# Patient Record
Sex: FEMALE | Race: WHITE | NOT HISPANIC OR LATINO | Employment: UNEMPLOYED | URBAN - METROPOLITAN AREA
[De-identification: names, ages, dates, MRNs, and addresses within clinical notes are randomized per-mention and may not be internally consistent; named-entity substitution may affect disease eponyms.]

---

## 2022-02-08 ENCOUNTER — HISTORICAL (OUTPATIENT)
Dept: ADMINISTRATIVE | Facility: HOSPITAL | Age: 62
End: 2022-02-08

## 2022-12-12 ENCOUNTER — HOSPITAL ENCOUNTER (EMERGENCY)
Facility: HOSPITAL | Age: 62
Discharge: HOME | End: 2022-12-12
Attending: STUDENT IN AN ORGANIZED HEALTH CARE EDUCATION/TRAINING PROGRAM
Payer: MEDICAID

## 2022-12-12 VITALS
SYSTOLIC BLOOD PRESSURE: 182 MMHG | WEIGHT: 145 LBS | DIASTOLIC BLOOD PRESSURE: 111 MMHG | HEART RATE: 91 BPM | TEMPERATURE: 98 F | OXYGEN SATURATION: 98 % | RESPIRATION RATE: 17 BRPM

## 2022-12-12 DIAGNOSIS — T40.711A UNINTENTIONAL POISONING BY CANNABIS, INITIAL ENCOUNTER: Primary | ICD-10-CM

## 2022-12-12 DIAGNOSIS — R51.9 NONINTRACTABLE HEADACHE, UNSPECIFIED CHRONICITY PATTERN, UNSPECIFIED HEADACHE TYPE: ICD-10-CM

## 2022-12-12 DIAGNOSIS — R11.2 NAUSEA AND VOMITING, UNSPECIFIED VOMITING TYPE: ICD-10-CM

## 2022-12-12 DIAGNOSIS — J18.9 PNEUMONIA OF LOWER LOBE DUE TO INFECTIOUS ORGANISM, UNSPECIFIED LATERALITY: ICD-10-CM

## 2022-12-12 LAB
ALBUMIN SERPL-MCNC: 4.2 GM/DL (ref 3.4–4.8)
ALBUMIN/GLOB SERPL: 1 RATIO (ref 1.1–2)
ALP SERPL-CCNC: 89 UNIT/L (ref 40–150)
ALT SERPL-CCNC: 44 UNIT/L (ref 0–55)
AST SERPL-CCNC: 72 UNIT/L (ref 5–34)
BASOPHILS # BLD AUTO: 0.05 X10(3)/MCL (ref 0–0.2)
BASOPHILS NFR BLD AUTO: 0.7 %
BILIRUBIN DIRECT+TOT PNL SERPL-MCNC: 0.7 MG/DL
BNP BLD-MCNC: 28.2 PG/ML
BUN SERPL-MCNC: 9 MG/DL (ref 9.8–20.1)
CALCIUM SERPL-MCNC: 9 MG/DL (ref 8.4–10.2)
CHLORIDE SERPL-SCNC: 108 MMOL/L (ref 98–107)
CO2 SERPL-SCNC: 21 MMOL/L (ref 23–31)
CREAT SERPL-MCNC: 0.67 MG/DL (ref 0.55–1.02)
EOSINOPHIL # BLD AUTO: 0.01 X10(3)/MCL (ref 0–0.9)
EOSINOPHIL NFR BLD AUTO: 0.1 %
ERYTHROCYTE [DISTWIDTH] IN BLOOD BY AUTOMATED COUNT: 16.2 % (ref 11.5–17)
FLUAV AG UPPER RESP QL IA.RAPID: NOT DETECTED
FLUBV AG UPPER RESP QL IA.RAPID: NOT DETECTED
GFR SERPLBLD CREATININE-BSD FMLA CKD-EPI: >60 MLS/MIN/1.73/M2
GLOBULIN SER-MCNC: 4.1 GM/DL (ref 2.4–3.5)
GLUCOSE SERPL-MCNC: 114 MG/DL (ref 82–115)
HCT VFR BLD AUTO: 41.6 % (ref 37–47)
HGB BLD-MCNC: 13.3 GM/DL (ref 12–16)
IMM GRANULOCYTES # BLD AUTO: 0.02 X10(3)/MCL (ref 0–0.04)
IMM GRANULOCYTES NFR BLD AUTO: 0.3 %
LYMPHOCYTES # BLD AUTO: 0.69 X10(3)/MCL (ref 0.6–4.6)
LYMPHOCYTES NFR BLD AUTO: 9.3 %
MCH RBC QN AUTO: 27.9 PG (ref 27–31)
MCHC RBC AUTO-ENTMCNC: 32 MG/DL (ref 33–36)
MCV RBC AUTO: 87.2 FL (ref 80–94)
MONOCYTES # BLD AUTO: 0.58 X10(3)/MCL (ref 0.1–1.3)
MONOCYTES NFR BLD AUTO: 7.8 %
NEUTROPHILS # BLD AUTO: 6 X10(3)/MCL (ref 2.1–9.2)
NEUTROPHILS NFR BLD AUTO: 81.8 %
NRBC BLD AUTO-RTO: 0 %
PLATELET # BLD AUTO: 197 X10(3)/MCL (ref 130–400)
PMV BLD AUTO: 8.8 FL (ref 7.4–10.4)
POTASSIUM SERPL-SCNC: 3.8 MMOL/L (ref 3.5–5.1)
PROT SERPL-MCNC: 8.3 GM/DL (ref 5.8–7.6)
RBC # BLD AUTO: 4.77 X10(6)/MCL (ref 4.2–5.4)
RSV A 5' UTR RNA NPH QL NAA+PROBE: NOT DETECTED
SARS-COV-2 RNA RESP QL NAA+PROBE: NOT DETECTED
SODIUM SERPL-SCNC: 145 MMOL/L (ref 136–145)
TROPONIN I SERPL-MCNC: <0.01 NG/ML (ref 0–0.04)
WBC # SPEC AUTO: 7.4 X10(3)/MCL (ref 4.5–11.5)

## 2022-12-12 PROCEDURE — 84484 ASSAY OF TROPONIN QUANT: CPT | Performed by: STUDENT IN AN ORGANIZED HEALTH CARE EDUCATION/TRAINING PROGRAM

## 2022-12-12 PROCEDURE — 99285 EMERGENCY DEPT VISIT HI MDM: CPT | Mod: 25

## 2022-12-12 PROCEDURE — 93005 ELECTROCARDIOGRAM TRACING: CPT

## 2022-12-12 PROCEDURE — 63600175 PHARM REV CODE 636 W HCPCS: Performed by: STUDENT IN AN ORGANIZED HEALTH CARE EDUCATION/TRAINING PROGRAM

## 2022-12-12 PROCEDURE — 96375 TX/PRO/DX INJ NEW DRUG ADDON: CPT

## 2022-12-12 PROCEDURE — 0241U COVID/RSV/FLU A&B PCR: CPT | Performed by: STUDENT IN AN ORGANIZED HEALTH CARE EDUCATION/TRAINING PROGRAM

## 2022-12-12 PROCEDURE — 83880 ASSAY OF NATRIURETIC PEPTIDE: CPT | Performed by: STUDENT IN AN ORGANIZED HEALTH CARE EDUCATION/TRAINING PROGRAM

## 2022-12-12 PROCEDURE — 25000003 PHARM REV CODE 250: Performed by: STUDENT IN AN ORGANIZED HEALTH CARE EDUCATION/TRAINING PROGRAM

## 2022-12-12 PROCEDURE — 85025 COMPLETE CBC W/AUTO DIFF WBC: CPT | Performed by: STUDENT IN AN ORGANIZED HEALTH CARE EDUCATION/TRAINING PROGRAM

## 2022-12-12 PROCEDURE — 96361 HYDRATE IV INFUSION ADD-ON: CPT

## 2022-12-12 PROCEDURE — 96365 THER/PROPH/DIAG IV INF INIT: CPT

## 2022-12-12 PROCEDURE — 80053 COMPREHEN METABOLIC PANEL: CPT | Performed by: STUDENT IN AN ORGANIZED HEALTH CARE EDUCATION/TRAINING PROGRAM

## 2022-12-12 RX ORDER — DOXYCYCLINE 100 MG/1
100 CAPSULE ORAL 2 TIMES DAILY
Qty: 14 CAPSULE | Refills: 0 | Status: SHIPPED | OUTPATIENT
Start: 2022-12-12 | End: 2022-12-19

## 2022-12-12 RX ORDER — KETOROLAC TROMETHAMINE 30 MG/ML
15 INJECTION, SOLUTION INTRAMUSCULAR; INTRAVENOUS
Status: COMPLETED | OUTPATIENT
Start: 2022-12-12 | End: 2022-12-12

## 2022-12-12 RX ORDER — PROMETHAZINE HYDROCHLORIDE 25 MG/1
25 TABLET ORAL EVERY 6 HOURS PRN
Qty: 10 TABLET | Refills: 0 | Status: SHIPPED | OUTPATIENT
Start: 2022-12-12 | End: 2022-12-12 | Stop reason: SDUPTHER

## 2022-12-12 RX ORDER — PROMETHAZINE HYDROCHLORIDE 25 MG/1
25 SUPPOSITORY RECTAL EVERY 6 HOURS PRN
Qty: 10 SUPPOSITORY | Refills: 0 | Status: ON HOLD | OUTPATIENT
Start: 2022-12-12 | End: 2023-10-28 | Stop reason: HOSPADM

## 2022-12-12 RX ORDER — PROMETHAZINE HYDROCHLORIDE 25 MG/1
25 TABLET ORAL EVERY 6 HOURS PRN
Qty: 10 TABLET | Refills: 0 | Status: ON HOLD | OUTPATIENT
Start: 2022-12-12 | End: 2023-10-28 | Stop reason: HOSPADM

## 2022-12-12 RX ORDER — ONDANSETRON 2 MG/ML
4 INJECTION INTRAMUSCULAR; INTRAVENOUS
Status: COMPLETED | OUTPATIENT
Start: 2022-12-12 | End: 2022-12-12

## 2022-12-12 RX ORDER — ACETAMINOPHEN 500 MG
1000 TABLET ORAL
Status: DISCONTINUED | OUTPATIENT
Start: 2022-12-12 | End: 2022-12-13 | Stop reason: HOSPADM

## 2022-12-12 RX ORDER — DOXYCYCLINE 100 MG/1
100 CAPSULE ORAL 2 TIMES DAILY
Qty: 14 CAPSULE | Refills: 0 | Status: SHIPPED | OUTPATIENT
Start: 2022-12-12 | End: 2022-12-12 | Stop reason: SDUPTHER

## 2022-12-12 RX ORDER — PROMETHAZINE HYDROCHLORIDE 25 MG/1
25 SUPPOSITORY RECTAL EVERY 6 HOURS PRN
Qty: 10 SUPPOSITORY | Refills: 0 | Status: SHIPPED | OUTPATIENT
Start: 2022-12-12 | End: 2022-12-12 | Stop reason: SDUPTHER

## 2022-12-12 RX ADMIN — ONDANSETRON 4 MG: 2 INJECTION INTRAMUSCULAR; INTRAVENOUS at 09:12

## 2022-12-12 RX ADMIN — SODIUM CHLORIDE 1000 ML: 9 INJECTION, SOLUTION INTRAVENOUS at 09:12

## 2022-12-12 RX ADMIN — ONDANSETRON 4 MG: 2 INJECTION INTRAMUSCULAR; INTRAVENOUS at 07:12

## 2022-12-12 RX ADMIN — PROMETHAZINE HYDROCHLORIDE 25 MG: 25 INJECTION INTRAMUSCULAR; INTRAVENOUS at 10:12

## 2022-12-12 RX ADMIN — KETOROLAC TROMETHAMINE 15 MG: 30 INJECTION, SOLUTION INTRAMUSCULAR; INTRAVENOUS at 07:12

## 2022-12-12 NOTE — Clinical Note
Aayush Duffy accompanied their spouse to the emergency department on 12/12/2022. They may return to work on 12/13/2022.      If you have any questions or concerns, please don't hesitate to call.      VALERI Mcguire RN RN

## 2022-12-13 NOTE — ED PROVIDER NOTES
Encounter Date: 12/12/2022       History     Chief Complaint   Patient presents with    Shortness of Breath     Pt reports migraine headache, n/v, SOB onset today. +tachycardia, EKG ordered     62-year-old female presents to ED for migraines, nausea and vomiting.  Patient states she consumed a marijuana laced gummy earlier today.  States since this time she is had recurrent nausea and vomiting.  States she has some mild abdominal discomfort from the repetitive vomiting.  Only hurts when actively throwing up.  Denies any diarrhea or stool changes.  No urinary or pelvic complaints.  Does report chronic history of migraines and states this feels similar.  No neck pain or stiffness.  No fevers or chills.   Does report history of prior head trauma requiring neurosurgery.  Has been years since procedure. Has home ODT Zofran that was not effective in controlling her nausea.  Denies any acute vision changes or neuro deficits.  Concerned since she was unable to keep any food or liquids down.  No other complaints or concerns at this time.    Review of patient's allergies indicates:   Allergen Reactions    Hydrocodone      No past medical history on file.  No past surgical history on file.  No family history on file.     Review of Systems   Constitutional:  Negative for chills, diaphoresis and fever.   HENT:  Negative for congestion, rhinorrhea, sinus pain and sore throat.    Eyes:  Negative for pain, discharge and itching.   Respiratory:  Negative for cough, chest tightness and shortness of breath.    Cardiovascular:  Negative for chest pain and palpitations.   Gastrointestinal:  Positive for abdominal pain, nausea and vomiting. Negative for abdominal distention, anal bleeding, blood in stool, constipation, diarrhea and rectal pain.   Genitourinary:  Negative for dysuria, flank pain and hematuria.   Musculoskeletal:  Negative for back pain, myalgias, neck pain and neck stiffness.   Skin:  Negative for color change and rash.    Neurological:  Positive for headaches. Negative for dizziness, tremors, seizures, syncope, facial asymmetry, speech difficulty, weakness, light-headedness and numbness.   Psychiatric/Behavioral:  Negative for confusion. The patient is not hyperactive.      Physical Exam     Initial Vitals [12/12/22 1826]   BP Pulse Resp Temp SpO2   (!) 197/96 (!) 137 (!) 24 97.5 °F (36.4 °C) 98 %      MAP       --         Physical Exam    Vitals reviewed.  Constitutional: She is not diaphoretic. No distress.   Actively vomiting.   HENT:   Head: Normocephalic and atraumatic.   Eyes: Conjunctivae and EOM are normal. Pupils are equal, round, and reactive to light.   Neck: Neck supple. No tracheal deviation present.   Normal range of motion.  Cardiovascular:  Normal rate, regular rhythm, normal heart sounds and intact distal pulses.           Pulmonary/Chest: Breath sounds normal. No respiratory distress.   Abdominal: Abdomen is soft and flat. There is no abdominal tenderness.   No right CVA tenderness.  No left CVA tenderness. There is no rebound, no guarding, no tenderness at McBurney's point and negative Castrejon's sign. negative Rovsing's sign  Musculoskeletal:         General: Normal range of motion.      Cervical back: Normal range of motion and neck supple.     Neurological: She is alert and oriented to person, place, and time. She has normal strength. She displays no atrophy and no tremor. No cranial nerve deficit or sensory deficit. She exhibits normal muscle tone. She displays a negative Romberg sign. She displays no seizure activity. Coordination normal. GCS score is 15. GCS eye subscore is 4. GCS verbal subscore is 5. GCS motor subscore is 6.   +photophobia.   Skin: Skin is warm and dry. Capillary refill takes less than 2 seconds. No rash noted.   Psychiatric: She has a normal mood and affect. Her behavior is normal. Judgment and thought content normal.       ED Course   Procedures  Labs Reviewed   COMPREHENSIVE METABOLIC  PANEL - Abnormal; Notable for the following components:       Result Value    Chloride 108 (*)     Carbon Dioxide 21 (*)     Blood Urea Nitrogen 9.0 (*)     Protein Total 8.3 (*)     Globulin 4.1 (*)     Albumin/Globulin Ratio 1.0 (*)     Aspartate Aminotransferase 72 (*)     All other components within normal limits   CBC WITH DIFFERENTIAL - Abnormal; Notable for the following components:    MCHC 32.0 (*)     All other components within normal limits   COVID/RSV/FLU A&B PCR - Normal    Narrative:     The [employer] Xpress SARS-CoV-2/FLU/RSV plus is a rapid, multiplexed real-time PCR test intended for the simultaneous qualitative detection and differentiation of SARS-CoV-2, Influenza A, Influenza B, and respiratory syncytial virus (RSV) viral RNA in either nasopharyngeal swab or nasal swab specimens.         TROPONIN I - Normal   B-TYPE NATRIURETIC PEPTIDE - Normal   CBC W/ AUTO DIFFERENTIAL    Narrative:     The following orders were created for panel order CBC auto differential.  Procedure                               Abnormality         Status                     ---------                               -----------         ------                     CBC with Differential[[id number]]        Abnormal            Final result                 Please view results for these tests on the individual orders.   EXTRA TUBES    Narrative:     The following orders were created for panel order EXTRA TUBES.  Procedure                               Abnormality         Status                     ---------                               -----------         ------                     Light Blue Top Hold[[id number]]                              In process                 Red Top Hold[[id number]]                                     In process                   Please view results for these tests on the individual orders.   LIGHT BLUE TOP HOLD   RED TOP HOLD     EKG Readings: (Independently Interpreted)   Initial Reading: No STEMI. Rhythm:  Normal Sinus Rhythm. Ectopy: No Ectopy. Conduction: Normal. Axis: Normal. Other Findings: Prolonged QT Interval. Clinical Impression: Normal Sinus Rhythm   ECG Results              EKG 12-lead (Final result)  Result time 12/16/22 11:14:47      Final result by Interface, [facility] (12/16/22 11:14:47)                   Narrative:    Test Reason : R06.02,    Vent. Rate : 099 BPM     Atrial Rate : 099 BPM     P-R Int : 112 ms          QRS Dur : 090 ms      QT Int : 410 ms       P-R-T Axes : 068 036 069 degrees     QTc Int : 526 ms    Normal sinus rhythm  Prolonged QT  Abnormal ECG  No previous ECGs available  Confirmed by Fatou De La Torre MD ([id number]) on 12/16/2022 11:14:36 AM    Referred By: MARITZA   SELF           Confirmed By:Fatou De La Torre MD                                  Imaging Results              CT Head Without Contrast (Final result)  Result time 12/13/22 08:50:37      Final result by Kevyn Abebe MD (12/13/22 08:50:37)                   Impression:    Impression:    1. There is left parietal craniotomy with stable underlying dural thickening.    2. No acute intracranial process identified. Details and findings as noted above.    No significant discrepancy with overnight report.      Electronically signed by: Kevyn Abebe  Date:    12/13/2022  Time:    08:50               Narrative:      Technique:CT of the head was performed without intravenous contrast with axial as well as coronal and sagittal images.    Comparison:Comparison is with study dated2022-02-08 17:11:03.    DLP 9 3    Dosage Information:Automated exposure control was utilized.    Clinical history:Pt reports migraine headache, n/v, SOB onset today. +tachycardia.    Findings:    Hemorrhage:No acute intracranial hemorrhage is seen.    CSF spaces:The ventricles, sulci and basal cisterns all appear mildly prominent consistent with global cerebral atrophy.    Brain parenchyma:There is preservation of the grey white junction throughout. Mild  microvascular change is seen in portions of the periventricular and deep white matter tracts.    Cerebellum:Unremarkable.    Calvarium:There is left parietal craniotomy with stable underlying dural thickening.    Maxillofacial Structures:    Paranasal sinuses:There is opacification of the right maxillary sinus with sclerosis of the sinus wall. A soft tissue density is seen into the right maxillary sinus that extends to the nasal cavity through a large defect in the medial wall of the sinus. This may reflect a polyp. There is opacification of right anterior ethmoid air cell..                        Preliminary result by Kevyn Abebe MD (12/12/22 21:38:27)                   Narrative:    START OF REPORT:  Technique: CT of the head was performed without intravenous contrast with axial as well as coronal and sagittal images.    Comparison: Comparison is with study dated2022-02-08 17:11:03.    Dosage Information: Automated exposure control was utilized.    Clinical history: Pt reports migraine headache, n/v, SOB onset today. +tachycardia.    Findings:  Hemorrhage: No acute intracranial hemorrhage is seen.  CSF spaces: The ventricles, sulci and basal cisterns all appear mildly prominent consistent with global cerebral atrophy.  Brain parenchyma: There is preservation of the grey white junction throughout. Mild microvascular change is seen in portions of the periventricular and deep white matter tracts.  Cerebellum: Unremarkable.  Sella and skull base: The sella appears to be within normal limits for age.  Herniation: None.  Intracranial calcifications: Incidental note is made of bilateral choroid plexus calcification. Incidental note is made of some pineal region calcification.  Calvarium: There is left parietal craniotomy with stable underlying dural thickening.    Maxillofacial Structures:  Paranasal sinuses: There is opacification of the right maxillary sinus with sclerosis of the sinus wall. A soft tissue density  is seen into the right maxillary sinus that extends to the nasal cavity through a large defect in the medial wall of the sinus. This may reflect a polyp. There is opacification of right anterior ethmoid air cell.  Orbits: The orbits appear unremarkable.  Zygomatic arches: The zygomatic arches are intact and unremarkable.  Temporal bones and mastoids: The temporal bones and mastoids appear unremarkable.  TMJ: The mandibular condyles appear normally placed with respect to the mandibular fossa.      Impression:  1. There is left parietal craniotomy with stable underlying dural thickening.  2. No acute intracranial process identified. Details and findings as noted above.                          Preliminary result by Mahesh Posada Jr., MD (12/12/22 21:38:27)                   Narrative:    START OF REPORT:  Technique: CT of the head was performed without intravenous contrast with axial as well as coronal and sagittal images.    Comparison: Comparison is with study [id number] 17:11:03.    Dosage Information: Automated exposure control was utilized.    Clinical history: Pt reports migraine headache, n/v, SOB onset today. +tachycardia.    Findings:  Hemorrhage: No acute intracranial hemorrhage is seen.  CSF spaces: The ventricles, sulci and basal cisterns all appear mildly prominent consistent with global cerebral atrophy.  Brain parenchyma: There is preservation of the grey white junction throughout. Mild microvascular change is seen in portions of the periventricular and deep white matter tracts.  Cerebellum: Unremarkable.  Sella and skull base: The sella appears to be within normal limits for age.  Herniation: None.  Intracranial calcifications: Incidental note is made of bilateral choroid plexus calcification. Incidental note is made of some pineal region calcification.  Calvarium: There is left parietal craniotomy with stable underlying dural thickening.    Maxillofacial Structures:  Paranasal sinuses:  There is opacification of the right maxillary sinus with sclerosis of the sinus wall. A soft tissue density is seen into the right maxillary sinus that extends to the nasal cavity through a large defect in the medial wall of the sinus. This may reflect a polyp. There is opacification of right anterior ethmoid air cell.  Orbits: The orbits appear unremarkable.  Zygomatic arches: The zygomatic arches are intact and unremarkable.  Temporal bones and mastoids: The temporal bones and mastoids appear unremarkable.  TMJ: The mandibular condyles appear normally placed with respect to the mandibular fossa.      Impression:  1. There is left parietal craniotomy with stable underlying dural thickening.  2. No acute intracranial process identified. Details and findings as noted above.                                         X-Ray Chest AP Portable (Final result)  Result time 12/12/22 19:08:29      Final result by Gildardo Tracy MD (12/12/22 19:08:29)                   Impression:      Left basilar opacities, question infection.      Electronically signed by: Gildardo Tracy  Date:    12/12/2022  Time:    19:08               Narrative:    EXAMINATION:  XR CHEST AP PORTABLE    CLINICAL HISTORY:  Chest Pain;    COMPARISON:  19 February 2021    FINDINGS:  Portable frontal view of the chest was obtained. Heart is not enlarged.  Mild left basilar opacities.  No pneumothorax.                                       Medications   ketorolac injection 15 mg (15 mg Intravenous Given 12/12/22 1958)   ondansetron injection 4 mg (4 mg Intravenous Given 12/12/22 1958)   ondansetron injection 4 mg (4 mg Intravenous Given 12/12/22 2113)   sodium chloride 0.9% bolus 1,000 mL (0 mLs Intravenous Stopped 12/12/22 2213)   promethazine (PHENERGAN) 25 mg in dextrose 5 % 50 mL IVPB (0 mg Intravenous Stopped 12/12/22 2227)     Medical Decision Making:   Clinical Tests:   Lab Tests: Reviewed and Ordered  Radiological Study: Ordered and Reviewed  Medical  Tests: Reviewed and Ordered  ED Management:  62-year-old female presents to ED for nausea vomiting and headache after consuming a cannabis edible.  Patient reports history of headaches and prior Neurosurgery but states her head discomfort feels baseline.  Denies any neuro deficits. reporting a mild abdominal discomfort secondary to repetitive vomiting.  Did try Zofran without relief.  On arrival was hypertensive otherwise vitals stable.  Afebrile.  Her comprehensive neuro assessment was grossly unremarkable. no focal deficits, no nuchal rigidity, did have some mild photophobia initially.  Her abdomen for me was soft without rebound guarding rigidity. did have recurrent nausea and vomiting in department. laboratory analysis including labs, cardiac markers, etc were grossly unremarkable.  EKG prolonged QT but no acute ischemic changes. secondary to the patient's surgical history and presenting signs and symptoms I did perform head CT that was read negative acute per Radiology.  Provided multiple antiemetics to the patient including with near-complete resolution of her symptoms prior to discharge. much more comfortable and very grateful for care.  She is to follow up closely in the outpatient setting and avoid cannabis products in the future.  Voiced understanding and ultimately discharged stable.  Strict return precautions provided. (Zmora)                         Clinical Impression:   Final diagnoses:  [T40.711A] Unintentional poisoning by cannabis, initial encounter (Primary)  [R11.2] Nausea and vomiting, unspecified vomiting type  [R51.9] Nonintractable headache, unspecified chronicity pattern, unspecified headache type  [J18.9] Pneumonia of lower lobe due to infectious organism, unspecified laterality        ED Disposition Condition    Discharge Stable          ED Prescriptions       Medication Sig Dispense Start Date End Date Auth. Provider    promethazine (PHENERGAN) 25 MG suppository  (Status: Discontinued)  Place 1 suppository (25 mg total) rectally every 6 (six) hours as needed for Nausea. 10 suppository 12/12/2022 12/12/2022 Chapin Courtney MD    promethazine (PHENERGAN) 25 MG tablet  (Status: Discontinued) Take 1 tablet (25 mg total) by mouth every 6 (six) hours as needed for Nausea. 10 tablet 12/12/2022 12/12/2022 Chapin Courtney MD    doxycycline (VIBRAMYCIN) 100 MG Cap  (Status: Discontinued) Take 1 capsule (100 mg total) by mouth 2 (two) times daily. for 7 days 14 capsule 12/12/2022 12/12/2022 Chapin Courtney MD    doxycycline (VIBRAMYCIN) 100 MG Cap Take 1 capsule (100 mg total) by mouth 2 (two) times daily. for 7 days 14 capsule 12/12/2022 12/19/2022 Chapin Courtney MD    promethazine (PHENERGAN) 25 MG suppository Place 1 suppository (25 mg total) rectally every 6 (six) hours as needed for Nausea. 10 suppository 12/12/2022 -- Chapin Courtney MD    promethazine (PHENERGAN) 25 MG tablet Take 1 tablet (25 mg total) by mouth every 6 (six) hours as needed for Nausea. 10 tablet 12/12/2022 -- Chapin Courtney MD          Follow-up Information       Follow up With Specialties Details Why Contact Info    [facility] Dept Emergency Medicine  As needed, If symptoms worsen [address] [facility] [address] [phone]    Primary  Schedule an appointment as soon as possible for a visit in 3 days               Chapin Courtney MD  12/17/22 3266

## 2023-10-12 ENCOUNTER — HOSPITAL ENCOUNTER (INPATIENT)
Facility: HOSPITAL | Age: 63
LOS: 13 days | Discharge: ANOTHER HOSPITAL | DRG: 871 | End: 2023-10-25
Attending: EMERGENCY MEDICINE | Admitting: INTERNAL MEDICINE
Payer: MEDICAID

## 2023-10-12 DIAGNOSIS — R91.8 PULMONARY INFILTRATE: ICD-10-CM

## 2023-10-12 DIAGNOSIS — E87.6 ACUTE HYPOKALEMIA: ICD-10-CM

## 2023-10-12 DIAGNOSIS — K76.9 PLEURAL EFFUSION ASSOCIATED WITH HEPATIC DISORDER: ICD-10-CM

## 2023-10-12 DIAGNOSIS — E87.1 HYPONATREMIA: Primary | ICD-10-CM

## 2023-10-12 DIAGNOSIS — R17 JAUNDICE: ICD-10-CM

## 2023-10-12 DIAGNOSIS — J90 PLEURAL EFFUSION ON RIGHT: ICD-10-CM

## 2023-10-12 DIAGNOSIS — F10.11 HISTORY OF ALCOHOL ABUSE: ICD-10-CM

## 2023-10-12 DIAGNOSIS — J91.8 PLEURAL EFFUSION ASSOCIATED WITH HEPATIC DISORDER: ICD-10-CM

## 2023-10-12 DIAGNOSIS — K70.31 ASCITES DUE TO ALCOHOLIC CIRRHOSIS: ICD-10-CM

## 2023-10-12 LAB
ALBUMIN SERPL-MCNC: 1.7 G/DL (ref 3.4–4.8)
ALBUMIN/GLOB SERPL: 0.4 RATIO (ref 1.1–2)
ALP SERPL-CCNC: 180 UNIT/L (ref 40–150)
ALT SERPL-CCNC: 36 UNIT/L (ref 0–55)
AMMONIA PLAS-MSCNC: 54.7 UMOL/L (ref 18–72)
APPEARANCE UR: ABNORMAL
APTT PPP: 42 SECONDS (ref 23.2–33.7)
AST SERPL-CCNC: 120 UNIT/L (ref 5–34)
BACTERIA #/AREA URNS AUTO: ABNORMAL /HPF
BASOPHILS # BLD AUTO: 0.08 X10(3)/MCL
BASOPHILS NFR BLD AUTO: 0.4 %
BILIRUB CRY URNS QL MICRO: ABNORMAL /HPF
BILIRUB SERPL-MCNC: 11.6 MG/DL
BILIRUB UR QL STRIP.AUTO: ABNORMAL
BNP BLD-MCNC: 36.3 PG/ML
BUN SERPL-MCNC: 9.6 MG/DL (ref 9.8–20.1)
CALCIUM SERPL-MCNC: 7.4 MG/DL (ref 8.4–10.2)
CHLORIDE SERPL-SCNC: 88 MMOL/L (ref 98–107)
CO2 SERPL-SCNC: 23 MMOL/L (ref 23–31)
COLOR UR AUTO: ABNORMAL
CREAT SERPL-MCNC: 0.79 MG/DL (ref 0.55–1.02)
EOSINOPHIL # BLD AUTO: 0.33 X10(3)/MCL (ref 0–0.9)
EOSINOPHIL NFR BLD AUTO: 1.6 %
ERYTHROCYTE [DISTWIDTH] IN BLOOD BY AUTOMATED COUNT: 19.5 % (ref 11.5–17)
ETHANOL SERPL-MCNC: <10 MG/DL
GFR SERPLBLD CREATININE-BSD FMLA CKD-EPI: >60 MLS/MIN/1.73/M2
GLOBULIN SER-MCNC: 3.9 GM/DL (ref 2.4–3.5)
GLUCOSE SERPL-MCNC: 101 MG/DL (ref 82–115)
GLUCOSE UR QL STRIP.AUTO: NEGATIVE
HCT VFR BLD AUTO: 31.8 % (ref 37–47)
HGB BLD-MCNC: 11.4 G/DL (ref 12–16)
IMM GRANULOCYTES # BLD AUTO: 0.3 X10(3)/MCL (ref 0–0.04)
IMM GRANULOCYTES NFR BLD AUTO: 1.5 %
INR PPP: 2.2
KETONES UR QL STRIP.AUTO: NEGATIVE
LACTATE SERPL-SCNC: 3.4 MMOL/L (ref 0.5–2.2)
LACTATE SERPL-SCNC: 3.4 MMOL/L (ref 0.5–2.2)
LEUKOCYTE ESTERASE UR QL STRIP.AUTO: ABNORMAL
LIPASE SERPL-CCNC: 25 U/L
LYMPHOCYTES # BLD AUTO: 1.71 X10(3)/MCL (ref 0.6–4.6)
LYMPHOCYTES NFR BLD AUTO: 8.5 %
MAGNESIUM SERPL-MCNC: 1.8 MG/DL (ref 1.6–2.6)
MCH RBC QN AUTO: 29.4 PG (ref 27–31)
MCHC RBC AUTO-ENTMCNC: 35.8 G/DL (ref 33–36)
MCV RBC AUTO: 82 FL (ref 80–94)
MONOCYTES # BLD AUTO: 1.87 X10(3)/MCL (ref 0.1–1.3)
MONOCYTES NFR BLD AUTO: 9.3 %
NEUTROPHILS # BLD AUTO: 15.88 X10(3)/MCL (ref 2.1–9.2)
NEUTROPHILS NFR BLD AUTO: 78.7 %
NITRITE UR QL STRIP.AUTO: POSITIVE
NRBC BLD AUTO-RTO: 0 %
OSMOLALITY SERPL: 261 MOSM/KG (ref 280–300)
PH UR STRIP.AUTO: 6 [PH]
PLATELET # BLD AUTO: 168 X10(3)/MCL (ref 130–400)
PMV BLD AUTO: 8.1 FL (ref 7.4–10.4)
POTASSIUM SERPL-SCNC: 3 MMOL/L (ref 3.5–5.1)
PROT SERPL-MCNC: 5.6 GM/DL (ref 5.8–7.6)
PROT UR QL STRIP.AUTO: NEGATIVE
PROTHROMBIN TIME: 24.3 SECONDS (ref 12.5–14.5)
RBC # BLD AUTO: 3.88 X10(6)/MCL (ref 4.2–5.4)
RBC #/AREA URNS AUTO: <5 /HPF
RBC UR QL AUTO: NEGATIVE
SODIUM SERPL-SCNC: 122 MMOL/L (ref 136–145)
SP GR UR STRIP.AUTO: 1.02 (ref 1–1.03)
SQUAMOUS #/AREA URNS AUTO: <5 /HPF
TROPONIN I SERPL-MCNC: <0.01 NG/ML (ref 0–0.04)
UROBILINOGEN UR STRIP-ACNC: 2
WBC # SPEC AUTO: 20.17 X10(3)/MCL (ref 4.5–11.5)
WBC #/AREA URNS AUTO: <5 /HPF

## 2023-10-12 PROCEDURE — 25500020 PHARM REV CODE 255: Performed by: EMERGENCY MEDICINE

## 2023-10-12 PROCEDURE — 63600175 PHARM REV CODE 636 W HCPCS: Performed by: INTERNAL MEDICINE

## 2023-10-12 PROCEDURE — 96375 TX/PRO/DX INJ NEW DRUG ADDON: CPT

## 2023-10-12 PROCEDURE — 25000003 PHARM REV CODE 250: Performed by: INTERNAL MEDICINE

## 2023-10-12 PROCEDURE — 80053 COMPREHEN METABOLIC PANEL: CPT | Performed by: EMERGENCY MEDICINE

## 2023-10-12 PROCEDURE — 99291 CRITICAL CARE FIRST HOUR: CPT

## 2023-10-12 PROCEDURE — 96361 HYDRATE IV INFUSION ADD-ON: CPT

## 2023-10-12 PROCEDURE — 82140 ASSAY OF AMMONIA: CPT | Performed by: EMERGENCY MEDICINE

## 2023-10-12 PROCEDURE — 63600175 PHARM REV CODE 636 W HCPCS: Performed by: EMERGENCY MEDICINE

## 2023-10-12 PROCEDURE — 93010 ELECTROCARDIOGRAM REPORT: CPT | Mod: ,,, | Performed by: INTERNAL MEDICINE

## 2023-10-12 PROCEDURE — 96365 THER/PROPH/DIAG IV INF INIT: CPT

## 2023-10-12 PROCEDURE — 93010 EKG 12-LEAD: ICD-10-PCS | Mod: ,,, | Performed by: INTERNAL MEDICINE

## 2023-10-12 PROCEDURE — 82077 ASSAY SPEC XCP UR&BREATH IA: CPT | Performed by: INTERNAL MEDICINE

## 2023-10-12 PROCEDURE — 93005 ELECTROCARDIOGRAM TRACING: CPT

## 2023-10-12 PROCEDURE — 25000003 PHARM REV CODE 250: Performed by: EMERGENCY MEDICINE

## 2023-10-12 PROCEDURE — 83735 ASSAY OF MAGNESIUM: CPT | Performed by: EMERGENCY MEDICINE

## 2023-10-12 PROCEDURE — 80074 ACUTE HEPATITIS PANEL: CPT | Performed by: EMERGENCY MEDICINE

## 2023-10-12 PROCEDURE — 83605 ASSAY OF LACTIC ACID: CPT | Performed by: EMERGENCY MEDICINE

## 2023-10-12 PROCEDURE — 84484 ASSAY OF TROPONIN QUANT: CPT | Performed by: EMERGENCY MEDICINE

## 2023-10-12 PROCEDURE — 85025 COMPLETE CBC W/AUTO DIFF WBC: CPT | Performed by: EMERGENCY MEDICINE

## 2023-10-12 PROCEDURE — 85730 THROMBOPLASTIN TIME PARTIAL: CPT | Performed by: INTERNAL MEDICINE

## 2023-10-12 PROCEDURE — 85610 PROTHROMBIN TIME: CPT | Performed by: INTERNAL MEDICINE

## 2023-10-12 PROCEDURE — 83880 ASSAY OF NATRIURETIC PEPTIDE: CPT | Performed by: EMERGENCY MEDICINE

## 2023-10-12 PROCEDURE — 81001 URINALYSIS AUTO W/SCOPE: CPT | Performed by: EMERGENCY MEDICINE

## 2023-10-12 PROCEDURE — 21400001 HC TELEMETRY ROOM

## 2023-10-12 PROCEDURE — 83930 ASSAY OF BLOOD OSMOLALITY: CPT | Performed by: INTERNAL MEDICINE

## 2023-10-12 PROCEDURE — 11000001 HC ACUTE MED/SURG PRIVATE ROOM

## 2023-10-12 PROCEDURE — 87040 BLOOD CULTURE FOR BACTERIA: CPT | Performed by: EMERGENCY MEDICINE

## 2023-10-12 PROCEDURE — 83690 ASSAY OF LIPASE: CPT | Performed by: EMERGENCY MEDICINE

## 2023-10-12 RX ORDER — SODIUM CHLORIDE 9 MG/ML
1000 INJECTION, SOLUTION INTRAVENOUS
Status: COMPLETED | OUTPATIENT
Start: 2023-10-12 | End: 2023-10-12

## 2023-10-12 RX ORDER — POTASSIUM CHLORIDE 14.9 MG/ML
20 INJECTION INTRAVENOUS
Status: DISCONTINUED | OUTPATIENT
Start: 2023-10-12 | End: 2023-10-12

## 2023-10-12 RX ORDER — POTASSIUM CHLORIDE 14.9 MG/ML
20 INJECTION INTRAVENOUS ONCE
Status: COMPLETED | OUTPATIENT
Start: 2023-10-12 | End: 2023-10-13

## 2023-10-12 RX ADMIN — CEFTRIAXONE SODIUM 1 G: 1 INJECTION, POWDER, FOR SOLUTION INTRAMUSCULAR; INTRAVENOUS at 09:10

## 2023-10-12 RX ADMIN — SODIUM CHLORIDE 1000 ML: 9 INJECTION, SOLUTION INTRAVENOUS at 07:10

## 2023-10-12 RX ADMIN — DOXYCYCLINE 100 MG: 100 INJECTION, POWDER, LYOPHILIZED, FOR SOLUTION INTRAVENOUS at 11:10

## 2023-10-12 RX ADMIN — SODIUM CHLORIDE 1000 ML: 9 INJECTION, SOLUTION INTRAVENOUS at 05:10

## 2023-10-12 RX ADMIN — PIPERACILLIN AND TAZOBACTAM 4.5 G: 4; .5 INJECTION, POWDER, FOR SOLUTION INTRAVENOUS at 07:10

## 2023-10-12 RX ADMIN — POTASSIUM CHLORIDE 20 MEQ: 14.9 INJECTION, SOLUTION INTRAVENOUS at 08:10

## 2023-10-12 RX ADMIN — IOPAMIDOL 100 ML: 755 INJECTION, SOLUTION INTRAVENOUS at 07:10

## 2023-10-12 NOTE — ED PROVIDER NOTES
Encounter Date: 10/12/2023    SCRIBE #1 NOTE: I, Ceci Olguin, am scribing for, and in the presence of,  Faizan Jurado MD. I have scribed the following portions of the note - Other sections scribed: HPI, ROS, PE.       History     Chief Complaint   Patient presents with    Abdominal Pain     AASi with generalized weakness, dec appetite, jaundice x2-3 days. Denies N/V/D.     63 year old female presents to the ED via EMS for generalized weakness onset 1 week ago. EMS noted to be jaundiced upon arrival. Patient states all of her limbs are week and she cannot remember the last time she walked. Patient reports nausea, abdominal pain, and decreased PO intake.  Patient was asked if she consumes alcohol and she says that she does not but she does state she has a history of alcohol abuse.    The history is provided by the patient. No  was used.     Review of patient's allergies indicates:   Allergen Reactions    Hydrocodone      No past medical history on file.  No past surgical history on file.  No family history on file.     Review of Systems   Constitutional:  Positive for appetite change.   Gastrointestinal:  Positive for abdominal pain and nausea.   Neurological:  Positive for weakness.       Physical Exam     Initial Vitals [10/12/23 1654]   BP Pulse Resp Temp SpO2   (!) 94/56 104 18 97.7 °F (36.5 °C) 99 %      MAP       --         Physical Exam    Nursing note and vitals reviewed.  Constitutional: No distress.   Jaundice  Very weak  Somewhat confused   HENT:   Head: Normocephalic and atraumatic.   Slightly dry mucous membranes   Eyes: EOM are normal.   Scleral icterus   Neck: Trachea normal. Neck supple.   Normal range of motion.  Cardiovascular:            No murmur  Tachycardic   Irregular rhythm   Pulmonary/Chest: Breath sounds normal. No respiratory distress.   Lungs clear   Abdominal: Abdomen is soft. Bowel sounds are normal. She exhibits no distension.   General abdominal tenderness  to palpation There is no rebound and no guarding.   Musculoskeletal:         General: Normal range of motion.      Cervical back: Normal range of motion and neck supple.      Lumbar back: Normal range of motion.      Comments: Lifts all extremities against gravity     Neurological: She is alert and oriented to person, place, and time. She has normal strength.   Answers questions appropriately  No aphasia  No facial droop   Skin: Skin is warm and dry. No rash noted.   Psychiatric: She has a normal mood and affect.         ED Course   Critical Care    Date/Time: 10/12/2023 8:42 PM    Performed by: Faizan Jurado MD  Authorized by: Faizan Jurado MD  Direct patient critical care time: 30 minutes  Additional history critical care time: 5 minutes  Ordering / reviewing critical care time: 10 minutes  Documentation critical care time: 15 minutes  Consulting other physicians critical care time: 10 minutes  Total critical care time (exclusive of procedural time) : 70 minutes  Critical care time was exclusive of separately billable procedures and treating other patients and teaching time.  Critical care was necessary to treat or prevent imminent or life-threatening deterioration of the following conditions: dehydration, metabolic crisis, sepsis, CNS failure or compromise and circulatory failure.  Critical care was time spent personally by me on the following activities: discussions with primary provider, development of treatment plan with patient or surrogate, interpretation of cardiac output measurements, evaluation of patient's response to treatment, examination of patient, obtaining history from patient or surrogate, ordering and performing treatments and interventions, ordering and review of laboratory studies, ordering and review of radiographic studies, pulse oximetry, re-evaluation of patient's condition and review of old charts.        Labs Reviewed   COMPREHENSIVE METABOLIC PANEL - Abnormal; Notable for the  following components:       Result Value    Sodium Level 122 (*)     Potassium Level 3.0 (*)     Chloride 88 (*)     Blood Urea Nitrogen 9.6 (*)     Calcium Level Total 7.4 (*)     Protein Total 5.6 (*)     Albumin Level 1.7 (*)     Globulin 3.9 (*)     Albumin/Globulin Ratio 0.4 (*)     Bilirubin Total 11.6 (*)     Alkaline Phosphatase 180 (*)     Aspartate Aminotransferase 120 (*)     All other components within normal limits   CBC WITH DIFFERENTIAL - Abnormal; Notable for the following components:    WBC 20.17 (*)     RBC 3.88 (*)     Hgb 11.4 (*)     Hct 31.8 (*)     RDW 19.5 (*)     Neut # 15.88 (*)     Mono # 1.87 (*)     IG# 0.30 (*)     All other components within normal limits   LACTIC ACID, PLASMA - Abnormal; Notable for the following components:    Lactic Acid Level 3.4 (*)     All other components within normal limits   LACTIC ACID, PLASMA - Abnormal; Notable for the following components:    Lactic Acid Level 3.4 (*)     All other components within normal limits   LIPASE - Normal   TROPONIN I - Normal   AMMONIA - Normal   MAGNESIUM - Normal   B-TYPE NATRIURETIC PEPTIDE - Normal   BLOOD CULTURE [facility]   BLOOD CULTURE [facility]   CBC W/ AUTO DIFFERENTIAL    Narrative:     The following orders were created for panel order CBC auto differential.  Procedure                               Abnormality         Status                     ---------                               -----------         ------                     CBC with Differential[[id number]]        Abnormal            Final result                 Please view results for these tests on the individual orders.   URINALYSIS, REFLEX TO URINE CULTURE   HEPATITIS PANEL, ACUTE   ALCOHOL,MEDICAL (ETHANOL)   PROTIME-INR   APTT   OSMOLALITY, SERUM     EKG Readings: (Independently Interpreted)   Initial Reading: No STEMI. Rhythm: Normal Sinus Rhythm. Heart Rate: 100. Ectopy: No Ectopy. Conduction: Normal. ST Segments: Normal ST Segments. T Waves: Normal. Axis: Normal.  Other Findings: Prolonged QT Interval.       Imaging Results              CT Abdomen Pelvis With Contrast (Final result)  Result time 10/12/23 19:33:53      Final result by Kevyn Abebe MD (10/12/23 19:33:53)                   Impression:      1. Right pleural effusion and right lower lung lobe consolidation.    2. Hepatic cirrhotic morphology and ascites.    3. Mural thickening of small bowel loops suggest nonspecific enteritis.      Electronically signed by: Kevyn Abebe  Date:    10/12/2023  Time:    19:33               Narrative:    EXAMINATION:  CT ABDOMEN PELVIS WITH CONTRAST    CLINICAL HISTORY:  Sepsis;Jaundiced;    TECHNIQUE:  Multidetector axial images were obtained of the abdomen and pelvis following the administration of IV contrast. Oral contrast was not administered.    Dose length product of 322 mGycm. Automated exposure control was utilized to minimize radiation dose.    COMPARISON:  None available    FINDINGS:  There is moderate volume right pleural effusion and right lower lung lobe dense consolidation.  There is also small left pleural and left basilar atelectasis.  Bilateral mammary implants.    There is hepatic cirrhotic morphology with low attenuation and surface nodularity.  No focal hepatic space-occupying lesion.  There is intra-abdominopelvic small to moderate free fluid consistent with ascites.  Gallbladder is distended without intraluminal calcified calculus.  No significant dilatation of the intrahepatic biliary radicles and the extrahepatic duct is also not distended without calcified choledocholithiasis.  No no acute findings of the pancreas or the spleen.    The adrenal glands appear within normal limits. The kidneys are unremarkable in size and contour. No solid or cystic renal lesion identified. There is no hydronephrosis. No perinephric fluid strandings or collections identified.    Stomach is mostly decompressed and difficult to assess.  There is suspected mild mural  thickening of the loops of small bowel suspect for enteritis.  No transition or bowel obstruction.  Colon is nondistended.  Noninflamed diverticulosis coli.  No pneumoperitoneum.    Urinary bladder appears within normal limits.  Small volume uterus.  Endometrium is not well delineated.  There is no pelvic free fluid.    Lower lumbar degenerative changes.  No acute or aggressive skeletal abnormality no acute or otherwise osseous abnormality identified.                                       CT Head Without Contrast (Final result)  Result time 10/12/23 19:26:14      Final result by Kevyn Abebe MD (10/12/23 19:26:14)                   Impression:      No acute intracranial findings identified.      Electronically signed by: Kevyn Abebe  Date:    10/12/2023  Time:    19:26               Narrative:    EXAMINATION:  CT HEAD WITHOUT CONTRAST    CLINICAL HISTORY:  Infusion;    TECHNIQUE:  Sequential axial images were performed of the brain without contrast.    Dose product length of 884 mGycm. Automated exposure control was utilized to minimize radiation dose.    COMPARISON:  February 8, 2022.    FINDINGS:  There is no intracranial mass effect, midline shift, hydrocephalus or hemorrhage. There is no sulcal effacement or low attenuation changes to suggest recent large vessel territory infarction. Chronic appearing periventricular and subcortical white matter low attenuation changes are present and are consistent with chronic microangiopathic ischemia.  Beneath left craniotomy is similar appearing dural thickening.  No acute extra-axial fluid collection identified.  The ventricular system and sulcal markings prominence is consistent with atrophy more advanced for the age.  There is no acute extra axial fluid collection.  Similar mucoperiosteal thickening of the right maxillary sinus.  Paranasal sinuses are clear without mucosal thickening, polypoidal abnormality or air-fluid levels. Mastoid air cells aeration is  optimal.                                       X-Ray Chest AP Portable (Final result)  Result time 10/12/23 17:53:34      Final result by Dariana Linares MD (10/12/23 17:53:34)                   Impression:      Infiltrate developing in the right lower lobe with a small right-sided pleural effusion    Patchy appearing infiltrate developing in the left lower lobe      Electronically signed by: Dariana Linares  Date:    10/12/2023  Time:    17:53               Narrative:    EXAMINATION:  XR CHEST AP PORTABLE    CLINICAL HISTORY:  Hypotension;    TECHNIQUE:  Single frontal view of the chest was performed.    COMPARISON:  12/12/2022    FINDINGS:  There is a infiltrate developing in the right lower lobe.  There is a small right-sided pleural effusion.  There is a patchy infiltrate in the left lower lobe.  The heart is normal in appearance.  Bones and joints show no acute abnormality.                                       Medications   potassium chloride 20 mEq in 100 mL IVPB (FOR CENTRAL LINE ADMINISTRATION ONLY) (has no administration in time range)   cefTRIAXone (ROCEPHIN) 1 g in dextrose 5 % in water (D5W) 100 mL IVPB (MB+) (has no administration in time range)   doxycycline (VIBRAMYCIN) 100 mg in dextrose 5 % in water (D5W) 100 mL IVPB (MB+) (has no administration in time range)   sodium chloride 0.9% bolus 1,000 mL 1,000 mL (0 mLs Intravenous Stopped 10/12/23 1816)   piperacillin-tazobactam (ZOSYN) 4.5 g in dextrose 5 % in water (D5W) 100 mL IVPB (MB+) (0 g Intravenous Stopped 10/12/23 1949)   0.9%  NaCl infusion (1,000 mLs Intravenous New Bag 10/12/23 1918)   iopamidoL (ISOVUE-370) injection 100 mL (100 mLs Intravenous Given 10/12/23 1917)     Medical Decision Making  As per HPI.  Differential diagnosis:  Painless jaundice, cirrhosis, alcohol abuse, electrolyte abnormality, dehydration, altered mental status, intracranial bleed    Amount and/or Complexity of Data Reviewed  Labs: ordered.     Details:  Patient has multiple lab abnormalities including sodium of 122, potassium of 3.0, chloride of 88, albumin of 1.7, [place] blood [place] [address], lactic acid of 3.4, H&H of 11.4 and 31.8.  Radiology: ordered and independent interpretation performed.  ECG/medicine tests: ordered. Decision-making details documented in ED Course.  Discussion of management or test interpretation with external provider(s): Shortly after arrival, patient was given a 1 L normal saline bolus then started on normal saline at 200 cc/hour.  Patient was also given Zosyn 4.5 g IV piggyback.  Patient was also started on supplemental potassium, 40 mEq over 4 hour infusion.  After fluids, patient states she is feeling better patient appears more alert.  Patient with multiple electrolyte abnormalities on labs.  Will continue normal saline.  CT of the head shows no acute changes.  CT of the abdomen pelvis shows ascites and right pleural effusion and right lower lobe infiltrate.  Patient will be admitted to the hospitalist team.  Vital signs remain stable.    Risk  Prescription drug management.  Decision regarding hospitalization.            Scribe Attestation:   Scribe #1: I performed the above scribed service and the documentation accurately describes the services I performed. I attest to the accuracy of the note.    Attending Attestation:           Physician Attestation for Scribe:  Physician Attestation Statement for Scribe #1: I, Faizan Jurado MD, reviewed documentation, as scribed by Ceci Olguin in my presence, and it is both accurate and complete.             ED Course as of 10/12/23 2055   Thu Oct 12, 2023   2026 Paged hospitalist [DP]   2044 After fluids, patient appears more alert, less confused.  Vital signs are stable. [KG]   2044 Anette NP, OK to admit [KG]      ED Course User Index  [DP] Chanell Brothers  [KG] Faizan Jurado MD                    Clinical Impression:   Final diagnoses:  [E87.1] Hyponatremia (Primary)  [E87.6]  Acute hypokalemia  [R17] Jaundice  [F10.11] History of alcohol abuse  [K70.31] Ascites due to alcoholic cirrhosis  [R91.8] Pulmonary infiltrate  [J90] Pleural effusion on right        ED Disposition Condition    Admit Stable                Faizan Jurado MD  10/12/23 2044       Faizan Jurado MD  10/12/23 2114

## 2023-10-13 PROBLEM — E87.1 HYPONATREMIA: Status: ACTIVE | Noted: 2023-10-13

## 2023-10-13 LAB
ALBUMIN FLD-MCNC: <0.4 GM/DL
ALBUMIN SERPL-MCNC: 1.5 G/DL (ref 3.4–4.8)
ALBUMIN/GLOB SERPL: 0.4 RATIO (ref 1.1–2)
ALP SERPL-CCNC: 170 UNIT/L (ref 40–150)
ALT SERPL-CCNC: 32 UNIT/L (ref 0–55)
AMYLASE FLD-CCNC: 5 U/L
AST SERPL-CCNC: 114 UNIT/L (ref 5–34)
B PERT.PT PRMT NPH QL NAA+NON-PROBE: NOT DETECTED
BASOPHILS # BLD AUTO: 0.07 X10(3)/MCL
BASOPHILS NFR BLD AUTO: 0.4 %
BILIRUB SERPL-MCNC: 10.8 MG/DL
BUN SERPL-MCNC: 8.5 MG/DL (ref 9.8–20.1)
C PNEUM DNA NPH QL NAA+NON-PROBE: NOT DETECTED
CALCIUM SERPL-MCNC: 7.1 MG/DL (ref 8.4–10.2)
CHLORIDE SERPL-SCNC: 92 MMOL/L (ref 98–107)
CLARITY BODY FLUID (OHS): CLEAR
CO2 SERPL-SCNC: 20 MMOL/L (ref 23–31)
COLOR BODY FLUID (OHS): YELLOW
CREAT SERPL-MCNC: 0.73 MG/DL (ref 0.55–1.02)
EOSINOPHIL # BLD AUTO: 0.35 X10(3)/MCL (ref 0–0.9)
EOSINOPHIL MANUAL BF (OHS): 6 %
EOSINOPHIL NFR BLD AUTO: 1.9 %
ERYTHROCYTE [DISTWIDTH] IN BLOOD BY AUTOMATED COUNT: 19.9 % (ref 11.5–17)
GFR SERPLBLD CREATININE-BSD FMLA CKD-EPI: >60 MLS/MIN/1.73/M2
GLOBULIN SER-MCNC: 3.8 GM/DL (ref 2.4–3.5)
GLUCOSE FLD-MCNC: 139 MG/DL
GLUCOSE SERPL-MCNC: 125 MG/DL (ref 82–115)
GRAM STN SPEC: NORMAL
HADV DNA NPH QL NAA+NON-PROBE: NOT DETECTED
HAV IGM SERPL QL IA: NONREACTIVE
HBV CORE IGM SERPL QL IA: NONREACTIVE
HBV SURFACE AG SERPL QL IA: NONREACTIVE
HCOV 229E RNA NPH QL NAA+NON-PROBE: NOT DETECTED
HCOV HKU1 RNA NPH QL NAA+NON-PROBE: NOT DETECTED
HCOV NL63 RNA NPH QL NAA+NON-PROBE: NOT DETECTED
HCOV OC43 RNA NPH QL NAA+NON-PROBE: NOT DETECTED
HCT VFR BLD AUTO: 31.6 % (ref 37–47)
HCV AB SERPL QL IA: NONREACTIVE
HGB BLD-MCNC: 11.1 G/DL (ref 12–16)
HMPV RNA NPH QL NAA+NON-PROBE: NOT DETECTED
HPIV1 RNA NPH QL NAA+NON-PROBE: NOT DETECTED
HPIV2 RNA NPH QL NAA+NON-PROBE: NOT DETECTED
HPIV3 RNA NPH QL NAA+NON-PROBE: NOT DETECTED
HPIV4 RNA NPH QL NAA+NON-PROBE: NOT DETECTED
IMM GRANULOCYTES # BLD AUTO: 0.33 X10(3)/MCL (ref 0–0.04)
IMM GRANULOCYTES NFR BLD AUTO: 1.8 %
LACTATE SERPL-SCNC: 1.8 MMOL/L (ref 0.5–2.2)
LACTATE SERPL-SCNC: 2.9 MMOL/L (ref 0.5–2.2)
LDH FLD-CCNC: <30 U/L
LYMPHOCYTE MANUAL BF (OHS): 39 %
LYMPHOCYTES # BLD AUTO: 1.91 X10(3)/MCL (ref 0.6–4.6)
LYMPHOCYTES NFR BLD AUTO: 10.1 %
M PNEUMO DNA NPH QL NAA+NON-PROBE: NOT DETECTED
MACROPHAGES, FLUID MAN COUNT (OHS): 27
MCH RBC QN AUTO: 29.5 PG (ref 27–31)
MCHC RBC AUTO-ENTMCNC: 35.1 G/DL (ref 33–36)
MCV RBC AUTO: 84 FL (ref 80–94)
MONOCYTE MANUAL BF (OHS): 49 %
MONOCYTES # BLD AUTO: 2.28 X10(3)/MCL (ref 0.1–1.3)
MONOCYTES NFR BLD AUTO: 12.1 %
NEUTROPHILS # BLD AUTO: 13.89 X10(3)/MCL (ref 2.1–9.2)
NEUTROPHILS MAN BF (OHS): 2 %
NEUTROPHILS NFR BLD AUTO: 73.7 %
NRBC BLD AUTO-RTO: 0.1 %
PLATELET # BLD AUTO: 141 X10(3)/MCL (ref 130–400)
PMV BLD AUTO: 8.3 FL (ref 7.4–10.4)
POTASSIUM SERPL-SCNC: 3.5 MMOL/L (ref 3.5–5.1)
PROT FLD-MCNC: <0.8 GM/DL
PROT SERPL-MCNC: 5.3 GM/DL (ref 5.8–7.6)
RBC # BLD AUTO: 3.76 X10(6)/MCL (ref 4.2–5.4)
RSV RNA NPH QL NAA+NON-PROBE: NOT DETECTED
RV+EV RNA NPH QL NAA+NON-PROBE: NOT DETECTED
SODIUM SERPL-SCNC: 123 MMOL/L (ref 136–145)
WBC # FLD AUTO: 56 /UL
WBC # SPEC AUTO: 18.83 X10(3)/MCL (ref 4.5–11.5)
WBC OTHER NFR FLD MANUAL: 4 %

## 2023-10-13 PROCEDURE — 25000003 PHARM REV CODE 250: Performed by: INTERNAL MEDICINE

## 2023-10-13 PROCEDURE — 51700 IRRIGATION OF BLADDER: CPT

## 2023-10-13 PROCEDURE — 87798 DETECT AGENT NOS DNA AMP: CPT | Performed by: INTERNAL MEDICINE

## 2023-10-13 PROCEDURE — 87205 SMEAR GRAM STAIN: CPT | Performed by: INTERNAL MEDICINE

## 2023-10-13 PROCEDURE — 80053 COMPREHEN METABOLIC PANEL: CPT | Performed by: INTERNAL MEDICINE

## 2023-10-13 PROCEDURE — 85025 COMPLETE CBC W/AUTO DIFF WBC: CPT | Performed by: INTERNAL MEDICINE

## 2023-10-13 PROCEDURE — 82042 OTHER SOURCE ALBUMIN QUAN EA: CPT | Performed by: INTERNAL MEDICINE

## 2023-10-13 PROCEDURE — 83605 ASSAY OF LACTIC ACID: CPT | Performed by: INTERNAL MEDICINE

## 2023-10-13 PROCEDURE — 51798 US URINE CAPACITY MEASURE: CPT

## 2023-10-13 PROCEDURE — 21400001 HC TELEMETRY ROOM

## 2023-10-13 PROCEDURE — 83615 LACTATE (LD) (LDH) ENZYME: CPT | Performed by: INTERNAL MEDICINE

## 2023-10-13 PROCEDURE — 82150 ASSAY OF AMYLASE: CPT | Performed by: INTERNAL MEDICINE

## 2023-10-13 PROCEDURE — 25000003 PHARM REV CODE 250: Performed by: EMERGENCY MEDICINE

## 2023-10-13 PROCEDURE — 94761 N-INVAS EAR/PLS OXIMETRY MLT: CPT

## 2023-10-13 PROCEDURE — 51701 INSERT BLADDER CATHETER: CPT

## 2023-10-13 PROCEDURE — 63600175 PHARM REV CODE 636 W HCPCS: Performed by: EMERGENCY MEDICINE

## 2023-10-13 PROCEDURE — 82945 GLUCOSE OTHER FLUID: CPT | Performed by: INTERNAL MEDICINE

## 2023-10-13 PROCEDURE — 87070 CULTURE OTHR SPECIMN AEROBIC: CPT | Performed by: INTERNAL MEDICINE

## 2023-10-13 PROCEDURE — 84157 ASSAY OF PROTEIN OTHER: CPT | Performed by: INTERNAL MEDICINE

## 2023-10-13 PROCEDURE — 89051 BODY FLUID CELL COUNT: CPT | Performed by: INTERNAL MEDICINE

## 2023-10-13 PROCEDURE — 63600175 PHARM REV CODE 636 W HCPCS: Performed by: INTERNAL MEDICINE

## 2023-10-13 RX ORDER — SODIUM CHLORIDE 1 G/1
1000 TABLET ORAL 2 TIMES DAILY
Status: DISCONTINUED | OUTPATIENT
Start: 2023-10-13 | End: 2023-10-15

## 2023-10-13 RX ORDER — ESCITALOPRAM OXALATE 5 MG/1
5 TABLET ORAL DAILY
Status: DISCONTINUED | OUTPATIENT
Start: 2023-10-13 | End: 2023-10-17

## 2023-10-13 RX ORDER — ONDANSETRON 2 MG/ML
4 INJECTION INTRAMUSCULAR; INTRAVENOUS EVERY 4 HOURS PRN
Status: DISCONTINUED | OUTPATIENT
Start: 2023-10-14 | End: 2023-10-26 | Stop reason: HOSPADM

## 2023-10-13 RX ORDER — TALC
6 POWDER (GRAM) TOPICAL NIGHTLY PRN
Status: DISCONTINUED | OUTPATIENT
Start: 2023-10-13 | End: 2023-10-26 | Stop reason: HOSPADM

## 2023-10-13 RX ORDER — SODIUM CHLORIDE 0.9 % (FLUSH) 0.9 %
10 SYRINGE (ML) INJECTION
Status: DISCONTINUED | OUTPATIENT
Start: 2023-10-13 | End: 2023-10-26 | Stop reason: HOSPADM

## 2023-10-13 RX ORDER — METHOCARBAMOL 750 MG/1
750 TABLET, FILM COATED ORAL EVERY 8 HOURS PRN
Status: DISCONTINUED | OUTPATIENT
Start: 2023-10-13 | End: 2023-10-26 | Stop reason: HOSPADM

## 2023-10-13 RX ORDER — DOXYCYCLINE HYCLATE 100 MG
100 TABLET ORAL EVERY 12 HOURS
Status: COMPLETED | OUTPATIENT
Start: 2023-10-13 | End: 2023-10-19

## 2023-10-13 RX ORDER — FUROSEMIDE 20 MG/1
20 TABLET ORAL DAILY
Status: DISCONTINUED | OUTPATIENT
Start: 2023-10-13 | End: 2023-10-15

## 2023-10-13 RX ORDER — ENOXAPARIN SODIUM 100 MG/ML
40 INJECTION SUBCUTANEOUS EVERY 24 HOURS
Status: DISCONTINUED | OUTPATIENT
Start: 2023-10-13 | End: 2023-10-25

## 2023-10-13 RX ADMIN — FUROSEMIDE 20 MG: 20 TABLET ORAL at 02:10

## 2023-10-13 RX ADMIN — ONDANSETRON 4 MG: 2 INJECTION INTRAMUSCULAR; INTRAVENOUS at 11:10

## 2023-10-13 RX ADMIN — Medication 6 MG: at 11:10

## 2023-10-13 RX ADMIN — ESCITALOPRAM OXALATE 5 MG: 5 TABLET, FILM COATED ORAL at 05:10

## 2023-10-13 RX ADMIN — DOXYCYCLINE HYCLATE 100 MG: 100 TABLET, COATED ORAL at 09:10

## 2023-10-13 RX ADMIN — METHOCARBAMOL 750 MG: 750 TABLET ORAL at 11:10

## 2023-10-13 RX ADMIN — SODIUM CHLORIDE 1000 MG: 1 TABLET ORAL at 05:10

## 2023-10-13 RX ADMIN — POTASSIUM CHLORIDE 20 MEQ: 14.9 INJECTION, SOLUTION INTRAVENOUS at 01:10

## 2023-10-13 RX ADMIN — FOLIC ACID: 5 INJECTION, SOLUTION INTRAMUSCULAR; INTRAVENOUS; SUBCUTANEOUS at 12:10

## 2023-10-13 RX ADMIN — CEFTRIAXONE SODIUM 1 G: 1 INJECTION, POWDER, FOR SOLUTION INTRAMUSCULAR; INTRAVENOUS at 11:10

## 2023-10-13 RX ADMIN — METHOCARBAMOL 750 MG: 750 TABLET ORAL at 03:10

## 2023-10-13 RX ADMIN — DOXYCYCLINE HYCLATE 100 MG: 100 TABLET, COATED ORAL at 10:10

## 2023-10-13 RX ADMIN — SODIUM CHLORIDE 1000 MG: 1 TABLET ORAL at 10:10

## 2023-10-13 NOTE — NURSING
Nurses Note -- 4 Eyes      10/13/2023   12:00 AM      Skin assessed during: Admit      [x] No Altered Skin Integrity Present    []Prevention Measures Documented      [] Yes- Altered Skin Integrity Present or Discovered   [] LDA Added if Not in Epic (Describe Wound)   [] New Altered Skin Integrity was Present on Admit and Documented in LDA   [] Wound Image Taken    Wound Care Consulted? Yes    Attending Nurse:  Harsha Toro RN/Staff Member:  Camila Schmidt

## 2023-10-13 NOTE — CARE UPDATE
Patient admitted by overnight hospitalist.  Documentation reviewed.    continue the current treatment plan.    Patient had successful ultrasound-guided paracentesis with Interventional Radiology today reported 0.75 L clear low ascitic fluid removed fluid studies currently pending    Pulmonology input noted-No indication for thoracentesis.  Suspecting cirrhosis with a resulting ascites and small hepato thorax.    Started on low-dose p.o. Lasix

## 2023-10-13 NOTE — CONSULTS
[facility] - 4th Floor Medical Telemetry  Pulmonary Critical Care Note    Patient Name: Lauren Ewing  MRN: [id number]  Admission Date: 10/12/2023  Hospital Length of Stay: 1 days  Code Status: Full Code  Attending Provider: Daniel Kapadia MD  Primary Care Provider: Pennie, Primary Doctor     Subjective:     HPI:   This is a 63-year-old female with a past medical history of depression/anxiety, and alcohol abuse who presented to the emergency department on 10/12/2023 with progressive weakness and yellowing of her skin.  She also endorsed poor appetite over the past week.  On arrival to the emergency department she was found to have leukocytosis of 20,000, a significantly elevated bilirubin at 11.6, hyponatremia, and mildly elevated lactic acid level.  Alcohol level was undetectable.  She reported a heavy history of alcohol abuse since about age 38.  She has been in rehab for alcohol abuse before.  Her last drink was approximately 4 months ago she tells me.  CT of the abdomen and pelvis with contrast revealed  a right-sided pleural effusion, right lower lobe consolidation, hepatic cirrhotic morphology and ascites with mural thickening of the small bowel loops suggesting nonspecific enteritis.  Pulmonary is being consulted for evaluation of right-sided pleural effusion.    Hospital Course/Significant events:  As above    24 Hour Interval History:  She is lying in bed. Still feels pretty weak but breathing is easier. Nurse tells me they are planning paracentesis. She does endorse some coughing but denies sputum production or fever.         PMH:depression/anxiety, ETOH abuse  PSH: denies  SOCIAL HX:  history of alcohol abuse since age 38. Has been clean for approximately 4 months. Life long non smoker.        Current Outpatient Medications   Medication Instructions    promethazine (PHENERGAN) 25 mg, Rectal, Every 6 hours PRN    promethazine (PHENERGAN) 25 mg, Oral, Every 6 hours PRN       Current Inpatient  Medications   cefTRIAXone (ROCEPHIN) IVPB  1 g Intravenous Q24H    doxycycline  100 mg Oral Q12H    enoxparin  40 mg Subcutaneous Daily    sodium chloride  1,000 mg Oral BID       Current Intravenous Infusions        Review of Systems   Constitutional:  Positive for malaise/fatigue.   Respiratory:  Positive for cough.    Neurological:  Positive for weakness.          Objective:       Intake/Output Summary (Last 24 hours) at 10/13/2023 0849  Last data filed at 10/13/2023 0730  Gross per 24 hour   Intake 242.71 ml   Output 500 ml   Net -257.29 ml         Vital Signs (Most Recent):  Temp: 97.3 °F (36.3 °C) (10/13/23 0751)  Pulse: 110 (10/13/23 0751)  Resp: 18 (10/13/23 0751)  BP: 114/69 (10/13/23 0751)  SpO2: (!) 94 % (10/13/23 0751)  Body mass index is 24.98 kg/m².  Weight: 66 kg (145 lb 8.1 oz) Vital Signs (24h Range):  Temp:  [97.3 °F (36.3 °C)-97.9 °F (36.6 °C)] 97.3 °F (36.3 °C)  Pulse:  [] 110  Resp:  [17-20] 18  SpO2:  [92 %-99 %] 94 %  BP: ()/(54-73) 114/69     Physical Exam  Vitals reviewed.   Constitutional:       Appearance: Normal appearance.   HENT:      Head: Normocephalic and atraumatic.   Cardiovascular:      Rate and Rhythm: Normal rate.      Heart sounds: Normal heart sounds.   Pulmonary:      Effort: Pulmonary effort is normal.      Comments: Diminished in bases  Abdominal:      Palpations: Abdomen is soft.   Musculoskeletal:      Cervical back: Neck supple.   Skin:     Coloration: Skin is jaundiced.   Neurological:      General: No focal deficit present.      Mental Status: She is alert and oriented to person, place, and time.           Lines/Drains/Airways       Peripheral Intravenous Line  Duration                  Peripheral IV - Single Lumen 10/12/23 2129 20 G Right Antecubital <1 day                    Significant Labs:    Lab Results   Component Value Date    WBC 18.83 (H) 10/13/2023    HGB 11.1 (L) 10/13/2023    HCT 31.6 (L) 10/13/2023    MCV 84.0 10/13/2023     10/13/2023  "          BMP  Lab Results   Component Value Date     (L) 10/13/2023    K 3.5 10/13/2023    CHLORIDE 92 (L) 10/13/2023    CO2 20 (L) 10/13/2023    BUN 8.5 (L) 10/13/2023    CREATININE 0.73 10/13/2023    CALCIUM 7.1 (L) 10/13/2023    EGFRNONAA >60 02/08/2022         ABG  No results for input(s): "PH", "PO2", "PCO2", "HCO3", "POCBASEDEF" in the last 168 hours.      Significant Imaging:  CT Abdomen Pelvis With Contrast  Narrative: EXAMINATION:  CT ABDOMEN PELVIS WITH CONTRAST    CLINICAL HISTORY:  Sepsis;Jaundiced;    TECHNIQUE:  Multidetector axial images were obtained of the abdomen and pelvis following the administration of IV contrast. Oral contrast was not administered.    Dose length product of 322 mGycm. Automated exposure control was utilized to minimize radiation dose.    COMPARISON:  None available    FINDINGS:  There is moderate volume right pleural effusion and right lower lung lobe dense consolidation.  There is also small left pleural and left basilar atelectasis.  Bilateral mammary implants.    There is hepatic cirrhotic morphology with low attenuation and surface nodularity.  No focal hepatic space-occupying lesion.  There is intra-abdominopelvic small to moderate free fluid consistent with ascites.  Gallbladder is distended without intraluminal calcified calculus.  No significant dilatation of the intrahepatic biliary radicles and the extrahepatic duct is also not distended without calcified choledocholithiasis.  No no acute findings of the pancreas or the spleen.    The adrenal glands appear within normal limits. The kidneys are unremarkable in size and contour. No solid or cystic renal lesion identified. There is no hydronephrosis. No perinephric fluid strandings or collections identified.    Stomach is mostly decompressed and difficult to assess.  There is suspected mild mural thickening of the loops of small bowel suspect for enteritis.  No transition or bowel obstruction.  Colon is " nondistended.  Noninflamed diverticulosis coli.  No pneumoperitoneum.    Urinary bladder appears within normal limits.  Small volume uterus.  Endometrium is not well delineated.  There is no pelvic free fluid.    Lower lumbar degenerative changes.  No acute or aggressive skeletal abnormality no acute or otherwise osseous abnormality identified.  Impression: 1. Right pleural effusion and right lower lung lobe consolidation.    2. Hepatic cirrhotic morphology and ascites.    3. Mural thickening of small bowel loops suggest nonspecific enteritis.    Electronically signed by: Kevyn Abebe  Date:    10/12/2023  Time:    19:33  CT Head Without Contrast  Narrative: EXAMINATION:  CT HEAD WITHOUT CONTRAST    CLINICAL HISTORY:  Infusion;    TECHNIQUE:  Sequential axial images were performed of the brain without contrast.    Dose product length of 884 mGycm. Automated exposure control was utilized to minimize radiation dose.    COMPARISON:  February 8, 2022.    FINDINGS:  There is no intracranial mass effect, midline shift, hydrocephalus or hemorrhage. There is no sulcal effacement or low attenuation changes to suggest recent large vessel territory infarction. Chronic appearing periventricular and subcortical white matter low attenuation changes are present and are consistent with chronic microangiopathic ischemia.  Beneath left craniotomy is similar appearing dural thickening.  No acute extra-axial fluid collection identified.  The ventricular system and sulcal markings prominence is consistent with atrophy more advanced for the age.  There is no acute extra axial fluid collection.  Similar mucoperiosteal thickening of the right maxillary sinus.  Paranasal sinuses are clear without mucosal thickening, polypoidal abnormality or air-fluid levels. Mastoid air cells aeration is optimal.  Impression: No acute intracranial findings identified.    Electronically signed by: Kevyn Abebe  Date:    10/12/2023  Time:    19:26  X-Ray  Chest AP Portable  Narrative: EXAMINATION:  XR CHEST AP PORTABLE    CLINICAL HISTORY:  Hypotension;    TECHNIQUE:  Single frontal view of the chest was performed.    COMPARISON:  12/12/2022    FINDINGS:  There is a infiltrate developing in the right lower lobe.  There is a small right-sided pleural effusion.  There is a patchy infiltrate in the left lower lobe.  The heart is normal in appearance.  Bones and joints show no acute abnormality.  Impression: Infiltrate developing in the right lower lobe with a small right-sided pleural effusion    Patchy appearing infiltrate developing in the left lower lobe    Electronically signed by: Dariana Linares  Date:    10/12/2023  Time:    17:53            Assessment/Plan:     Assessment  Right sided pneumonia with associated small pleural effusion  Hyperbilirubinemia and cirrhotic appearing liver with ascites  Hyponatremia       Plan  Continue rocephin and doxycycline (day 2) for CAP  Effusion appears small on imaging but will have MD review for further recommendations. May be hepatic hydrothorax vs parapneumonic  GI has been consulted for cirrhosis  Reportedly planning paracentesis   MD to follow for further recs          PAVAN Shahid  Pulmonary Critical Care Medicine  [facility] - 4th Floor Medical Telemetry  DOS: 10/13/2023

## 2023-10-13 NOTE — PLAN OF CARE
10/13/23 1455   Discharge Assessment   Assessment Type Discharge Planning Assessment   Confirmed/corrected address, phone number and insurance Yes   Confirmed Demographics Correct on Facesheet   Source of Information patient   Communicated LISA with patient/caregiver Date not available/Unable to determine   Reason For Admission Weakness, nausea, pneumonia, effusion, and cirrosis   People in Home alone   Do you expect to return to your current living situation? Yes   Do you have help at home or someone to help you manage your care at home? No   Current cognitive status: Alert/Oriented   Equipment Currently Used at Home none   Do you currently have service(s) that help you manage your care at home? No   Do you take prescription medications? No   Who is going to help you get home at discharge? Will need assist with transportation home at discharge   How do you get to doctors appointments? car, drives self   Are you on dialysis? No   Do you take coumadin? No   Discharge Plan discussed with: Patient   Discharge Plan A Home   Discharge Plan B Home

## 2023-10-13 NOTE — PROGRESS NOTES
Inpatient Nutrition Assessment    Admit Date: 10/12/2023   Total duration of encounter: 1 day     Nutrition Recommendation/Prescription     Continue oral diet as tolerated.  Add Boost Very High Calorie (provides 530 kcal, 22 g protein per serving).    Communication of Recommendations: reviewed with patient    Nutrition Assessment     Chart Review    Reason Seen: continuous nutrition monitoring and malnutrition screening tool (MST)    Malnutrition Screening Tool Results   Have you recently lost weight without trying?: Yes: 14-23 lbs  Have you been eating poorly because of a decreased appetite?: Yes   MST Score: 3   Diagnosis:  Right sided pneumonia with associated small pleural effusion  Hyperbilirubinemia and cirrhotic appearing liver with ascites  Hyponatremia     Relevant Medical History: depression/anxiety, and alcohol abuse     Nutrition-Related Medications: furosemide  Calorie Containing IV Medications: no significant kcals from medications at this time    Recent Labs   Lab 10/12/23  1735 10/13/23  0359   * 123*   K 3.0* 3.5   CALCIUM 7.4* 7.1*   MG 1.80  --    CHLORIDE 88* 92*   CO2 23 20*   BUN 9.6* 8.5*   CREATININE 0.79 0.73   EGFRNORACEVR >60 >60   GLUCOSE 101 125*   BILITOT 11.6* 10.8*   ALKPHOS 180* 170*   ALT 36 32   * 114*   ALBUMIN 1.7* 1.5*   HGB 11.4* 11.1*   HCT 31.8* 31.6*     Dietary Orders (From admission, onward)       Start     Ordered    10/13/23 0037  Diet Adult Regular  Diet effective now         10/13/23 0036                Appetite/Oral Intake: good/% of meals  Factors Affecting Nutritional Intake: none identified  Food/Congregation/Cultural Preferences: none reported  Food Allergies: none reported  Wound(s): no pressure injuries documented at this time  Last Bowel Movement: 10/12/23    Comments    10/13/23 Patient reports good appetite, current weight above reported usual weight, likely due to fluid accumulation, agreeable to strawberry oral  "supplement.    Anthropometrics    Height: 5' 4" (162.6 cm), Height Method: Stated  Last Weight: 66 kg (145 lb 8.1 oz) (10/13/23 0026), Weight Method: Bed Scale  BMI (Calculated): 25  BMI Classification: overweight (BMI 25-29.9)     Ideal Body Weight (IBW), Female: 120 lb     % Ideal Body Weight, Female (lb): 121.26 %                    Usual Body Weight (UBW), k.97 kg-63.5 kg  % Usual Body Weight: 112.16     Usual Weight Provided By: patient    Wt Readings from Last 5 Encounters:   10/13/23 66 kg (145 lb 8.1 oz)   22 65.8 kg (145 lb)     Weight Change(s) Since Admission: admission weight stated, +5 kg increase, ascites noted  Wt Readings from Last 1 Encounters:   10/13/23 0026 66 kg (145 lb 8.1 oz)   10/12/23 1815 61.2 kg (135 lb)   Admit Weight: 61.2 kg (135 lb) (10/12/23 1815), Weight Method: Stated    Monitoring & Evaluation     Dietitian will monitor food and beverage intake.    Nutrition Risk/Follow-Up: low (follow-up in 5-7 days)   Please consult if re-assessment needed sooner.    "

## 2023-10-13 NOTE — H&P
[facility] Hospital Medicine History & Physical Examination       Patient Name: Lauren Ewing  MRN: [id number]  Patient Class: IP- Inpatient   Admission Date: 10/12/2023  5:07 PM  Length of Stay: 1  Admitting Service: Hospital Medicine   Attending Physician: Bairon Knox MD   Primary Care Provider: No, Primary Doctor  History source: EMR, patient and/or patient's family    CHIEF COMPLAINT   Weakness, nausea     HISTORY OF PRESENT ILLNESS:   Patient is a 63-year-old female presented to the ER for further evaluation of severe weakness that started within the last week.  Patient reports she felt as though she was so weak she can not get out of bed, had a very poor appetite.  She denied fever chills, chest pain, diarrhea or vomiting.  She reports she was a prior heavy alcohol user since age 38 to age 63 and reports she is been in rehab for alcohol before, and her last drink was about 4 weeks ago.  She states a 5th would last her 2-3 days but she would drink daily.  Over the last couple of days she noticed her skin was yellow as well.      On arrival to the ER she was afebrile, hemodynamically stable though borderline hypotensive, mildly tachycardic.  Laboratory work showed leukocytosis of 20 K, a bilirubin of 11 0.6, mild AST elevation and alk-phos elevation, albumin of 1.7, a sodium level of 122 and a potassium of 3.0.  INR was 2.2.  Lactic acid was 3.4.  Alcohol level undetectable.  CT of the abdomen and pelvis with contrast showed a right pleural effusion and right lower lung consolidation, cirrhotic appearing liver with ascites, and nonspecific enteritis.  She was given 2 L of IV fluid, empiric antibiotics admitted to the hospitalist service for further management.    PAST MEDICAL HISTORY:   Depression/anxiety    PAST SURGICAL HISTORY:   Denies prior surgical intervention    ALLERGIES:   Hydrocodone    FAMILY HISTORY:   Reviewed and non-contributory     SOCIAL HISTORY:   Former  "alcohol use used to drink about a 5th every 2-3 days  Quit drinking 4 weeks ago     HOME MEDICATIONS:     Prior to Admission medications    Medication Sig Start Date End Date Taking? Authorizing Provider   promethazine (PHENERGAN) 25 MG suppository Place 1 suppository (25 mg total) rectally every 6 (six) hours as needed for Nausea. 12/12/22   Chapin Courtney MD   promethazine (PHENERGAN) 25 MG tablet Take 1 tablet (25 mg total) by mouth every 6 (six) hours as needed for Nausea. 12/12/22   Chapin Courtney MD       REVIEW OF SYSTEMS:   Except as documented, all other systems reviewed and negative     PHYSICAL EXAM:   T 97.7 °F (36.5 °C)   /73   P 97   RR 18   O2 97 %  GENERAL: awake, alert, oriented and in no acute distress, non-toxic appearing   HEENT: normocephalic atraumatic   NECK: supple   LUNGS: Clear bilaterally, no wheezing or rales, no accessory muscle use   CVS: Regular rate and rhythm, normal peripheral perfusion  ABD: Soft, non-tender, non-distended, bowel sounds present  EXTREMITIES: no clubbing or cyanosis  SKIN: Warm, dry.   NEURO: alert and oriented, grossly without focal deficits   PSYCHIATRIC: Cooperative    LABS AND IMAGING:     Recent Labs     10/12/23  1735   WBC 20.17*   RBC 3.88*   HGB 11.4*   HCT 31.8*   MCV 82.0   MCH 29.4   MCHC 35.8   RDW 19.5*        Recent Labs     10/12/23  1735 10/12/23  1954   LACTIC 3.4* 3.4*     Recent Labs     10/12/23  2133   INR 2.2*     No results for input(s): "HGBA1C", "CHOL", "TRIG", "LDL", "VLDL", "HDL" in the last 72 hours.   Recent Labs     10/12/23  1735   *   K 3.0*   CHLORIDE 88*   CO2 23   BUN 9.6*   CREATININE 0.79   GLUCOSE 101   CALCIUM 7.4*   MG 1.80   ALBUMIN 1.7*   GLOBULIN 3.9*   ALKPHOS 180*   ALT 36   *   BILITOT 11.6*   LIPASE 25     Recent Labs     10/12/23  1735   BNP 36.3   TROPONINI <0.010          CT Abdomen Pelvis With Contrast  Narrative: EXAMINATION:  CT ABDOMEN PELVIS WITH CONTRAST    CLINICAL " HISTORY:  Sepsis;Jaundiced;    TECHNIQUE:  Multidetector axial images were obtained of the abdomen and pelvis following the administration of IV contrast. Oral contrast was not administered.    Dose length product of 322 mGycm. Automated exposure control was utilized to minimize radiation dose.    COMPARISON:  None available    FINDINGS:  There is moderate volume right pleural effusion and right lower lung lobe dense consolidation.  There is also small left pleural and left basilar atelectasis.  Bilateral mammary implants.    There is hepatic cirrhotic morphology with low attenuation and surface nodularity.  No focal hepatic space-occupying lesion.  There is intra-abdominopelvic small to moderate free fluid consistent with ascites.  Gallbladder is distended without intraluminal calcified calculus.  No significant dilatation of the intrahepatic biliary radicles and the extrahepatic duct is also not distended without calcified choledocholithiasis.  No no acute findings of the pancreas or the spleen.    The adrenal glands appear within normal limits. The kidneys are unremarkable in size and contour. No solid or cystic renal lesion identified. There is no hydronephrosis. No perinephric fluid strandings or collections identified.    Stomach is mostly decompressed and difficult to assess.  There is suspected mild mural thickening of the loops of small bowel suspect for enteritis.  No transition or bowel obstruction.  Colon is nondistended.  Noninflamed diverticulosis coli.  No pneumoperitoneum.    Urinary bladder appears within normal limits.  Small volume uterus.  Endometrium is not well delineated.  There is no pelvic free fluid.    Lower lumbar degenerative changes.  No acute or aggressive skeletal abnormality no acute or otherwise osseous abnormality identified.  Impression: 1. Right pleural effusion and right lower lung lobe consolidation.    2. Hepatic cirrhotic morphology and ascites.    3. Mural thickening of  small bowel loops suggest nonspecific enteritis.    Electronically signed by: Kevyn Abebe  Date:    10/12/2023  Time:    19:33  CT Head Without Contrast  Narrative: EXAMINATION:  CT HEAD WITHOUT CONTRAST    CLINICAL HISTORY:  Infusion;    TECHNIQUE:  Sequential axial images were performed of the brain without contrast.    Dose product length of 884 mGycm. Automated exposure control was utilized to minimize radiation dose.    COMPARISON:  February 8, 2022.    FINDINGS:  There is no intracranial mass effect, midline shift, hydrocephalus or hemorrhage. There is no sulcal effacement or low attenuation changes to suggest recent large vessel territory infarction. Chronic appearing periventricular and subcortical white matter low attenuation changes are present and are consistent with chronic microangiopathic ischemia.  Beneath left craniotomy is similar appearing dural thickening.  No acute extra-axial fluid collection identified.  The ventricular system and sulcal markings prominence is consistent with atrophy more advanced for the age.  There is no acute extra axial fluid collection.  Similar mucoperiosteal thickening of the right maxillary sinus.  Paranasal sinuses are clear without mucosal thickening, polypoidal abnormality or air-fluid levels. Mastoid air cells aeration is optimal.  Impression: No acute intracranial findings identified.    Electronically signed by: Kevyn Abebe  Date:    10/12/2023  Time:    19:26  X-Ray Chest AP Portable  Narrative: EXAMINATION:  XR CHEST AP PORTABLE    CLINICAL HISTORY:  Hypotension;    TECHNIQUE:  Single frontal view of the chest was performed.    COMPARISON:  12/12/2022    FINDINGS:  There is a infiltrate developing in the right lower lobe.  There is a small right-sided pleural effusion.  There is a patchy infiltrate in the left lower lobe.  The heart is normal in appearance.  Bones and joints show no acute abnormality.  Impression: Infiltrate developing in the right lower lobe  with a small right-sided pleural effusion    Patchy appearing infiltrate developing in the left lower lobe    Electronically signed by: Dariana Linares  Date:    10/12/2023  Time:    17:53      ASSESSMENT & PLAN:   Community-acquired pneumonia  Right pleural effusion  Newly found cirrhosis/ascites, likely alcohol related   Mildly symptomatic hypervolemic hyponatremia  Prior heavy/daily alcohol use, quit 4 weeks ago    - blood cultures, respiratory PCR, continue empiric antibiotics  - obtain liver ultrasound/Doppler  - diagnostic paracentesis in AM, labs ordered  - fluid restriction 800 cc/day, oral salt tabs  - will avoid stopping SSRI which could be contributing to hyponatremia however suspect underlying cirrhosis and hypervolemia main cause.  She is tearful in the room and suspect her depression is not in remission  - encouraged continued cessation from alcohol  - consultation to GI for newly found cirrhosis  - consultation to pulmonology given right parapneumonic effusion versus hepatic hydrothorax    DVT prophylaxis: lovenox  Code status: full    If patient was admitted under observational status it is with my approval/permission.     At least 55 min was spent on this history and physical.  Time seen: 1AM 10/13/23  Bairon Knox MD

## 2023-10-13 NOTE — CONSULTS
Lauren Ewing   MRN: [id number]   ADMISSION DATE: 10/12/2023  : 1960  AGE: 63 y.o.    DATE :  10/13/2023     PROVIDER: LOPEZ JENKINS    REASON FOR REFERRAL     SUBJECTIVE:  Patient is a 63-year-old female presented to the ER for further evaluation of severe weakness that started within the last week.  Patient reports she felt as though she was so weak she can not get out of bed, had a very poor appetite.  She denied fever chills, chest pain, diarrhea or vomiting.  She reports she was a prior heavy alcohol user since age 38 to age 63 and reports she is been in rehab for alcohol before, and her last drink was about 4 weeks ago.  She states a 5th would last her 2-3 days but she would drink daily.  Over the last couple of days she noticed her skin was yellow as well.       On arrival to the ER she was afebrile, hemodynamically stable though borderline hypotensive, mildly tachycardic.  Laboratory work showed leukocytosis of 20 K, a bilirubin of 11 0.6, mild AST elevation and alk-phos elevation, albumin of 1.7, a sodium level of 122 and a potassium of 3.0.  INR was 2.2.  Lactic acid was 3.4.  Alcohol level undetectable.  CT of the abdomen and pelvis with contrast showed a right pleural effusion and right lower lung consolidation, cirrhotic appearing liver with ascites, and nonspecific enteritis.  She was given 2 L of IV fluid, empiric antibiotics admitted to the hospitalist service for further management.    Patient is noted to have markedly hyponatremic with decompensated liver disease with a bilirubin of 11.  Her MELD score is up to 30.  She is not sure if she has seen hepatology in the past.  History of heavy alcohol abuse.  She has had alcohol rehab done in the past.  According to patient last drink was 4 months ago.    Review of Systems   History of generalized weakness.  No nausea vomiting.  No abdominal pain.  No overt GI bleed.  She denies any significant issues with the bowels.  She did notice yellowish  discoloration of the sclera and skin in the recent past.  No previous history of jaundice reported.    Review of patient's allergies indicates:   Allergen Reactions    Hydrocodone          cefTRIAXone (ROCEPHIN) IVPB  1 g Intravenous Q24H    doxycycline  100 mg Oral Q12H    enoxparin  40 mg Subcutaneous Daily    furosemide  20 mg Oral Daily    sodium chloride  1,000 mg Oral BID       Medications Discontinued During This Encounter   Medication Reason    potassium chloride 20 mEq in 100 mL IVPB (FOR CENTRAL LINE ADMINISTRATION ONLY)     potassium chloride 20 mEq in 100 mL IVPB (FOR CENTRAL LINE ADMINISTRATION ONLY)     potassium chloride 20 mEq in 100 mL IVPB (FOR CENTRAL LINE ADMINISTRATION ONLY)     azithromycin 500 mg in dextrose 5 % 250 mL IVPB (ready to mix)     doxycycline (VIBRAMYCIN) 100 mg in dextrose 5 % in water (D5W) 100 mL IVPB (MB+)              No past medical history on file.   Social History     Socioeconomic History    Marital status: Significant Other    No past surgical history on file.     No family history on file.     OBJECTIVE:     Vitals:    10/13/23 0751 10/13/23 0800 10/13/23 0900 10/13/23 1152   BP: 114/69   119/82   BP Location: Right arm      Patient Position: Lying      Pulse: 110   (!) 117   Resp: 18   18   Temp: 97.3 °F (36.3 °C)   97.4 °F (36.3 °C)   TempSrc: Oral  Oral Oral   SpO2: (!) 94% (!) 94%  (!) 92%   Weight:       Height:         Physical Exam   HEENT examination deeply icteric sclera.  Neck:  Supple   Chest:  Scattered spider nevi upper chest.  Decreased breath sounds bilaterally.  Heart examination:  S1, S2 audible   Abdomen:  Bowel sounds positive.  Mild epigastric and right upper quadrant tenderness.  Also some lower abdominal tenderness.  No guarding or rebound.    Extremities:  With some edema bilaterally.      LABS    Recent Labs   Lab 10/12/23  1735 10/13/23  0359   WBC 20.17* 18.83*   HGB 11.4* 11.1*   HCT 31.8* 31.6*    141      Recent Labs   Lab  "10/12/23  1735 10/13/23  0359   * 123*   K 3.0* 3.5   CO2 23 20*   BUN 9.6* 8.5*   CREATININE 0.79 0.73   CALCIUM 7.4* 7.1*   BILITOT 11.6* 10.8*   ALKPHOS 180* 170*   ALT 36 32   * 114*   GLUCOSE 101 125*      Recent Labs   Lab 10/12/23  2133   INR 2.2*    No results for input(s): "AMYLASE" in the last 168 hours.   Recent Labs   Lab 10/12/23  2133   INR 2.2*   PTT 42.0*           RESULTS: US Paracentesis inc Imaging    Result Date: 10/13/2023  EXAMINATION: US GUIDED PARACENTESIS INC IMAGING CLINICAL HISTORY: new ascites; Attending: Carlos Enrique Irwin M.D. Anesthesia: Lidocaine utilized for local anesthesia. TECHNIQUE: After the risks, benefits and alternatives were discussed, consent was obtained. A time out was performed to verify the patient's identity and procedure. The patient was placed supine. Limited ultrasound the abdomen demonstrated ascitic fluid in the right lower quadrant. Static image was obtained. The lower quadrant was cleaned prepped in normal sterile fashion. Subcutaneous tissues were anesthetized with lidocaine solution. The Yueh needle was advanced into the abdominal cavity. Placement was confirmed by return of ascitic fluid. The catheter was advanced and a total of 0.75 liters of clear yellow fluid was aspirated. The patient tolerated the procedure well. There were no immediate complications. Estimated blood loss: None     Successful paracentesis. Electronically signed by: Carlos Enrique Irwin Date:    10/13/2023 Time:    12:19    US Liver with Doppler (xpd)    Result Date: 10/13/2023  EXAMINATION: US LIVER WITH DOPPLER CLINICAL HISTORY: Cirrhosis evaluation, Evaluation for hepatic, portal and splenic veins patency; TECHNIQUE: Right upper quadrant abdominal ultrasound (including pancreas, aorta, liver, gallbladder, common bile duct, IVC, right kidney, and spleen) was performed.  Spectral Doppler evaluation performed. COMPARISON: CT abdomen pelvis 10/12/2023 FINDINGS: LIMITATIONS: Exam limited " due to poor acoustic window related to shadowing bowel gas and/or body habitus. LIVER: Liver measures 15 cm cranial caudal at the midclavicular line. The liver is echogenic.  Nodular surface contour.  No discrete mass appreciable by sonographic evaluation. PANCREAS: Obscured by overlying bowel gas. GALLBLADDER: Not imaged BILE DUCTS: Common bile duct not imaged. RIGHT KIDNEY: Not imaged SPLEEN: 8 cm.  Normal in size with homogeneous echotexture. OTHER: There is ascites and bilateral pleural effusions. DOPPLER EVALUATION: AORTA: Not imaged HEPATIC ARTERY: Patent. Peak systolic velocity 173 cm/s.  Normal waveform. INFERIOR VENA CAVA: Not imaged.  IVC is patent on preceding CT exam. PORTAL VEINS: Portal vein is patent.  Hepatofugal flow.  Note on CT exam there were recanalized periumbilical veins, gastric varices and a large spleno renal shunt. SPLENIC VEIN: Patent with appropriate directional flow. HEPATIC VEINS: Unable to visualize.  Note on CT exam the right and left hepatic veins appeared compressed but patent.  Left hepatic vein was not well visualized.  This can be seen related to fibrotic changes of cirrhosis.     Cirrhotic morphology of the liver Changes of portal hypertension with reversed flow at the portal vein.  There were portosystemic collaterals on preceding CT exam Small pleural effusions and abdominal ascites Electronically signed by: Lorna Maxwell Date:    10/13/2023 Time:    10:26    CT Abdomen Pelvis With Contrast    Result Date: 10/12/2023  EXAMINATION: CT ABDOMEN PELVIS WITH CONTRAST CLINICAL HISTORY: Sepsis;Jaundiced; TECHNIQUE: Multidetector axial images were obtained of the abdomen and pelvis following the administration of IV contrast. Oral contrast was not administered. Dose length product of 322 mGycm. Automated exposure control was utilized to minimize radiation dose. COMPARISON: None available FINDINGS: There is moderate volume right pleural effusion and right lower lung lobe dense  consolidation.  There is also small left pleural and left basilar atelectasis.  Bilateral mammary implants. There is hepatic cirrhotic morphology with low attenuation and surface nodularity.  No focal hepatic space-occupying lesion.  There is intra-abdominopelvic small to moderate free fluid consistent with ascites.  Gallbladder is distended without intraluminal calcified calculus.  No significant dilatation of the intrahepatic biliary radicles and the extrahepatic duct is also not distended without calcified choledocholithiasis.  No no acute findings of the pancreas or the spleen. The adrenal glands appear within normal limits. The kidneys are unremarkable in size and contour. No solid or cystic renal lesion identified. There is no hydronephrosis. No perinephric fluid strandings or collections identified. Stomach is mostly decompressed and difficult to assess.  There is suspected mild mural thickening of the loops of small bowel suspect for enteritis.  No transition or bowel obstruction.  Colon is nondistended.  Noninflamed diverticulosis coli.  No pneumoperitoneum. Urinary bladder appears within normal limits.  Small volume uterus.  Endometrium is not well delineated.  There is no pelvic free fluid. Lower lumbar degenerative changes.  No acute or aggressive skeletal abnormality no acute or otherwise osseous abnormality identified.     1. Right pleural effusion and right lower lung lobe consolidation. 2. Hepatic cirrhotic morphology and ascites. 3. Mural thickening of small bowel loops suggest nonspecific enteritis. Electronically signed by: Kevyn Abebe Date:    10/12/2023 Time:    19:33    CT Head Without Contrast    Result Date: 10/12/2023  EXAMINATION: CT HEAD WITHOUT CONTRAST CLINICAL HISTORY: Infusion; TECHNIQUE: Sequential axial images were performed of the brain without contrast. Dose product length of 884 mGycm. Automated exposure control was utilized to minimize radiation dose. COMPARISON: February 8,  2022. FINDINGS: There is no intracranial mass effect, midline shift, hydrocephalus or hemorrhage. There is no sulcal effacement or low attenuation changes to suggest recent large vessel territory infarction. Chronic appearing periventricular and subcortical white matter low attenuation changes are present and are consistent with chronic microangiopathic ischemia.  Beneath left craniotomy is similar appearing dural thickening.  No acute extra-axial fluid collection identified.  The ventricular system and sulcal markings prominence is consistent with atrophy more advanced for the age.  There is no acute extra axial fluid collection.  Similar mucoperiosteal thickening of the right maxillary sinus.  Paranasal sinuses are clear without mucosal thickening, polypoidal abnormality or air-fluid levels. Mastoid air cells aeration is optimal.     No acute intracranial findings identified. Electronically signed by: Kevyn Abebe Date:    10/12/2023 Time:    19:26    X-Ray Chest AP Portable    Result Date: 10/12/2023  EXAMINATION: XR CHEST AP PORTABLE CLINICAL HISTORY: Hypotension; TECHNIQUE: Single frontal view of the chest was performed. COMPARISON: 12/12/2022 FINDINGS: There is a infiltrate developing in the right lower lobe.  There is a small right-sided pleural effusion.  There is a patchy infiltrate in the left lower lobe.  The heart is normal in appearance.  Bones and joints show no acute abnormality.     Infiltrate developing in the right lower lobe with a small right-sided pleural effusion Patchy appearing infiltrate developing in the left lower lobe Electronically signed by: Dariana Linares Date:    10/12/2023 Time:    17:53       ICD-10-CM ICD-9-CM   1. Hyponatremia  E87.1 276.1   2. Acute hypokalemia  E87.6 276.8   3. Jaundice  R17 782.4   4. History of alcohol abuse  F10.11 305.03   5. Ascites due to alcoholic cirrhosis  K70.31 571.2   6. Pulmonary infiltrate  R91.8 793.19   7. Pleural effusion on right  J90 511.9       ASSESSMENT & PLAN:   Patient is a 63-year-old female presented to the ER for further evaluation of severe weakness that started within the last week.  Patient reports she felt as though she was so weak she can not get out of bed, had a very poor appetite.  She denied fever chills, chest pain, diarrhea or vomiting.  She reports she was a prior heavy alcohol user since age 38 to age 63 and reports she is been in rehab for alcohol before, and her last drink was about 4 weeks ago.  She states a 5th would last her 2-3 days but she would drink daily.  Over the last couple of days she noticed her skin was yellow as well.       On arrival to the ER she was afebrile, hemodynamically stable though borderline hypotensive, mildly tachycardic.  Laboratory work showed leukocytosis of 20 K, a bilirubin of 11 0.6, mild AST elevation and alk-phos elevation, albumin of 1.7, a sodium level of 122 and a potassium of 3.0.  INR was 2.2.  Lactic acid was 3.4.  Alcohol level undetectable.  CT of the abdomen and pelvis with contrast showed a right pleural effusion and right lower lung consolidation, cirrhotic appearing liver with ascites, and nonspecific enteritis.  She was given 2 L of IV fluid, empiric antibiotics admitted to the hospitalist service for further management.    Patient is noted to have markedly hyponatremic with decompensated liver disease with a bilirubin of 11.  Her MELD score is up to 30.  She is not sure if she has seen hepatology in the past.  History of heavy alcohol abuse.  She has had alcohol rehab done in the past.  According to patient last drink was 4 months ago.    Recommendations:    1.  Monitor electrolytes periodically.  Marked hyponatremia.  Decompensated liver disease.  Periodic monitoring of liver enzymes.  Meld score around 30  2. Alpha-fetoprotein if not done so far.  Doppler ultrasound results noted.  Reversal of the flow along with the following Impression:  Cirrhotic morphology of the  liver  Changes of portal hypertension with reversed flow at the portal vein.  There were portosystemic collaterals on preceding CT exam  Small pleural effusions and abdominal ascites  3. Pulmonary consultation noted.  4. Lasix and Aldactone as tolerated.  5. Recommend lactulose along with rifaximin as necessary.  6. Strongly advised to follow up with hepatology on discharge.  Also recommend case management follow up to establish service at [facility].    GI consult service to follow.  Thank you for the consult    Addendum: Ascitic fluid analysis in progress.  0.75 L was removed.  So far, studies were negative for SBP.  Additional chemistries pending.  Cytology requested as well

## 2023-10-14 LAB
ALBUMIN SERPL-MCNC: 1.6 G/DL (ref 3.4–4.8)
ALBUMIN/GLOB SERPL: 0.5 RATIO (ref 1.1–2)
ALP SERPL-CCNC: 181 UNIT/L (ref 40–150)
ALT SERPL-CCNC: 34 UNIT/L (ref 0–55)
AST SERPL-CCNC: 110 UNIT/L (ref 5–34)
BASOPHILS # BLD AUTO: 0.08 X10(3)/MCL
BASOPHILS NFR BLD AUTO: 0.5 %
BILIRUB SERPL-MCNC: 9.7 MG/DL
BUN SERPL-MCNC: 9.1 MG/DL (ref 9.8–20.1)
CALCIUM SERPL-MCNC: 7.3 MG/DL (ref 8.4–10.2)
CHLORIDE SERPL-SCNC: 91 MMOL/L (ref 98–107)
CO2 SERPL-SCNC: 25 MMOL/L (ref 23–31)
CREAT SERPL-MCNC: 0.79 MG/DL (ref 0.55–1.02)
EOSINOPHIL # BLD AUTO: 0.49 X10(3)/MCL (ref 0–0.9)
EOSINOPHIL NFR BLD AUTO: 2.8 %
ERYTHROCYTE [DISTWIDTH] IN BLOOD BY AUTOMATED COUNT: 19.9 % (ref 11.5–17)
GFR SERPLBLD CREATININE-BSD FMLA CKD-EPI: >60 MLS/MIN/1.73/M2
GLOBULIN SER-MCNC: 3.5 GM/DL (ref 2.4–3.5)
GLUCOSE SERPL-MCNC: 132 MG/DL (ref 82–115)
HCT VFR BLD AUTO: 29.7 % (ref 37–47)
HGB BLD-MCNC: 10.5 G/DL (ref 12–16)
IMM GRANULOCYTES # BLD AUTO: 0.39 X10(3)/MCL (ref 0–0.04)
IMM GRANULOCYTES NFR BLD AUTO: 2.2 %
LYMPHOCYTES # BLD AUTO: 2.49 X10(3)/MCL (ref 0.6–4.6)
LYMPHOCYTES NFR BLD AUTO: 14.3 %
MCH RBC QN AUTO: 29.7 PG (ref 27–31)
MCHC RBC AUTO-ENTMCNC: 35.4 G/DL (ref 33–36)
MCV RBC AUTO: 84.1 FL (ref 80–94)
MONOCYTES # BLD AUTO: 1.45 X10(3)/MCL (ref 0.1–1.3)
MONOCYTES NFR BLD AUTO: 8.3 %
NEUTROPHILS # BLD AUTO: 12.55 X10(3)/MCL (ref 2.1–9.2)
NEUTROPHILS NFR BLD AUTO: 71.9 %
NRBC BLD AUTO-RTO: 0.2 %
PATH REV: NORMAL
PLATELET # BLD AUTO: 165 X10(3)/MCL (ref 130–400)
PMV BLD AUTO: 8.1 FL (ref 7.4–10.4)
POTASSIUM SERPL-SCNC: 3.3 MMOL/L (ref 3.5–5.1)
PROT SERPL-MCNC: 5.1 GM/DL (ref 5.8–7.6)
RBC # BLD AUTO: 3.53 X10(6)/MCL (ref 4.2–5.4)
SODIUM SERPL-SCNC: 125 MMOL/L (ref 136–145)
WBC # SPEC AUTO: 17.45 X10(3)/MCL (ref 4.5–11.5)

## 2023-10-14 PROCEDURE — 21400001 HC TELEMETRY ROOM

## 2023-10-14 PROCEDURE — 63600175 PHARM REV CODE 636 W HCPCS: Performed by: INTERNAL MEDICINE

## 2023-10-14 PROCEDURE — 25000003 PHARM REV CODE 250: Performed by: INTERNAL MEDICINE

## 2023-10-14 PROCEDURE — 63600175 PHARM REV CODE 636 W HCPCS: Performed by: NURSE PRACTITIONER

## 2023-10-14 PROCEDURE — 85610 PROTHROMBIN TIME: CPT | Performed by: NURSE PRACTITIONER

## 2023-10-14 PROCEDURE — 85025 COMPLETE CBC W/AUTO DIFF WBC: CPT | Performed by: INTERNAL MEDICINE

## 2023-10-14 PROCEDURE — 97162 PT EVAL MOD COMPLEX 30 MIN: CPT

## 2023-10-14 PROCEDURE — 80053 COMPREHEN METABOLIC PANEL: CPT | Performed by: INTERNAL MEDICINE

## 2023-10-14 PROCEDURE — 25000003 PHARM REV CODE 250: Performed by: NURSE PRACTITIONER

## 2023-10-14 RX ORDER — THIAMINE HCL 100 MG
100 TABLET ORAL DAILY
Status: DISCONTINUED | OUTPATIENT
Start: 2023-10-14 | End: 2023-10-26 | Stop reason: HOSPADM

## 2023-10-14 RX ORDER — FOLIC ACID 1 MG/1
1 TABLET ORAL DAILY
Status: DISCONTINUED | OUTPATIENT
Start: 2023-10-14 | End: 2023-10-26 | Stop reason: HOSPADM

## 2023-10-14 RX ORDER — MIDODRINE HYDROCHLORIDE 2.5 MG/1
2.5 TABLET ORAL
Status: DISCONTINUED | OUTPATIENT
Start: 2023-10-14 | End: 2023-10-23

## 2023-10-14 RX ORDER — SPIRONOLACTONE 25 MG/1
50 TABLET ORAL DAILY
Status: DISCONTINUED | OUTPATIENT
Start: 2023-10-14 | End: 2023-10-16

## 2023-10-14 RX ORDER — BUSPIRONE HYDROCHLORIDE 5 MG/1
5 TABLET ORAL 2 TIMES DAILY
Status: DISCONTINUED | OUTPATIENT
Start: 2023-10-14 | End: 2023-10-26 | Stop reason: HOSPADM

## 2023-10-14 RX ADMIN — FUROSEMIDE 20 MG: 20 TABLET ORAL at 09:10

## 2023-10-14 RX ADMIN — SPIRONOLACTONE 50 MG: 25 TABLET ORAL at 10:10

## 2023-10-14 RX ADMIN — ENOXAPARIN SODIUM 40 MG: 40 INJECTION SUBCUTANEOUS at 05:10

## 2023-10-14 RX ADMIN — METHOCARBAMOL 750 MG: 750 TABLET ORAL at 09:10

## 2023-10-14 RX ADMIN — BUSPIRONE HYDROCHLORIDE 5 MG: 5 TABLET ORAL at 09:10

## 2023-10-14 RX ADMIN — THIAMINE HCL TAB 100 MG 100 MG: 100 TAB at 01:10

## 2023-10-14 RX ADMIN — SODIUM CHLORIDE 1000 MG: 1 TABLET ORAL at 09:10

## 2023-10-14 RX ADMIN — RIFAXIMIN 550 MG: 550 TABLET ORAL at 09:10

## 2023-10-14 RX ADMIN — FOLIC ACID 1 MG: 1 TABLET ORAL at 01:10

## 2023-10-14 RX ADMIN — CEFTRIAXONE SODIUM 1 G: 1 INJECTION, POWDER, FOR SOLUTION INTRAMUSCULAR; INTRAVENOUS at 09:10

## 2023-10-14 RX ADMIN — ONDANSETRON 4 MG: 2 INJECTION INTRAMUSCULAR; INTRAVENOUS at 05:10

## 2023-10-14 RX ADMIN — Medication 6 MG: at 09:10

## 2023-10-14 RX ADMIN — DOXYCYCLINE HYCLATE 100 MG: 100 TABLET, COATED ORAL at 09:10

## 2023-10-14 RX ADMIN — MIDODRINE HYDROCHLORIDE 2.5 MG: 2.5 TABLET ORAL at 05:10

## 2023-10-14 RX ADMIN — THERA TABS 1 TABLET: TAB at 01:10

## 2023-10-14 RX ADMIN — ESCITALOPRAM OXALATE 5 MG: 5 TABLET, FILM COATED ORAL at 09:10

## 2023-10-14 RX ADMIN — RIFAXIMIN 550 MG: 550 TABLET ORAL at 01:10

## 2023-10-14 NOTE — PLAN OF CARE
Problem: Physical Therapy  Goal: Physical Therapy Goal  Description: Goals to be met by: 23     Patient will increase functional independence with mobility by performin. Supine to sit with [place]  2. Sit to supine with [place]  3. Sit to stand transfer with Modified [place]  4. Gait  x 300 feet with Modified [place] using Rolling Walker (if needed).     Outcome: Ongoing, Progressing

## 2023-10-14 NOTE — PT/OT/SLP EVAL
"Physical Therapy Evaluation    Patient Name:  Lauren Ewing   MRN:  [id number]    Recommendations:     Discharge Recommendations:  (TBD pending progress)   Discharge Equipment Recommendations: walker, rolling (Will continue to assess)   Barriers to discharge: Impaired mobility and Ongoing medical needs    Assessment:     Lauren Ewing is a 63 y.o. female admitted with a medical diagnosis of Hyponatremia. Patient with history of alcohol abuse. Prior to admit, patient was living alone in 1st-floor apartment. At baseline, she is independent.  She presents with the following impairments/functional limitations: weakness, impaired endurance, impaired self care skills, impaired functional mobility, gait instability, impaired balance, decreased safety awareness, decreased lower extremity function, decreased upper extremity function, pain. She required MIN A for bed mobility. MOD A for sit<>stand. Ambulated 4 ft with RW & MOD A. She fatigues extremely fast. Limited by pain. Discharge recommendations  will depend on patient progress.     Rehab Prognosis: Good; patient would benefit from acute skilled PT services to address these deficits and reach maximum level of function.    Recent Surgery: * No surgery found *      Plan:     During this hospitalization, patient to be seen 5 x/week to address the identified rehab impairments via gait training, therapeutic activities, therapeutic exercises, neuromuscular re-education and progress toward the following goals:    Plan of Care Expires:  11/14/23    Subjective     Chief Complaint: pain  Patient/Family Comments/goals: none  Pain/Comfort:  Pain Rating 1: 8/10  Location 1:  ("whole body")  Pain Addressed 1: Distraction, Reposition  Pain Rating Post-Intervention 1: 10/10    Patients cultural, spiritual, Mosque conflicts given the current situation: no    Living Environment:  Lives alone in 1st-floor apartment.   Prior to admission, patients level of function was independent.  " Equipment used at home: none.  DME owned (not currently used): none.  Upon discharge, patient will have assistance from TBD.    Objective:     Communicated with nurse prior to session.  Patient found supine with telemetry  upon PT entry to room.    General Precautions: Standard, fall  Orthopedic Precautions:N/A   Braces: N/A  Respiratory Status: Room air    Exams:  Cognitive Exam:  Patient is oriented to Person, Place, Time, and Situation  Sensation: -       Intact  RLE ROM: WFL  RLE Strength: -4/5 grossly  LLE ROM: WFL  LLE Strength: -4/5 grossly  Skin integrity: Visible skin intact      Functional Mobility:  Bed Mobility:     Supine to Sit: minimum assistance  Sit to Supine: minimum assistance  Transfers:  Sit to Stand:  moderate assistance with hand-held assist  Gait: 3 ft with MOD A. Limited by pain  Balance: poor      AM-PAC 6 CLICK MOBILITY  Total Score:13     Education Provided:  Role and goals of PT, transfer training, bed mobility, gait training, balance training, safety awareness, assistive device, strengthening exercises, and importance of participating in PT to return to PLOF.    Patient left supine with all lines intact and call button in reach.    GOALS:   Multidisciplinary Problems       Physical Therapy Goals          Problem: Physical Therapy    Goal Priority Disciplines Outcome Goal Variances Interventions   Physical Therapy Goal     PT, PT/OT Ongoing, Progressing     Description: Goals to be met by: 23     Patient will increase functional independence with mobility by performin. Supine to sit with [place]  2. Sit to supine with [place]  3. Sit to stand transfer with Modified [place]  4. Gait  x 300 feet with Modified [place] using Rolling Walker (if needed).                          History:     No past medical history on file.    No past surgical history on file.    Time Tracking:     PT Received On: 10/14/23  PT Start Time: 852     PT Stop Time: 905  PT Total  Time (min): 13 min     Billable Minutes: Evaluation 13 minutes      10/14/2023
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
## 2023-10-14 NOTE — PROGRESS NOTES
[facility] Hospital Medicine Progress Note        Chief Complaint: Inpatient Follow-up for weakness, nausea     HPI:   Patient is a 63-year-old female presented to the ER for further evaluation of severe weakness that started within the last week. Patient reports she felt as though she was so weak she can not get out of bed, had a very poor appetite.  She denied fever chills, chest pain, diarrhea or vomiting.  She reports she was a prior heavy alcohol user since age 38 to age 63 and reports she is been in rehab for alcohol before, and her last drink was about 4 weeks ago.  She states a 5th would last her 2-3 days but she would drink daily. Over the last couple of days she noticed her skin was yellow as well.       On arrival to the ER she was afebrile, hemodynamically stable though borderline hypotensive, mildly tachycardic.  Laboratory work showed leukocytosis of 20 K, a bilirubin of 11 0.6, mild AST elevation and alk-phos elevation, albumin of 1.7, a sodium level of 122 and a potassium of 3.0.  INR was 2.2. Lactic acid was 3.4.  Alcohol level undetectable.  CT of the abdomen and pelvis with contrast showed a right pleural effusion and right lower lung consolidation, cirrhotic appearing liver with ascites, and nonspecific enteritis.  She was given 2 L of IV fluid in the ED, started  empiric antibiotics and admitted to the hospitalist service for further management.       Interval Hx:   Awake , alert, oriented x3  Continues to c/o extreme fatigue, malaise and generalized aches.   Nausea is better. Appetite is slightly better   Afebrile since admission.   BP is soft but MAP is favorable   On RA. SpO2 92 to 97%  S/P Paracentesis yesterday with removal of 750 mL of clear yellow fluid.   GI and Pulmonology are on the case     Labs today showed  WBC down to 17.4K>20.0K, Hgb 10.5, Plt normal, Na improved to 125>122, K 3.3, Cr 0.7, AST down to 110>120, T.bili 9.7>11.6, NH3 54 on admit      Ascitic fluid is transudate with SAAG>1.1 suggestive of portal HTN is likely the cause of ascites.      Maddrey's Discriminant function 65.9 on admit        Case was discussed with patient's nurse and  on the floor.    Objective/physical exam:  General: In no acute distress, afebrile  Chest: Decreased breath sounds at abses   Heart: RRR, +S1, S2, no appreciable murmur  Abdomen: Soft, generalized tenderness to palpation,  BS +, no fluid shift   MSK: Warm, trace or no lower extremity edema, no clubbing or cyanosis  Neurologic: Alert and oriented x4, Cranial nerve II-XII intact, Moves all ext in the bed spontaneously       VITAL SIGNS: 24 HRS MIN & MAX LAST   Temp  Min: 97.4 °F (36.3 °C)  Max: 98.4 °F (36.9 °C) 98 °F (36.7 °C)   BP  Min: 102/65  Max: 119/82 102/65   Pulse  Min: 97  Max: 113  97   Resp  Min: 18  Max: 20 20   SpO2  Min: 92 %  Max: 97 % (!) 94 %     I have reviewed the following labs:  Recent Labs   Lab 10/12/23  1735 10/13/23  0359 10/14/23  0602   WBC 20.17* 18.83* 17.45*   RBC 3.88* 3.76* 3.53*   HGB 11.4* 11.1* 10.5*   HCT 31.8* 31.6* 29.7*   MCV 82.0 84.0 84.1   MCH 29.4 29.5 29.7   MCHC 35.8 35.1 35.4   RDW 19.5* 19.9* 19.9*    141 165   MPV 8.1 8.3 8.1     Recent Labs   Lab 10/12/23  1735 10/13/23  0359 10/14/23  0602   * 123* 125*   K 3.0* 3.5 3.3*   CO2 23 20* 25   BUN 9.6* 8.5* 9.1*   CREATININE 0.79 0.73 0.79   CALCIUM 7.4* 7.1* 7.3*   MG 1.80  --   --    ALBUMIN 1.7* 1.5* 1.6*   ALKPHOS 180* 170* 181*   ALT 36 32 34   * 114* 110*   BILITOT 11.6* 10.8* 9.7*     Microbiology Results (last 7 days)       Procedure Component Value Units Date/Time    Body Fluid Culture [[id number]] Collected: 10/13/23 0955    Order Status: Completed Specimen: Peritoneal Fluid from Ascitic Fluid Updated: 10/14/23 0808     Body Fluid Culture No Growth At 24 Hours    Blood culture #1 **CANNOT BE ORDERED STAT** [[id number]]  (Normal) Collected: 10/12/23 1646    Order Status: Completed  Specimen: Blood Updated: 10/13/23 2001     CULTURE, BLOOD ([facility]) No Growth At 24 Hours    Blood culture #2 **CANNOT BE ORDERED STAT** [[id number]]  (Normal) Collected: 10/12/23 1735    Order Status: Completed Specimen: Blood Updated: 10/13/23 2001     CULTURE, BLOOD ([facility]) No Growth At 24 Hours    Gram Stain [[id number]] Collected: 10/13/23 0955    Order Status: Completed Specimen: Peritoneal Fluid Updated: 10/13/23 1523     GRAM STAIN No WBCs, No bacteria seen             See below for Radiology    Scheduled Med:   cefTRIAXone (ROCEPHIN) IVPB  1 g Intravenous Q24H    doxycycline  100 mg Oral Q12H    enoxparin  40 mg Subcutaneous Daily    EScitalopram oxalate  5 mg Oral Daily    folic acid  1 mg Oral Daily    furosemide  20 mg Oral Daily    multivitamin  1 tablet Oral Daily    rifAXIMin  550 mg Oral BID    sodium chloride  1,000 mg Oral BID    spironolactone  50 mg Oral Daily    thiamine  100 mg Oral Daily      Continuous Infusions:     PRN Meds:  melatonin, methocarbamoL, ondansetron, sodium chloride 0.9%     Assessment/Plan:  Sepsis due to RLL community Acquired pneumonia , POA ( WBC 20.1K, HR>90)   Severe leukocytosis due to infection - improving   Alcoholic hepatitis and cirrhosis with ascites and decompensation  Hypervolemic hypo osmolar hyponatremia - improving   Hypokalemia, POA  Metabolic acidosis/ Lactic acidosis  , POA- improved   Normocytic anemia   Coagulopathy due to cirrhosis      H/O Alcohol abuse and dependency, Depression/ Anxiety     Plan-   Continue antibiotic targeting CAP  Ascitic fluid study neg for spontaneous bacterial peritonitis    Ascitic fluid is transudate with SAAG>1.1 suggestive of portal HTN is likely the cause of ascites.    Maddrey's Discriminant function 65.9 on admit  No steroid treatment given presence of pneumonia or infection   LFT's are improving   Serum sodium is improving as expected. Continue Lasix . Aldactone added today . Continue fluid restriction 800 to 1000 ml.  Correct  and replete electrolyte as indicated   Ok to continue SSRI with hyponatremia as pt has obvious reason for hyponatremia   Start Thiamine , Folic acid , MVI   CIWA protocol   Pt initially reported last drink 4 week ago per HPI, today she tells me last drink was 4 mos ago   GI is following   Pulmonology was consulted on admission for Rt pleural effusion. No need for thoracentesis.     PT/OT consult  Home meds reviewed.       MELD 3.0: 32 at 10/14/2023  6:02 AM  MELD-Na: 30 at 10/14/2023  6:02 AM  Calculated from:  Serum Creatinine: 0.79 mg/dL (Using min of 1 mg/dL) at 10/14/2023  6:02 AM  Serum Sodium: 125 mmol/L at 10/14/2023  6:02 AM  Total Bilirubin: 9.7 mg/dL at 10/14/2023  6:02 AM  Serum Albumin: 1.6 g/dL at 10/14/2023  6:02 AM  INR(ratio): 2.2 at 10/12/2023  9:33 PM  Age at listing (hypothetical): 63 years  Sex: Female at 10/14/2023  6:02 AM           VTE prophylaxis: Lovenox     Patient condition:  Fair     Anticipated discharge and Disposition:     TBD    All diagnosis and differential diagnosis have been reviewed; assessment and plan has been documented; I have personally reviewed the labs and test results that are presently available; I have reviewed the patients medication list; I have reviewed the consulting providers response and recommendations. I have reviewed or attempted to review medical records based upon their availability    All of the patient's questions have been  addressed and answered. Patient's is agreeable to the above stated plan. I will continue to monitor closely and make adjustments to medical management as needed.      Aleksey Roa MD   10/14/2023

## 2023-10-14 NOTE — PROGRESS NOTES
"Gastroenterology Progress Note    Subjective/Interval History:  10-14-23  Pt ambulating in room with PT this am  Tolerating diet  Hgb stable  Bili trending down slowly      ROS:12 point system reviewed and is negative except as noted in HPI       Vital Signs:  /69   Pulse 99   Temp 97.4 °F (36.3 °C) (Oral)   Resp 20   Ht 5' 4" (1.626 m)   Wt 66 kg (145 lb 8.1 oz)   SpO2 95%   BMI 24.98 kg/m²   Body mass index is 24.98 kg/m².    Physical Exam:  Vitals reviewed.   Constitutional:       Appearance: Normal appearance.   HENT:      Head: Normocephalic and atraumatic.   Cardiovascular:      Rate and Rhythm: Normal rate.      Heart sounds: Normal heart sounds.   Pulmonary:      Effort: Pulmonary effort is normal.      Comments: Diminished in bases  Abdominal:      Palpations: Abdomen is soft.   Musculoskeletal:      Cervical back: Neck supple.   Skin:     Coloration: Skin is jaundiced.   Neurological:      General: No focal deficit present.      Mental Status: She is alert and oriented to person, place, and time.     Labs:  Recent Results (from the past 48 hour(s))   Comprehensive metabolic panel    Collection Time: 10/12/23  5:35 PM   Result Value Ref Range    Sodium Level 122 (L) 136 - 145 mmol/L    Potassium Level 3.0 (L) 3.5 - 5.1 mmol/L    Chloride 88 (L) 98 - 107 mmol/L    Carbon Dioxide 23 23 - 31 mmol/L    Glucose Level 101 82 - 115 mg/dL    Blood Urea Nitrogen 9.6 (L) 9.8 - 20.1 mg/dL    Creatinine 0.79 0.55 - 1.02 mg/dL    Calcium Level Total 7.4 (L) 8.4 - 10.2 mg/dL    Protein Total 5.6 (L) 5.8 - 7.6 gm/dL    Albumin Level 1.7 (L) 3.4 - 4.8 g/dL    Globulin 3.9 (H) 2.4 - 3.5 gm/dL    Albumin/Globulin Ratio 0.4 (L) 1.1 - 2.0 ratio    Bilirubin Total 11.6 (H) <=1.5 mg/dL    Alkaline Phosphatase 180 (H) 40 - 150 unit/L    Alanine Aminotransferase 36 0 - 55 unit/L    Aspartate Aminotransferase 120 (H) 5 - 34 unit/L    eGFR >60 mls/min/1.73/m2   Lipase    Collection Time: 10/12/23  5:35 PM   Result Value " Ref Range    Lipase Level 25 <=60 U/L   Troponin I    Collection Time: 10/12/23  5:35 PM   Result Value Ref Range    Troponin-I <0.010 0.000 - 0.045 ng/mL   CBC with Differential    Collection Time: 10/12/23  5:35 PM   Result Value Ref Range    WBC 20.17 (H) 4.50 - 11.50 x10(3)/mcL    RBC 3.88 (L) 4.20 - 5.40 x10(6)/mcL    Hgb 11.4 (L) 12.0 - 16.0 g/dL    Hct 31.8 (L) 37.0 - 47.0 %    MCV 82.0 80.0 - 94.0 fL    MCH 29.4 27.0 - 31.0 pg    MCHC 35.8 33.0 - 36.0 g/dL    RDW 19.5 (H) 11.5 - 17.0 %    Platelet 168 130 - 400 x10(3)/mcL    MPV 8.1 7.4 - 10.4 fL    Neut % 78.7 %    Lymph % 8.5 %    Mono % 9.3 %    Eos % 1.6 %    Basophil % 0.4 %    Lymph # 1.71 0.6 - 4.6 x10(3)/mcL    Neut # 15.88 (H) 2.1 - 9.2 x10(3)/mcL    Mono # 1.87 (H) 0.1 - 1.3 x10(3)/mcL    Eos # 0.33 0 - 0.9 x10(3)/mcL    Baso # 0.08 <=0.2 x10(3)/mcL    IG# 0.30 (H) 0 - 0.04 x10(3)/mcL    IG% 1.5 %    NRBC% 0.0 %   Ammonia    Collection Time: 10/12/23  5:35 PM   Result Value Ref Range    Ammonia Level 54.7 18.0 - 72.0 umol/L   Blood culture #1 **CANNOT BE ORDERED STAT**    Collection Time: 10/12/23  5:35 PM    Specimen: Blood   Result Value Ref Range    CULTURE, BLOOD ([facility]) No Growth At 24 Hours    Blood culture #2 **CANNOT BE ORDERED STAT**    Collection Time: 10/12/23  5:35 PM    Specimen: Blood   Result Value Ref Range    CULTURE, BLOOD ([facility]) No Growth At 24 Hours    Lactic acid, plasma    Collection Time: 10/12/23  5:35 PM   Result Value Ref Range    Lactic Acid Level 3.4 (H) 0.5 - 2.2 mmol/L   Magnesium    Collection Time: 10/12/23  5:35 PM   Result Value Ref Range    Magnesium Level 1.80 1.60 - 2.60 mg/dL   Brain natriuretic peptide    Collection Time: 10/12/23  5:35 PM   Result Value Ref Range    Natriuretic Peptide 36.3 <=100.0 pg/mL   Hepatitis Panel, Acute    Collection Time: 10/12/23  5:35 PM   Result Value Ref Range    Hepatitis A IgM Nonreactive Nonreactive    Hepatitis B Core IgM Nonreactive Nonreactive    Hepatitis B Surface Antigen  Nonreactive Nonreactive    Hep C Ab Interp Nonreactive Nonreactive   Ethanol    Collection Time: 10/12/23  5:35 PM   Result Value Ref Range    Ethanol Level <10.0 <=10.0 mg/dL   Lactic Acid, Plasma    Collection Time: 10/12/23  7:54 PM   Result Value Ref Range    Lactic Acid Level 3.4 (H) 0.5 - 2.2 mmol/L   Urinalysis, Reflex to Urine Culture    Collection Time: 10/12/23  8:04 PM    Specimen: Urine   Result Value Ref Range    Color, UA Dark Yellow Yellow, Light-Yellow, Dark Yellow, Keisha, Straw    Appearance, UA Cloudy (A) Clear    Specific Gravity, UA 1.019 1.005 - 1.030    pH, UA 6.0 5.0 - 8.5    Protein, UA Negative Negative    Glucose, UA Negative Negative, Normal    Ketones, UA Negative Negative    Blood, UA Negative Negative    Bilirubin, UA 3+ (A) Negative    Urobilinogen, UA 2.0 (A) 0.2, 1.0, Normal    Nitrites, UA Positive (A) Negative    Leukocyte Esterase, UA 1+ (A) Negative   Urinalysis, Microscopic    Collection Time: 10/12/23  8:04 PM   Result Value Ref Range    RBC, UA <5 <=5 /HPF    WBC, UA <5 <=5 /HPF    Squamous Epithelial Cells, UA <5 <=5 /HPF    Bacteria, UA None Seen None Seen, Rare, Occasional /HPF    Bilirubin Crystals, UA Occasional (A) None Seen /HPF   Osmolality, Serum    Collection Time: 10/12/23  9:04 PM   Result Value Ref Range    Osmolality 261 (L) 280 - 300 mOsm/kg   Protime-INR    Collection Time: 10/12/23  9:33 PM   Result Value Ref Range    PT 24.3 (H) 12.5 - 14.5 seconds    INR 2.2 (H) <=1.3   APTT    Collection Time: 10/12/23  9:33 PM   Result Value Ref Range    PTT 42.0 (H) 23.2 - 33.7 seconds   Comprehensive Metabolic Panel    Collection Time: 10/13/23  3:59 AM   Result Value Ref Range    Sodium Level 123 (L) 136 - 145 mmol/L    Potassium Level 3.5 3.5 - 5.1 mmol/L    Chloride 92 (L) 98 - 107 mmol/L    Carbon Dioxide 20 (L) 23 - 31 mmol/L    Glucose Level 125 (H) 82 - 115 mg/dL    Blood Urea Nitrogen 8.5 (L) 9.8 - 20.1 mg/dL    Creatinine 0.73 0.55 - 1.02 mg/dL    Calcium Level  Total 7.1 (L) 8.4 - 10.2 mg/dL    Protein Total 5.3 (L) 5.8 - 7.6 gm/dL    Albumin Level 1.5 (L) 3.4 - 4.8 g/dL    Globulin 3.8 (H) 2.4 - 3.5 gm/dL    Albumin/Globulin Ratio 0.4 (L) 1.1 - 2.0 ratio    Bilirubin Total 10.8 (H) <=1.5 mg/dL    Alkaline Phosphatase 170 (H) 40 - 150 unit/L    Alanine Aminotransferase 32 0 - 55 unit/L    Aspartate Aminotransferase 114 (H) 5 - 34 unit/L    eGFR >60 mls/min/1.73/m2   Lactic Acid, Plasma    Collection Time: 10/13/23  3:59 AM   Result Value Ref Range    Lactic Acid Level 2.9 (H) 0.5 - 2.2 mmol/L   CBC with Differential    Collection Time: 10/13/23  3:59 AM   Result Value Ref Range    WBC 18.83 (H) 4.50 - 11.50 x10(3)/mcL    RBC 3.76 (L) 4.20 - 5.40 x10(6)/mcL    Hgb 11.1 (L) 12.0 - 16.0 g/dL    Hct 31.6 (L) 37.0 - 47.0 %    MCV 84.0 80.0 - 94.0 fL    MCH 29.5 27.0 - 31.0 pg    MCHC 35.1 33.0 - 36.0 g/dL    RDW 19.9 (H) 11.5 - 17.0 %    Platelet 141 130 - 400 x10(3)/mcL    MPV 8.3 7.4 - 10.4 fL    Neut % 73.7 %    Lymph % 10.1 %    Mono % 12.1 %    Eos % 1.9 %    Basophil % 0.4 %    Lymph # 1.91 0.6 - 4.6 x10(3)/mcL    Neut # 13.89 (H) 2.1 - 9.2 x10(3)/mcL    Mono # 2.28 (H) 0.1 - 1.3 x10(3)/mcL    Eos # 0.35 0 - 0.9 x10(3)/mcL    Baso # 0.07 <=0.2 x10(3)/mcL    IG# 0.33 (H) 0 - 0.04 x10(3)/mcL    IG% 1.8 %    NRBC% 0.1 %   LD, Body Fluid    Collection Time: 10/13/23  9:55 AM   Result Value Ref Range    Lactate Dehydrogenase Body Fluid <30 U/L   Amylase, Body Fluid    Collection Time: 10/13/23  9:55 AM   Result Value Ref Range    Amylase Body Fluid 5 U/L   Glucose, Body Fluid    Collection Time: 10/13/23  9:55 AM   Result Value Ref Range    Glucose Body Fluid 139 mg/dL   Albumin, Body Fluid    Collection Time: 10/13/23  9:55 AM   Result Value Ref Range    Albumin Level Body Fluid <0.4 gm/dL   Protein, Body Fluid    Collection Time: 10/13/23  9:55 AM   Result Value Ref Range    Protein Body Fluid <0.8 gm/dL   Body Fluid Culture    Collection Time: 10/13/23  9:55 AM    Specimen:  Ascitic Fluid; Peritoneal Fluid   Result Value Ref Range    Body Fluid Culture No Growth At 24 Hours    Cell Count, Body Fluid    Collection Time: 10/13/23  9:55 AM   Result Value Ref Range    WBC BF 56 /uL    Color BF Yellow     Clarity BF Clear    Gram Stain    Collection Time: 10/13/23  9:55 AM    Specimen: Peritoneal Fluid   Result Value Ref Range    GRAM STAIN No WBCs, No bacteria seen    Differential, Body Fluid    Collection Time: 10/13/23  9:55 AM   Result Value Ref Range    Neutrophils % 2 %    Lymphocyte % 39 %    Monocyte % 49 %    Eosinophil 6 %    Other Cells BF 4 %    Macrophage count 27    Respiratory Panel    Collection Time: 10/13/23 12:33 PM   Result Value Ref Range    Adenovirus Not Detected Not Detected    Coronavirus 229E Not Detected Not Detected    Coronavirus HKU1 Not Detected Not Detected    Coronavirus NL63 Not Detected Not Detected    Coronavirus OC43 PCR, Common Cold Virus Not Detected Not Detected    Human Metapneumovirus Not Detected Not Detected    Parainfluenza Virus 1 Not Detected Not Detected    Parainfluenza Virus 2 Not Detected Not Detected    Parainfluenza Virus 3 Not Detected Not Detected    Parainfluenza Virus 4 Not Detected Not Detected    Bordetella pertussis (ptxP) Not Detected Not Detected    Chlamydia pneumoniae Not Detected Not Detected    Mycoplasma pneumoniae Not Detected Not Detected    Human Rhinovirus/Enterovirus Not Detected Not Detected    Bordetella parapertussis (UO0056) Not Detected Not Detected   Lactic Acid, Plasma    Collection Time: 10/13/23  2:34 PM   Result Value Ref Range    Lactic Acid Level 1.8 0.5 - 2.2 mmol/L   Comprehensive Metabolic Panel    Collection Time: 10/14/23  6:02 AM   Result Value Ref Range    Sodium Level 125 (L) 136 - 145 mmol/L    Potassium Level 3.3 (L) 3.5 - 5.1 mmol/L    Chloride 91 (L) 98 - 107 mmol/L    Carbon Dioxide 25 23 - 31 mmol/L    Glucose Level 132 (H) 82 - 115 mg/dL    Blood Urea Nitrogen 9.1 (L) 9.8 - 20.1 mg/dL     Creatinine 0.79 0.55 - 1.02 mg/dL    Calcium Level Total 7.3 (L) 8.4 - 10.2 mg/dL    Protein Total 5.1 (L) 5.8 - 7.6 gm/dL    Albumin Level 1.6 (L) 3.4 - 4.8 g/dL    Globulin 3.5 2.4 - 3.5 gm/dL    Albumin/Globulin Ratio 0.5 (L) 1.1 - 2.0 ratio    Bilirubin Total 9.7 (H) <=1.5 mg/dL    Alkaline Phosphatase 181 (H) 40 - 150 unit/L    Alanine Aminotransferase 34 0 - 55 unit/L    Aspartate Aminotransferase 110 (H) 5 - 34 unit/L    eGFR >60 mls/min/1.73/m2   CBC with Differential    Collection Time: 10/14/23  6:02 AM   Result Value Ref Range    WBC 17.45 (H) 4.50 - 11.50 x10(3)/mcL    RBC 3.53 (L) 4.20 - 5.40 x10(6)/mcL    Hgb 10.5 (L) 12.0 - 16.0 g/dL    Hct 29.7 (L) 37.0 - 47.0 %    MCV 84.1 80.0 - 94.0 fL    MCH 29.7 27.0 - 31.0 pg    MCHC 35.4 33.0 - 36.0 g/dL    RDW 19.9 (H) 11.5 - 17.0 %    Platelet 165 130 - 400 x10(3)/mcL    MPV 8.1 7.4 - 10.4 fL    Neut % 71.9 %    Lymph % 14.3 %    Mono % 8.3 %    Eos % 2.8 %    Basophil % 0.5 %    Lymph # 2.49 0.6 - 4.6 x10(3)/mcL    Neut # 12.55 (H) 2.1 - 9.2 x10(3)/mcL    Mono # 1.45 (H) 0.1 - 1.3 x10(3)/mcL    Eos # 0.49 0 - 0.9 x10(3)/mcL    Baso # 0.08 <=0.2 x10(3)/mcL    IG# 0.39 (H) 0 - 0.04 x10(3)/mcL    IG% 2.2 %    NRBC% 0.2 %       Imaging:  US Paracentesis inc Imaging    Result Date: 10/13/2023  EXAMINATION: US GUIDED PARACENTESIS INC IMAGING CLINICAL HISTORY: new ascites; Attending: Carlos Enrique Irwin M.D. Anesthesia: Lidocaine utilized for local anesthesia. TECHNIQUE: After the risks, benefits and alternatives were discussed, consent was obtained. A time out was performed to verify the patient's identity and procedure. The patient was placed supine. Limited ultrasound the abdomen demonstrated ascitic fluid in the right lower quadrant. Static image was obtained. The lower quadrant was cleaned prepped in normal sterile fashion. Subcutaneous tissues were anesthetized with lidocaine solution. The [employer] needle was advanced into the abdominal cavity. Placement was  confirmed by return of ascitic fluid. The catheter was advanced and a total of 0.75 liters of clear yellow fluid was aspirated. The patient tolerated the procedure well. There were no immediate complications. Estimated blood loss: None     Successful paracentesis. Electronically signed by: Carlos Enrique Irwin Date:    10/13/2023 Time:    12:19    US Liver with Doppler (xpd)    Result Date: 10/13/2023  EXAMINATION: US LIVER WITH DOPPLER CLINICAL HISTORY: Cirrhosis evaluation, Evaluation for hepatic, portal and splenic veins patency; TECHNIQUE: Right upper quadrant abdominal ultrasound (including pancreas, aorta, liver, gallbladder, common bile duct, IVC, right kidney, and spleen) was performed.  Spectral Doppler evaluation performed. COMPARISON: CT abdomen pelvis 10/12/2023 FINDINGS: LIMITATIONS: Exam limited due to poor acoustic window related to shadowing bowel gas and/or body habitus. LIVER: Liver measures 15 cm cranial caudal at the midclavicular line. The liver is echogenic.  Nodular surface contour.  No discrete mass appreciable by sonographic evaluation. PANCREAS: Obscured by overlying bowel gas. GALLBLADDER: Not imaged BILE DUCTS: Common bile duct not imaged. RIGHT KIDNEY: Not imaged SPLEEN: 8 cm.  Normal in size with homogeneous echotexture. OTHER: There is ascites and bilateral pleural effusions. DOPPLER EVALUATION: AORTA: Not imaged HEPATIC ARTERY: Patent. Peak systolic velocity 173 cm/s.  Normal waveform. INFERIOR VENA CAVA: Not imaged.  IVC is patent on preceding CT exam. PORTAL VEINS: Portal vein is patent.  Hepatofugal flow.  Note on CT exam there were recanalized periumbilical veins, gastric varices and a large spleno renal shunt. SPLENIC VEIN: Patent with appropriate directional flow. HEPATIC VEINS: Unable to visualize.  Note on CT exam the right and left hepatic veins appeared compressed but patent.  Left hepatic vein was not well visualized.  This can be seen related to fibrotic changes of cirrhosis.      Cirrhotic morphology of the liver Changes of portal hypertension with reversed flow at the portal vein.  There were portosystemic collaterals on preceding CT exam Small pleural effusions and abdominal ascites Electronically signed by: Lorna Maxwell Date:    10/13/2023 Time:    10:26    CT Abdomen Pelvis With Contrast    Result Date: 10/12/2023  EXAMINATION: CT ABDOMEN PELVIS WITH CONTRAST CLINICAL HISTORY: Sepsis;Jaundiced; TECHNIQUE: Multidetector axial images were obtained of the abdomen and pelvis following the administration of IV contrast. Oral contrast was not administered. Dose length product of 322 mGycm. Automated exposure control was utilized to minimize radiation dose. COMPARISON: None available FINDINGS: There is moderate volume right pleural effusion and right lower lung lobe dense consolidation.  There is also small left pleural and left basilar atelectasis.  Bilateral mammary implants. There is hepatic cirrhotic morphology with low attenuation and surface nodularity.  No focal hepatic space-occupying lesion.  There is intra-abdominopelvic small to moderate free fluid consistent with ascites.  Gallbladder is distended without intraluminal calcified calculus.  No significant dilatation of the intrahepatic biliary radicles and the extrahepatic duct is also not distended without calcified choledocholithiasis.  No no acute findings of the pancreas or the spleen. The adrenal glands appear within normal limits. The kidneys are unremarkable in size and contour. No solid or cystic renal lesion identified. There is no hydronephrosis. No perinephric fluid strandings or collections identified. Stomach is mostly decompressed and difficult to assess.  There is suspected mild mural thickening of the loops of small bowel suspect for enteritis.  No transition or bowel obstruction.  Colon is nondistended.  Noninflamed diverticulosis coli.  No pneumoperitoneum. Urinary bladder appears within normal limits.  Small  volume uterus.  Endometrium is not well delineated.  There is no pelvic free fluid. Lower lumbar degenerative changes.  No acute or aggressive skeletal abnormality no acute or otherwise osseous abnormality identified.     1. Right pleural effusion and right lower lung lobe consolidation. 2. Hepatic cirrhotic morphology and ascites. 3. Mural thickening of small bowel loops suggest nonspecific enteritis. Electronically signed by: Kevyn Abebe Date:    10/12/2023 Time:    19:33    CT Head Without Contrast    Result Date: 10/12/2023  EXAMINATION: CT HEAD WITHOUT CONTRAST CLINICAL HISTORY: Infusion; TECHNIQUE: Sequential axial images were performed of the brain without contrast. Dose product length of 884 mGycm. Automated exposure control was utilized to minimize radiation dose. COMPARISON: February 8, 2022. FINDINGS: There is no intracranial mass effect, midline shift, hydrocephalus or hemorrhage. There is no sulcal effacement or low attenuation changes to suggest recent large vessel territory infarction. Chronic appearing periventricular and subcortical white matter low attenuation changes are present and are consistent with chronic microangiopathic ischemia.  Beneath left craniotomy is similar appearing dural thickening.  No acute extra-axial fluid collection identified.  The ventricular system and sulcal markings prominence is consistent with atrophy more advanced for the age.  There is no acute extra axial fluid collection.  Similar mucoperiosteal thickening of the right maxillary sinus.  Paranasal sinuses are clear without mucosal thickening, polypoidal abnormality or air-fluid levels. Mastoid air cells aeration is optimal.     No acute intracranial findings identified. Electronically signed by: Kevyn Abebe Date:    10/12/2023 Time:    19:26    X-Ray Chest AP Portable    Result Date: 10/12/2023  EXAMINATION: XR CHEST AP PORTABLE CLINICAL HISTORY: Hypotension; TECHNIQUE: Single frontal view of the chest was  performed. COMPARISON: 12/12/2022 FINDINGS: There is a infiltrate developing in the right lower lobe.  There is a small right-sided pleural effusion.  There is a patchy infiltrate in the left lower lobe.  The heart is normal in appearance.  Bones and joints show no acute abnormality.     Infiltrate developing in the right lower lobe with a small right-sided pleural effusion Patchy appearing infiltrate developing in the left lower lobe Electronically signed by: Dariana Linares Date:    10/12/2023 Time:    17:53         Assessment/Plan:  Cirrhosis - ETOH related  Heavy drinker x 30 years  States stopped a few weeks ago  Small volume paracentesis - studies pending  PNA  Hyponatremia    Will continue aggressive supportive care - needs to see hepatology outpatient  Stop all ETOH       [place] NP as scribe for Dr. Jose Daniel Garrido  on call for Dr. Pennington

## 2023-10-14 NOTE — PLAN OF CARE
Problem: Adult Inpatient Plan of Care  Goal: Plan of Care Review  Outcome: Ongoing, Progressing  Goal: Patient-Specific Goal (Individualized)  Outcome: Ongoing, Progressing  Goal: Absence of Hospital-Acquired Illness or Injury  Outcome: Ongoing, Progressing  Goal: Optimal Comfort and Wellbeing  Outcome: Ongoing, Progressing  Goal: Readiness for Transition of Care  Outcome: Ongoing, Progressing     Problem: Skin Injury Risk Increased  Goal: Skin Health and Integrity  Outcome: Ongoing, Progressing     Problem: Infection  Goal: Absence of Infection Signs and Symptoms  Outcome: Ongoing, Progressing     Problem: Impaired Wound Healing  Goal: Optimal Wound Healing  Outcome: Ongoing, Progressing

## 2023-10-14 NOTE — PROGRESS NOTES
[facility] - 4th Floor Medical Telemetry  Pulmonary Critical Care Note    Patient Name: Lauren Ewing  MRN: [id number]  Admission Date: 10/12/2023  Hospital Length of Stay: 2 days  Code Status: Full Code  Attending Provider: Daniel Kapadia MD  Primary Care Provider: Pennie, Primary Doctor     Subjective:     HPI:   This is a 63-year-old female with a past medical history of depression/anxiety, and alcohol abuse who presented to the emergency department on 10/12/2023 with progressive weakness and yellowing of her skin.  She also endorsed poor appetite over the past week.  On arrival to the emergency department she was found to have leukocytosis of 20,000, a significantly elevated bilirubin at 11.6, hyponatremia, and mildly elevated lactic acid level.  Alcohol level was undetectable.  She reported a heavy history of alcohol abuse since about age 38.  She has been in rehab for alcohol abuse before.  Her last drink was approximately 4 months ago she tells me.  CT of the abdomen and pelvis with contrast revealed  a right-sided pleural effusion, right lower lobe consolidation, hepatic cirrhotic morphology and ascites with mural thickening of the small bowel loops suggesting nonspecific enteritis.  Pulmonary is being consulted for evaluation of right-sided pleural effusion.    Hospital Course/Significant events:  10/13/2023- US paracentesis with removal of 750 mL of clear yellow fluid.    24 Hour Interval History:  She is lying in bed. Still continues to experience fatigue. Oxygenating well on room air.      PMH:depression/anxiety, ETOH abuse  PSH: denies  SOCIAL HX:  history of alcohol abuse since age 38. Has been clean for approximately 4 months. Life long non smoker.        Current Outpatient Medications   Medication Instructions    promethazine (PHENERGAN) 25 mg, Rectal, Every 6 hours PRN    promethazine (PHENERGAN) 25 mg, Oral, Every 6 hours PRN       Current Inpatient Medications   cefTRIAXone  (ROCEPHIN) IVPB  1 g Intravenous Q24H    doxycycline  100 mg Oral Q12H    enoxparin  40 mg Subcutaneous Daily    EScitalopram oxalate  5 mg Oral Daily    folic acid  1 mg Oral Daily    furosemide  20 mg Oral Daily    multivitamin  1 tablet Oral Daily    rifAXIMin  550 mg Oral BID    sodium chloride  1,000 mg Oral BID    spironolactone  50 mg Oral Daily    thiamine  100 mg Oral Daily       Current Intravenous Infusions        Review of Systems   Constitutional:  Positive for malaise/fatigue.   Respiratory:  Positive for cough.    Neurological:  Positive for weakness.          Objective:       Intake/Output Summary (Last 24 hours) at 10/14/2023 1136  Last data filed at 10/13/2023 1750  Gross per 24 hour   Intake 1220 ml   Output 150 ml   Net 1070 ml           Vital Signs (Most Recent):  Temp: 98 °F (36.7 °C) (10/14/23 1112)  Pulse: 97 (10/14/23 1112)  Resp: 20 (10/14/23 0420)  BP: 102/65 (10/14/23 1112)  SpO2: (!) 94 % (10/14/23 1112)  Body mass index is 24.98 kg/m².  Weight: 66 kg (145 lb 8.1 oz) Vital Signs (24h Range):  Temp:  [97.4 °F (36.3 °C)-98.4 °F (36.9 °C)] 98 °F (36.7 °C)  Pulse:  [] 97  Resp:  [18-20] 20  SpO2:  [92 %-97 %] 94 %  BP: (102-119)/(63-82) 102/65     Physical Exam  Vitals reviewed.   Constitutional:       Appearance: Normal appearance.   HENT:      Head: Normocephalic and atraumatic.   Cardiovascular:      Rate and Rhythm: Normal rate.      Heart sounds: Normal heart sounds.   Pulmonary:      Effort: Pulmonary effort is normal.      Comments: Diminished in bases  Abdominal:      Palpations: Abdomen is soft.   Musculoskeletal:      Cervical back: Neck supple.   Skin:     Coloration: Skin is jaundiced.   Neurological:      General: No focal deficit present.      Mental Status: She is alert and oriented to person, place, and time.           Lines/Drains/Airways       Peripheral Intravenous Line  Duration                  Peripheral IV - Single Lumen 10/12/23 2129 20 G Right Antecubital 1 day  "                   Significant Labs:    Lab Results   Component Value Date    WBC 17.45 (H) 10/14/2023    HGB 10.5 (L) 10/14/2023    HCT 29.7 (L) 10/14/2023    MCV 84.1 10/14/2023     10/14/2023           BMP  Lab Results   Component Value Date     (L) 10/14/2023    K 3.3 (L) 10/14/2023    CHLORIDE 91 (L) 10/14/2023    CO2 25 10/14/2023    BUN 9.1 (L) 10/14/2023    CREATININE 0.79 10/14/2023    CALCIUM 7.3 (L) 10/14/2023    EGFRNONAA >60 02/08/2022         ABG  No results for input(s): "PH", "PO2", "PCO2", "HCO3", "POCBASEDEF" in the last 168 hours.      Significant Imaging:  US Paracentesis inc Imaging  Narrative: EXAMINATION:  US GUIDED PARACENTESIS INC IMAGING    CLINICAL HISTORY:  new ascites;    Attending: Carlos Enrique Irwin M.D.    Anesthesia: Lidocaine utilized for local anesthesia.    TECHNIQUE:  After the risks, benefits and alternatives were discussed, consent was obtained. A time out was performed to verify the patient's identity and procedure.    The patient was placed supine. Limited ultrasound the abdomen demonstrated ascitic fluid in the right lower quadrant. Static image was obtained. The lower quadrant was cleaned prepped in normal sterile fashion. Subcutaneous tissues were anesthetized with lidocaine solution. The Yueh needle was advanced into the abdominal cavity. Placement was confirmed by return of ascitic fluid. The catheter was advanced and a total of 0.75 liters of clear yellow fluid was aspirated. The patient tolerated the procedure well. There were no immediate complications.    Estimated blood loss: None  Impression: Successful paracentesis.    Electronically signed by: Carlos Enrique Irwin  Date:    10/13/2023  Time:    12:19  US Liver with Doppler (xpd)  Narrative: EXAMINATION:  US LIVER WITH DOPPLER    CLINICAL HISTORY:  Cirrhosis evaluation, Evaluation for hepatic, portal and splenic veins patency;    TECHNIQUE:  Right upper quadrant abdominal ultrasound (including pancreas, aorta, " liver, gallbladder, common bile duct, IVC, right kidney, and spleen) was performed.  Spectral Doppler evaluation performed.    COMPARISON:  CT abdomen pelvis 10/12/2023    FINDINGS:  LIMITATIONS: Exam limited due to poor acoustic window related to shadowing bowel gas and/or body habitus.    LIVER: Liver measures 15 cm cranial caudal at the midclavicular line. The liver is echogenic.  Nodular surface contour.  No discrete mass appreciable by sonographic evaluation.    PANCREAS: Obscured by overlying bowel gas.    GALLBLADDER: Not imaged    BILE DUCTS: Common bile duct not imaged.    RIGHT KIDNEY: Not imaged    SPLEEN: 8 cm.  Normal in size with homogeneous echotexture.    OTHER: There is ascites and bilateral pleural effusions.    DOPPLER EVALUATION:    AORTA: Not imaged    HEPATIC ARTERY: Patent. Peak systolic velocity 173 cm/s.  Normal waveform.    INFERIOR VENA CAVA: Not imaged.  IVC is patent on preceding CT exam.    PORTAL VEINS: Portal vein is patent.  Hepatofugal flow.  Note on CT exam there were recanalized periumbilical veins, gastric varices and a large spleno renal shunt.    SPLENIC VEIN: Patent with appropriate directional flow.    HEPATIC VEINS: Unable to visualize.  Note on CT exam the right and left hepatic veins appeared compressed but patent.  Left hepatic vein was not well visualized.  This can be seen related to fibrotic changes of cirrhosis.  Impression: Cirrhotic morphology of the liver    Changes of portal hypertension with reversed flow at the portal vein.  There were portosystemic collaterals on preceding CT exam    Small pleural effusions and abdominal ascites    Electronically signed by: Lorna Maxwell  Date:    10/13/2023  Time:    10:26            Assessment/Plan:     Assessment  Right sided pneumonia  Cirrhosis with resulting ascites and small hepatothorax  Hyponatremia       Plan  Continue rocephin and doxycycline (day 3) for possible CAP  Status post paracentesis; doing well from a  pulmonary standpoint          PAVAN De Santiago  Pulmonary Critical Care Medicine  [facility] - 4th Floor Medical Telemetry  DOS: 10/14/2023

## 2023-10-15 LAB
ALBUMIN SERPL-MCNC: 1.6 G/DL (ref 3.4–4.8)
ALBUMIN/GLOB SERPL: 0.5 RATIO (ref 1.1–2)
ALP SERPL-CCNC: 169 UNIT/L (ref 40–150)
ALT SERPL-CCNC: 35 UNIT/L (ref 0–55)
AST SERPL-CCNC: 106 UNIT/L (ref 5–34)
BASOPHILS # BLD AUTO: 0.06 X10(3)/MCL
BASOPHILS NFR BLD AUTO: 0.4 %
BILIRUB SERPL-MCNC: 8.7 MG/DL
BUN SERPL-MCNC: 10.3 MG/DL (ref 9.8–20.1)
CALCIUM SERPL-MCNC: 7.1 MG/DL (ref 8.4–10.2)
CHLORIDE SERPL-SCNC: 92 MMOL/L (ref 98–107)
CO2 SERPL-SCNC: 25 MMOL/L (ref 23–31)
CREAT SERPL-MCNC: 0.78 MG/DL (ref 0.55–1.02)
EOSINOPHIL # BLD AUTO: 0.51 X10(3)/MCL (ref 0–0.9)
EOSINOPHIL NFR BLD AUTO: 3.4 %
ERYTHROCYTE [DISTWIDTH] IN BLOOD BY AUTOMATED COUNT: 20.1 % (ref 11.5–17)
GFR SERPLBLD CREATININE-BSD FMLA CKD-EPI: >60 MLS/MIN/1.73/M2
GLOBULIN SER-MCNC: 3.4 GM/DL (ref 2.4–3.5)
GLUCOSE SERPL-MCNC: 111 MG/DL (ref 82–115)
HCT VFR BLD AUTO: 30.5 % (ref 37–47)
HGB BLD-MCNC: 10.7 G/DL (ref 12–16)
IMM GRANULOCYTES # BLD AUTO: 0.29 X10(3)/MCL (ref 0–0.04)
IMM GRANULOCYTES NFR BLD AUTO: 1.9 %
INR PPP: 2.5
INR PPP: 2.9
LYMPHOCYTES # BLD AUTO: 2.29 X10(3)/MCL (ref 0.6–4.6)
LYMPHOCYTES NFR BLD AUTO: 15.4 %
MCH RBC QN AUTO: 29.6 PG (ref 27–31)
MCHC RBC AUTO-ENTMCNC: 35.1 G/DL (ref 33–36)
MCV RBC AUTO: 84.3 FL (ref 80–94)
MONOCYTES # BLD AUTO: 1.17 X10(3)/MCL (ref 0.1–1.3)
MONOCYTES NFR BLD AUTO: 7.9 %
NEUTROPHILS # BLD AUTO: 10.58 X10(3)/MCL (ref 2.1–9.2)
NEUTROPHILS NFR BLD AUTO: 71 %
NRBC BLD AUTO-RTO: 0.3 %
OSMOLALITY SERPL: 261 MOSM/KG (ref 280–300)
OSMOLALITY UR: 467 MOSM/KG (ref 300–1300)
PLATELET # BLD AUTO: 138 X10(3)/MCL (ref 130–400)
PMV BLD AUTO: 8.1 FL (ref 7.4–10.4)
POTASSIUM SERPL-SCNC: 3.1 MMOL/L (ref 3.5–5.1)
PROT SERPL-MCNC: 5 GM/DL (ref 5.8–7.6)
PROTHROMBIN TIME: 26.9 SECONDS (ref 12.5–14.5)
PROTHROMBIN TIME: 29.5 SECONDS (ref 12.5–14.5)
RBC # BLD AUTO: 3.62 X10(6)/MCL (ref 4.2–5.4)
SODIUM SERPL-SCNC: 123 MMOL/L (ref 136–145)
SODIUM SERPL-SCNC: 125 MMOL/L (ref 136–145)
SODIUM UR-SCNC: <20 MMOL/L
WBC # SPEC AUTO: 14.9 X10(3)/MCL (ref 4.5–11.5)

## 2023-10-15 PROCEDURE — 80053 COMPREHEN METABOLIC PANEL: CPT | Performed by: INTERNAL MEDICINE

## 2023-10-15 PROCEDURE — 25000003 PHARM REV CODE 250: Performed by: INTERNAL MEDICINE

## 2023-10-15 PROCEDURE — 25000003 PHARM REV CODE 250: Performed by: NURSE PRACTITIONER

## 2023-10-15 PROCEDURE — 21400001 HC TELEMETRY ROOM

## 2023-10-15 PROCEDURE — 83935 ASSAY OF URINE OSMOLALITY: CPT | Performed by: INTERNAL MEDICINE

## 2023-10-15 PROCEDURE — 85610 PROTHROMBIN TIME: CPT | Performed by: INTERNAL MEDICINE

## 2023-10-15 PROCEDURE — 83930 ASSAY OF BLOOD OSMOLALITY: CPT | Performed by: INTERNAL MEDICINE

## 2023-10-15 PROCEDURE — 85025 COMPLETE CBC W/AUTO DIFF WBC: CPT | Performed by: INTERNAL MEDICINE

## 2023-10-15 PROCEDURE — 63600175 PHARM REV CODE 636 W HCPCS: Performed by: NURSE PRACTITIONER

## 2023-10-15 PROCEDURE — 84295 ASSAY OF SERUM SODIUM: CPT | Performed by: INTERNAL MEDICINE

## 2023-10-15 PROCEDURE — 84300 ASSAY OF URINE SODIUM: CPT | Performed by: INTERNAL MEDICINE

## 2023-10-15 PROCEDURE — 63600175 PHARM REV CODE 636 W HCPCS: Performed by: INTERNAL MEDICINE

## 2023-10-15 RX ORDER — PANTOPRAZOLE SODIUM 40 MG/1
40 TABLET, DELAYED RELEASE ORAL DAILY
Status: DISCONTINUED | OUTPATIENT
Start: 2023-10-15 | End: 2023-10-26 | Stop reason: HOSPADM

## 2023-10-15 RX ORDER — LACTULOSE 10 G/15ML
10 SOLUTION ORAL 2 TIMES DAILY
Status: DISCONTINUED | OUTPATIENT
Start: 2023-10-15 | End: 2023-10-26 | Stop reason: HOSPADM

## 2023-10-15 RX ORDER — FUROSEMIDE 20 MG/1
20 TABLET ORAL 2 TIMES DAILY
Status: DISCONTINUED | OUTPATIENT
Start: 2023-10-15 | End: 2023-10-16

## 2023-10-15 RX ADMIN — RIFAXIMIN 550 MG: 550 TABLET ORAL at 09:10

## 2023-10-15 RX ADMIN — PANTOPRAZOLE SODIUM 40 MG: 40 TABLET, DELAYED RELEASE ORAL at 11:10

## 2023-10-15 RX ADMIN — BUSPIRONE HYDROCHLORIDE 5 MG: 5 TABLET ORAL at 10:10

## 2023-10-15 RX ADMIN — FUROSEMIDE 20 MG: 20 TABLET ORAL at 06:10

## 2023-10-15 RX ADMIN — DOXYCYCLINE HYCLATE 100 MG: 100 TABLET, COATED ORAL at 09:10

## 2023-10-15 RX ADMIN — THERA TABS 1 TABLET: TAB at 09:10

## 2023-10-15 RX ADMIN — Medication 6 MG: at 10:10

## 2023-10-15 RX ADMIN — MIDODRINE HYDROCHLORIDE 2.5 MG: 2.5 TABLET ORAL at 11:10

## 2023-10-15 RX ADMIN — FOLIC ACID 1 MG: 1 TABLET ORAL at 09:10

## 2023-10-15 RX ADMIN — RIFAXIMIN 550 MG: 550 TABLET ORAL at 10:10

## 2023-10-15 RX ADMIN — POTASSIUM BICARBONATE 50 MEQ: 977.5 TABLET, EFFERVESCENT ORAL at 10:10

## 2023-10-15 RX ADMIN — THIAMINE HCL TAB 100 MG 100 MG: 100 TAB at 09:10

## 2023-10-15 RX ADMIN — ENOXAPARIN SODIUM 40 MG: 40 INJECTION SUBCUTANEOUS at 04:10

## 2023-10-15 RX ADMIN — ESCITALOPRAM OXALATE 5 MG: 5 TABLET, FILM COATED ORAL at 09:10

## 2023-10-15 RX ADMIN — SPIRONOLACTONE 50 MG: 25 TABLET ORAL at 09:10

## 2023-10-15 RX ADMIN — FUROSEMIDE 20 MG: 20 TABLET ORAL at 09:10

## 2023-10-15 RX ADMIN — ONDANSETRON 4 MG: 2 INJECTION INTRAMUSCULAR; INTRAVENOUS at 11:10

## 2023-10-15 RX ADMIN — SODIUM CHLORIDE 1000 MG: 1 TABLET ORAL at 09:10

## 2023-10-15 RX ADMIN — ONDANSETRON 4 MG: 2 INJECTION INTRAMUSCULAR; INTRAVENOUS at 10:10

## 2023-10-15 RX ADMIN — CEFTRIAXONE SODIUM 1 G: 1 INJECTION, POWDER, FOR SOLUTION INTRAMUSCULAR; INTRAVENOUS at 10:10

## 2023-10-15 RX ADMIN — DOXYCYCLINE HYCLATE 100 MG: 100 TABLET, COATED ORAL at 10:10

## 2023-10-15 RX ADMIN — LACTULOSE 10 G: 10 SOLUTION ORAL at 10:10

## 2023-10-15 RX ADMIN — BUSPIRONE HYDROCHLORIDE 5 MG: 5 TABLET ORAL at 09:10

## 2023-10-15 RX ADMIN — POTASSIUM BICARBONATE 50 MEQ: 977.5 TABLET, EFFERVESCENT ORAL at 11:10

## 2023-10-15 RX ADMIN — MIDODRINE HYDROCHLORIDE 2.5 MG: 2.5 TABLET ORAL at 09:10

## 2023-10-15 RX ADMIN — LACTULOSE 10 G: 10 SOLUTION ORAL at 11:10

## 2023-10-15 NOTE — PROGRESS NOTES
"Gastroenterology Progress Note    Subjective/Interval History:  10-14-23  Pt ambulating in room with PT this am  Tolerating diet  Hgb stable  Bili trending down slowly    10-15-23  In bathroom  Had bm  Hgb stable  Sodium remains low  No acute changes overnight        ROS:12 point system reviewed and is negative except as noted in HPI       Vital Signs:  /66   Pulse 95   Temp 97.5 °F (36.4 °C) (Oral)   Resp 20   Ht 5' 4" (1.626 m)   Wt 66 kg (145 lb 8.1 oz)   SpO2 (!) 94%   BMI 24.98 kg/m²   Body mass index is 24.98 kg/m².    Physical Exam:  Vitals reviewed.   Constitutional:       Appearance: Normal appearance.   HENT:      Head: Normocephalic and atraumatic.   Cardiovascular:      Rate and Rhythm: Normal rate.      Heart sounds: Normal heart sounds.   Pulmonary:      Effort: Pulmonary effort is normal.      Comments: Diminished in bases  Abdominal:      Palpations: Abdomen is soft.   Musculoskeletal:      Cervical back: Neck supple.   Skin:     Coloration: Skin is jaundiced.   Neurological:      General: No focal deficit present.      Mental Status: She is alert and oriented to person, place, and time.     Labs:  Recent Results (from the past 48 hour(s))   LD, Body Fluid    Collection Time: 10/13/23  9:55 AM   Result Value Ref Range    Lactate Dehydrogenase Body Fluid <30 U/L   Amylase, Body Fluid    Collection Time: 10/13/23  9:55 AM   Result Value Ref Range    Amylase Body Fluid 5 U/L   Glucose, Body Fluid    Collection Time: 10/13/23  9:55 AM   Result Value Ref Range    Glucose Body Fluid 139 mg/dL   Albumin, Body Fluid    Collection Time: 10/13/23  9:55 AM   Result Value Ref Range    Albumin Level Body Fluid <0.4 gm/dL   Protein, Body Fluid    Collection Time: 10/13/23  9:55 AM   Result Value Ref Range    Protein Body Fluid <0.8 gm/dL   Body Fluid Culture    Collection Time: 10/13/23  9:55 AM    Specimen: Ascitic Fluid; Peritoneal Fluid   Result Value Ref Range    Body Fluid Culture No Growth At 48 " Hours    Cell Count, Body Fluid    Collection Time: 10/13/23  9:55 AM   Result Value Ref Range    WBC BF 56 /uL    Color BF Yellow     Clarity BF Clear    Gram Stain    Collection Time: 10/13/23  9:55 AM    Specimen: Peritoneal Fluid   Result Value Ref Range    GRAM STAIN No WBCs, No bacteria seen    Differential, Body Fluid    Collection Time: 10/13/23  9:55 AM   Result Value Ref Range    Neutrophils % 2 %    Lymphocyte % 39 %    Monocyte % 49 %    Eosinophil 6 %    Other Cells BF 4 %    Macrophage count 27    Pathologist Interpretation    Collection Time: 10/13/23  9:55 AM   Result Value Ref Range    Pathology Review       Numerous histiocytes, neutrophils, reactive mesothelial cells, red blood cells. No malignant cells identified.  Joseph Houston M.D., Ph.D.     Respiratory Panel    Collection Time: 10/13/23 12:33 PM   Result Value Ref Range    Adenovirus Not Detected Not Detected    Coronavirus 229E Not Detected Not Detected    Coronavirus HKU1 Not Detected Not Detected    Coronavirus NL63 Not Detected Not Detected    Coronavirus OC43 PCR, Common Cold Virus Not Detected Not Detected    Human Metapneumovirus Not Detected Not Detected    Parainfluenza Virus 1 Not Detected Not Detected    Parainfluenza Virus 2 Not Detected Not Detected    Parainfluenza Virus 3 Not Detected Not Detected    Parainfluenza Virus 4 Not Detected Not Detected    Bordetella pertussis (ptxP) Not Detected Not Detected    Chlamydia pneumoniae Not Detected Not Detected    Mycoplasma pneumoniae Not Detected Not Detected    Human Rhinovirus/Enterovirus Not Detected Not Detected    Bordetella parapertussis (MK3849) Not Detected Not Detected   Lactic Acid, Plasma    Collection Time: 10/13/23  2:34 PM   Result Value Ref Range    Lactic Acid Level 1.8 0.5 - 2.2 mmol/L   Comprehensive Metabolic Panel    Collection Time: 10/14/23  6:02 AM   Result Value Ref Range    Sodium Level 125 (L) 136 - 145 mmol/L    Potassium Level 3.3 (L) 3.5 - 5.1 mmol/L     Chloride 91 (L) 98 - 107 mmol/L    Carbon Dioxide 25 23 - 31 mmol/L    Glucose Level 132 (H) 82 - 115 mg/dL    Blood Urea Nitrogen 9.1 (L) 9.8 - 20.1 mg/dL    Creatinine 0.79 0.55 - 1.02 mg/dL    Calcium Level Total 7.3 (L) 8.4 - 10.2 mg/dL    Protein Total 5.1 (L) 5.8 - 7.6 gm/dL    Albumin Level 1.6 (L) 3.4 - 4.8 g/dL    Globulin 3.5 2.4 - 3.5 gm/dL    Albumin/Globulin Ratio 0.5 (L) 1.1 - 2.0 ratio    Bilirubin Total 9.7 (H) <=1.5 mg/dL    Alkaline Phosphatase 181 (H) 40 - 150 unit/L    Alanine Aminotransferase 34 0 - 55 unit/L    Aspartate Aminotransferase 110 (H) 5 - 34 unit/L    eGFR >60 mls/min/1.73/m2   CBC with Differential    Collection Time: 10/14/23  6:02 AM   Result Value Ref Range    WBC 17.45 (H) 4.50 - 11.50 x10(3)/mcL    RBC 3.53 (L) 4.20 - 5.40 x10(6)/mcL    Hgb 10.5 (L) 12.0 - 16.0 g/dL    Hct 29.7 (L) 37.0 - 47.0 %    MCV 84.1 80.0 - 94.0 fL    MCH 29.7 27.0 - 31.0 pg    MCHC 35.4 33.0 - 36.0 g/dL    RDW 19.9 (H) 11.5 - 17.0 %    Platelet 165 130 - 400 x10(3)/mcL    MPV 8.1 7.4 - 10.4 fL    Neut % 71.9 %    Lymph % 14.3 %    Mono % 8.3 %    Eos % 2.8 %    Basophil % 0.5 %    Lymph # 2.49 0.6 - 4.6 x10(3)/mcL    Neut # 12.55 (H) 2.1 - 9.2 x10(3)/mcL    Mono # 1.45 (H) 0.1 - 1.3 x10(3)/mcL    Eos # 0.49 0 - 0.9 x10(3)/mcL    Baso # 0.08 <=0.2 x10(3)/mcL    IG# 0.39 (H) 0 - 0.04 x10(3)/mcL    IG% 2.2 %    NRBC% 0.2 %   Protime-INR    Collection Time: 10/14/23 11:29 PM   Result Value Ref Range    PT 29.5 (H) 12.5 - 14.5 seconds    INR 2.9 (H) <=1.3   Comprehensive Metabolic Panel    Collection Time: 10/15/23  3:39 AM   Result Value Ref Range    Sodium Level 123 (L) 136 - 145 mmol/L    Potassium Level 3.1 (L) 3.5 - 5.1 mmol/L    Chloride 92 (L) 98 - 107 mmol/L    Carbon Dioxide 25 23 - 31 mmol/L    Glucose Level 111 82 - 115 mg/dL    Blood Urea Nitrogen 10.3 9.8 - 20.1 mg/dL    Creatinine 0.78 0.55 - 1.02 mg/dL    Calcium Level Total 7.1 (L) 8.4 - 10.2 mg/dL    Protein Total 5.0 (L) 5.8 - 7.6 gm/dL     Albumin Level 1.6 (L) 3.4 - 4.8 g/dL    Globulin 3.4 2.4 - 3.5 gm/dL    Albumin/Globulin Ratio 0.5 (L) 1.1 - 2.0 ratio    Bilirubin Total 8.7 (H) <=1.5 mg/dL    Alkaline Phosphatase 169 (H) 40 - 150 unit/L    Alanine Aminotransferase 35 0 - 55 unit/L    Aspartate Aminotransferase 106 (H) 5 - 34 unit/L    eGFR >60 mls/min/1.73/m2   Protime-INR    Collection Time: 10/15/23  3:39 AM   Result Value Ref Range    PT 26.9 (H) 12.5 - 14.5 seconds    INR 2.5 (H) <=1.3   CBC with Differential    Collection Time: 10/15/23  3:39 AM   Result Value Ref Range    WBC 14.90 (H) 4.50 - 11.50 x10(3)/mcL    RBC 3.62 (L) 4.20 - 5.40 x10(6)/mcL    Hgb 10.7 (L) 12.0 - 16.0 g/dL    Hct 30.5 (L) 37.0 - 47.0 %    MCV 84.3 80.0 - 94.0 fL    MCH 29.6 27.0 - 31.0 pg    MCHC 35.1 33.0 - 36.0 g/dL    RDW 20.1 (H) 11.5 - 17.0 %    Platelet 138 130 - 400 x10(3)/mcL    MPV 8.1 7.4 - 10.4 fL    Neut % 71.0 %    Lymph % 15.4 %    Mono % 7.9 %    Eos % 3.4 %    Basophil % 0.4 %    Lymph # 2.29 0.6 - 4.6 x10(3)/mcL    Neut # 10.58 (H) 2.1 - 9.2 x10(3)/mcL    Mono # 1.17 0.1 - 1.3 x10(3)/mcL    Eos # 0.51 0 - 0.9 x10(3)/mcL    Baso # 0.06 <=0.2 x10(3)/mcL    IG# 0.29 (H) 0 - 0.04 x10(3)/mcL    IG% 1.9 %    NRBC% 0.3 %       Imaging:  US Paracentesis inc Imaging    Result Date: 10/13/2023  EXAMINATION: US GUIDED PARACENTESIS INC IMAGING CLINICAL HISTORY: new ascites; Attending: Carlos Enrique Irwin M.D. Anesthesia: Lidocaine utilized for local anesthesia. TECHNIQUE: After the risks, benefits and alternatives were discussed, consent was obtained. A time out was performed to verify the patient's identity and procedure. The patient was placed supine. Limited ultrasound the abdomen demonstrated ascitic fluid in the right lower quadrant. Static image was obtained. The lower quadrant was cleaned prepped in normal sterile fashion. Subcutaneous tissues were anesthetized with lidocaine solution. The Verslyeh needle was advanced into the abdominal cavity. Placement was  confirmed by return of ascitic fluid. The catheter was advanced and a total of 0.75 liters of clear yellow fluid was aspirated. The patient tolerated the procedure well. There were no immediate complications. Estimated blood loss: None     Successful paracentesis. Electronically signed by: Carlos Enrique Irwin Date:    10/13/2023 Time:    12:19    US Liver with Doppler (xpd)    Result Date: 10/13/2023  EXAMINATION: US LIVER WITH DOPPLER CLINICAL HISTORY: Cirrhosis evaluation, Evaluation for hepatic, portal and splenic veins patency; TECHNIQUE: Right upper quadrant abdominal ultrasound (including pancreas, aorta, liver, gallbladder, common bile duct, IVC, right kidney, and spleen) was performed.  Spectral Doppler evaluation performed. COMPARISON: CT abdomen pelvis 10/12/2023 FINDINGS: LIMITATIONS: Exam limited due to poor acoustic window related to shadowing bowel gas and/or body habitus. LIVER: Liver measures 15 cm cranial caudal at the midclavicular line. The liver is echogenic.  Nodular surface contour.  No discrete mass appreciable by sonographic evaluation. PANCREAS: Obscured by overlying bowel gas. GALLBLADDER: Not imaged BILE DUCTS: Common bile duct not imaged. RIGHT KIDNEY: Not imaged SPLEEN: 8 cm.  Normal in size with homogeneous echotexture. OTHER: There is ascites and bilateral pleural effusions. DOPPLER EVALUATION: AORTA: Not imaged HEPATIC ARTERY: Patent. Peak systolic velocity 173 cm/s.  Normal waveform. INFERIOR VENA CAVA: Not imaged.  IVC is patent on preceding CT exam. PORTAL VEINS: Portal vein is patent.  Hepatofugal flow.  Note on CT exam there were recanalized periumbilical veins, gastric varices and a large spleno renal shunt. SPLENIC VEIN: Patent with appropriate directional flow. HEPATIC VEINS: Unable to visualize.  Note on CT exam the right and left hepatic veins appeared compressed but patent.  Left hepatic vein was not well visualized.  This can be seen related to fibrotic changes of cirrhosis.      Cirrhotic morphology of the liver Changes of portal hypertension with reversed flow at the portal vein.  There were portosystemic collaterals on preceding CT exam Small pleural effusions and abdominal ascites Electronically signed by: Lorna Maxwell Date:    10/13/2023 Time:    10:26    CT Abdomen Pelvis With Contrast    Result Date: 10/12/2023  EXAMINATION: CT ABDOMEN PELVIS WITH CONTRAST CLINICAL HISTORY: Sepsis;Jaundiced; TECHNIQUE: Multidetector axial images were obtained of the abdomen and pelvis following the administration of IV contrast. Oral contrast was not administered. Dose length product of 322 mGycm. Automated exposure control was utilized to minimize radiation dose. COMPARISON: None available FINDINGS: There is moderate volume right pleural effusion and right lower lung lobe dense consolidation.  There is also small left pleural and left basilar atelectasis.  Bilateral mammary implants. There is hepatic cirrhotic morphology with low attenuation and surface nodularity.  No focal hepatic space-occupying lesion.  There is intra-abdominopelvic small to moderate free fluid consistent with ascites.  Gallbladder is distended without intraluminal calcified calculus.  No significant dilatation of the intrahepatic biliary radicles and the extrahepatic duct is also not distended without calcified choledocholithiasis.  No no acute findings of the pancreas or the spleen. The adrenal glands appear within normal limits. The kidneys are unremarkable in size and contour. No solid or cystic renal lesion identified. There is no hydronephrosis. No perinephric fluid strandings or collections identified. Stomach is mostly decompressed and difficult to assess.  There is suspected mild mural thickening of the loops of small bowel suspect for enteritis.  No transition or bowel obstruction.  Colon is nondistended.  Noninflamed diverticulosis coli.  No pneumoperitoneum. Urinary bladder appears within normal limits.  Small  volume uterus.  Endometrium is not well delineated.  There is no pelvic free fluid. Lower lumbar degenerative changes.  No acute or aggressive skeletal abnormality no acute or otherwise osseous abnormality identified.     1. Right pleural effusion and right lower lung lobe consolidation. 2. Hepatic cirrhotic morphology and ascites. 3. Mural thickening of small bowel loops suggest nonspecific enteritis. Electronically signed by: Kevyn Abebe Date:    10/12/2023 Time:    19:33    CT Head Without Contrast    Result Date: 10/12/2023  EXAMINATION: CT HEAD WITHOUT CONTRAST CLINICAL HISTORY: Infusion; TECHNIQUE: Sequential axial images were performed of the brain without contrast. Dose product length of 884 mGycm. Automated exposure control was utilized to minimize radiation dose. COMPARISON: February 8, 2022. FINDINGS: There is no intracranial mass effect, midline shift, hydrocephalus or hemorrhage. There is no sulcal effacement or low attenuation changes to suggest recent large vessel territory infarction. Chronic appearing periventricular and subcortical white matter low attenuation changes are present and are consistent with chronic microangiopathic ischemia.  Beneath left craniotomy is similar appearing dural thickening.  No acute extra-axial fluid collection identified.  The ventricular system and sulcal markings prominence is consistent with atrophy more advanced for the age.  There is no acute extra axial fluid collection.  Similar mucoperiosteal thickening of the right maxillary sinus.  Paranasal sinuses are clear without mucosal thickening, polypoidal abnormality or air-fluid levels. Mastoid air cells aeration is optimal.     No acute intracranial findings identified. Electronically signed by: Kevyn Abebe Date:    10/12/2023 Time:    19:26    X-Ray Chest AP Portable    Result Date: 10/12/2023  EXAMINATION: XR CHEST AP PORTABLE CLINICAL HISTORY: Hypotension; TECHNIQUE: Single frontal view of the chest was  performed. COMPARISON: 12/12/2022 FINDINGS: There is a infiltrate developing in the right lower lobe.  There is a small right-sided pleural effusion.  There is a patchy infiltrate in the left lower lobe.  The heart is normal in appearance.  Bones and joints show no acute abnormality.     Infiltrate developing in the right lower lobe with a small right-sided pleural effusion Patchy appearing infiltrate developing in the left lower lobe Electronically signed by: Dariana Linares Date:    10/12/2023 Time:    17:53         Assessment/Plan:  Cirrhosis - ETOH related  Heavy drinker x 30 years  States stopped a few weeks ago  Small volume paracentesis - studies pending  PNA  Hyponatremia    Will continue aggressive supportive care - needs to see hepatology outpatient  Stop all ETOH    Dr. Pennington to resume care of his pt in the am       [place] NP as scribe for Dr. Jose Daniel Garrido  on call for Dr. Pennington

## 2023-10-15 NOTE — PROGRESS NOTES
[facility] - 4th Floor Medical Telemetry  Pulmonary Critical Care Note    Patient Name: Lauren Ewing  MRN: [id number]  Admission Date: 10/12/2023  Hospital Length of Stay: 3 days  Code Status: Full Code  Attending Provider: Aleksey Roa MD  Primary Care Provider: Pennie, Primary Doctor     Subjective:     HPI:   This is a 63-year-old female with a past medical history of depression/anxiety, and alcohol abuse who presented to the emergency department on 10/12/2023 with progressive weakness and yellowing of her skin.  She also endorsed poor appetite over the past week.  On arrival to the emergency department she was found to have leukocytosis of 20,000, a significantly elevated bilirubin at 11.6, hyponatremia, and mildly elevated lactic acid level.  Alcohol level was undetectable.  She reported a heavy history of alcohol abuse since about age 38.  She has been in rehab for alcohol abuse before.  Her last drink was approximately 4 months ago she tells me.  CT of the abdomen and pelvis with contrast revealed  a right-sided pleural effusion, right lower lobe consolidation, hepatic cirrhotic morphology and ascites with mural thickening of the small bowel loops suggesting nonspecific enteritis.  Pulmonary is being consulted for evaluation of right-sided pleural effusion.    Hospital Course/Significant events:  10/13/2023- US paracentesis with removal of 750 mL of clear yellow fluid.    24 Hour Interval History:  She is lying in bed. Still continues to experience fatigue. Oxygenating well on room air.      PMH:depression/anxiety, ETOH abuse  PSH: denies  SOCIAL HX:  history of alcohol abuse since age 38. Has been clean for approximately 4 months. Life long non smoker.        Current Outpatient Medications   Medication Instructions    promethazine (PHENERGAN) 25 mg, Rectal, Every 6 hours PRN    promethazine (PHENERGAN) 25 mg, Oral, Every 6 hours PRN       Current Inpatient Medications   busPIRone  5 mg Oral BID     cefTRIAXone (ROCEPHIN) IVPB  1 g Intravenous Q24H    doxycycline  100 mg Oral Q12H    enoxparin  40 mg Subcutaneous Daily    EScitalopram oxalate  5 mg Oral Daily    folic acid  1 mg Oral Daily    furosemide  20 mg Oral BID    lactulose 10 gram/15 ml  10 g Oral BID    midodrine  2.5 mg Oral TID WM    multivitamin  1 tablet Oral Daily    pantoprazole  40 mg Oral Daily    potassium bicarbonate  50 mEq Oral BID    rifAXIMin  550 mg Oral BID    spironolactone  50 mg Oral Daily    thiamine  100 mg Oral Daily       Current Intravenous Infusions        Review of Systems   Constitutional:  Positive for malaise/fatigue.   Respiratory:  Positive for cough.    Neurological:  Positive for weakness.          Objective:       Intake/Output Summary (Last 24 hours) at 10/15/2023 1132  Last data filed at 10/15/2023 0754  Gross per 24 hour   Intake --   Output 240 ml   Net -240 ml           Vital Signs (Most Recent):  Temp: 98.5 °F (36.9 °C) (10/15/23 1121)  Pulse: 98 (10/15/23 1121)  Resp: 20 (10/14/23 0420)  BP: 112/60 (10/15/23 1121)  SpO2: (!) 94 % (10/15/23 0740)  Body mass index is 24.98 kg/m².  Weight: 66 kg (145 lb 8.1 oz) Vital Signs (24h Range):  Temp:  [97.5 °F (36.4 °C)-98.5 °F (36.9 °C)] 98.5 °F (36.9 °C)  Pulse:  [92-99] 98  SpO2:  [92 %-94 %] 94 %  BP: ()/(58-67) 112/60     Physical Exam  Vitals reviewed.   Constitutional:       Appearance: Normal appearance.   HENT:      Head: Normocephalic and atraumatic.   Cardiovascular:      Rate and Rhythm: Normal rate.      Heart sounds: Normal heart sounds.   Pulmonary:      Effort: Pulmonary effort is normal.      Comments: Diminished in bases  Abdominal:      Palpations: Abdomen is soft.   Musculoskeletal:      Cervical back: Neck supple.   Skin:     Coloration: Skin is jaundiced.   Neurological:      General: No focal deficit present.      Mental Status: She is alert and oriented to person, place, and time.           Lines/Drains/Airways       Peripheral Intravenous  "Line  Duration                  Peripheral IV - Single Lumen 10/12/23 2129 20 G Right Antecubital 2 days                    Significant Labs:    Lab Results   Component Value Date    WBC 14.90 (H) 10/15/2023    HGB 10.7 (L) 10/15/2023    HCT 30.5 (L) 10/15/2023    MCV 84.3 10/15/2023     10/15/2023           BMP  Lab Results   Component Value Date     (L) 10/15/2023    K 3.1 (L) 10/15/2023    CHLORIDE 92 (L) 10/15/2023    CO2 25 10/15/2023    BUN 10.3 10/15/2023    CREATININE 0.78 10/15/2023    CALCIUM 7.1 (L) 10/15/2023    EGFRNONAA >60 02/08/2022         ABG  No results for input(s): "PH", "PO2", "PCO2", "HCO3", "POCBASEDEF" in the last 168 hours.      Significant Imaging:  US Paracentesis inc Imaging  Narrative: EXAMINATION:  US GUIDED PARACENTESIS INC IMAGING    CLINICAL HISTORY:  new ascites;    Attending: Carlos Enrique Irwin M.D.    Anesthesia: Lidocaine utilized for local anesthesia.    TECHNIQUE:  After the risks, benefits and alternatives were discussed, consent was obtained. A time out was performed to verify the patient's identity and procedure.    The patient was placed supine. Limited ultrasound the abdomen demonstrated ascitic fluid in the right lower quadrant. Static image was obtained. The lower quadrant was cleaned prepped in normal sterile fashion. Subcutaneous tissues were anesthetized with lidocaine solution. The Yueh needle was advanced into the abdominal cavity. Placement was confirmed by return of ascitic fluid. The catheter was advanced and a total of 0.75 liters of clear yellow fluid was aspirated. The patient tolerated the procedure well. There were no immediate complications.    Estimated blood loss: None  Impression: Successful paracentesis.    Electronically signed by: Carlos Enrique Irwin  Date:    10/13/2023  Time:    12:19  US Liver with Doppler (xpd)  Narrative: EXAMINATION:  US LIVER WITH DOPPLER    CLINICAL HISTORY:  Cirrhosis evaluation, Evaluation for hepatic, portal and " splenic veins patency;    TECHNIQUE:  Right upper quadrant abdominal ultrasound (including pancreas, aorta, liver, gallbladder, common bile duct, IVC, right kidney, and spleen) was performed.  Spectral Doppler evaluation performed.    COMPARISON:  CT abdomen pelvis 10/12/2023    FINDINGS:  LIMITATIONS: Exam limited due to poor acoustic window related to shadowing bowel gas and/or body habitus.    LIVER: Liver measures 15 cm cranial caudal at the midclavicular line. The liver is echogenic.  Nodular surface contour.  No discrete mass appreciable by sonographic evaluation.    PANCREAS: Obscured by overlying bowel gas.    GALLBLADDER: Not imaged    BILE DUCTS: Common bile duct not imaged.    RIGHT KIDNEY: Not imaged    SPLEEN: 8 cm.  Normal in size with homogeneous echotexture.    OTHER: There is ascites and bilateral pleural effusions.    DOPPLER EVALUATION:    AORTA: Not imaged    HEPATIC ARTERY: Patent. Peak systolic velocity 173 cm/s.  Normal waveform.    INFERIOR VENA CAVA: Not imaged.  IVC is patent on preceding CT exam.    PORTAL VEINS: Portal vein is patent.  Hepatofugal flow.  Note on CT exam there were recanalized periumbilical veins, gastric varices and a large spleno renal shunt.    SPLENIC VEIN: Patent with appropriate directional flow.    HEPATIC VEINS: Unable to visualize.  Note on CT exam the right and left hepatic veins appeared compressed but patent.  Left hepatic vein was not well visualized.  This can be seen related to fibrotic changes of cirrhosis.  Impression: Cirrhotic morphology of the liver    Changes of portal hypertension with reversed flow at the portal vein.  There were portosystemic collaterals on preceding CT exam    Small pleural effusions and abdominal ascites    Electronically signed by: Lorna Maxwell  Date:    10/13/2023  Time:    10:26            Assessment/Plan:     Assessment  Right sided pneumonia  Cirrhosis with resulting ascites and small hepatothorax  Hyponatremia        Plan  Continue rocephin and doxycycline (day 4) for possible CAP  Status post paracentesis; doing well from a pulmonary standpoint   Psych has been consulted         PAVAN De Santiago  Pulmonary Critical Care Medicine  [facility] - 4th Floor Medical Telemetry  DOS: 10/15/2023

## 2023-10-15 NOTE — PROGRESS NOTES
[facility]  Hospital Medicine Progress Note        Chief Complaint: Inpatient Follow-up    HPI:     63-year-old female with significant history of depression/anxiety presented to the ED with complaints of severe generalized weakness x1 week with associated poor appetite.  History of heavy alcohol abuse since age 38.  Patient has been in rehab for alcohol use before.  Last drink 4 weeks back.  Patient was hemodynamically stable except for borderline hypotension mild tachycardia in the ED.  Lab significant for leukocytosis, hyperbilirubinemia, low albumin and severe hyponatremia/hypokalemia.  Lactic acid was elevated.  Imaging revealed right pleural effusion, right lower lobe consolidation, cirrhotic liver with ascites and enteritis.  Patient received IV fluids in the ED.  Initiated on empiric antibiotics paid pulmonology, Gastroenterology consulted.  Admitted to hospitalist medicine service.  Patient underwent paracentesis.  Evaluated by pulmonology, effusion is small, recommended to continue antibiotics for pneumonia.  GI also following, ascitic fluid studies negative for SBP.  Initiated Lasix, Aldactone.  Monitoring sodium closely.    Interval Hx:   Patient seen at bedside.  Comfortably laying in bed.  Complaining of mild hand tremors . otherwise no specific complaints.  Awake, alert and fully oriented.  Hemodynamics are stable and afebrile.  No abdominal pain.  Patient reports she quit alcohol use 5 months back    Objective/physical exam:  General: In no acute distress, afebrile  Chest: Clear to auscultation bilaterally  Heart: S1, S2, no appreciable murmur  Abdomen: Soft, nontender, BS +  MSK: Warm, no lower extremity edema, no clubbing or cyanosis  Neurologic: Alert and oriented x4, moving all extremities with good strength     VITAL SIGNS: 24 HRS MIN & MAX LAST   Temp  Min: 97.5 °F (36.4 °C)  Max: 98 °F (36.7 °C) 97.5 °F (36.4 °C)   BP  Min: 97/58  Max: 108/69 103/66   Pulse  Min:  92  Max: 99  95   No data recorded 20   SpO2  Min: 92 %  Max: 94 % (!) 94 %       Recent Labs   Lab 10/15/23  0339   WBC 14.90*   RBC 3.62*   HGB 10.7*   HCT 30.5*   MCV 84.3   MCH 29.6   MCHC 35.1   RDW 20.1*      MPV 8.1         Recent Labs   Lab 10/12/23  1735 10/13/23  0359 10/15/23  0339   *   < > 123*   K 3.0*   < > 3.1*   CO2 23   < > 25   BUN 9.6*   < > 10.3   CREATININE 0.79   < > 0.78   CALCIUM 7.4*   < > 7.1*   MG 1.80  --   --    ALBUMIN 1.7*   < > 1.6*   ALKPHOS 180*   < > 169*   ALT 36   < > 35   *   < > 106*   BILITOT 11.6*   < > 8.7*    < > = values in this interval not displayed.          Microbiology Results (last 7 days)       Procedure Component Value Units Date/Time    Body Fluid Culture [[id number]] Collected: 10/13/23 0955    Order Status: Completed Specimen: Peritoneal Fluid from Ascitic Fluid Updated: 10/15/23 0844     Body Fluid Culture No Growth At 48 Hours    Blood culture #1 **CANNOT BE ORDERED STAT** [[id number]]  (Normal) Collected: 10/12/23 1735    Order Status: Completed Specimen: Blood Updated: 10/14/23 2000     CULTURE, BLOOD ([facility]) No Growth At 48 Hours    Blood culture #2 **CANNOT BE ORDERED STAT** [[id number]]  (Normal) Collected: 10/12/23 1735    Order Status: Completed Specimen: Blood Updated: 10/14/23 2000     CULTURE, BLOOD ([facility]) No Growth At 48 Hours    Gram Stain [[id number]] Collected: 10/13/23 0955    Order Status: Completed Specimen: Peritoneal Fluid Updated: 10/13/23 1523     GRAM STAIN No WBCs, No bacteria seen             Scheduled Med:   busPIRone  5 mg Oral BID    cefTRIAXone (ROCEPHIN) IVPB  1 g Intravenous Q24H    doxycycline  100 mg Oral Q12H    enoxparin  40 mg Subcutaneous Daily    EScitalopram oxalate  5 mg Oral Daily    folic acid  1 mg Oral Daily    furosemide  20 mg Oral Daily    midodrine  2.5 mg Oral TID WM    multivitamin  1 tablet Oral Daily    rifAXIMin  550 mg Oral BID    sodium chloride  1,000 mg Oral BID    spironolactone  50 mg  Oral Daily    thiamine  100 mg Oral Daily          Assessment/Plan:    Right lower lobe pneumonia with right-sided parapneumonic effusion  Sepsis secondary to above  New diagnosis alcoholic cirrhosis-decompensated with ascites status post paracentesis  Acute with possible underlying chronic hyponatremia-hypervolemic  Chronic anemia  Hypokalemia  Chronic heavy alcohol abuse, quit 5 months back  Depression/anxiety  Prophylaxis    Continue ceftriaxone, doxycycline for pneumonia, will complete a 7 day course  Evaluated by pulmonology for pleural effusion which is small, no interventions needed  GI on board for new diagnosis cirrhosis   Patient underwent paracentesis, no concern for SBP  On rifaximin to prevent hepatic encephalopathy, add lactulose   On Lasix, Aldactone to prevent reaccumulating ascites  I increase Lasix to 20 mg b.i.d.  Sodium worse today-123   Check osmolality studies  Monitor sodium every 8 hours   DC salt tablet  Potassium replaced-50 mEq x 2 doses  Will need screening EGD for varices, timing to be decided by GI   Add Protonix   Continue BuSpar, citalopram  BP pretty stable on midodrine  Continue multivitamin, thiamine, folic acid  Patient having some mild hand tremors, no concerns for withdrawal, quit alcohol use 5 months back, tremors could be secondary to electrolyte derangement  DVT prophylaxis-Lovenox      Amanda Trejo MD   10/15/2023

## 2023-10-16 LAB
ALBUMIN SERPL-MCNC: 1.5 G/DL (ref 3.4–4.8)
ALBUMIN/GLOB SERPL: 0.5 RATIO (ref 1.1–2)
ALP SERPL-CCNC: 161 UNIT/L (ref 40–150)
ALT SERPL-CCNC: 33 UNIT/L (ref 0–55)
AST SERPL-CCNC: 102 UNIT/L (ref 5–34)
BASOPHILS # BLD AUTO: 0.04 X10(3)/MCL
BASOPHILS NFR BLD AUTO: 0.3 %
BILIRUB SERPL-MCNC: 7.8 MG/DL
BUN SERPL-MCNC: 10.1 MG/DL (ref 9.8–20.1)
CALCIUM SERPL-MCNC: 6.9 MG/DL (ref 8.4–10.2)
CHLORIDE SERPL-SCNC: 94 MMOL/L (ref 98–107)
CO2 SERPL-SCNC: 22 MMOL/L (ref 23–31)
CREAT SERPL-MCNC: 0.77 MG/DL (ref 0.55–1.02)
EOSINOPHIL # BLD AUTO: 0.46 X10(3)/MCL (ref 0–0.9)
EOSINOPHIL NFR BLD AUTO: 3.2 %
ERYTHROCYTE [DISTWIDTH] IN BLOOD BY AUTOMATED COUNT: 19.6 % (ref 11.5–17)
GFR SERPLBLD CREATININE-BSD FMLA CKD-EPI: >60 MLS/MIN/1.73/M2
GLOBULIN SER-MCNC: 3.1 GM/DL (ref 2.4–3.5)
GLUCOSE SERPL-MCNC: 97 MG/DL (ref 82–115)
HCT VFR BLD AUTO: 26.7 % (ref 37–47)
HGB BLD-MCNC: 9.5 G/DL (ref 12–16)
IMM GRANULOCYTES # BLD AUTO: 0.3 X10(3)/MCL (ref 0–0.04)
IMM GRANULOCYTES NFR BLD AUTO: 2.1 %
INR PPP: 2.5
LYMPHOCYTES # BLD AUTO: 1.81 X10(3)/MCL (ref 0.6–4.6)
LYMPHOCYTES NFR BLD AUTO: 12.4 %
MCH RBC QN AUTO: 29.6 PG (ref 27–31)
MCHC RBC AUTO-ENTMCNC: 35.6 G/DL (ref 33–36)
MCV RBC AUTO: 83.2 FL (ref 80–94)
MONOCYTES # BLD AUTO: 1.56 X10(3)/MCL (ref 0.1–1.3)
MONOCYTES NFR BLD AUTO: 10.7 %
NEUTROPHILS # BLD AUTO: 10.37 X10(3)/MCL (ref 2.1–9.2)
NEUTROPHILS NFR BLD AUTO: 71.3 %
NRBC BLD AUTO-RTO: 0.3 %
PLATELET # BLD AUTO: 154 X10(3)/MCL (ref 130–400)
PMV BLD AUTO: 8.3 FL (ref 7.4–10.4)
POTASSIUM SERPL-SCNC: 3.8 MMOL/L (ref 3.5–5.1)
PROT SERPL-MCNC: 4.6 GM/DL (ref 5.8–7.6)
PROTHROMBIN TIME: 26.7 SECONDS (ref 12.5–14.5)
RBC # BLD AUTO: 3.21 X10(6)/MCL (ref 4.2–5.4)
SODIUM SERPL-SCNC: 123 MMOL/L (ref 136–145)
SODIUM SERPL-SCNC: 124 MMOL/L (ref 136–145)
WBC # SPEC AUTO: 14.54 X10(3)/MCL (ref 4.5–11.5)

## 2023-10-16 PROCEDURE — 63600175 PHARM REV CODE 636 W HCPCS: Performed by: INTERNAL MEDICINE

## 2023-10-16 PROCEDURE — 63600175 PHARM REV CODE 636 W HCPCS: Performed by: NURSE PRACTITIONER

## 2023-10-16 PROCEDURE — 94761 N-INVAS EAR/PLS OXIMETRY MLT: CPT

## 2023-10-16 PROCEDURE — 85610 PROTHROMBIN TIME: CPT | Performed by: INTERNAL MEDICINE

## 2023-10-16 PROCEDURE — 25000003 PHARM REV CODE 250: Performed by: NURSE PRACTITIONER

## 2023-10-16 PROCEDURE — 97166 OT EVAL MOD COMPLEX 45 MIN: CPT

## 2023-10-16 PROCEDURE — 85025 COMPLETE CBC W/AUTO DIFF WBC: CPT | Performed by: INTERNAL MEDICINE

## 2023-10-16 PROCEDURE — 25000003 PHARM REV CODE 250: Performed by: INTERNAL MEDICINE

## 2023-10-16 PROCEDURE — 21400001 HC TELEMETRY ROOM

## 2023-10-16 PROCEDURE — 80053 COMPREHEN METABOLIC PANEL: CPT | Performed by: INTERNAL MEDICINE

## 2023-10-16 PROCEDURE — 84295 ASSAY OF SERUM SODIUM: CPT | Performed by: INTERNAL MEDICINE

## 2023-10-16 PROCEDURE — 97116 GAIT TRAINING THERAPY: CPT | Mod: CQ

## 2023-10-16 RX ORDER — SODIUM CHLORIDE 9 MG/ML
INJECTION, SOLUTION INTRAVENOUS CONTINUOUS
Status: ACTIVE | OUTPATIENT
Start: 2023-10-16 | End: 2023-10-16

## 2023-10-16 RX ADMIN — DOXYCYCLINE HYCLATE 100 MG: 100 TABLET, COATED ORAL at 09:10

## 2023-10-16 RX ADMIN — FOLIC ACID 1 MG: 1 TABLET ORAL at 08:10

## 2023-10-16 RX ADMIN — RIFAXIMIN 550 MG: 550 TABLET ORAL at 08:10

## 2023-10-16 RX ADMIN — RIFAXIMIN 550 MG: 550 TABLET ORAL at 09:10

## 2023-10-16 RX ADMIN — BUSPIRONE HYDROCHLORIDE 5 MG: 5 TABLET ORAL at 09:10

## 2023-10-16 RX ADMIN — THIAMINE HCL TAB 100 MG 100 MG: 100 TAB at 08:10

## 2023-10-16 RX ADMIN — SODIUM CHLORIDE: 9 INJECTION, SOLUTION INTRAVENOUS at 10:10

## 2023-10-16 RX ADMIN — DOXYCYCLINE HYCLATE 100 MG: 100 TABLET, COATED ORAL at 08:10

## 2023-10-16 RX ADMIN — THERA TABS 1 TABLET: TAB at 08:10

## 2023-10-16 RX ADMIN — ENOXAPARIN SODIUM 40 MG: 40 INJECTION SUBCUTANEOUS at 04:10

## 2023-10-16 RX ADMIN — PANTOPRAZOLE SODIUM 40 MG: 40 TABLET, DELAYED RELEASE ORAL at 08:10

## 2023-10-16 RX ADMIN — ONDANSETRON 4 MG: 2 INJECTION INTRAMUSCULAR; INTRAVENOUS at 10:10

## 2023-10-16 RX ADMIN — CEFTRIAXONE SODIUM 1 G: 1 INJECTION, POWDER, FOR SOLUTION INTRAMUSCULAR; INTRAVENOUS at 09:10

## 2023-10-16 RX ADMIN — ESCITALOPRAM OXALATE 5 MG: 5 TABLET, FILM COATED ORAL at 08:10

## 2023-10-16 RX ADMIN — Medication 6 MG: at 09:10

## 2023-10-16 RX ADMIN — MIDODRINE HYDROCHLORIDE 2.5 MG: 2.5 TABLET ORAL at 04:10

## 2023-10-16 RX ADMIN — LACTULOSE 10 G: 10 SOLUTION ORAL at 09:10

## 2023-10-16 RX ADMIN — LACTULOSE 10 G: 10 SOLUTION ORAL at 08:10

## 2023-10-16 RX ADMIN — MIDODRINE HYDROCHLORIDE 2.5 MG: 2.5 TABLET ORAL at 01:10

## 2023-10-16 RX ADMIN — MIDODRINE HYDROCHLORIDE 2.5 MG: 2.5 TABLET ORAL at 08:10

## 2023-10-16 NOTE — PT/OT/SLP EVAL
Occupational Therapy  Evaluation    Name: Lauren Ewing  MRN: [id number]  Admitting Diagnosis: hyponatremia   Recent Surgery: * No surgery found *      Recommendations:     Discharge Recommendations:  high intensity (pending progress)  Discharge Equipment Recommendations:   (TBD pending progress)  Barriers to discharge:  Decreased caregiver support    Assessment:     Lauren Ewing is a 63 y.o. female with a medical diagnosis of hyponatremia, PNA, alcohol related cirrhosis. Pt was overall min A for mobility and ADLs, mostly limited by pain and decrease endurance/strength in LE's. She lives alone and states family/friends live in nearby area. Tremors noted in BUE's and pt reports noticing onset of tremors since hospitalization. Prior to hospitalization pt was independent with daily activities/routines. She presents with the following performance deficits affecting function: weakness, gait instability, impaired endurance, impaired self care skills, impaired functional mobility, pain, decreased coordination, impaired coordination, decreased lower extremity function, decreased upper extremity function, impaired fine motor.  Pending pt progress, pt may benefit from further skilled therapy to safely return to ADL/IADLs independently.    Rehab Prognosis: Good; patient would benefit from acute skilled OT services to address these deficits and reach maximum level of function.       Plan:     Patient to be seen 4 x/week to address the above listed problems via self-care/home management, therapeutic activities, therapeutic exercises, neuromuscular re-education  Plan of Care Expires: 11/13/23  Plan of Care Reviewed with: patient    Subjective     Chief Complaint: pain in BLE  Patient/Family Comments/goals: go home    Occupational Profile:  Living Environment: lives alone, apartment, 1st story   Previous level of function: independent  Roles and Routines: none stated  Equipment Used at Home: none  Assistance upon Discharge:  family/friends    Pain/Comfort:  Pain Rating 1: 0/10    Patients cultural, spiritual, Baptism conflicts given the current situation:      Objective:     OT communicated with Nsg prior to session.      Patient was found  sitting up in bed  with peripheral IV, telemetry upon OT entry to room.    General Precautions: Standard, fall  Orthopedic Precautions: N/A  Braces: N/A      Bed Mobility:    Patient completed Supine <> Sit with modified independence    Functional Mobility/Transfers:  Patient completed Sit <> Stand Transfer with minimum assistance  with  rolling walker   Functional Mobility: ambulated to bed <>toilet with RW and CGA for stability; t/f to toilet with min A for stability, no LOB but pain limiting    Activities of Daily Living:  Lower Body Dressing: SBA don/doff socks EOB  Toileting: SBA for pericare       Functional Cognition:  Orientation: oriented to Person, Place, and Time  Command Following: Intact grossly, increased cues intermittently to follow prompts for WC management and safety w/ transfers    Upper Extremity Function:  BUE:   WFL except tremors causing decrease coordination; able to oppose slowly      Therapeutic Positioning  Risk for acquired pressure injuries is decreased due to ability to get to BSC/toilet with assist.      Patient Education:  Patient provided with verbal education education regarding OT role/goals/POC.  Understanding was verbalized.     Patient left HOB elevated with all lines intact and call button in reach    GOALS:   Multidisciplinary Problems       Occupational Therapy Goals          Problem: Occupational Therapy    Goal Priority Disciplines Outcome Interventions   Occupational Therapy Goal     OT, PT/OT Ongoing, Progressing    Description: Goals to be met by: 11/13/23     Patient will increase functional independence with ADLs by performing:    UE Dressing with Modified [place].  LE Dressing with Modified [place].  Grooming while standing at sink with  Modified [place].  Toileting from toilet with Modified [place] for hygiene and clothing management.   Toilet transfer to toilet with Modified [place].                         History:     No past medical history on file.    No past surgical history on file.    Time Tracking:     OT Date of Treatment: 10/16/23  OT Start Time: 1014  OT Stop Time: 1034  OT Total Time (min): 20 min    Billable Minutes:Evaluation Mod Complexity    10/16/2023

## 2023-10-16 NOTE — PLAN OF CARE
Problem: Occupational Therapy  Goal: Occupational Therapy Goal  Description: Goals to be met by: 11/13/23     Patient will increase functional independence with ADLs by performing:    UE Dressing with Modified [place].  LE Dressing with Modified [place].  Grooming while standing at sink with Modified [place].  Toileting from toilet with Modified [place] for hygiene and clothing management.   Toilet transfer to toilet with Modified [place].    Outcome: Ongoing, Progressing

## 2023-10-16 NOTE — PROGRESS NOTES
"Gastroenterology Progress Note    Subjective/Interval History:  Awake.  No significant GI issues.  Patient reported to have 2-3 loose bowel movements a day.  On lactulose and rifaximin.    ROS:12 point system reviewed and is negative except as noted in HPI       Vital Signs:  /68   Pulse 97   Temp 98.1 °F (36.7 °C) (Oral)   Resp 18   Ht 5' 4" (1.626 m)   Wt 66 kg (145 lb 8.1 oz)   SpO2 (!) 94%   BMI 24.98 kg/m²   Body mass index is 24.98 kg/m².    Physical Exam:  Vitals reviewed.   Constitutional:       Appearance: Normal appearance.   HENT:      Head: Normocephalic and atraumatic.   Cardiovascular:      Rate and Rhythm: Normal rate.      Heart sounds: Normal heart sounds.   Pulmonary:      Effort: Pulmonary effort is normal.      Comments: Diminished in bases  Abdominal:      Palpations: Abdomen is soft.   Musculoskeletal:      Cervical back: Neck supple.   Skin:     Coloration: Skin is jaundiced.   Neurological:      General: No focal deficit present.      Mental Status: She is alert and oriented to person, place, and time.     Labs:  Recent Results (from the past 48 hour(s))   Protime-INR    Collection Time: 10/14/23 11:29 PM   Result Value Ref Range    PT 29.5 (H) 12.5 - 14.5 seconds    INR 2.9 (H) <=1.3   Comprehensive Metabolic Panel    Collection Time: 10/15/23  3:39 AM   Result Value Ref Range    Sodium Level 123 (L) 136 - 145 mmol/L    Potassium Level 3.1 (L) 3.5 - 5.1 mmol/L    Chloride 92 (L) 98 - 107 mmol/L    Carbon Dioxide 25 23 - 31 mmol/L    Glucose Level 111 82 - 115 mg/dL    Blood Urea Nitrogen 10.3 9.8 - 20.1 mg/dL    Creatinine 0.78 0.55 - 1.02 mg/dL    Calcium Level Total 7.1 (L) 8.4 - 10.2 mg/dL    Protein Total 5.0 (L) 5.8 - 7.6 gm/dL    Albumin Level 1.6 (L) 3.4 - 4.8 g/dL    Globulin 3.4 2.4 - 3.5 gm/dL    Albumin/Globulin Ratio 0.5 (L) 1.1 - 2.0 ratio    Bilirubin Total 8.7 (H) <=1.5 mg/dL    Alkaline Phosphatase 169 (H) 40 - 150 unit/L    Alanine Aminotransferase 35 0 - 55 " unit/L    Aspartate Aminotransferase 106 (H) 5 - 34 unit/L    eGFR >60 mls/min/1.73/m2   Protime-INR    Collection Time: 10/15/23  3:39 AM   Result Value Ref Range    PT 26.9 (H) 12.5 - 14.5 seconds    INR 2.5 (H) <=1.3   CBC with Differential    Collection Time: 10/15/23  3:39 AM   Result Value Ref Range    WBC 14.90 (H) 4.50 - 11.50 x10(3)/mcL    RBC 3.62 (L) 4.20 - 5.40 x10(6)/mcL    Hgb 10.7 (L) 12.0 - 16.0 g/dL    Hct 30.5 (L) 37.0 - 47.0 %    MCV 84.3 80.0 - 94.0 fL    MCH 29.6 27.0 - 31.0 pg    MCHC 35.1 33.0 - 36.0 g/dL    RDW 20.1 (H) 11.5 - 17.0 %    Platelet 138 130 - 400 x10(3)/mcL    MPV 8.1 7.4 - 10.4 fL    Neut % 71.0 %    Lymph % 15.4 %    Mono % 7.9 %    Eos % 3.4 %    Basophil % 0.4 %    Lymph # 2.29 0.6 - 4.6 x10(3)/mcL    Neut # 10.58 (H) 2.1 - 9.2 x10(3)/mcL    Mono # 1.17 0.1 - 1.3 x10(3)/mcL    Eos # 0.51 0 - 0.9 x10(3)/mcL    Baso # 0.06 <=0.2 x10(3)/mcL    IG# 0.29 (H) 0 - 0.04 x10(3)/mcL    IG% 1.9 %    NRBC% 0.3 %   Osmolality, Serum    Collection Time: 10/15/23  3:39 AM   Result Value Ref Range    Osmolality 261 (L) 280 - 300 mOsm/kg   Sodium    Collection Time: 10/15/23  5:09 PM   Result Value Ref Range    Sodium Level 125 (L) 136 - 145 mmol/L   Osmolality, Urine    Collection Time: 10/15/23 10:48 PM   Result Value Ref Range    Urine Osmolality 467 300 - 1,300 mOsm/kg   Sodium, Random Urine    Collection Time: 10/15/23 10:49 PM   Result Value Ref Range    Urine Sodium <20.0 mmol/L   Protime-INR    Collection Time: 10/16/23  2:13 AM   Result Value Ref Range    PT 26.7 (H) 12.5 - 14.5 seconds    INR 2.5 (H) <=1.3   Comprehensive Metabolic Panel    Collection Time: 10/16/23  2:13 AM   Result Value Ref Range    Sodium Level 123 (L) 136 - 145 mmol/L    Potassium Level 3.8 3.5 - 5.1 mmol/L    Chloride 94 (L) 98 - 107 mmol/L    Carbon Dioxide 22 (L) 23 - 31 mmol/L    Glucose Level 97 82 - 115 mg/dL    Blood Urea Nitrogen 10.1 9.8 - 20.1 mg/dL    Creatinine 0.77 0.55 - 1.02 mg/dL    Calcium  Level Total 6.9 (LL) 8.4 - 10.2 mg/dL    Protein Total 4.6 (L) 5.8 - 7.6 gm/dL    Albumin Level 1.5 (L) 3.4 - 4.8 g/dL    Globulin 3.1 2.4 - 3.5 gm/dL    Albumin/Globulin Ratio 0.5 (L) 1.1 - 2.0 ratio    Bilirubin Total 7.8 (H) <=1.5 mg/dL    Alkaline Phosphatase 161 (H) 40 - 150 unit/L    Alanine Aminotransferase 33 0 - 55 unit/L    Aspartate Aminotransferase 102 (H) 5 - 34 unit/L    eGFR >60 mls/min/1.73/m2   CBC with Differential    Collection Time: 10/16/23  2:13 AM   Result Value Ref Range    WBC 14.54 (H) 4.50 - 11.50 x10(3)/mcL    RBC 3.21 (L) 4.20 - 5.40 x10(6)/mcL    Hgb 9.5 (L) 12.0 - 16.0 g/dL    Hct 26.7 (L) 37.0 - 47.0 %    MCV 83.2 80.0 - 94.0 fL    MCH 29.6 27.0 - 31.0 pg    MCHC 35.6 33.0 - 36.0 g/dL    RDW 19.6 (H) 11.5 - 17.0 %    Platelet 154 130 - 400 x10(3)/mcL    MPV 8.3 7.4 - 10.4 fL    Neut % 71.3 %    Lymph % 12.4 %    Mono % 10.7 %    Eos % 3.2 %    Basophil % 0.3 %    Lymph # 1.81 0.6 - 4.6 x10(3)/mcL    Neut # 10.37 (H) 2.1 - 9.2 x10(3)/mcL    Mono # 1.56 (H) 0.1 - 1.3 x10(3)/mcL    Eos # 0.46 0 - 0.9 x10(3)/mcL    Baso # 0.04 <=0.2 x10(3)/mcL    IG# 0.30 (H) 0 - 0.04 x10(3)/mcL    IG% 2.1 %    NRBC% 0.3 %   Sodium    Collection Time: 10/16/23  8:45 AM   Result Value Ref Range    Sodium Level 123 (L) 136 - 145 mmol/L       Imaging:  US Paracentesis inc Imaging    Result Date: 10/13/2023  EXAMINATION: US GUIDED PARACENTESIS INC IMAGING CLINICAL HISTORY: new ascites; Attending: Carlos Enrique Irwin M.D. Anesthesia: Lidocaine utilized for local anesthesia. TECHNIQUE: After the risks, benefits and alternatives were discussed, consent was obtained. A time out was performed to verify the patient's identity and procedure. The patient was placed supine. Limited ultrasound the abdomen demonstrated ascitic fluid in the right lower quadrant. Static image was obtained. The lower quadrant was cleaned prepped in normal sterile fashion. Subcutaneous tissues were anesthetized with lidocaine solution. The Jacobo  needle was advanced into the abdominal cavity. Placement was confirmed by return of ascitic fluid. The catheter was advanced and a total of 0.75 liters of clear yellow fluid was aspirated. The patient tolerated the procedure well. There were no immediate complications. Estimated blood loss: None     Successful paracentesis. Electronically signed by: Carlos Enrique Irwin Date:    10/13/2023 Time:    12:19    US Liver with Doppler (xpd)    Result Date: 10/13/2023  EXAMINATION: US LIVER WITH DOPPLER CLINICAL HISTORY: Cirrhosis evaluation, Evaluation for hepatic, portal and splenic veins patency; TECHNIQUE: Right upper quadrant abdominal ultrasound (including pancreas, aorta, liver, gallbladder, common bile duct, IVC, right kidney, and spleen) was performed.  Spectral Doppler evaluation performed. COMPARISON: CT abdomen pelvis 10/12/2023 FINDINGS: LIMITATIONS: Exam limited due to poor acoustic window related to shadowing bowel gas and/or body habitus. LIVER: Liver measures 15 cm cranial caudal at the midclavicular line. The liver is echogenic.  Nodular surface contour.  No discrete mass appreciable by sonographic evaluation. PANCREAS: Obscured by overlying bowel gas. GALLBLADDER: Not imaged BILE DUCTS: Common bile duct not imaged. RIGHT KIDNEY: Not imaged SPLEEN: 8 cm.  Normal in size with homogeneous echotexture. OTHER: There is ascites and bilateral pleural effusions. DOPPLER EVALUATION: AORTA: Not imaged HEPATIC ARTERY: Patent. Peak systolic velocity 173 cm/s.  Normal waveform. INFERIOR VENA CAVA: Not imaged.  IVC is patent on preceding CT exam. PORTAL VEINS: Portal vein is patent.  Hepatofugal flow.  Note on CT exam there were recanalized periumbilical veins, gastric varices and a large spleno renal shunt. SPLENIC VEIN: Patent with appropriate directional flow. HEPATIC VEINS: Unable to visualize.  Note on CT exam the right and left hepatic veins appeared compressed but patent.  Left hepatic vein was not well visualized.   This can be seen related to fibrotic changes of cirrhosis.     Cirrhotic morphology of the liver Changes of portal hypertension with reversed flow at the portal vein.  There were portosystemic collaterals on preceding CT exam Small pleural effusions and abdominal ascites Electronically signed by: Lorna Maxwell Date:    10/13/2023 Time:    10:26    CT Abdomen Pelvis With Contrast    Result Date: 10/12/2023  EXAMINATION: CT ABDOMEN PELVIS WITH CONTRAST CLINICAL HISTORY: Sepsis;Jaundiced; TECHNIQUE: Multidetector axial images were obtained of the abdomen and pelvis following the administration of IV contrast. Oral contrast was not administered. Dose length product of 322 mGycm. Automated exposure control was utilized to minimize radiation dose. COMPARISON: None available FINDINGS: There is moderate volume right pleural effusion and right lower lung lobe dense consolidation.  There is also small left pleural and left basilar atelectasis.  Bilateral mammary implants. There is hepatic cirrhotic morphology with low attenuation and surface nodularity.  No focal hepatic space-occupying lesion.  There is intra-abdominopelvic small to moderate free fluid consistent with ascites.  Gallbladder is distended without intraluminal calcified calculus.  No significant dilatation of the intrahepatic biliary radicles and the extrahepatic duct is also not distended without calcified choledocholithiasis.  No no acute findings of the pancreas or the spleen. The adrenal glands appear within normal limits. The kidneys are unremarkable in size and contour. No solid or cystic renal lesion identified. There is no hydronephrosis. No perinephric fluid strandings or collections identified. Stomach is mostly decompressed and difficult to assess.  There is suspected mild mural thickening of the loops of small bowel suspect for enteritis.  No transition or bowel obstruction.  Colon is nondistended.  Noninflamed diverticulosis coli.  No  pneumoperitoneum. Urinary bladder appears within normal limits.  Small volume uterus.  Endometrium is not well delineated.  There is no pelvic free fluid. Lower lumbar degenerative changes.  No acute or aggressive skeletal abnormality no acute or otherwise osseous abnormality identified.     1. Right pleural effusion and right lower lung lobe consolidation. 2. Hepatic cirrhotic morphology and ascites. 3. Mural thickening of small bowel loops suggest nonspecific enteritis. Electronically signed by: Kevyn Abebe Date:    10/12/2023 Time:    19:33    CT Head Without Contrast    Result Date: 10/12/2023  EXAMINATION: CT HEAD WITHOUT CONTRAST CLINICAL HISTORY: Infusion; TECHNIQUE: Sequential axial images were performed of the brain without contrast. Dose product length of 884 mGycm. Automated exposure control was utilized to minimize radiation dose. COMPARISON: February 8, 2022. FINDINGS: There is no intracranial mass effect, midline shift, hydrocephalus or hemorrhage. There is no sulcal effacement or low attenuation changes to suggest recent large vessel territory infarction. Chronic appearing periventricular and subcortical white matter low attenuation changes are present and are consistent with chronic microangiopathic ischemia.  Beneath left craniotomy is similar appearing dural thickening.  No acute extra-axial fluid collection identified.  The ventricular system and sulcal markings prominence is consistent with atrophy more advanced for the age.  There is no acute extra axial fluid collection.  Similar mucoperiosteal thickening of the right maxillary sinus.  Paranasal sinuses are clear without mucosal thickening, polypoidal abnormality or air-fluid levels. Mastoid air cells aeration is optimal.     No acute intracranial findings identified. Electronically signed by: Kevyn Abebe Date:    10/12/2023 Time:    19:26    X-Ray Chest AP Portable    Result Date: 10/12/2023  EXAMINATION: XR CHEST AP PORTABLE CLINICAL  HISTORY: Hypotension; TECHNIQUE: Single frontal view of the chest was performed. COMPARISON: 12/12/2022 FINDINGS: There is a infiltrate developing in the right lower lobe.  There is a small right-sided pleural effusion.  There is a patchy infiltrate in the left lower lobe.  The heart is normal in appearance.  Bones and joints show no acute abnormality.     Infiltrate developing in the right lower lobe with a small right-sided pleural effusion Patchy appearing infiltrate developing in the left lower lobe Electronically signed by: Dariana Linares Date:    10/12/2023 Time:    17:53         Assessment/Plan:  Cirrhosis - ETOH related  Heavy drinker x 30 years  States stopped a few weeks ago  Small volume paracentesis - studies pending  PNA  Hyponatremia    Will continue aggressive supportive care - needs to see hepatology outpatient  Stop all ETOH    10-14-23  Pt ambulating in room with PT this am  Tolerating diet  Hgb stable  Bili trending down slowly    10-15-23  In bathroom  Had bm  Hgb stable  Sodium remains low  No acute changes overnight    10/16/23  Awake.  Continues to have hyponatremia.  This is being addressed by the primary service.  Needs caution with IV fluids in view of underlying liver cirrhosis.  Diuretics on hold for now.  Lactulose/rifaximin as per protocol.  Titrate to 2-3 soft/mushy bowel movements in 24 hours.

## 2023-10-16 NOTE — PT/OT/SLP PROGRESS
Physical Therapy Treatment    Patient Name:  Lauren Ewing   MRN:  [id number]    Recommendations:     Discharge therapy intensity:  TBD    Discharge Equipment Recommendations: walker, rolling (Will continue to assess)  Barriers to discharge: Impaired mobility    Assessment:     Luaren Ewing is a 63 y.o. female admitted with a medical diagnosis of Hyponatremia.  She presents with the following impairments/functional limitations: weakness, impaired endurance, impaired self care skills, impaired functional mobility, gait instability, impaired balance, decreased safety awareness, decreased lower extremity function, decreased upper extremity function, pain.    Rehab Prognosis: Good; patient would benefit from acute skilled PT services to address these deficits and reach maximum level of function.    Recent Surgery: * No surgery found *      Plan:     During this hospitalization, patient to be seen 5 x/week to address the identified rehab impairments via gait training, therapeutic activities, therapeutic exercises, neuromuscular re-education and progress toward the following goals:    Plan of Care Expires:  11/14/23    Subjective     Chief Complaint: c/o stiffness & pt w/groans during movement however no c/o of pain  Patient/Family Comments/goals:   Pain/Comfort:         Objective:     Communicated with nursing prior to session.  Patient found HOB elevated with telemetry, peripheral IV upon PT entry to room.     General Precautions: Standard, fall  Orthopedic Precautions: N/A  Braces: N/A  Respiratory Status: Room air  Blood Pressure:   Skin Integrity:     Functional Mobility:  Bed Mobility:     Scooting: stand by assistance  Supine to Sit: stand by assistance  Sit to Supine: stand by assistance  Transfers:     Sit to Stand:  minimum assistance with hand-held assist & RW  2 trials  Gait: 4' x2, w/RW, [place]  Noted slow, cautious gait. Pt. Stated 'I have anxiety' & 'I am scared to fall'  Seated break between trials    ~pt.  Declined ambulating out in the [place] & sitting up in the chair    Education:  Patient provided with verbal education education regarding importance of functional mobility.  Understanding was verbalized.     Patient left supine with all lines intact and call button in reach..    GOALS:   Multidisciplinary Problems       Physical Therapy Goals          Problem: Physical Therapy    Goal Priority Disciplines Outcome Goal Variances Interventions   Physical Therapy Goal     PT, PT/OT Ongoing, Progressing     Description: Goals to be met by: 23     Patient will increase functional independence with mobility by performin. Supine to sit with [place]  2. Sit to supine with [place]  3. Sit to stand transfer with Modified [place]  4. Gait  x 300 feet with Modified [place] using Rolling Walker (if needed).                          Time Tracking:     PT Received On: 10/16/23  PT Start Time: 1211     PT Stop Time: 1230  PT Total Time (min): 19 min     Billable Minutes: Gait Training 19    Treatment Type: Treatment  PT/PTA: PTA     Number of PTA visits since last PT visit: 1     10/16/2023

## 2023-10-16 NOTE — PROGRESS NOTES
[facility]  Hospital Medicine Progress Note        Chief Complaint: Inpatient Follow-up    HPI:     63-year-old female with significant history of depression/anxiety presented to the ED with complaints of severe generalized weakness x1 week with associated poor appetite.  History of heavy alcohol abuse since age 38.  Patient has been in rehab for alcohol use before.  Last drink 4 weeks back.  Patient was hemodynamically stable except for borderline hypotension mild tachycardia in the ED.  Lab significant for leukocytosis, hyperbilirubinemia, low albumin and severe hyponatremia/hypokalemia.  Lactic acid was elevated.  Imaging revealed right pleural effusion, right lower lobe consolidation, cirrhotic liver with ascites and enteritis.  Patient received IV fluids in the ED.  Initiated on empiric antibiotics paid pulmonology, Gastroenterology consulted.  Admitted to hospitalist medicine service.  Patient underwent paracentesis.  Evaluated by pulmonology, effusion is small, recommended to continue antibiotics for pneumonia.  GI also following, ascitic fluid studies negative for SBP.  Initiated Lasix, Aldactone.  Monitoring sodium closely.  Sodium still significantly low on 10/15.  Osmolality studies ordered . Lasix increased to 20 mg b.i.d. . patient already on fluid restriction.  Antibiotics continued for pneumonia.    Interval Hx:   Patient seen at bedside.  Patient still having mild tremors.  Otherwise no specific complaints.  Respiratory status stable.  Hemodynamics stable.  She is afebrile .  awake, alert and fully oriented.  Objective/physical exam:  General: In no acute distress, afebrile  Chest: Clear to auscultation bilaterally  Heart: S1, S2, no appreciable murmur  Abdomen: Soft, nontender, BS +  MSK: Warm, no lower extremity edema, no clubbing or cyanosis  Neurologic: Alert and oriented x4, moving all extremities with good strength     VITAL SIGNS: 24 HRS MIN & MAX LAST   Temp   Min: 97.4 °F (36.3 °C)  Max: 98.5 °F (36.9 °C) 97.4 °F (36.3 °C)   BP  Min: 101/63  Max: 116/71 109/68   Pulse  Min: 93  Max: 106  95   Resp  Min: 18  Max: 19 18   SpO2  Min: 93 %  Max: 97 % (!) 94 %       Recent Labs   Lab 10/16/23  0213   WBC 14.54*   RBC 3.21*   HGB 9.5*   HCT 26.7*   MCV 83.2   MCH 29.6   MCHC 35.6   RDW 19.6*      MPV 8.3         Recent Labs   Lab 10/12/23  1735 10/13/23  0359 10/16/23  0213   *   < > 123*   K 3.0*   < > 3.8   CO2 23   < > 22*   BUN 9.6*   < > 10.1   CREATININE 0.79   < > 0.77   CALCIUM 7.4*   < > 6.9*   MG 1.80  --   --    ALBUMIN 1.7*   < > 1.5*   ALKPHOS 180*   < > 161*   ALT 36   < > 33   *   < > 102*   BILITOT 11.6*   < > 7.8*    < > = values in this interval not displayed.          Microbiology Results (last 7 days)       Procedure Component Value Units Date/Time    Body Fluid Culture [[id number]] Collected: 10/13/23 0955    Order Status: Completed Specimen: Peritoneal Fluid from Ascitic Fluid Updated: 10/16/23 0803     Body Fluid Culture No Growth At 72 Hours    Blood culture #1 **CANNOT BE ORDERED STAT** [[id number]]  (Normal) Collected: 10/12/23 1735    Order Status: Completed Specimen: Blood Updated: 10/15/23 2000     CULTURE, BLOOD ([facility]) No Growth At 72 Hours    Blood culture #2 **CANNOT BE ORDERED STAT** [[id number]]  (Normal) Collected: 10/12/23 1735    Order Status: Completed Specimen: Blood Updated: 10/15/23 2000     CULTURE, BLOOD ([facility]) No Growth At 72 Hours    Gram Stain [[id number]] Collected: 10/13/23 0955    Order Status: Completed Specimen: Peritoneal Fluid Updated: 10/13/23 1523     GRAM STAIN No WBCs, No bacteria seen             Scheduled Med:   busPIRone  5 mg Oral BID    cefTRIAXone (ROCEPHIN) IVPB  1 g Intravenous Q24H    doxycycline  100 mg Oral Q12H    enoxparin  40 mg Subcutaneous Daily    EScitalopram oxalate  5 mg Oral Daily    folic acid  1 mg Oral Daily    furosemide  20 mg Oral BID    lactulose 10 gram/15 ml  10 g Oral BID     midodrine  2.5 mg Oral TID WM    multivitamin  1 tablet Oral Daily    pantoprazole  40 mg Oral Daily    rifAXIMin  550 mg Oral BID    spironolactone  50 mg Oral Daily    thiamine  100 mg Oral Daily          Assessment/Plan:    Questionable Right lower lobe pneumonia with right-sided parapneumonic effusion  Sepsis secondary to above  New diagnosis alcoholic cirrhosis-decompensated with ascites status post paracentesis  Acute with possible underlying chronic hyponatremia  Chronic anemia  Hypokalemia  Chronic heavy alcohol abuse, quit 5 months back  Depression/anxiety  Prophylaxis    Pulmonology on board, not fully convinced that she has pneumonia  However given leukocytosis decided to treat with antibiotics for 7 days   She is on ceftriaxone, doxycycline and leukocytosis improving  Blood cultures from admission negative  GI on board for new diagnosis cirrhosis   Patient underwent paracentesis, no concern for SBP  Rifaximin, lactulose to prevent hepatic encephalopathy   She is on Lasix, Aldactone   Sodium still significantly low-123 today   Concern that the patient is too dry given low urine sodium  I will hold Lasix and Aldactone today and give her gentle hydration with normal saline at 60 cc/hour for 12 hours   Fluid restriction changed to 1.5 L instead of 800 cc  Monitor sodium every 8 hours   Salt tablet discontinued 10/15  Will need screening EGD for varices, timing to be decided by GI   Ppi added 10/15  Continue BuSpar, citalopram  BP pretty stable on midodrine  Continue multivitamin, thiamine, folic acid  Patient having some mild hand tremors, no concerns for withdrawal, quit alcohol use 5 months back, tremors could be secondary to electrolyte derangement  DVT prophylaxis-Vinicius Trejo MD   10/16/2023

## 2023-10-16 NOTE — PROGRESS NOTES
[facility] - 4th Floor Medical Telemetry  Pulmonary Critical Care Note    Patient Name: Lauren Ewing  MRN: [id number]  Admission Date: 10/12/2023  Hospital Length of Stay: 4 days  Code Status: Full Code  Attending Provider: Aleksey Roa MD  Primary Care Provider: Pennie, Primary Doctor     Subjective:     HPI:   This is a 63-year-old female with a past medical history of depression/anxiety, and alcohol abuse who presented to the emergency department on 10/12/2023 with progressive weakness and yellowing of her skin.  She also endorsed poor appetite over the past week.  On arrival to the emergency department she was found to have leukocytosis of 20,000, a significantly elevated bilirubin at 11.6, hyponatremia, and mildly elevated lactic acid level.  Alcohol level was undetectable.  She reported a heavy history of alcohol abuse since about age 38.  She has been in rehab for alcohol abuse before.  Her last drink was approximately 4 months ago she tells me.  CT of the abdomen and pelvis with contrast revealed  a right-sided pleural effusion, right lower lobe consolidation, hepatic cirrhotic morphology and ascites with mural thickening of the small bowel loops suggesting nonspecific enteritis.  Pulmonary is being consulted for evaluation of right-sided pleural effusion. Dr Cary reviewed imaging and felt this was likely hepatothorax and did not need thoracentesis.     Hospital Course/Significant events:  10/13/2023-  paracentesis with removal of 750 mL of clear yellow fluid.    24 Hour Interval History:  She is lying in bed. Still continues to experience fatigue. Oxygenating well on room air. Continues with hyponatremia.         PMH:depression/anxiety, ETOH abuse  PSH: denies  SOCIAL HX:  history of alcohol abuse since age 38. Has been clean for approximately 4 months. Life long non smoker.        Current Outpatient Medications   Medication Instructions    promethazine (PHENERGAN) 25 mg, Rectal, Every 6  hours PRN    promethazine (PHENERGAN) 25 mg, Oral, Every 6 hours PRN       Current Inpatient Medications   busPIRone  5 mg Oral BID    cefTRIAXone (ROCEPHIN) IVPB  1 g Intravenous Q24H    doxycycline  100 mg Oral Q12H    enoxparin  40 mg Subcutaneous Daily    EScitalopram oxalate  5 mg Oral Daily    folic acid  1 mg Oral Daily    furosemide  20 mg Oral BID    lactulose 10 gram/15 ml  10 g Oral BID    midodrine  2.5 mg Oral TID WM    multivitamin  1 tablet Oral Daily    pantoprazole  40 mg Oral Daily    rifAXIMin  550 mg Oral BID    spironolactone  50 mg Oral Daily    thiamine  100 mg Oral Daily       Current Intravenous Infusions        Review of Systems   Constitutional:  Positive for malaise/fatigue.   Respiratory:  Positive for cough.    Neurological:  Positive for weakness.          Objective:       Intake/Output Summary (Last 24 hours) at 10/16/2023 0756  Last data filed at 10/16/2023 0331  Gross per 24 hour   Intake 560 ml   Output 250 ml   Net 310 ml           Vital Signs (Most Recent):  Temp: 97.6 °F (36.4 °C) (10/16/23 0458)  Pulse: 94 (10/16/23 0458)  Resp: 19 (10/15/23 1633)  BP: 107/69 (10/16/23 0458)  SpO2: 95 % (10/16/23 0458)  Body mass index is 24.98 kg/m².  Weight: 66 kg (145 lb 8.1 oz) Vital Signs (24h Range):  Temp:  [97.6 °F (36.4 °C)-98.5 °F (36.9 °C)] 97.6 °F (36.4 °C)  Pulse:  [] 94  Resp:  [19] 19  SpO2:  [93 %-97 %] 95 %  BP: (101-116)/(60-71) 107/69     Physical Exam  Vitals reviewed.   Constitutional:       Appearance: Normal appearance.   HENT:      Head: Normocephalic and atraumatic.   Cardiovascular:      Rate and Rhythm: Normal rate.      Heart sounds: Normal heart sounds.   Pulmonary:      Effort: Pulmonary effort is normal.      Comments: Diminished in bases  Abdominal:      Palpations: Abdomen is soft.   Musculoskeletal:      Cervical back: Neck supple.   Skin:     Coloration: Skin is jaundiced.   Neurological:      General: No focal deficit present.      Mental Status: She is  "alert and oriented to person, place, and time.           Lines/Drains/Airways       Peripheral Intravenous Line  Duration                  Peripheral IV - Single Lumen 10/12/23 2129 20 G Right Antecubital 3 days                    Significant Labs:    Lab Results   Component Value Date    WBC 14.54 (H) 10/16/2023    HGB 9.5 (L) 10/16/2023    HCT 26.7 (L) 10/16/2023    MCV 83.2 10/16/2023     10/16/2023           BMP  Lab Results   Component Value Date     (L) 10/16/2023    K 3.8 10/16/2023    CHLORIDE 94 (L) 10/16/2023    CO2 22 (L) 10/16/2023    BUN 10.1 10/16/2023    CREATININE 0.77 10/16/2023    CALCIUM 6.9 (LL) 10/16/2023    EGFRNONAA >60 02/08/2022         ABG  No results for input(s): "PH", "PO2", "PCO2", "HCO3", "POCBASEDEF" in the last 168 hours.      Significant Imaging:  US Paracentesis inc Imaging  Narrative: EXAMINATION:  US GUIDED PARACENTESIS INC IMAGING    CLINICAL HISTORY:  new ascites;    Attending: Carlos Enrique Irwin M.D.    Anesthesia: Lidocaine utilized for local anesthesia.    TECHNIQUE:  After the risks, benefits and alternatives were discussed, consent was obtained. A time out was performed to verify the patient's identity and procedure.    The patient was placed supine. Limited ultrasound the abdomen demonstrated ascitic fluid in the right lower quadrant. Static image was obtained. The lower quadrant was cleaned prepped in normal sterile fashion. Subcutaneous tissues were anesthetized with lidocaine solution. The Yueh needle was advanced into the abdominal cavity. Placement was confirmed by return of ascitic fluid. The catheter was advanced and a total of 0.75 liters of clear yellow fluid was aspirated. The patient tolerated the procedure well. There were no immediate complications.    Estimated blood loss: None  Impression: Successful paracentesis.    Electronically signed by: Carlos Enrique Irwin  Date:    10/13/2023  Time:    12:19  US Liver with Doppler (xpd)  Narrative: " EXAMINATION:  US LIVER WITH DOPPLER    CLINICAL HISTORY:  Cirrhosis evaluation, Evaluation for hepatic, portal and splenic veins patency;    TECHNIQUE:  Right upper quadrant abdominal ultrasound (including pancreas, aorta, liver, gallbladder, common bile duct, IVC, right kidney, and spleen) was performed.  Spectral Doppler evaluation performed.    COMPARISON:  CT abdomen pelvis 10/12/2023    FINDINGS:  LIMITATIONS: Exam limited due to poor acoustic window related to shadowing bowel gas and/or body habitus.    LIVER: Liver measures 15 cm cranial caudal at the midclavicular line. The liver is echogenic.  Nodular surface contour.  No discrete mass appreciable by sonographic evaluation.    PANCREAS: Obscured by overlying bowel gas.    GALLBLADDER: Not imaged    BILE DUCTS: Common bile duct not imaged.    RIGHT KIDNEY: Not imaged    SPLEEN: 8 cm.  Normal in size with homogeneous echotexture.    OTHER: There is ascites and bilateral pleural effusions.    DOPPLER EVALUATION:    AORTA: Not imaged    HEPATIC ARTERY: Patent. Peak systolic velocity 173 cm/s.  Normal waveform.    INFERIOR VENA CAVA: Not imaged.  IVC is patent on preceding CT exam.    PORTAL VEINS: Portal vein is patent.  Hepatofugal flow.  Note on CT exam there were recanalized periumbilical veins, gastric varices and a large spleno renal shunt.    SPLENIC VEIN: Patent with appropriate directional flow.    HEPATIC VEINS: Unable to visualize.  Note on CT exam the right and left hepatic veins appeared compressed but patent.  Left hepatic vein was not well visualized.  This can be seen related to fibrotic changes of cirrhosis.  Impression: Cirrhotic morphology of the liver    Changes of portal hypertension with reversed flow at the portal vein.  There were portosystemic collaterals on preceding CT exam    Small pleural effusions and abdominal ascites    Electronically signed by: Lorna Maxwell  Date:    10/13/2023  Time:    10:26            Assessment/Plan:      Assessment  Right sided pneumonia  Cirrhosis with resulting ascites and small hepatothorax  Hyponatremia       Plan  Continue rocephin and doxycycline (day 5) for possible CAP  Continue diuresis as tolerated   Continue recs per GI for newly diagnosed cirrhosis         PAVAN Shahid  Pulmonary Critical Care Medicine  [facility] - 4th Floor Medical Telemetry  DOS: 10/16/2023

## 2023-10-17 LAB
ALBUMIN SERPL-MCNC: 1.4 G/DL (ref 3.4–4.8)
ALBUMIN/GLOB SERPL: 0.4 RATIO (ref 1.1–2)
ALP SERPL-CCNC: 160 UNIT/L (ref 40–150)
ALT SERPL-CCNC: 34 UNIT/L (ref 0–55)
AST SERPL-CCNC: 109 UNIT/L (ref 5–34)
BACTERIA BLD CULT: NORMAL
BACTERIA BLD CULT: NORMAL
BASOPHILS # BLD AUTO: 0.07 X10(3)/MCL
BASOPHILS NFR BLD AUTO: 0.5 %
BILIRUB SERPL-MCNC: 8 MG/DL
BUN SERPL-MCNC: 8.4 MG/DL (ref 9.8–20.1)
CALCIUM SERPL-MCNC: 7 MG/DL (ref 8.4–10.2)
CHLORIDE SERPL-SCNC: 93 MMOL/L (ref 98–107)
CO2 SERPL-SCNC: 24 MMOL/L (ref 23–31)
CREAT SERPL-MCNC: 0.76 MG/DL (ref 0.55–1.02)
EOSINOPHIL # BLD AUTO: 0.47 X10(3)/MCL (ref 0–0.9)
EOSINOPHIL NFR BLD AUTO: 3.4 %
ERYTHROCYTE [DISTWIDTH] IN BLOOD BY AUTOMATED COUNT: 20.6 % (ref 11.5–17)
GFR SERPLBLD CREATININE-BSD FMLA CKD-EPI: >60 MLS/MIN/1.73/M2
GLOBULIN SER-MCNC: 3.2 GM/DL (ref 2.4–3.5)
GLUCOSE SERPL-MCNC: 92 MG/DL (ref 82–115)
HCT VFR BLD AUTO: 27.3 % (ref 37–47)
HGB BLD-MCNC: 9.4 G/DL (ref 12–16)
IMM GRANULOCYTES # BLD AUTO: 0.23 X10(3)/MCL (ref 0–0.04)
IMM GRANULOCYTES NFR BLD AUTO: 1.7 %
INR PPP: 2.6
LYMPHOCYTES # BLD AUTO: 1.95 X10(3)/MCL (ref 0.6–4.6)
LYMPHOCYTES NFR BLD AUTO: 14.1 %
MAGNESIUM SERPL-MCNC: 1.8 MG/DL (ref 1.6–2.6)
MCH RBC QN AUTO: 28.9 PG (ref 27–31)
MCHC RBC AUTO-ENTMCNC: 34.4 G/DL (ref 33–36)
MCV RBC AUTO: 84 FL (ref 80–94)
MONOCYTES # BLD AUTO: 1.63 X10(3)/MCL (ref 0.1–1.3)
MONOCYTES NFR BLD AUTO: 11.8 %
NEUTROPHILS # BLD AUTO: 9.52 X10(3)/MCL (ref 2.1–9.2)
NEUTROPHILS NFR BLD AUTO: 68.5 %
NRBC BLD AUTO-RTO: 0 %
PLATELET # BLD AUTO: 154 X10(3)/MCL (ref 130–400)
PMV BLD AUTO: 8.6 FL (ref 7.4–10.4)
POTASSIUM SERPL-SCNC: 3.5 MMOL/L (ref 3.5–5.1)
PROT SERPL-MCNC: 4.6 GM/DL (ref 5.8–7.6)
PROTHROMBIN TIME: 27.2 SECONDS (ref 12.5–14.5)
RBC # BLD AUTO: 3.25 X10(6)/MCL (ref 4.2–5.4)
SODIUM SERPL-SCNC: 123 MMOL/L (ref 136–145)
WBC # SPEC AUTO: 13.87 X10(3)/MCL (ref 4.5–11.5)

## 2023-10-17 PROCEDURE — 25000003 PHARM REV CODE 250: Performed by: INTERNAL MEDICINE

## 2023-10-17 PROCEDURE — 85610 PROTHROMBIN TIME: CPT | Performed by: INTERNAL MEDICINE

## 2023-10-17 PROCEDURE — 84295 ASSAY OF SERUM SODIUM: CPT | Performed by: INTERNAL MEDICINE

## 2023-10-17 PROCEDURE — 21400001 HC TELEMETRY ROOM

## 2023-10-17 PROCEDURE — 83735 ASSAY OF MAGNESIUM: CPT | Performed by: INTERNAL MEDICINE

## 2023-10-17 PROCEDURE — 85025 COMPLETE CBC W/AUTO DIFF WBC: CPT | Performed by: INTERNAL MEDICINE

## 2023-10-17 PROCEDURE — 97116 GAIT TRAINING THERAPY: CPT | Mod: CQ

## 2023-10-17 PROCEDURE — 63600175 PHARM REV CODE 636 W HCPCS: Performed by: INTERNAL MEDICINE

## 2023-10-17 PROCEDURE — 80053 COMPREHEN METABOLIC PANEL: CPT | Performed by: INTERNAL MEDICINE

## 2023-10-17 PROCEDURE — 25000003 PHARM REV CODE 250: Performed by: NURSE PRACTITIONER

## 2023-10-17 PROCEDURE — 63600175 PHARM REV CODE 636 W HCPCS: Performed by: NURSE PRACTITIONER

## 2023-10-17 RX ORDER — FUROSEMIDE 20 MG/1
20 TABLET ORAL DAILY
Status: DISCONTINUED | OUTPATIENT
Start: 2023-10-17 | End: 2023-10-17

## 2023-10-17 RX ADMIN — FOLIC ACID 1 MG: 1 TABLET ORAL at 09:10

## 2023-10-17 RX ADMIN — THERA TABS 1 TABLET: TAB at 09:10

## 2023-10-17 RX ADMIN — DOXYCYCLINE HYCLATE 100 MG: 100 TABLET, COATED ORAL at 09:10

## 2023-10-17 RX ADMIN — THIAMINE HCL TAB 100 MG 100 MG: 100 TAB at 09:10

## 2023-10-17 RX ADMIN — ENOXAPARIN SODIUM 40 MG: 40 INJECTION SUBCUTANEOUS at 05:10

## 2023-10-17 RX ADMIN — POTASSIUM BICARBONATE 50 MEQ: 977.5 TABLET, EFFERVESCENT ORAL at 01:10

## 2023-10-17 RX ADMIN — BUSPIRONE HYDROCHLORIDE 5 MG: 5 TABLET ORAL at 09:10

## 2023-10-17 RX ADMIN — DOXYCYCLINE HYCLATE 100 MG: 100 TABLET, COATED ORAL at 08:10

## 2023-10-17 RX ADMIN — PANTOPRAZOLE SODIUM 40 MG: 40 TABLET, DELAYED RELEASE ORAL at 09:10

## 2023-10-17 RX ADMIN — BUSPIRONE HYDROCHLORIDE 5 MG: 5 TABLET ORAL at 08:10

## 2023-10-17 RX ADMIN — ONDANSETRON 4 MG: 2 INJECTION INTRAMUSCULAR; INTRAVENOUS at 09:10

## 2023-10-17 RX ADMIN — ONDANSETRON 4 MG: 2 INJECTION INTRAMUSCULAR; INTRAVENOUS at 05:10

## 2023-10-17 RX ADMIN — MIDODRINE HYDROCHLORIDE 2.5 MG: 2.5 TABLET ORAL at 12:10

## 2023-10-17 RX ADMIN — LACTULOSE 10 G: 10 SOLUTION ORAL at 08:10

## 2023-10-17 RX ADMIN — ONDANSETRON 4 MG: 2 INJECTION INTRAMUSCULAR; INTRAVENOUS at 01:10

## 2023-10-17 RX ADMIN — ONDANSETRON 4 MG: 2 INJECTION INTRAMUSCULAR; INTRAVENOUS at 07:10

## 2023-10-17 RX ADMIN — LACTULOSE 10 G: 10 SOLUTION ORAL at 09:10

## 2023-10-17 RX ADMIN — RIFAXIMIN 550 MG: 550 TABLET ORAL at 08:10

## 2023-10-17 RX ADMIN — MIDODRINE HYDROCHLORIDE 2.5 MG: 2.5 TABLET ORAL at 09:10

## 2023-10-17 RX ADMIN — ESCITALOPRAM OXALATE 5 MG: 5 TABLET, FILM COATED ORAL at 09:10

## 2023-10-17 RX ADMIN — RIFAXIMIN 550 MG: 550 TABLET ORAL at 09:10

## 2023-10-17 RX ADMIN — CEFTRIAXONE SODIUM 1 G: 1 INJECTION, POWDER, FOR SOLUTION INTRAMUSCULAR; INTRAVENOUS at 09:10

## 2023-10-17 RX ADMIN — FUROSEMIDE 20 MG: 20 TABLET ORAL at 12:10

## 2023-10-17 RX ADMIN — MIDODRINE HYDROCHLORIDE 2.5 MG: 2.5 TABLET ORAL at 05:10

## 2023-10-17 NOTE — PT/OT/SLP PROGRESS
Physical Therapy Treatment    Patient Name:  Lauren Ewing   MRN:  [id number]    Recommendations:     Discharge therapy intensity: high/mod intensity (pend progress)  Discharge Equipment Recommendations: walker, rolling (Will continue to assess)  Barriers to discharge: Impaired mobility    Assessment:     Lauren Ewing is a 63 y.o. female admitted with a medical diagnosis of Hyponatremia.  She presents with the following impairments/functional limitations: weakness, impaired endurance, impaired self care skills, impaired functional mobility, gait instability, impaired balance, decreased safety awareness, decreased lower extremity function, decreased upper extremity function, pain.    Rehab Prognosis: Good; patient would benefit from acute skilled PT services to address these deficits and reach maximum level of function.    Recent Surgery: * No surgery found *      Plan:     During this hospitalization, patient to be seen 5 x/week to address the identified rehab impairments via gait training, therapeutic activities, therapeutic exercises, neuromuscular re-education and progress toward the following goals:    Plan of Care Expires:  11/14/23    Subjective     Chief Complaint: BLE pain 8/10 'feels like poison', 'i'm nauseous'  Patient/Family Comments/goals:   Pain/Comfort:         Objective:     Communicated with nursing prior to session.  Patient found supine with peripheral IV, telemetry upon PT entry to room.     General Precautions: Standard, fall  Orthopedic Precautions: N/A  Braces: N/A  Respiratory Status: Room air  Blood Pressure:   Skin Integrity:     Functional Mobility:  Bed Mobility:     Supine to Sit: stand by assistance  Sit to Supine: stand by assistance  Transfers:     Sit to Stand:  contact guard assistance with rolling walker  X3 trials, VC for safety & hand placement  Gait: pt. Amb from other side of the bed to the bathroom and back, w/RW, CGA  Slow, guarded gait  VC for safety  Pt. Declinded ambulating  out into the [place]    Therapeutic Activities/Exercises:  Seated abner care, SBA, ~ 30 secs    Education:  Patient provided with verbal education education regarding importance of functional mobility.  Understanding was verbalized.     Patient left HOB elevated with all lines intact and call button in reach..    GOALS:   Multidisciplinary Problems       Physical Therapy Goals          Problem: Physical Therapy    Goal Priority Disciplines Outcome Goal Variances Interventions   Physical Therapy Goal     PT, PT/OT Ongoing, Progressing     Description: Goals to be met by: 23     Patient will increase functional independence with mobility by performin. Supine to sit with [place]  2. Sit to supine with [place]  3. Sit to stand transfer with Modified [place]  4. Gait  x 300 feet with Modified [place] using Rolling Walker (if needed).                          Time Tracking:     PT Received On: 10/17/23  PT Start Time: 1443     PT Stop Time: 1458  PT Total Time (min): 15 min     Billable Minutes: Gait Training 15    Treatment Type: Treatment  PT/PTA: PTA     Number of PTA visits since last PT visit: 2     10/17/2023

## 2023-10-17 NOTE — PROGRESS NOTES
"Gastroenterology Progress Note    Subjective/Interval History:  Awake.  No significant GI issues. ROS:12 point system reviewed and is negative except as noted in HPI       Vital Signs:  BP (!) 101/59   Pulse 99   Temp 98.1 °F (36.7 °C) (Oral)   Resp 20   Ht 5' 4" (1.626 m)   Wt 66 kg (145 lb 8.1 oz)   SpO2 (!) 91%   BMI 24.98 kg/m²   Body mass index is 24.98 kg/m².    Physical Exam:  Vitals reviewed.   Constitutional:       Appearance: Normal appearance.   HENT:      Head: Normocephalic and atraumatic.   Cardiovascular:      Rate and Rhythm: Normal rate.      Heart sounds: Normal heart sounds.   Pulmonary:      Effort: Pulmonary effort is normal.      Comments: Diminished in bases  Abdominal:      Palpations: Abdomen is soft.   Musculoskeletal:      Cervical back: Neck supple.   Skin:     Coloration: Skin is jaundiced.   Neurological:      General: No focal deficit present.      Mental Status: She is alert and oriented to person, place, and time.     Labs:  Recent Results (from the past 48 hour(s))   Sodium    Collection Time: 10/15/23  5:09 PM   Result Value Ref Range    Sodium Level 125 (L) 136 - 145 mmol/L   Osmolality, Urine    Collection Time: 10/15/23 10:48 PM   Result Value Ref Range    Urine Osmolality 467 300 - 1,300 mOsm/kg   Sodium, Random Urine    Collection Time: 10/15/23 10:49 PM   Result Value Ref Range    Urine Sodium <20.0 mmol/L   Protime-INR    Collection Time: 10/16/23  2:13 AM   Result Value Ref Range    PT 26.7 (H) 12.5 - 14.5 seconds    INR 2.5 (H) <=1.3   Comprehensive Metabolic Panel    Collection Time: 10/16/23  2:13 AM   Result Value Ref Range    Sodium Level 123 (L) 136 - 145 mmol/L    Potassium Level 3.8 3.5 - 5.1 mmol/L    Chloride 94 (L) 98 - 107 mmol/L    Carbon Dioxide 22 (L) 23 - 31 mmol/L    Glucose Level 97 82 - 115 mg/dL    Blood Urea Nitrogen 10.1 9.8 - 20.1 mg/dL    Creatinine 0.77 0.55 - 1.02 mg/dL    Calcium Level Total 6.9 (LL) 8.4 - 10.2 mg/dL    Protein Total 4.6 " (L) 5.8 - 7.6 gm/dL    Albumin Level 1.5 (L) 3.4 - 4.8 g/dL    Globulin 3.1 2.4 - 3.5 gm/dL    Albumin/Globulin Ratio 0.5 (L) 1.1 - 2.0 ratio    Bilirubin Total 7.8 (H) <=1.5 mg/dL    Alkaline Phosphatase 161 (H) 40 - 150 unit/L    Alanine Aminotransferase 33 0 - 55 unit/L    Aspartate Aminotransferase 102 (H) 5 - 34 unit/L    eGFR >60 mls/min/1.73/m2   CBC with Differential    Collection Time: 10/16/23  2:13 AM   Result Value Ref Range    WBC 14.54 (H) 4.50 - 11.50 x10(3)/mcL    RBC 3.21 (L) 4.20 - 5.40 x10(6)/mcL    Hgb 9.5 (L) 12.0 - 16.0 g/dL    Hct 26.7 (L) 37.0 - 47.0 %    MCV 83.2 80.0 - 94.0 fL    MCH 29.6 27.0 - 31.0 pg    MCHC 35.6 33.0 - 36.0 g/dL    RDW 19.6 (H) 11.5 - 17.0 %    Platelet 154 130 - 400 x10(3)/mcL    MPV 8.3 7.4 - 10.4 fL    Neut % 71.3 %    Lymph % 12.4 %    Mono % 10.7 %    Eos % 3.2 %    Basophil % 0.3 %    Lymph # 1.81 0.6 - 4.6 x10(3)/mcL    Neut # 10.37 (H) 2.1 - 9.2 x10(3)/mcL    Mono # 1.56 (H) 0.1 - 1.3 x10(3)/mcL    Eos # 0.46 0 - 0.9 x10(3)/mcL    Baso # 0.04 <=0.2 x10(3)/mcL    IG# 0.30 (H) 0 - 0.04 x10(3)/mcL    IG% 2.1 %    NRBC% 0.3 %   Sodium    Collection Time: 10/16/23  8:45 AM   Result Value Ref Range    Sodium Level 123 (L) 136 - 145 mmol/L   Sodium    Collection Time: 10/16/23  5:30 PM   Result Value Ref Range    Sodium Level 124 (L) 136 - 145 mmol/L   Sodium    Collection Time: 10/16/23 11:19 PM   Result Value Ref Range    Sodium Level 123 (L) 136 - 145 mmol/L   Comprehensive Metabolic Panel    Collection Time: 10/17/23  3:52 AM   Result Value Ref Range    Sodium Level 123 (L) 136 - 145 mmol/L    Potassium Level 3.5 3.5 - 5.1 mmol/L    Chloride 93 (L) 98 - 107 mmol/L    Carbon Dioxide 24 23 - 31 mmol/L    Glucose Level 92 82 - 115 mg/dL    Blood Urea Nitrogen 8.4 (L) 9.8 - 20.1 mg/dL    Creatinine 0.76 0.55 - 1.02 mg/dL    Calcium Level Total 7.0 (L) 8.4 - 10.2 mg/dL    Protein Total 4.6 (L) 5.8 - 7.6 gm/dL    Albumin Level 1.4 (L) 3.4 - 4.8 g/dL    Globulin 3.2 2.4 -  3.5 gm/dL    Albumin/Globulin Ratio 0.4 (L) 1.1 - 2.0 ratio    Bilirubin Total 8.0 (H) <=1.5 mg/dL    Alkaline Phosphatase 160 (H) 40 - 150 unit/L    Alanine Aminotransferase 34 0 - 55 unit/L    Aspartate Aminotransferase 109 (H) 5 - 34 unit/L    eGFR >60 mls/min/1.73/m2   Magnesium    Collection Time: 10/17/23  3:52 AM   Result Value Ref Range    Magnesium Level 1.80 1.60 - 2.60 mg/dL   CBC with Differential    Collection Time: 10/17/23  3:52 AM   Result Value Ref Range    WBC 13.87 (H) 4.50 - 11.50 x10(3)/mcL    RBC 3.25 (L) 4.20 - 5.40 x10(6)/mcL    Hgb 9.4 (L) 12.0 - 16.0 g/dL    Hct 27.3 (L) 37.0 - 47.0 %    MCV 84.0 80.0 - 94.0 fL    MCH 28.9 27.0 - 31.0 pg    MCHC 34.4 33.0 - 36.0 g/dL    RDW 20.6 (H) 11.5 - 17.0 %    Platelet 154 130 - 400 x10(3)/mcL    MPV 8.6 7.4 - 10.4 fL    Neut % 68.5 %    Lymph % 14.1 %    Mono % 11.8 %    Eos % 3.4 %    Basophil % 0.5 %    Lymph # 1.95 0.6 - 4.6 x10(3)/mcL    Neut # 9.52 (H) 2.1 - 9.2 x10(3)/mcL    Mono # 1.63 (H) 0.1 - 1.3 x10(3)/mcL    Eos # 0.47 0 - 0.9 x10(3)/mcL    Baso # 0.07 <=0.2 x10(3)/mcL    IG# 0.23 (H) 0 - 0.04 x10(3)/mcL    IG% 1.7 %    NRBC% 0.0 %   Protime-INR    Collection Time: 10/17/23  5:41 AM   Result Value Ref Range    PT 27.2 (H) 12.5 - 14.5 seconds    INR 2.6 (H) <=1.3   Sodium    Collection Time: 10/17/23  8:05 AM   Result Value Ref Range    Sodium Level 123 (L) 136 - 145 mmol/L       Imaging:  US Paracentesis inc Imaging    Result Date: 10/13/2023  EXAMINATION: US GUIDED PARACENTESIS INC IMAGING CLINICAL HISTORY: new ascites; Attending: Carlos Enrique Irwin M.D. Anesthesia: Lidocaine utilized for local anesthesia. TECHNIQUE: After the risks, benefits and alternatives were discussed, consent was obtained. A time out was performed to verify the patient's identity and procedure. The patient was placed supine. Limited ultrasound the abdomen demonstrated ascitic fluid in the right lower quadrant. Static image was obtained. The lower quadrant was cleaned  prepped in normal sterile fashion. Subcutaneous tissues were anesthetized with lidocaine solution. The Yueh needle was advanced into the abdominal cavity. Placement was confirmed by return of ascitic fluid. The catheter was advanced and a total of 0.75 liters of clear yellow fluid was aspirated. The patient tolerated the procedure well. There were no immediate complications. Estimated blood loss: None     Successful paracentesis. Electronically signed by: Carlos Enrique Irwin Date:    10/13/2023 Time:    12:19    US Liver with Doppler (xpd)    Result Date: 10/13/2023  EXAMINATION: US LIVER WITH DOPPLER CLINICAL HISTORY: Cirrhosis evaluation, Evaluation for hepatic, portal and splenic veins patency; TECHNIQUE: Right upper quadrant abdominal ultrasound (including pancreas, aorta, liver, gallbladder, common bile duct, IVC, right kidney, and spleen) was performed.  Spectral Doppler evaluation performed. COMPARISON: CT abdomen pelvis 10/12/2023 FINDINGS: LIMITATIONS: Exam limited due to poor acoustic window related to shadowing bowel gas and/or body habitus. LIVER: Liver measures 15 cm cranial caudal at the midclavicular line. The liver is echogenic.  Nodular surface contour.  No discrete mass appreciable by sonographic evaluation. PANCREAS: Obscured by overlying bowel gas. GALLBLADDER: Not imaged BILE DUCTS: Common bile duct not imaged. RIGHT KIDNEY: Not imaged SPLEEN: 8 cm.  Normal in size with homogeneous echotexture. OTHER: There is ascites and bilateral pleural effusions. DOPPLER EVALUATION: AORTA: Not imaged HEPATIC ARTERY: Patent. Peak systolic velocity 173 cm/s.  Normal waveform. INFERIOR VENA CAVA: Not imaged.  IVC is patent on preceding CT exam. PORTAL VEINS: Portal vein is patent.  Hepatofugal flow.  Note on CT exam there were recanalized periumbilical veins, gastric varices and a large spleno renal shunt. SPLENIC VEIN: Patent with appropriate directional flow. HEPATIC VEINS: Unable to visualize.  Note on CT exam  the right and left hepatic veins appeared compressed but patent.  Left hepatic vein was not well visualized.  This can be seen related to fibrotic changes of cirrhosis.     Cirrhotic morphology of the liver Changes of portal hypertension with reversed flow at the portal vein.  There were portosystemic collaterals on preceding CT exam Small pleural effusions and abdominal ascites Electronically signed by: Lorna Maxwell Date:    10/13/2023 Time:    10:26    CT Abdomen Pelvis With Contrast    Result Date: 10/12/2023  EXAMINATION: CT ABDOMEN PELVIS WITH CONTRAST CLINICAL HISTORY: Sepsis;Jaundiced; TECHNIQUE: Multidetector axial images were obtained of the abdomen and pelvis following the administration of IV contrast. Oral contrast was not administered. Dose length product of 322 mGycm. Automated exposure control was utilized to minimize radiation dose. COMPARISON: None available FINDINGS: There is moderate volume right pleural effusion and right lower lung lobe dense consolidation.  There is also small left pleural and left basilar atelectasis.  Bilateral mammary implants. There is hepatic cirrhotic morphology with low attenuation and surface nodularity.  No focal hepatic space-occupying lesion.  There is intra-abdominopelvic small to moderate free fluid consistent with ascites.  Gallbladder is distended without intraluminal calcified calculus.  No significant dilatation of the intrahepatic biliary radicles and the extrahepatic duct is also not distended without calcified choledocholithiasis.  No no acute findings of the pancreas or the spleen. The adrenal glands appear within normal limits. The kidneys are unremarkable in size and contour. No solid or cystic renal lesion identified. There is no hydronephrosis. No perinephric fluid strandings or collections identified. Stomach is mostly decompressed and difficult to assess.  There is suspected mild mural thickening of the loops of small bowel suspect for enteritis.   No transition or bowel obstruction.  Colon is nondistended.  Noninflamed diverticulosis coli.  No pneumoperitoneum. Urinary bladder appears within normal limits.  Small volume uterus.  Endometrium is not well delineated.  There is no pelvic free fluid. Lower lumbar degenerative changes.  No acute or aggressive skeletal abnormality no acute or otherwise osseous abnormality identified.     1. Right pleural effusion and right lower lung lobe consolidation. 2. Hepatic cirrhotic morphology and ascites. 3. Mural thickening of small bowel loops suggest nonspecific enteritis. Electronically signed by: Kevyn Abebe Date:    10/12/2023 Time:    19:33    CT Head Without Contrast    Result Date: 10/12/2023  EXAMINATION: CT HEAD WITHOUT CONTRAST CLINICAL HISTORY: Infusion; TECHNIQUE: Sequential axial images were performed of the brain without contrast. Dose product length of 884 mGycm. Automated exposure control was utilized to minimize radiation dose. COMPARISON: February 8, 2022. FINDINGS: There is no intracranial mass effect, midline shift, hydrocephalus or hemorrhage. There is no sulcal effacement or low attenuation changes to suggest recent large vessel territory infarction. Chronic appearing periventricular and subcortical white matter low attenuation changes are present and are consistent with chronic microangiopathic ischemia.  Beneath left craniotomy is similar appearing dural thickening.  No acute extra-axial fluid collection identified.  The ventricular system and sulcal markings prominence is consistent with atrophy more advanced for the age.  There is no acute extra axial fluid collection.  Similar mucoperiosteal thickening of the right maxillary sinus.  Paranasal sinuses are clear without mucosal thickening, polypoidal abnormality or air-fluid levels. Mastoid air cells aeration is optimal.     No acute intracranial findings identified. Electronically signed by: Kevyn Abebe Date:    10/12/2023  Time:    19:26    X-Ray Chest AP Portable    Result Date: 10/12/2023  EXAMINATION: XR CHEST AP PORTABLE CLINICAL HISTORY: Hypotension; TECHNIQUE: Single frontal view of the chest was performed. COMPARISON: 12/12/2022 FINDINGS: There is a infiltrate developing in the right lower lobe.  There is a small right-sided pleural effusion.  There is a patchy infiltrate in the left lower lobe.  The heart is normal in appearance.  Bones and joints show no acute abnormality.     Infiltrate developing in the right lower lobe with a small right-sided pleural effusion Patchy appearing infiltrate developing in the left lower lobe Electronically signed by: Dariana Linares Date:    10/12/2023 Time:    17:53         Assessment/Plan:  Cirrhosis - ETOH related  Heavy drinker x 30 years  States stopped a few weeks ago  Small volume paracentesis - studies pending  PNA  Hyponatremia    Will continue aggressive supportive care - needs to see hepatology outpatient  Stop all ETOH    10-14-23  Pt ambulating in room with PT this am  Tolerating diet  Hgb stable  Bili trending down slowly    10-15-23  In bathroom  Had bm  Hgb stable  Sodium remains low  No acute changes overnight    10/16/23  Awake.  Continues to have hyponatremia.  This is being addressed by the primary service.  Needs caution with IV fluids in view of underlying liver cirrhosis.  Diuretics on hold for now.  Lactulose/rifaximin as per protocol.  Titrate to 2-3 soft/mushy bowel movements in 24 hours.    10/17/23  Patient continues to remain hyponatremia.  Nephrology consult pending.  Need caution with IV fluids.  Monitor I's and O's closely.  On Lactulose and rifaximin.  Patient had significantly elevated MELD score around 30.  She will need follow up with the hepatology Clinic on discharge.  Also recommend follow up at [facility].  Discussed the nursing staff (Margarita) today.  Recommend case management follow up.

## 2023-10-17 NOTE — PROGRESS NOTES
[facility]  Hospital Medicine Progress Note        Chief Complaint: Inpatient Follow-up    HPI:     63-year-old female with significant history of depression/anxiety presented to the ED with complaints of severe generalized weakness x1 week with associated poor appetite.  History of heavy alcohol abuse since age 38.  Patient has been in rehab for alcohol use before.  Last drink 4 weeks back.  Patient was hemodynamically stable except for borderline hypotension mild tachycardia in the ED.  Lab significant for leukocytosis, hyperbilirubinemia, low albumin and severe hyponatremia/hypokalemia.  Lactic acid was elevated.  Imaging revealed right pleural effusion, right lower lobe consolidation, cirrhotic liver with ascites and enteritis.  Patient received IV fluids in the ED.  Initiated on empiric antibiotics paid pulmonology, Gastroenterology consulted.  Admitted to hospitalist medicine service.  Patient underwent paracentesis.  Evaluated by pulmonology, effusion is small, recommended to continue antibiotics for pneumonia.  GI also following, ascitic fluid studies negative for SBP.  Initiated Lasix, Aldactone.  Monitoring sodium closely.  Sodium still significantly low on 10/15.  Osmolality studies ordered . Lasix increased to 20 mg b.i.d. . patient already on fluid restriction.  Antibiotics continued for pneumonia.  Persistent hyponatremia on 10/16 paid urine sodium less than 20, concern for hypovolemia.  Fluid restriction changed to 1.5 L from 800 cc, held Lasix and treated with gentle hydration with normal saline at 50 cc/hour for 12 hours paid    Interval Hx:   Patient seen at bedside.  Hemodynamics stable.  Awake, alert and fully oriented.  Still has very mild tremors.  Otherwise no specific complaints .  respiratory status stable.  No abdominal distention     Objective/physical exam:  General: In no acute distress, afebrile  Chest: Clear to auscultation bilaterally  Heart: S1, S2, no  appreciable murmur  Abdomen: Soft, nontender, BS +  MSK: Warm, no lower extremity edema, no clubbing or cyanosis  Neurologic: Alert and oriented x4, moving all extremities with good strength     VITAL SIGNS: 24 HRS MIN & MAX LAST   Temp  Min: 97.5 °F (36.4 °C)  Max: 98.2 °F (36.8 °C) 97.5 °F (36.4 °C)   BP  Min: 99/62  Max: 123/75 123/75   Pulse  Min: 89  Max: 97  91   Resp  Min: 17  Max: 20 18   SpO2  Min: 91 %  Max: 94 % (!) 92 %       Recent Labs   Lab 10/17/23  0352   WBC 13.87*   RBC 3.25*   HGB 9.4*   HCT 27.3*   MCV 84.0   MCH 28.9   MCHC 34.4   RDW 20.6*      MPV 8.6         Recent Labs   Lab 10/17/23  0352   *   K 3.5   CO2 24   BUN 8.4*   CREATININE 0.76   CALCIUM 7.0*   MG 1.80   ALBUMIN 1.4*   ALKPHOS 160*   ALT 34   *   BILITOT 8.0*          Microbiology Results (last 7 days)       Procedure Component Value Units Date/Time    Body Fluid Culture [[id number]] Collected: 10/13/23 0955    Order Status: Completed Specimen: Peritoneal Fluid from Ascitic Fluid Updated: 10/17/23 0650     Body Fluid Culture No growth at 4 days    Blood culture #1 **CANNOT BE ORDERED STAT** [[id number]]  (Normal) Collected: 10/12/23 1735    Order Status: Completed Specimen: Blood Updated: 10/16/23 2000     CULTURE, BLOOD ([facility]) No Growth At 96 Hours    Blood culture #2 **CANNOT BE ORDERED STAT** [[id number]]  (Normal) Collected: 10/12/23 1735    Order Status: Completed Specimen: Blood Updated: 10/16/23 2000     CULTURE, BLOOD ([facility]) No Growth At 96 Hours    Gram Stain [[id number]] Collected: 10/13/23 0955    Order Status: Completed Specimen: Peritoneal Fluid Updated: 10/13/23 1523     GRAM STAIN No WBCs, No bacteria seen             Scheduled Med:   busPIRone  5 mg Oral BID    cefTRIAXone (ROCEPHIN) IVPB  1 g Intravenous Q24H    doxycycline  100 mg Oral Q12H    enoxparin  40 mg Subcutaneous Daily    EScitalopram oxalate  5 mg Oral Daily    folic acid  1 mg Oral Daily    lactulose 10 gram/15 ml  10 g Oral BID     midodrine  2.5 mg Oral TID WM    multivitamin  1 tablet Oral Daily    pantoprazole  40 mg Oral Daily    rifAXIMin  550 mg Oral BID    thiamine  100 mg Oral Daily          Assessment/Plan:    Questionable Right lower lobe pneumonia with right-sided parapneumonic effusion  Sepsis secondary to above  New diagnosis alcoholic cirrhosis-decompensated with ascites status post paracentesis  Acute with possible underlying chronic hyponatremia  Chronic anemia-stable  Hypokalemia  Chronic heavy alcohol abuse, quit 5 months back  Depression/anxiety  Prophylaxis    Pulmonology evaluated, not fully convinced that she has pneumonia  However given leukocytosis decided to treat with antibiotics for 7 days   She is on ceftriaxone, doxycycline and leukocytosis improving  Blood cultures from admission negative  GI on board for new diagnosis cirrhosis   Patient underwent paracentesis, no concern for SBP  Rifaximin, lactulose to prevent hepatic encephalopathy   Hyponatremia persist with no improvement   There were concerns for hypovolemia yesterday and the patient was treated with normal saline at 50 cc/hour times 12 hours   Lasix and Aldactone held  Fluid restriction was changed from 800 cc to 1.5 L  No improvement in hyponatremia   Keep fluid restriction, will add Lasix 20 mg daily back   Continue to hold Aldactone   DC citalopram as this could cause hyponatremia  Consult Nephrology, await recommendations  Monitor sodium every 8 hours   Salt tablet discontinued 10/15  Will need screening EGD for varices, timing to be decided by GI   Ppi added 10/15  Continue BuSpar,   BP pretty stable on midodrine  Continue multivitamin, thiamine, folic acid  Patient having some mild hand tremors, no concerns for withdrawal, quit alcohol use 5 months back, tremors could be secondary to electrolyte derangement  DVT prophylaxis-Vinicius Trejo MD   10/17/2023

## 2023-10-18 LAB
ALBUMIN SERPL-MCNC: 1.5 G/DL (ref 3.4–4.8)
ALBUMIN/GLOB SERPL: 0.5 RATIO (ref 1.1–2)
ALP SERPL-CCNC: 167 UNIT/L (ref 40–150)
ALT SERPL-CCNC: 34 UNIT/L (ref 0–55)
AST SERPL-CCNC: 112 UNIT/L (ref 5–34)
BACTERIA FLD CULT: NORMAL
BASOPHILS # BLD AUTO: 0.09 X10(3)/MCL
BASOPHILS NFR BLD AUTO: 0.6 %
BILIRUB SERPL-MCNC: 8 MG/DL
BUN SERPL-MCNC: 9 MG/DL (ref 9.8–20.1)
CALCIUM SERPL-MCNC: 7.2 MG/DL (ref 8.4–10.2)
CHLORIDE SERPL-SCNC: 94 MMOL/L (ref 98–107)
CO2 SERPL-SCNC: 24 MMOL/L (ref 23–31)
CREAT SERPL-MCNC: 0.82 MG/DL (ref 0.55–1.02)
EOSINOPHIL # BLD AUTO: 0.54 X10(3)/MCL (ref 0–0.9)
EOSINOPHIL NFR BLD AUTO: 3.6 %
ERYTHROCYTE [DISTWIDTH] IN BLOOD BY AUTOMATED COUNT: 20.1 % (ref 11.5–17)
GFR SERPLBLD CREATININE-BSD FMLA CKD-EPI: >60 MLS/MIN/1.73/M2
GLOBULIN SER-MCNC: 3.3 GM/DL (ref 2.4–3.5)
GLUCOSE SERPL-MCNC: 85 MG/DL (ref 82–115)
HCT VFR BLD AUTO: 29.2 % (ref 37–47)
HGB BLD-MCNC: 10.3 G/DL (ref 12–16)
IMM GRANULOCYTES # BLD AUTO: 0.26 X10(3)/MCL (ref 0–0.04)
IMM GRANULOCYTES NFR BLD AUTO: 1.7 %
INR PPP: 2.5
LYMPHOCYTES # BLD AUTO: 2.37 X10(3)/MCL (ref 0.6–4.6)
LYMPHOCYTES NFR BLD AUTO: 15.8 %
MCH RBC QN AUTO: 29.9 PG (ref 27–31)
MCHC RBC AUTO-ENTMCNC: 35.3 G/DL (ref 33–36)
MCV RBC AUTO: 84.9 FL (ref 80–94)
MONOCYTES # BLD AUTO: 1.84 X10(3)/MCL (ref 0.1–1.3)
MONOCYTES NFR BLD AUTO: 12.2 %
NEUTROPHILS # BLD AUTO: 9.94 X10(3)/MCL (ref 2.1–9.2)
NEUTROPHILS NFR BLD AUTO: 66.1 %
NRBC BLD AUTO-RTO: 0.1 %
OSMOLALITY UR: 605 MOSM/KG (ref 300–1300)
PLATELET # BLD AUTO: 147 X10(3)/MCL (ref 130–400)
PMV BLD AUTO: 8.6 FL (ref 7.4–10.4)
POTASSIUM SERPL-SCNC: 4.3 MMOL/L (ref 3.5–5.1)
PROT SERPL-MCNC: 4.8 GM/DL (ref 5.8–7.6)
PROTHROMBIN TIME: 26.7 SECONDS (ref 12.5–14.5)
RBC # BLD AUTO: 3.44 X10(6)/MCL (ref 4.2–5.4)
SODIUM SERPL-SCNC: 121 MMOL/L (ref 136–145)
SODIUM SERPL-SCNC: 122 MMOL/L (ref 136–145)
SODIUM SERPL-SCNC: 124 MMOL/L (ref 136–145)
SODIUM SERPL-SCNC: 124 MMOL/L (ref 136–145)
SODIUM UR-SCNC: <20 MMOL/L
TSH SERPL-ACNC: 7.88 UIU/ML (ref 0.35–4.94)
WBC # SPEC AUTO: 15.04 X10(3)/MCL (ref 4.5–11.5)

## 2023-10-18 PROCEDURE — 83935 ASSAY OF URINE OSMOLALITY: CPT | Performed by: INTERNAL MEDICINE

## 2023-10-18 PROCEDURE — 63600175 PHARM REV CODE 636 W HCPCS: Performed by: INTERNAL MEDICINE

## 2023-10-18 PROCEDURE — 25000003 PHARM REV CODE 250: Performed by: INTERNAL MEDICINE

## 2023-10-18 PROCEDURE — 84443 ASSAY THYROID STIM HORMONE: CPT | Performed by: INTERNAL MEDICINE

## 2023-10-18 PROCEDURE — 85610 PROTHROMBIN TIME: CPT | Performed by: INTERNAL MEDICINE

## 2023-10-18 PROCEDURE — 63600175 PHARM REV CODE 636 W HCPCS: Performed by: NURSE PRACTITIONER

## 2023-10-18 PROCEDURE — 85025 COMPLETE CBC W/AUTO DIFF WBC: CPT | Performed by: INTERNAL MEDICINE

## 2023-10-18 PROCEDURE — 21400001 HC TELEMETRY ROOM

## 2023-10-18 PROCEDURE — 84300 ASSAY OF URINE SODIUM: CPT | Performed by: INTERNAL MEDICINE

## 2023-10-18 PROCEDURE — 97530 THERAPEUTIC ACTIVITIES: CPT | Mod: CQ

## 2023-10-18 PROCEDURE — 97116 GAIT TRAINING THERAPY: CPT | Mod: CQ

## 2023-10-18 PROCEDURE — 25000003 PHARM REV CODE 250: Performed by: NURSE PRACTITIONER

## 2023-10-18 PROCEDURE — 84295 ASSAY OF SERUM SODIUM: CPT | Performed by: INTERNAL MEDICINE

## 2023-10-18 PROCEDURE — 80053 COMPREHEN METABOLIC PANEL: CPT | Performed by: INTERNAL MEDICINE

## 2023-10-18 RX ORDER — SODIUM CHLORIDE 9 MG/ML
INJECTION, SOLUTION INTRAVENOUS CONTINUOUS
Status: ACTIVE | OUTPATIENT
Start: 2023-10-18 | End: 2023-10-19

## 2023-10-18 RX ADMIN — ENOXAPARIN SODIUM 40 MG: 40 INJECTION SUBCUTANEOUS at 04:10

## 2023-10-18 RX ADMIN — LACTULOSE 10 G: 10 SOLUTION ORAL at 08:10

## 2023-10-18 RX ADMIN — CEFTRIAXONE SODIUM 1 G: 1 INJECTION, POWDER, FOR SOLUTION INTRAMUSCULAR; INTRAVENOUS at 08:10

## 2023-10-18 RX ADMIN — BUSPIRONE HYDROCHLORIDE 5 MG: 5 TABLET ORAL at 08:10

## 2023-10-18 RX ADMIN — BUSPIRONE HYDROCHLORIDE 5 MG: 5 TABLET ORAL at 09:10

## 2023-10-18 RX ADMIN — RIFAXIMIN 550 MG: 550 TABLET ORAL at 08:10

## 2023-10-18 RX ADMIN — THIAMINE HCL TAB 100 MG 100 MG: 100 TAB at 09:10

## 2023-10-18 RX ADMIN — MIDODRINE HYDROCHLORIDE 2.5 MG: 2.5 TABLET ORAL at 12:10

## 2023-10-18 RX ADMIN — FOLIC ACID 1 MG: 1 TABLET ORAL at 09:10

## 2023-10-18 RX ADMIN — RIFAXIMIN 550 MG: 550 TABLET ORAL at 09:10

## 2023-10-18 RX ADMIN — SODIUM CHLORIDE: 9 INJECTION, SOLUTION INTRAVENOUS at 10:10

## 2023-10-18 RX ADMIN — THERA TABS 1 TABLET: TAB at 09:10

## 2023-10-18 RX ADMIN — MIDODRINE HYDROCHLORIDE 2.5 MG: 2.5 TABLET ORAL at 04:10

## 2023-10-18 RX ADMIN — SODIUM CHLORIDE: 9 INJECTION, SOLUTION INTRAVENOUS at 12:10

## 2023-10-18 RX ADMIN — LACTULOSE 10 G: 10 SOLUTION ORAL at 09:10

## 2023-10-18 RX ADMIN — MIDODRINE HYDROCHLORIDE 2.5 MG: 2.5 TABLET ORAL at 08:10

## 2023-10-18 RX ADMIN — DOXYCYCLINE HYCLATE 100 MG: 100 TABLET, COATED ORAL at 09:10

## 2023-10-18 RX ADMIN — DOXYCYCLINE HYCLATE 100 MG: 100 TABLET, COATED ORAL at 08:10

## 2023-10-18 RX ADMIN — ONDANSETRON 4 MG: 2 INJECTION INTRAMUSCULAR; INTRAVENOUS at 12:10

## 2023-10-18 RX ADMIN — PANTOPRAZOLE SODIUM 40 MG: 40 TABLET, DELAYED RELEASE ORAL at 09:10

## 2023-10-18 NOTE — CONSULTS
Nephrology on call  Consults  Chief Complaint   Patient presents with    Abdominal Pain     AASi with generalized weakness, dec appetite, jaundice x2-3 days. Denies N/V/D.     HPI: 63-year-old female, consulted for hyponatremia, initially presented to the ER on 10/12/23  for further evaluation of severe weakness that started within the last week.  Patient reports she felt as though she was so weak she can not get out of bed, had a very poor appetite.  She denied fever chills, chest pain, diarrhea or vomiting.  She reports she was a prior heavy alcohol user since age 38 to age 63 and reports she is been in rehab for alcohol before, and her last drink was about 4 weeks ago.  She states a 5th would last her 2-3 days but she would drink daily.  Over the last couple of days she noticed her skin was yellow as well. She was admitted, underwent paracentesis.  Evaluated by pulmonology, effusion is small, recommended to continue antibiotics for pneumonia.  GI also following, ascitic fluid studies negative for SBP.  Initiated Lasix, Aldactone. Sodium on admit was 122 and has not really changed. Today it is 123.     Review of Systems   All other systems negative except for HPI    No past medical history on file.   No past surgical history on file.   No family history on file.     Social History     Socioeconomic History    Marital status: Significant Other        Review of patient's allergies indicates:   Allergen Reactions    Hydrocodone      Current Outpatient Medications   Medication Instructions    promethazine (PHENERGAN) 25 mg, Rectal, Every 6 hours PRN    promethazine (PHENERGAN) 25 mg, Oral, Every 6 hours PRN     Objective   VITAL SIGNS: 24 HR MIN & MAX LAST    Temp  Min: 97.5 °F (36.4 °C)  Max: 98.1 °F (36.7 °C)  97.7 °F (36.5 °C)        BP  Min: 99/62  Max: 123/75  113/70     Pulse  Min: 89  Max: 99  92     Resp  Min: 18  Max: 20  18    SpO2  Min: 91 %  Max: 94 %  (!) 93 %      Wt Readings from Last 3 Encounters:    10/13/23 66 kg (145 lb 8.1 oz)   12/12/22 65.8 kg (145 lb)       Intake/Output Summary (Last 24 hours) at 10/17/2023 1956  Last data filed at 10/17/2023 1513  Gross per 24 hour   Intake 360 ml   Output 202 ml   Net 158 ml     General:  Alert and oriented  Psychiatric:  Cooperative, Appropriate mood & affect.    Eye:  Within normal limits, Normal conjunctiva.    HENT:  Normocephalic, Oral mucosa is moist.    Respiratory: Bilaterally CTA, un-labored.    Cardiovascular:  Normal rate, Regular rhythm, No murmur, No edema.    Gastrointestinal:  Soft, mildly protuberant  Musculoskeletal:  Normal strength, No deformity.    Integumentary:  Warm, No rash. Mildy jaundiced    Recent Labs     10/17/23  0352 10/17/23  0805 10/17/23  1559   * 123* 123*   K 3.5  --   --    CHLORIDE 93*  --   --    CO2 24  --   --    BUN 8.4*  --   --    CREATININE 0.76  --   --    GLUCOSE 92  --   --    CALCIUM 7.0*  --   --    MG 1.80  --   --    ALBUMIN 1.4*  --   --       Recent Labs     10/15/23  0339 10/16/23  0213 10/17/23  0352   WBC 14.90* 14.54* 13.87*   HGB 10.7* 9.5* 9.4*    154 154          ASSESSMENT PLAN    New EtOH cirrhosis    HypoNa  PNA with effusion  ESLD  Anemia    Does not appear fluid overloaded  Agree with stopping SSRI  Will stop lasix  Repeat urine studies in AM        Omer Caballero M.D.  Nephrology

## 2023-10-18 NOTE — PROGRESS NOTES
[facility]  Hospital Medicine Progress Note        Chief Complaint:  Weakness and poor appetite    HPI:   63-year-old female with significant history of depression/anxiety and history of heavy alcohol abuse since age 38.    presented to the ED with complaints of severe generalized weakness x1 week with associated poor appetite.  Patient was hemodynamically stable except for borderline hypotension mild tachycardia in the ED.  Lab significant for leukocytosis, hyperbilirubinemia, low albumin and severe hyponatremia/hypokalemia.  Lactic acid was elevated.  Imaging revealed right pleural effusion, right lower lobe consolidation, cirrhotic liver with ascites and enteritis.  Patient received IV fluids in the ED.  Initiated on empiric antibiotics paid pulmonology, Gastroenterology consulted.  Admitted to hospitalist medicine service.     Interval Hx:   Patient resting in bed comfortably.  No overnight events reported.  No new complaints reported.  No family present at bedside.    Patient tells me that her last drink was 5 months ago but according to documentation is seems it may have been 4 weeks ago.  __________________________________________________________________________________________________________________________________  Objective/physical exam:  Vital signs have been personally reviewed by me   Neuro:  Awake, alert, oriented  Sitting in chair onside of the bed.      General: Appears comfortable, no acute distress.  Integumentary: Warm, dry, intact.  Musculoskeletal: Purposeful movement noted.   Respiratory: No accessory muscle use. Breath sounds are equal.  Cardiovascular: Regular rate. No peripheral edema.    VITAL SIGNS: 24 HRS MIN & MAX LAST   Temp  Min: 97.5 °F (36.4 °C)  Max: 98.2 °F (36.8 °C) 97.9 °F (36.6 °C)   BP  Min: 104/69  Max: 117/73 115/72   Pulse  Min: 89  Max: 94  94   Resp  Min: 17  Max: 20 18   SpO2  Min: 93 %  Max: 96 % (!) 94 %     US Paracentesis inc  Imaging  Narrative: EXAMINATION:  US GUIDED PARACENTESIS INC IMAGING    CLINICAL HISTORY:  new ascites;    Attending: Carlos Enrique Irwin M.D.    Anesthesia: Lidocaine utilized for local anesthesia.    TECHNIQUE:  After the risks, benefits and alternatives were discussed, consent was obtained. A time out was performed to verify the patient's identity and procedure.    The patient was placed supine. Limited ultrasound the abdomen demonstrated ascitic fluid in the right lower quadrant. Static image was obtained. The lower quadrant was cleaned prepped in normal sterile fashion. Subcutaneous tissues were anesthetized with lidocaine solution. The Yueh needle was advanced into the abdominal cavity. Placement was confirmed by return of ascitic fluid. The catheter was advanced and a total of 0.75 liters of clear yellow fluid was aspirated. The patient tolerated the procedure well. There were no immediate complications.    Estimated blood loss: None  Impression: Successful paracentesis.    Electronically signed by: Carlos Enrique Irwin  Date:    10/13/2023  Time:    12:19  US Liver with Doppler (xpd)  Narrative: EXAMINATION:  US LIVER WITH DOPPLER    CLINICAL HISTORY:  Cirrhosis evaluation, Evaluation for hepatic, portal and splenic veins patency;    TECHNIQUE:  Right upper quadrant abdominal ultrasound (including pancreas, aorta, liver, gallbladder, common bile duct, IVC, right kidney, and spleen) was performed.  Spectral Doppler evaluation performed.    COMPARISON:  CT abdomen pelvis 10/12/2023    FINDINGS:  LIMITATIONS: Exam limited due to poor acoustic window related to shadowing bowel gas and/or body habitus.    LIVER: Liver measures 15 cm cranial caudal at the midclavicular line. The liver is echogenic.  Nodular surface contour.  No discrete mass appreciable by sonographic evaluation.    PANCREAS: Obscured by overlying bowel gas.    GALLBLADDER: Not imaged    BILE DUCTS: Common bile duct not imaged.    RIGHT KIDNEY: Not  imaged    SPLEEN: 8 cm.  Normal in size with homogeneous echotexture.    OTHER: There is ascites and bilateral pleural effusions.    DOPPLER EVALUATION:    AORTA: Not imaged    HEPATIC ARTERY: Patent. Peak systolic velocity 173 cm/s.  Normal waveform.    INFERIOR VENA CAVA: Not imaged.  IVC is patent on preceding CT exam.    PORTAL VEINS: Portal vein is patent.  Hepatofugal flow.  Note on CT exam there were recanalized periumbilical veins, gastric varices and a large spleno renal shunt.    SPLENIC VEIN: Patent with appropriate directional flow.    HEPATIC VEINS: Unable to visualize.  Note on CT exam the right and left hepatic veins appeared compressed but patent.  Left hepatic vein was not well visualized.  This can be seen related to fibrotic changes of cirrhosis.  Impression: Cirrhotic morphology of the liver    Changes of portal hypertension with reversed flow at the portal vein.  There were portosystemic collaterals on preceding CT exam    Small pleural effusions and abdominal ascites    Electronically signed by: Lorna Maxwell  Date:    10/13/2023  Time:    10:26    Recent Labs   Lab 10/16/23  0213 10/17/23  0352 10/18/23  0438   WBC 14.54* 13.87* 15.04*   RBC 3.21* 3.25* 3.44*   HGB 9.5* 9.4* 10.3*   HCT 26.7* 27.3* 29.2*   MCV 83.2 84.0 84.9   MCH 29.6 28.9 29.9   MCHC 35.6 34.4 35.3   RDW 19.6* 20.6* 20.1*    154 147   MPV 8.3 8.6 8.6       Recent Labs   Lab 10/12/23  1735 10/13/23  0359 10/16/23  0213 10/16/23  0845 10/17/23  0352 10/17/23  0805 10/17/23  2357 10/18/23  0438 10/18/23  0745   *   < > 123*   < > 123*   < > 124* 122* 121*   K 3.0*   < > 3.8  --  3.5  --   --  4.3  --    CO2 23   < > 22*  --  24  --   --  24  --    BUN 9.6*   < > 10.1  --  8.4*  --   --  9.0*  --    CREATININE 0.79   < > 0.77  --  0.76  --   --  0.82  --    CALCIUM 7.4*   < > 6.9*  --  7.0*  --   --  7.2*  --    MG 1.80  --   --   --  1.80  --   --   --   --    ALBUMIN 1.7*   < > 1.5*  --  1.4*  --   --  1.5*  --     ALKPHOS 180*   < > 161*  --  160*  --   --  167*  --    ALT 36   < > 33  --  34  --   --  34  --    *   < > 102*  --  109*  --   --  112*  --    BILITOT 11.6*   < > 7.8*  --  8.0*  --   --  8.0*  --     < > = values in this interval not displayed.          Microbiology Results (last 7 days)       Procedure Component Value Units Date/Time    Body Fluid Culture [[id number]] Collected: 10/13/23 0955    Order Status: Completed Specimen: Peritoneal Fluid from Ascitic Fluid Updated: 10/18/23 0922     Body Fluid Culture Final Report: At 5 days. No growth    Blood culture #1 **CANNOT BE ORDERED STAT** [[id number]]  (Normal) Collected: 10/12/23 1735    Order Status: Completed Specimen: Blood Updated: 10/17/23 2001     CULTURE, BLOOD ([facility]) No Growth at 5 days    Blood culture #2 **CANNOT BE ORDERED STAT** [[id number]]  (Normal) Collected: 10/12/23 1735    Order Status: Completed Specimen: Blood Updated: 10/17/23 2001     CULTURE, BLOOD ([facility]) No Growth at 5 days    Gram Stain [[id number]] Collected: 10/13/23 0955    Order Status: Completed Specimen: Peritoneal Fluid Updated: 10/13/23 1523     GRAM STAIN No WBCs, No bacteria seen             See below for Radiology    Scheduled Med:   busPIRone  5 mg Oral BID    cefTRIAXone (ROCEPHIN) IVPB  1 g Intravenous Q24H    doxycycline  100 mg Oral Q12H    enoxparin  40 mg Subcutaneous Daily    folic acid  1 mg Oral Daily    lactulose 10 gram/15 ml  10 g Oral BID    midodrine  2.5 mg Oral TID WM    multivitamin  1 tablet Oral Daily    pantoprazole  40 mg Oral Daily    rifAXIMin  550 mg Oral BID    thiamine  100 mg Oral Daily        Continuous Infusions:   sodium chloride 0.9% 100 mL/hr at 10/18/23 1228        PRN Meds:  melatonin, methocarbamoL, ondansetron, sodium chloride 0.9%     ______________________________________________________________________________________________________________________    Assessment/Plan:  Acute hyponatremia   Pneumonia with pleural effusions    End-stage liver disease-MELD score of 30  Chronic anemia  _______________________________________________________________________________________________________________________________  Presents with generalized weakness and poor appetite.  Noted to be hyponatremic appears hypovolemic.    Plan for initiation of normal saline at 100 mL/hour, will continue to monitor volume status and sodium response.    Continue PT/OT and diet as tolerated  Continue supportive care  Continue checking vital signs q4hrs.     Nurse notified to page me if any changes occur   DVT prophylaxis initiated     Consults:  Nephrologist, gastroenterologist    I have personally reviewed the specialist documentation and/or have spoken to the specialist with regard to the care of this patient; recommendations are noted above.     I have spent >30 minutes on the day of the visit; time spent includes face to face time and non-face to face time preparing to see the patient (eg, review of tests), independently reviewing and interpreting medical records, both past and current; documenting clinical information in the electronic or other health record, and communicating results to the patient/family/caregiver and care coordinator and nursing team.    Anticipated discharge and Disposition when medically stable:      All diagnosis and differential diagnosis have been reviewed,  interpreted and communicated appropriately to care team. assessment and plan has been documented; I have personally reviewed the labs and test results that are presently available and pertinent to this hospital course; I have reviewed medical records based upon their availability.    All of the patient's questions have been  addressed and answered. Patient's is agreeable to the above stated plan.   I will continue to monitor closely and make adjustments to medical management as needed.    Gaby Tabares,    10/18/2023      This note was created with the assistance of [employer]  voice recognition software. There may be transcription errors as a result of using this technology however minimal. Effort has been made to assure accuracy of transcription but any obvious errors or omissions should be clarified with the author of the document.

## 2023-10-18 NOTE — PT/OT/SLP PROGRESS
Physical Therapy Treatment    Patient Name:  Lauren Ewing   MRN:  [id number]    Recommendations:     Discharge therapy intensity: high intensity   Discharge Equipment Recommendations: walker, rolling (Will continue to assess)  Barriers to discharge: Impaired mobility    Assessment:     Lauren Ewing is a 63 y.o. female admitted with a medical diagnosis of Hyponatremia.  She presents with the following impairments/functional limitations: weakness, impaired endurance, impaired self care skills, impaired functional mobility, gait instability, impaired balance, decreased safety awareness, decreased lower extremity function, decreased upper extremity function, pain.    Rehab Prognosis: Good; patient would benefit from acute skilled PT services to address these deficits and reach maximum level of function.    Recent Surgery: * No surgery found *      Plan:     During this hospitalization, patient to be seen 5 x/week to address the identified rehab impairments via gait training, therapeutic activities, therapeutic exercises, neuromuscular re-education and progress toward the following goals:    Plan of Care Expires:  11/14/23    Subjective     Chief Complaint: 'i'm nauseous'  Patient/Family Comments/goals:   Pain/Comfort:         Objective:     Communicated with nursing prior to session.  Patient found HOB elevated with peripheral IV, telemetry upon PT entry to room.     General Precautions: Standard, fall  Orthopedic Precautions: N/A  Braces: N/A  Respiratory Status: Room air  Blood Pressure:   Skin Integrity:     Functional Mobility:  Bed Mobility:     Supine to Sit: stand by assistance  Transfers:     Sit to Stand:  contact guard assistance with rolling walker  Gait: ~90' w/RW, CGA  Pt. With groans & stating she needs to turn around but no c/o pain    Therapeutic Activities/Exercises:  Pt. Amb from hensley to bathroom, w/RW, CGA  Seated trail ~3 mins, self pericare, SBA    Education:  Patient provided with verbal education  education regarding importance of functional mobility.  Understanding was verbalized.     Patient left up in chair with all lines intact and call button in reach..    GOALS:   Multidisciplinary Problems       Physical Therapy Goals          Problem: Physical Therapy    Goal Priority Disciplines Outcome Goal Variances Interventions   Physical Therapy Goal     PT, PT/OT Ongoing, Progressing     Description: Goals to be met by: 23     Patient will increase functional independence with mobility by performin. Supine to sit with Barron  2. Sit to supine with Barron  3. Sit to stand transfer with Modified Barron  4. Gait  x 300 feet with Modified Barron using Rolling Walker (if needed).                          Time Tracking:     PT Received On: 10/18/23  PT Start Time: 1130     PT Stop Time: 1153  PT Total Time (min): 23 min     Billable Minutes: Gait Training 10 and Therapeutic Activity 13    Treatment Type: Treatment  PT/PTA: PTA     Number of PTA visits since last PT visit: 3     10/18/2023

## 2023-10-18 NOTE — PROGRESS NOTES
Renal    HPI: 63-year-old female, consulted for hyponatremia, initially presented to the ER on 10/12/23  for further evaluation of severe weakness that started within the last week.  Patient reports she felt as though she was so weak she can not get out of bed, had a very poor appetite.  She denied fever chills, chest pain, diarrhea or vomiting.  She reports she was a prior heavy alcohol user since age 38 to age 63 and reports she is been in rehab for alcohol before, and her last drink was about 4 weeks ago.  She states a 5th would last her 2-3 days but she would drink daily.  Over the last couple of days she noticed her skin was yellow as well. She was admitted, underwent paracentesis.  Evaluated by pulmonology, effusion is small, recommended to continue antibiotics for pneumonia.  GI also following, ascitic fluid studies negative for SBP.  Initiated Lasix, Aldactone. Sodium on admit was 122 and has not really changed. Today it is 123.     Chief complaint:   A little nausea    Interval History:  Stopped lasix yesterday no issues overnight    ROS:  all else Neg     busPIRone  5 mg Oral BID    cefTRIAXone (ROCEPHIN) IVPB  1 g Intravenous Q24H    doxycycline  100 mg Oral Q12H    enoxparin  40 mg Subcutaneous Daily    folic acid  1 mg Oral Daily    lactulose 10 gram/15 ml  10 g Oral BID    midodrine  2.5 mg Oral TID WM    multivitamin  1 tablet Oral Daily    pantoprazole  40 mg Oral Daily    rifAXIMin  550 mg Oral BID    thiamine  100 mg Oral Daily       VITAL SIGNS: 24 HR MIN & MAX LAST    Temp  Min: 97.5 °F (36.4 °C)  Max: 98.2 °F (36.8 °C)  98 °F (36.7 °C)        BP  Min: 101/59  Max: 117/73  109/72     Pulse  Min: 89  Max: 99  89     Resp  Min: 17  Max: 20  20    SpO2  Min: 91 %  Max: 96 %  96 %      Wt Readings from Last 3 Encounters:   10/13/23 66 kg (145 lb 8.1 oz)   12/12/22 65.8 kg (145 lb)       Intake/Output Summary (Last 24 hours) at 10/18/2023 1109  Last data filed at 10/18/2023 0554  Gross per 24 hour    Intake 360 ml   Output 100 ml   Net 260 ml     A&O x 4, ill appearing  EOMI, janudice  (B) symmetrical  Unlabored  Small ascites, TTP RLQ  Edema none    Recent Labs     10/17/23  0352 10/17/23  0805 10/17/23  2357 10/18/23  0438 10/18/23  0745   *   < > 124* 122* 121*   K 3.5  --   --  4.3  --    CHLORIDE 93*  --   --  94*  --    CO2 24  --   --  24  --    BUN 8.4*  --   --  9.0*  --    CREATININE 0.76  --   --  0.82  --    GLUCOSE 92  --   --  85  --    CALCIUM 7.0*  --   --  7.2*  --    MG 1.80  --   --   --   --    ALBUMIN 1.4*  --   --  1.5*  --     < > = values in this interval not displayed.      Recent Labs     10/16/23  0213 10/17/23  0352 10/18/23  0438   WBC 14.54* 13.87* 15.04*   HGB 9.5* 9.4* 10.3*   HCT 26.7* 27.3* 29.2*    154 147       ASSESSMENT / PLAN    New EtOH cirrhosis     HypoNa - worse, Ur c/w decr ECF, not clinically overloaded  PNA with effusion  ESLD  Anemia     Start NS @  100/hr x 20 hrs  Check TSH        Omer Caballero M.D.  Nephrology

## 2023-10-19 LAB
ALBUMIN SERPL-MCNC: 1.4 G/DL (ref 3.4–4.8)
BUN SERPL-MCNC: 8.9 MG/DL (ref 9.8–20.1)
CALCIUM SERPL-MCNC: 7.1 MG/DL (ref 8.4–10.2)
CHLORIDE SERPL-SCNC: 95 MMOL/L (ref 98–107)
CO2 SERPL-SCNC: 25 MMOL/L (ref 23–31)
CREAT SERPL-MCNC: 0.76 MG/DL (ref 0.55–1.02)
GFR SERPLBLD CREATININE-BSD FMLA CKD-EPI: >60 MLS/MIN/1.73/M2
GLUCOSE SERPL-MCNC: 86 MG/DL (ref 82–115)
INR PPP: 2.6
MAGNESIUM SERPL-MCNC: 1.7 MG/DL (ref 1.6–2.6)
PHOSPHATE SERPL-MCNC: 2.9 MG/DL (ref 2.3–4.7)
POTASSIUM SERPL-SCNC: 3.9 MMOL/L (ref 3.5–5.1)
PROTHROMBIN TIME: 27.8 SECONDS (ref 12.5–14.5)
SODIUM SERPL-SCNC: 124 MMOL/L (ref 136–145)
SODIUM SERPL-SCNC: 124 MMOL/L (ref 136–145)
SODIUM SERPL-SCNC: 125 MMOL/L (ref 136–145)
T3FREE SERPL-MCNC: <1.5 PG/ML (ref 1.58–3.91)
T4 FREE SERPL-MCNC: 0.69 NG/DL (ref 0.7–1.48)

## 2023-10-19 PROCEDURE — 25000003 PHARM REV CODE 250: Performed by: INTERNAL MEDICINE

## 2023-10-19 PROCEDURE — 25000003 PHARM REV CODE 250: Performed by: NURSE PRACTITIONER

## 2023-10-19 PROCEDURE — 63600175 PHARM REV CODE 636 W HCPCS: Performed by: INTERNAL MEDICINE

## 2023-10-19 PROCEDURE — 63600175 PHARM REV CODE 636 W HCPCS: Performed by: NURSE PRACTITIONER

## 2023-10-19 PROCEDURE — 97116 GAIT TRAINING THERAPY: CPT | Mod: CQ

## 2023-10-19 PROCEDURE — 84295 ASSAY OF SERUM SODIUM: CPT | Performed by: INTERNAL MEDICINE

## 2023-10-19 PROCEDURE — 85610 PROTHROMBIN TIME: CPT | Performed by: INTERNAL MEDICINE

## 2023-10-19 PROCEDURE — 84481 FREE ASSAY (FT-3): CPT | Performed by: STUDENT IN AN ORGANIZED HEALTH CARE EDUCATION/TRAINING PROGRAM

## 2023-10-19 PROCEDURE — 25000003 PHARM REV CODE 250: Performed by: STUDENT IN AN ORGANIZED HEALTH CARE EDUCATION/TRAINING PROGRAM

## 2023-10-19 PROCEDURE — 21400001 HC TELEMETRY ROOM

## 2023-10-19 PROCEDURE — 80069 RENAL FUNCTION PANEL: CPT | Performed by: INTERNAL MEDICINE

## 2023-10-19 PROCEDURE — 83735 ASSAY OF MAGNESIUM: CPT | Performed by: INTERNAL MEDICINE

## 2023-10-19 PROCEDURE — 84439 ASSAY OF FREE THYROXINE: CPT | Performed by: STUDENT IN AN ORGANIZED HEALTH CARE EDUCATION/TRAINING PROGRAM

## 2023-10-19 RX ORDER — LEVOTHYROXINE SODIUM 100 UG/1
100 TABLET ORAL
Status: DISCONTINUED | OUTPATIENT
Start: 2023-10-19 | End: 2023-10-26 | Stop reason: HOSPADM

## 2023-10-19 RX ORDER — LEVOTHYROXINE SODIUM 100 UG/1
100 TABLET ORAL
Status: DISCONTINUED | OUTPATIENT
Start: 2023-10-20 | End: 2023-10-19

## 2023-10-19 RX ADMIN — MIDODRINE HYDROCHLORIDE 2.5 MG: 2.5 TABLET ORAL at 10:10

## 2023-10-19 RX ADMIN — FOLIC ACID 1 MG: 1 TABLET ORAL at 10:10

## 2023-10-19 RX ADMIN — PANTOPRAZOLE SODIUM 40 MG: 40 TABLET, DELAYED RELEASE ORAL at 10:10

## 2023-10-19 RX ADMIN — MIDODRINE HYDROCHLORIDE 2.5 MG: 2.5 TABLET ORAL at 11:10

## 2023-10-19 RX ADMIN — Medication 6 MG: at 09:10

## 2023-10-19 RX ADMIN — MIDODRINE HYDROCHLORIDE 2.5 MG: 2.5 TABLET ORAL at 05:10

## 2023-10-19 RX ADMIN — DOXYCYCLINE HYCLATE 100 MG: 100 TABLET, COATED ORAL at 09:10

## 2023-10-19 RX ADMIN — ONDANSETRON 4 MG: 2 INJECTION INTRAMUSCULAR; INTRAVENOUS at 10:10

## 2023-10-19 RX ADMIN — RIFAXIMIN 550 MG: 550 TABLET ORAL at 09:10

## 2023-10-19 RX ADMIN — RIFAXIMIN 550 MG: 550 TABLET ORAL at 10:10

## 2023-10-19 RX ADMIN — DOXYCYCLINE HYCLATE 100 MG: 100 TABLET, COATED ORAL at 10:10

## 2023-10-19 RX ADMIN — THIAMINE HCL TAB 100 MG 100 MG: 100 TAB at 10:10

## 2023-10-19 RX ADMIN — LACTULOSE 10 G: 10 SOLUTION ORAL at 10:10

## 2023-10-19 RX ADMIN — BUSPIRONE HYDROCHLORIDE 5 MG: 5 TABLET ORAL at 09:10

## 2023-10-19 RX ADMIN — BUSPIRONE HYDROCHLORIDE 5 MG: 5 TABLET ORAL at 10:10

## 2023-10-19 RX ADMIN — THERA TABS 1 TABLET: TAB at 10:10

## 2023-10-19 RX ADMIN — LEVOTHYROXINE SODIUM 100 MCG: 100 TABLET ORAL at 09:10

## 2023-10-19 RX ADMIN — ENOXAPARIN SODIUM 40 MG: 40 INJECTION SUBCUTANEOUS at 05:10

## 2023-10-19 RX ADMIN — ONDANSETRON 4 MG: 2 INJECTION INTRAMUSCULAR; INTRAVENOUS at 12:10

## 2023-10-19 NOTE — PROGRESS NOTES
Inpatient Nutrition Assessment    Admit Date: 10/12/2023   Total duration of encounter: 7 days     Nutrition Recommendation/Prescription     Continue oral diet as tolerated.  Discontinue Boost Very High Calorie (provides 530 kcal, 22 g protein per serving) per patient request.    Communication of Recommendations: reviewed with patient    Nutrition Assessment     Chart Review    Reason Seen: continuous nutrition monitoring, malnutrition screening tool (MST), and follow-up    Malnutrition Screening Tool Results   Have you recently lost weight without trying?: Yes: 14-23 lbs  Have you been eating poorly because of a decreased appetite?: Yes   MST Score: 3   Diagnosis:  Acute hyponatremia   Pneumonia with pleural effusions   End-stage liver disease-MELD score of 30  Chronic anemia    Relevant Medical History: depression/anxiety, and alcohol abuse     Nutrition-Related Medications: folic acid, lactulose, multivitamin, pantoprazole, thiamin  Calorie Containing IV Medications: no significant kcals from medications at this time    Recent Labs   Lab 10/12/23  1735 10/13/23  0359 10/14/23  0602 10/15/23  0339 10/15/23  1709 10/16/23  0213 10/16/23  0845 10/17/23  0352 10/17/23  0805 10/17/23  2357 10/18/23  0438 10/18/23  0745 10/18/23  1700 10/19/23  0012 10/19/23  0449 10/19/23  0900   * 123* 125* 123*   < > 123*   < > 123*   < > 124* 122* 121* 124* 124* 125* 124*   K 3.0* 3.5 3.3* 3.1*  --  3.8  --  3.5  --   --  4.3  --   --   --  3.9  --    CALCIUM 7.4* 7.1* 7.3* 7.1*  --  6.9*  --  7.0*  --   --  7.2*  --   --   --  7.1*  --    PHOS  --   --   --   --   --   --   --   --   --   --   --   --   --   --  2.9  --    MG 1.80  --   --   --   --   --   --  1.80  --   --   --   --   --   --  1.70  --    CHLORIDE 88* 92* 91* 92*  --  94*  --  93*  --   --  94*  --   --   --  95*  --    CO2 23 20* 25 25  --  22*  --  24  --   --  24  --   --   --  25  --    BUN 9.6* 8.5* 9.1* 10.3  --  10.1  --  8.4*  --   --  9.0*  --   --    --  8.9*  --    CREATININE 0.79 0.73 0.79 0.78  --  0.77  --  0.76  --   --  0.82  --   --   --  0.76  --    EGFRNORACEVR >60 >60 >60 >60  --  >60  --  >60  --   --  >60  --   --   --  >60  --    GLUCOSE 101 125* 132* 111  --  97  --  92  --   --  85  --   --   --  86  --    BILITOT 11.6* 10.8* 9.7* 8.7*  --  7.8*  --  8.0*  --   --  8.0*  --   --   --   --   --    ALKPHOS 180* 170* 181* 169*  --  161*  --  160*  --   --  167*  --   --   --   --   --    ALT 36 32 34 35  --  33  --  34  --   --  34  --   --   --   --   --    * 114* 110* 106*  --  102*  --  109*  --   --  112*  --   --   --   --   --    ALBUMIN 1.7* 1.5* 1.6* 1.6*  --  1.5*  --  1.4*  --   --  1.5*  --   --   --  1.4*  --    HGB 11.4* 11.1* 10.5* 10.7*  --  9.5*  --  9.4*  --   --  10.3*  --   --   --   --   --    HCT 31.8* 31.6* 29.7* 30.5*  --  26.7*  --  27.3*  --   --  29.2*  --   --   --   --   --     < > = values in this interval not displayed.       Dietary Orders (From admission, onward)       Start     Ordered    10/16/23 0835  Diet Adult Regular Fluid - 1500mL  Diet effective now        Question:  Fluid restriction:  Answer:  Fluid - 1500mL    10/16/23 0835    10/13/23 1520  Dietary nutrition supplements Boost Very High Calorie Nutritional Drink - Strawberry; BID  Continuous        Question Answer Comment   Select PO Supplement: Boost Very High Calorie Nutritional Drink - Strawberry    Frequency: BID        10/13/23 151                Appetite/Oral Intake: good/50-75% of meals  Factors Affecting Nutritional Intake: none identified  Food/Adventist/Cultural Preferences: none reported  Food Allergies: none reported  Wound(s): no pressure injuries documented at this time  Last Bowel Movement: 10/18/23    Comments    10/13/23 Patient reports good appetite, current weight above reported usual weight, likely due to fluid accumulation, agreeable to strawberry oral supplement.    10/19/23 Patient reports a good appetite, fair intake which  "is normal for her, denies drinking Boost and would like it discontinued.    Anthropometrics    Height: 5' 4" (162.6 cm), Height Method: Stated  Last Weight: 66 kg (145 lb 8.1 oz) (10/13/23 0026), Weight Method: Bed Scale  BMI (Calculated): 25  BMI Classification: overweight (BMI 25-29.9)     Ideal Body Weight (IBW), Female: 120 lb     % Ideal Body Weight, Female (lb): 121.26 %                    Usual Body Weight (UBW), k.97 kg-63.5 kg  % Usual Body Weight: 112.16     Usual Weight Provided By: patient    Wt Readings from Last 5 Encounters:   10/13/23 66 kg (145 lb 8.1 oz)   22 65.8 kg (145 lb)     Weight Change(s) Since Admission: admission weight stated, +5 kg increase, ascites noted  Wt Readings from Last 1 Encounters:   10/13/23 0026 66 kg (145 lb 8.1 oz)   10/12/23 1815 61.2 kg (135 lb)   Admit Weight: 61.2 kg (135 lb) (10/12/23 1815), Weight Method: Stated    Monitoring & Evaluation     Dietitian will monitor food and beverage intake.  Nutrition Risk/Follow-Up: low (follow-up in 5-7 days)   Please consult if re-assessment needed sooner.    "

## 2023-10-19 NOTE — PROGRESS NOTES
Renal    HPI: 63-year-old female, consulted for hyponatremia, initially presented to the ER on 10/12/23  for further evaluation of severe weakness that started within the last week.  Patient reports she felt as though she was so weak she can not get out of bed, had a very poor appetite.  She denied fever chills, chest pain, diarrhea or vomiting.  She reports she was a prior heavy alcohol user since age 38 to age 63 and reports she is been in rehab for alcohol before, and her last drink was about 4 weeks ago.  She states a 5th would last her 2-3 days but she would drink daily.  Over the last couple of days she noticed her skin was yellow as well. She was admitted, underwent paracentesis.  Evaluated by pulmonology, effusion is small, recommended to continue antibiotics for pneumonia.  GI also following, ascitic fluid studies negative for SBP.  Initiated Lasix, Aldactone. Sodium on admit was 122 and has not really changed. Today it is 123.     Chief complaint:   Nauseated    Interval History:  Got 2 L NS yesterday w/o issue. Having nausea that causes SOB    ROS:  all else Neg     busPIRone  5 mg Oral BID    doxycycline  100 mg Oral Q12H    enoxparin  40 mg Subcutaneous Daily    folic acid  1 mg Oral Daily    lactulose 10 gram/15 ml  10 g Oral BID    levothyroxine  100 mcg Oral Before breakfast    midodrine  2.5 mg Oral TID WM    multivitamin  1 tablet Oral Daily    pantoprazole  40 mg Oral Daily    rifAXIMin  550 mg Oral BID    thiamine  100 mg Oral Daily       VITAL SIGNS: 24 HR MIN & MAX LAST    Temp  Min: 97.9 °F (36.6 °C)  Max: 98.5 °F (36.9 °C)  98.1 °F (36.7 °C)        BP  Min: 105/68  Max: 115/72  105/68     Pulse  Min: 91  Max: 103  103     Resp  Min: 17  Max: 18  18    SpO2  Min: 92 %  Max: 97 %  (!) 92 %      Wt Readings from Last 3 Encounters:   10/13/23 66 kg (145 lb 8.1 oz)   12/12/22 65.8 kg (145 lb)     No intake or output data in the 24 hours ending 10/19/23 1102    A&O x 4, ill appearing  EOMI,  janudice  CTAB  Tachy  Small ascites  Edema none    Recent Labs     10/19/23  0012 10/19/23  0449 10/19/23  0900   * 125* 124*   K  --  3.9  --    CHLORIDE  --  95*  --    CO2  --  25  --    BUN  --  8.9*  --    CREATININE  --  0.76  --    GLUCOSE  --  86  --    CALCIUM  --  7.1*  --    MG  --  1.70  --    PHOS  --  2.9  --    ALBUMIN  --  1.4*  --       Recent Labs     10/17/23  0352 10/18/23  0438   WBC 13.87* 15.04*   HGB 9.4* 10.3*   HCT 27.3* 29.2*    147       ASSESSMENT / PLAN    New EtOH cirrhosis     HypoNa - worse, Ur c/w decr ECF, not clinically overloaded  PNA with effusion  ESLD  Anemia  New hypothyroid      Ur Na low, got 2 L NS over 24 hrs and sodium stable/ slightly improved.  No IVF today  TSH high, T3 and T4 low  Start synthroid 100 and monitor sodium          Omer Caballero M.D.  Nephrology

## 2023-10-19 NOTE — PROGRESS NOTES
[facility]  Hospital Medicine Progress Note      Chief Complaint:  Weakness and poor appetite    HPI:   63-year-old female with significant history of depression/anxiety and history of heavy alcohol abuse since age 38.    presented to the ED with complaints of severe generalized weakness x1 week with associated poor appetite.  Patient was hemodynamically stable except for borderline hypotension mild tachycardia in the ED.  Lab significant for leukocytosis, hyperbilirubinemia, low albumin and severe hyponatremia/hypokalemia.  Lactic acid was elevated.  Imaging revealed right pleural effusion, right lower lobe consolidation, cirrhotic liver with ascites and enteritis.  Patient received IV fluids in the ED.  Initiated on empiric antibiotics paid pulmonology, Gastroenterology consulted.  Admitted to hospitalist medicine service.     Interval Hx:     Patient tells me that her last drink was 5 months ago.  No new changes overnight.  Patient tolerating her meals no nausea no vomiting reported.  __________________________________________________________________________________________________________________________________  Objective/physical exam:  Vital signs have been personally reviewed by me   Neuro:  Awake, alert, oriented  Resting comfortably in bed.  Hand tremor present with extension    General: Appears comfortable, no acute distress.  Integumentary: Warm, dry, intact.  Musculoskeletal: Purposeful movement noted.   Respiratory: No accessory muscle use. Breath sounds are equal.  Cardiovascular: Regular rate. No peripheral edema.    VITAL SIGNS: 24 HRS MIN & MAX LAST   Temp  Min: 97.9 °F (36.6 °C)  Max: 98.5 °F (36.9 °C) 98.1 °F (36.7 °C)   BP  Min: 105/68  Max: 115/72 105/68   Pulse  Min: 91  Max: 103  103   Resp  Min: 17  Max: 18 18   SpO2  Min: 92 %  Max: 97 % (!) 92 %     US Paracentesis inc Imaging  Narrative: EXAMINATION:  US GUIDED PARACENTESIS INC IMAGING    CLINICAL  HISTORY:  new ascites;    Attending: Carlos Enrique Irwin M.D.    Anesthesia: Lidocaine utilized for local anesthesia.    TECHNIQUE:  After the risks, benefits and alternatives were discussed, consent was obtained. A time out was performed to verify the patient's identity and procedure.    The patient was placed supine. Limited ultrasound the abdomen demonstrated ascitic fluid in the right lower quadrant. Static image was obtained. The lower quadrant was cleaned prepped in normal sterile fashion. Subcutaneous tissues were anesthetized with lidocaine solution. The Yueh needle was advanced into the abdominal cavity. Placement was confirmed by return of ascitic fluid. The catheter was advanced and a total of 0.75 liters of clear yellow fluid was aspirated. The patient tolerated the procedure well. There were no immediate complications.    Estimated blood loss: None  Impression: Successful paracentesis.    Electronically signed by: Carlos Enrique Irwin  Date:    10/13/2023  Time:    12:19  US Liver with Doppler (xpd)  Narrative: EXAMINATION:  US LIVER WITH DOPPLER    CLINICAL HISTORY:  Cirrhosis evaluation, Evaluation for hepatic, portal and splenic veins patency;    TECHNIQUE:  Right upper quadrant abdominal ultrasound (including pancreas, aorta, liver, gallbladder, common bile duct, IVC, right kidney, and spleen) was performed.  Spectral Doppler evaluation performed.    COMPARISON:  CT abdomen pelvis 10/12/2023    FINDINGS:  LIMITATIONS: Exam limited due to poor acoustic window related to shadowing bowel gas and/or body habitus.    LIVER: Liver measures 15 cm cranial caudal at the midclavicular line. The liver is echogenic.  Nodular surface contour.  No discrete mass appreciable by sonographic evaluation.    PANCREAS: Obscured by overlying bowel gas.    GALLBLADDER: Not imaged    BILE DUCTS: Common bile duct not imaged.    RIGHT KIDNEY: Not imaged    SPLEEN: 8 cm.  Normal in size with homogeneous echotexture.    OTHER: There is  ascites and bilateral pleural effusions.    DOPPLER EVALUATION:    AORTA: Not imaged    HEPATIC ARTERY: Patent. Peak systolic velocity 173 cm/s.  Normal waveform.    INFERIOR VENA CAVA: Not imaged.  IVC is patent on preceding CT exam.    PORTAL VEINS: Portal vein is patent.  Hepatofugal flow.  Note on CT exam there were recanalized periumbilical veins, gastric varices and a large spleno renal shunt.    SPLENIC VEIN: Patent with appropriate directional flow.    HEPATIC VEINS: Unable to visualize.  Note on CT exam the right and left hepatic veins appeared compressed but patent.  Left hepatic vein was not well visualized.  This can be seen related to fibrotic changes of cirrhosis.  Impression: Cirrhotic morphology of the liver    Changes of portal hypertension with reversed flow at the portal vein.  There were portosystemic collaterals on preceding CT exam    Small pleural effusions and abdominal ascites    Electronically signed by: Lorna Maxwell  Date:    10/13/2023  Time:    10:26    Recent Labs   Lab 10/16/23  0213 10/17/23  0352 10/18/23  0438   WBC 14.54* 13.87* 15.04*   RBC 3.21* 3.25* 3.44*   HGB 9.5* 9.4* 10.3*   HCT 26.7* 27.3* 29.2*   MCV 83.2 84.0 84.9   MCH 29.6 28.9 29.9   MCHC 35.6 34.4 35.3   RDW 19.6* 20.6* 20.1*    154 147   MPV 8.3 8.6 8.6       Recent Labs   Lab 10/12/23  1735 10/13/23  0359 10/16/23  0213 10/16/23  0845 10/17/23  0352 10/17/23  0805 10/18/23  0438 10/18/23  0745 10/19/23  0012 10/19/23  0449 10/19/23  0900   *   < > 123*   < > 123*   < > 122*   < > 124* 125* 124*   K 3.0*   < > 3.8  --  3.5  --  4.3  --   --  3.9  --    CO2 23   < > 22*  --  24  --  24  --   --  25  --    BUN 9.6*   < > 10.1  --  8.4*  --  9.0*  --   --  8.9*  --    CREATININE 0.79   < > 0.77  --  0.76  --  0.82  --   --  0.76  --    CALCIUM 7.4*   < > 6.9*  --  7.0*  --  7.2*  --   --  7.1*  --    MG 1.80  --   --   --  1.80  --   --   --   --  1.70  --    ALBUMIN 1.7*   < > 1.5*  --  1.4*  --  1.5*   --   --  1.4*  --    ALKPHOS 180*   < > 161*  --  160*  --  167*  --   --   --   --    ALT 36   < > 33  --  34  --  34  --   --   --   --    *   < > 102*  --  109*  --  112*  --   --   --   --    BILITOT 11.6*   < > 7.8*  --  8.0*  --  8.0*  --   --   --   --     < > = values in this interval not displayed.          Microbiology Results (last 7 days)       Procedure Component Value Units Date/Time    Body Fluid Culture [[id number]] Collected: 10/13/23 0955    Order Status: Completed Specimen: Peritoneal Fluid from Ascitic Fluid Updated: 10/18/23 0922     Body Fluid Culture Final Report: At 5 days. No growth    Blood culture #1 **CANNOT BE ORDERED STAT** [[id number]]  (Normal) Collected: 10/12/23 1735    Order Status: Completed Specimen: Blood Updated: 10/17/23 2001     CULTURE, BLOOD ([facility]) No Growth at 5 days    Blood culture #2 **CANNOT BE ORDERED STAT** [[id number]]  (Normal) Collected: 10/12/23 1735    Order Status: Completed Specimen: Blood Updated: 10/17/23 2001     CULTURE, BLOOD ([facility]) No Growth at 5 days    Gram Stain [[id number]] Collected: 10/13/23 0955    Order Status: Completed Specimen: Peritoneal Fluid Updated: 10/13/23 1523     GRAM STAIN No WBCs, No bacteria seen             See below for Radiology    Scheduled Med:   busPIRone  5 mg Oral BID    doxycycline  100 mg Oral Q12H    enoxparin  40 mg Subcutaneous Daily    folic acid  1 mg Oral Daily    lactulose 10 gram/15 ml  10 g Oral BID    levothyroxine  100 mcg Oral Before breakfast    midodrine  2.5 mg Oral TID WM    multivitamin  1 tablet Oral Daily    pantoprazole  40 mg Oral Daily    rifAXIMin  550 mg Oral BID    thiamine  100 mg Oral Daily          PRN Meds:  melatonin, methocarbamoL, ondansetron, sodium chloride 0.9%     ______________________________________________________________________________________________________________________    Assessment/Plan:  Acute hyponatremia   Pneumonia with pleural effusions   End-stage liver  disease-MELD score of 30  Chronic anemia  Acute hypothyroidism-diagnoses on this hospitalization  _______________________________________________________________________________________________________________________________  Presents with generalized weakness and poor appetite.  Noted to be hyponatremic appears hypovolemic.    Hyponatremia slightly improved-appropriate response to fluid therapy.  TSH/T3-T4 reviewed-indicative of hypothyroidism, initiated levothyroxine 100 mcg daily------ an empty stomach with water, ideally 30 to 60 minutes before breakfast     Continue PT/OT and diet as tolerated  Continue supportive care  Continue checking vital signs q4hrs.     Nurse notified to page me if any changes occur   DVT prophylaxis initiated     Consults:  Nephrologist, gastroenterologist    I have personally reviewed the specialist documentation and/or have spoken to the specialist with regard to the care of this patient; recommendations are noted above.     I have spent >30 minutes on the day of the visit; time spent includes face to face time and non-face to face time preparing to see the patient (eg, review of tests), independently reviewing and interpreting medical records, both past and current; documenting clinical information in the electronic or other health record, and communicating results to the patient/family/caregiver and care coordinator and nursing team.    Anticipated discharge and Disposition when medically stable:  Pending.     All diagnosis and differential diagnosis have been reviewed,  interpreted and communicated appropriately to care team. assessment and plan has been documented; I have personally reviewed the labs and test results that are presently available and pertinent to this hospital course; I have reviewed medical records based upon their availability.    All of the patient's questions have been  addressed and answered. Patient's is agreeable to the above stated plan.   I will continue  to monitor closely and make adjustments to medical management as needed.    Gaby Tabares, DO   10/19/2023      This note was created with the assistance of Dragon voice recognition software. There may be transcription errors as a result of using this technology however minimal. Effort has been made to assure accuracy of transcription but any obvious errors or omissions should be clarified with the author of the document.

## 2023-10-19 NOTE — PT/OT/SLP PROGRESS
Physical Therapy Treatment    Patient Name:  Lauren Ewing   MRN:  [id number]    Recommendations:     Discharge therapy intensity: high/mod intensity (pend progress)  Discharge Equipment Recommendations: walker, rolling (Will continue to assess)  Barriers to discharge: Impaired mobility    Assessment:     Lauren Ewing is a 63 y.o. female admitted with a medical diagnosis of Hyponatremia.  She presents with the following impairments/functional limitations: weakness, impaired endurance, impaired self care skills, impaired functional mobility, gait instability, impaired balance, decreased safety awareness, decreased lower extremity function, decreased upper extremity function, pain. Pt. With increased anxiety & 1 instance of crying, requiring moderate encouragement/comfort during tx.    Rehab Prognosis: Good; patient would benefit from acute skilled PT services to address these deficits and reach maximum level of function.    Recent Surgery: * No surgery found *      Plan:     During this hospitalization, patient to be seen 5 x/week to address the identified rehab impairments via gait training, therapeutic activities, therapeutic exercises, neuromuscular re-education and progress toward the following goals:    Plan of Care Expires:  11/14/23    Subjective     Chief Complaint: nauseous   Patient/Family Comments/goals:   Pain/Comfort:       Objective:     Communicated with nursing prior to session.  Patient found HOB elevated with peripheral IV, telemetry upon PT entry to room.     General Precautions: Standard, fall  Orthopedic Precautions: N/A  Braces: N/A  Respiratory Status: Room air  Blood Pressure:   Skin Integrity:     Functional Mobility:  Bed Mobility:     Supine to Sit: stand by assistance  Transfers:     Sit to Stand:  contact guard assistance with rolling walker  Bed to Chair: contact guard assistance with  rolling walker  using  Step Transfer  Gait: 30' x 2 w/RW, CGA  1 seated rest break  Noted slow miguel,  forward flexed posture    Patient left up in chair with all lines intact and call button in reach..    GOALS:   Multidisciplinary Problems       Physical Therapy Goals          Problem: Physical Therapy    Goal Priority Disciplines Outcome Goal Variances Interventions   Physical Therapy Goal     PT, PT/OT Ongoing, Progressing     Description: Goals to be met by: 23     Patient will increase functional independence with mobility by performin. Supine to sit with Lamar  2. Sit to supine with Lamar  3. Sit to stand transfer with Modified Lamar  4. Gait  x 300 feet with Modified Lamar using Rolling Walker (if needed).                          Time Tracking:     PT Received On: 10/19/23  PT Start Time: 1000     PT Stop Time: 1019  PT Total Time (min): 19 min     Billable Minutes: Gait Training 19    Treatment Type: Treatment  PT/PTA: PTA     Number of PTA visits since last PT visit: 4     10/19/2023

## 2023-10-19 NOTE — PT/OT/SLP PROGRESS
Attempted OT session this AM, pt declined to participate 2/2 nausea. Will f/u this PM as schedule permits.

## 2023-10-20 LAB
ANION GAP SERPL CALC-SCNC: 3 MEQ/L
BASOPHILS # BLD AUTO: 0.09 X10(3)/MCL
BASOPHILS NFR BLD AUTO: 0.7 %
BUN SERPL-MCNC: 8.7 MG/DL (ref 9.8–20.1)
CALCIUM SERPL-MCNC: 7.1 MG/DL (ref 8.4–10.2)
CHLORIDE SERPL-SCNC: 96 MMOL/L (ref 98–107)
CO2 SERPL-SCNC: 24 MMOL/L (ref 23–31)
CREAT SERPL-MCNC: 0.73 MG/DL (ref 0.55–1.02)
CREAT/UREA NIT SERPL: 12
EOSINOPHIL # BLD AUTO: 0.36 X10(3)/MCL (ref 0–0.9)
EOSINOPHIL NFR BLD AUTO: 2.7 %
ERYTHROCYTE [DISTWIDTH] IN BLOOD BY AUTOMATED COUNT: 20.6 % (ref 11.5–17)
GFR SERPLBLD CREATININE-BSD FMLA CKD-EPI: >60 MLS/MIN/1.73/M2
GLUCOSE SERPL-MCNC: 90 MG/DL (ref 82–115)
HCT VFR BLD AUTO: 29 % (ref 37–47)
HGB BLD-MCNC: 10 G/DL (ref 12–16)
IMM GRANULOCYTES # BLD AUTO: 0.23 X10(3)/MCL (ref 0–0.04)
IMM GRANULOCYTES NFR BLD AUTO: 1.7 %
INR PPP: 2.6
LIPASE SERPL-CCNC: 26 U/L
LYMPHOCYTES # BLD AUTO: 2.12 X10(3)/MCL (ref 0.6–4.6)
LYMPHOCYTES NFR BLD AUTO: 15.7 %
MAGNESIUM SERPL-MCNC: 1.7 MG/DL (ref 1.6–2.6)
MCH RBC QN AUTO: 29.8 PG (ref 27–31)
MCHC RBC AUTO-ENTMCNC: 34.5 G/DL (ref 33–36)
MCV RBC AUTO: 86.3 FL (ref 80–94)
MONOCYTES # BLD AUTO: 1.88 X10(3)/MCL (ref 0.1–1.3)
MONOCYTES NFR BLD AUTO: 13.9 %
NEUTROPHILS # BLD AUTO: 8.83 X10(3)/MCL (ref 2.1–9.2)
NEUTROPHILS NFR BLD AUTO: 65.3 %
NRBC BLD AUTO-RTO: 0.1 %
PHOSPHATE SERPL-MCNC: 3.2 MG/DL (ref 2.3–4.7)
PLATELET # BLD AUTO: 160 X10(3)/MCL (ref 130–400)
PMV BLD AUTO: 8.5 FL (ref 7.4–10.4)
POTASSIUM SERPL-SCNC: 3.8 MMOL/L (ref 3.5–5.1)
PROTHROMBIN TIME: 27.4 SECONDS (ref 12.5–14.5)
RBC # BLD AUTO: 3.36 X10(6)/MCL (ref 4.2–5.4)
SODIUM SERPL-SCNC: 123 MMOL/L (ref 136–145)
WBC # SPEC AUTO: 13.51 X10(3)/MCL (ref 4.5–11.5)

## 2023-10-20 PROCEDURE — 63600175 PHARM REV CODE 636 W HCPCS: Performed by: INTERNAL MEDICINE

## 2023-10-20 PROCEDURE — 85610 PROTHROMBIN TIME: CPT | Performed by: INTERNAL MEDICINE

## 2023-10-20 PROCEDURE — 25000003 PHARM REV CODE 250: Performed by: INTERNAL MEDICINE

## 2023-10-20 PROCEDURE — 21400001 HC TELEMETRY ROOM

## 2023-10-20 PROCEDURE — 85025 COMPLETE CBC W/AUTO DIFF WBC: CPT | Performed by: STUDENT IN AN ORGANIZED HEALTH CARE EDUCATION/TRAINING PROGRAM

## 2023-10-20 PROCEDURE — 83690 ASSAY OF LIPASE: CPT | Performed by: STUDENT IN AN ORGANIZED HEALTH CARE EDUCATION/TRAINING PROGRAM

## 2023-10-20 PROCEDURE — 83735 ASSAY OF MAGNESIUM: CPT | Performed by: STUDENT IN AN ORGANIZED HEALTH CARE EDUCATION/TRAINING PROGRAM

## 2023-10-20 PROCEDURE — 84100 ASSAY OF PHOSPHORUS: CPT | Performed by: STUDENT IN AN ORGANIZED HEALTH CARE EDUCATION/TRAINING PROGRAM

## 2023-10-20 PROCEDURE — 97530 THERAPEUTIC ACTIVITIES: CPT

## 2023-10-20 PROCEDURE — 80048 BASIC METABOLIC PNL TOTAL CA: CPT | Performed by: STUDENT IN AN ORGANIZED HEALTH CARE EDUCATION/TRAINING PROGRAM

## 2023-10-20 PROCEDURE — 25000003 PHARM REV CODE 250: Performed by: NURSE PRACTITIONER

## 2023-10-20 RX ADMIN — ENOXAPARIN SODIUM 40 MG: 40 INJECTION SUBCUTANEOUS at 04:10

## 2023-10-20 RX ADMIN — THERA TABS 1 TABLET: TAB at 09:10

## 2023-10-20 RX ADMIN — Medication 6 MG: at 08:10

## 2023-10-20 RX ADMIN — MIDODRINE HYDROCHLORIDE 2.5 MG: 2.5 TABLET ORAL at 12:10

## 2023-10-20 RX ADMIN — RIFAXIMIN 550 MG: 550 TABLET ORAL at 09:10

## 2023-10-20 RX ADMIN — BUSPIRONE HYDROCHLORIDE 5 MG: 5 TABLET ORAL at 08:10

## 2023-10-20 RX ADMIN — PANTOPRAZOLE SODIUM 40 MG: 40 TABLET, DELAYED RELEASE ORAL at 09:10

## 2023-10-20 RX ADMIN — FOLIC ACID 1 MG: 1 TABLET ORAL at 09:10

## 2023-10-20 RX ADMIN — MIDODRINE HYDROCHLORIDE 2.5 MG: 2.5 TABLET ORAL at 04:10

## 2023-10-20 RX ADMIN — RIFAXIMIN 550 MG: 550 TABLET ORAL at 08:10

## 2023-10-20 RX ADMIN — MIDODRINE HYDROCHLORIDE 2.5 MG: 2.5 TABLET ORAL at 09:10

## 2023-10-20 RX ADMIN — THIAMINE HCL TAB 100 MG 100 MG: 100 TAB at 09:10

## 2023-10-20 RX ADMIN — BUSPIRONE HYDROCHLORIDE 5 MG: 5 TABLET ORAL at 09:10

## 2023-10-20 NOTE — PROGRESS NOTES
"Gastroenterology Progress Note    Subjective/Interval History:  Awake.  No significant GI issues.     ROS:12 point system reviewed and is negative except as noted in HPI     Vital Signs:  BP (!) 100/58   Pulse 93   Temp 98.1 °F (36.7 °C) (Oral)   Resp 18   Ht 5' 4" (1.626 m)   Wt 66 kg (145 lb 8.1 oz)   SpO2 (!) 92%   BMI 24.98 kg/m²   Body mass index is 24.98 kg/m².    Physical Exam:  Vitals reviewed.   Constitutional:       Appearance: Normal appearance.   HENT:      Head: Normocephalic and atraumatic.   Cardiovascular:      Rate and Rhythm: Normal rate.      Heart sounds: Normal heart sounds.   Pulmonary:      Effort: Pulmonary effort is normal.      Comments: Diminished in bases  Abdominal:      Palpations: Abdomen is soft.   Musculoskeletal:      Cervical back: Neck supple.   Skin:     Coloration: Skin is jaundiced.   Neurological:      General: No focal deficit present.      Mental Status: She is alert and oriented to person, place, and time.     Labs:  Recent Results (from the past 48 hour(s))   Sodium    Collection Time: 10/17/23 11:57 PM   Result Value Ref Range    Sodium Level 124 (L) 136 - 145 mmol/L   Protime-INR    Collection Time: 10/18/23  4:38 AM   Result Value Ref Range    PT 26.7 (H) 12.5 - 14.5 seconds    INR 2.5 (H) <=1.3   Comprehensive Metabolic Panel    Collection Time: 10/18/23  4:38 AM   Result Value Ref Range    Sodium Level 122 (L) 136 - 145 mmol/L    Potassium Level 4.3 3.5 - 5.1 mmol/L    Chloride 94 (L) 98 - 107 mmol/L    Carbon Dioxide 24 23 - 31 mmol/L    Glucose Level 85 82 - 115 mg/dL    Blood Urea Nitrogen 9.0 (L) 9.8 - 20.1 mg/dL    Creatinine 0.82 0.55 - 1.02 mg/dL    Calcium Level Total 7.2 (L) 8.4 - 10.2 mg/dL    Protein Total 4.8 (L) 5.8 - 7.6 gm/dL    Albumin Level 1.5 (L) 3.4 - 4.8 g/dL    Globulin 3.3 2.4 - 3.5 gm/dL    Albumin/Globulin Ratio 0.5 (L) 1.1 - 2.0 ratio    Bilirubin Total 8.0 (H) <=1.5 mg/dL    Alkaline Phosphatase 167 (H) 40 - 150 unit/L    Alanine " Aminotransferase 34 0 - 55 unit/L    Aspartate Aminotransferase 112 (H) 5 - 34 unit/L    eGFR >60 mls/min/1.73/m2   CBC with Differential    Collection Time: 10/18/23  4:38 AM   Result Value Ref Range    WBC 15.04 (H) 4.50 - 11.50 x10(3)/mcL    RBC 3.44 (L) 4.20 - 5.40 x10(6)/mcL    Hgb 10.3 (L) 12.0 - 16.0 g/dL    Hct 29.2 (L) 37.0 - 47.0 %    MCV 84.9 80.0 - 94.0 fL    MCH 29.9 27.0 - 31.0 pg    MCHC 35.3 33.0 - 36.0 g/dL    RDW 20.1 (H) 11.5 - 17.0 %    Platelet 147 130 - 400 x10(3)/mcL    MPV 8.6 7.4 - 10.4 fL    Neut % 66.1 %    Lymph % 15.8 %    Mono % 12.2 %    Eos % 3.6 %    Basophil % 0.6 %    Lymph # 2.37 0.6 - 4.6 x10(3)/mcL    Neut # 9.94 (H) 2.1 - 9.2 x10(3)/mcL    Mono # 1.84 (H) 0.1 - 1.3 x10(3)/mcL    Eos # 0.54 0 - 0.9 x10(3)/mcL    Baso # 0.09 <=0.2 x10(3)/mcL    IG# 0.26 (H) 0 - 0.04 x10(3)/mcL    IG% 1.7 %    NRBC% 0.1 %   Sodium, Random Urine    Collection Time: 10/18/23  5:32 AM   Result Value Ref Range    Urine Sodium <20.0 mmol/L   Osmolality, Urine    Collection Time: 10/18/23  5:32 AM   Result Value Ref Range    Urine Osmolality 605 300 - 1,300 mOsm/kg   Sodium    Collection Time: 10/18/23  7:45 AM   Result Value Ref Range    Sodium Level 121 (L) 136 - 145 mmol/L   TSH    Collection Time: 10/18/23  7:45 AM   Result Value Ref Range    TSH 7.882 (H) 0.350 - 4.940 uIU/mL   Sodium    Collection Time: 10/18/23  5:00 PM   Result Value Ref Range    Sodium Level 124 (L) 136 - 145 mmol/L   Sodium    Collection Time: 10/19/23 12:12 AM   Result Value Ref Range    Sodium Level 124 (L) 136 - 145 mmol/L   Protime-INR    Collection Time: 10/19/23  4:49 AM   Result Value Ref Range    PT 27.8 (H) 12.5 - 14.5 seconds    INR 2.6 (H) <=1.3   Renal Function Panel    Collection Time: 10/19/23  4:49 AM   Result Value Ref Range    Sodium Level 125 (L) 136 - 145 mmol/L    Potassium Level 3.9 3.5 - 5.1 mmol/L    Chloride 95 (L) 98 - 107 mmol/L    Carbon Dioxide 25 23 - 31 mmol/L    Glucose Level 86 82 - 115 mg/dL     Blood Urea Nitrogen 8.9 (L) 9.8 - 20.1 mg/dL    Creatinine 0.76 0.55 - 1.02 mg/dL    Calcium Level Total 7.1 (L) 8.4 - 10.2 mg/dL    Albumin Level 1.4 (L) 3.4 - 4.8 g/dL    Phosphorus Level 2.9 2.3 - 4.7 mg/dL    eGFR >60 mls/min/1.73/m2   Magnesium    Collection Time: 10/19/23  4:49 AM   Result Value Ref Range    Magnesium Level 1.70 1.60 - 2.60 mg/dL   T4, Free    Collection Time: 10/19/23  4:49 AM   Result Value Ref Range    Thyroxine Free 0.69 (L) 0.70 - 1.48 ng/dL   T3, Free ([facility])    Collection Time: 10/19/23  4:49 AM   Result Value Ref Range    T3 Free <1.50 (L) 1.58 - 3.91 pg/mL   Sodium    Collection Time: 10/19/23  9:00 AM   Result Value Ref Range    Sodium Level 124 (L) 136 - 145 mmol/L       Imaging:  US Paracentesis inc Imaging    Result Date: 10/13/2023  EXAMINATION: US GUIDED PARACENTESIS INC IMAGING CLINICAL HISTORY: new ascites; Attending: Carlos Enrique Irwin M.D. Anesthesia: Lidocaine utilized for local anesthesia. TECHNIQUE: After the risks, benefits and alternatives were discussed, consent was obtained. A time out was performed to verify the patient's identity and procedure. The patient was placed supine. Limited ultrasound the abdomen demonstrated ascitic fluid in the right lower quadrant. Static image was obtained. The lower quadrant was cleaned prepped in normal sterile fashion. Subcutaneous tissues were anesthetized with lidocaine solution. The Yueh needle was advanced into the abdominal cavity. Placement was confirmed by return of ascitic fluid. The catheter was advanced and a total of 0.75 liters of clear yellow fluid was aspirated. The patient tolerated the procedure well. There were no immediate complications. Estimated blood loss: None     Successful paracentesis. Electronically signed by: Carlos Enrique Irwin Date:    10/13/2023 Time:    12:19    US Liver with Doppler (xpd)    Result Date: 10/13/2023  EXAMINATION: US LIVER WITH DOPPLER CLINICAL HISTORY: Cirrhosis evaluation, Evaluation for hepatic,  portal and splenic veins patency; TECHNIQUE: Right upper quadrant abdominal ultrasound (including pancreas, aorta, liver, gallbladder, common bile duct, IVC, right kidney, and spleen) was performed.  Spectral Doppler evaluation performed. COMPARISON: CT abdomen pelvis 10/12/2023 FINDINGS: LIMITATIONS: Exam limited due to poor acoustic window related to shadowing bowel gas and/or body habitus. LIVER: Liver measures 15 cm cranial caudal at the midclavicular line. The liver is echogenic.  Nodular surface contour.  No discrete mass appreciable by sonographic evaluation. PANCREAS: Obscured by overlying bowel gas. GALLBLADDER: Not imaged BILE DUCTS: Common bile duct not imaged. RIGHT KIDNEY: Not imaged SPLEEN: 8 cm.  Normal in size with homogeneous echotexture. OTHER: There is ascites and bilateral pleural effusions. DOPPLER EVALUATION: AORTA: Not imaged HEPATIC ARTERY: Patent. Peak systolic velocity 173 cm/s.  Normal waveform. INFERIOR VENA CAVA: Not imaged.  IVC is patent on preceding CT exam. PORTAL VEINS: Portal vein is patent.  Hepatofugal flow.  Note on CT exam there were recanalized periumbilical veins, gastric varices and a large spleno renal shunt. SPLENIC VEIN: Patent with appropriate directional flow. HEPATIC VEINS: Unable to visualize.  Note on CT exam the right and left hepatic veins appeared compressed but patent.  Left hepatic vein was not well visualized.  This can be seen related to fibrotic changes of cirrhosis.     Cirrhotic morphology of the liver Changes of portal hypertension with reversed flow at the portal vein.  There were portosystemic collaterals on preceding CT exam Small pleural effusions and abdominal ascites Electronically signed by: Lorna Maxwell Date:    10/13/2023 Time:    10:26    CT Abdomen Pelvis With Contrast    Result Date: 10/12/2023  EXAMINATION: CT ABDOMEN PELVIS WITH CONTRAST CLINICAL HISTORY: Sepsis;Jaundiced; TECHNIQUE: Multidetector axial images were obtained of the abdomen  and pelvis following the administration of IV contrast. Oral contrast was not administered. Dose length product of 322 mGycm. Automated exposure control was utilized to minimize radiation dose. COMPARISON: None available FINDINGS: There is moderate volume right pleural effusion and right lower lung lobe dense consolidation.  There is also small left pleural and left basilar atelectasis.  Bilateral mammary implants. There is hepatic cirrhotic morphology with low attenuation and surface nodularity.  No focal hepatic space-occupying lesion.  There is intra-abdominopelvic small to moderate free fluid consistent with ascites.  Gallbladder is distended without intraluminal calcified calculus.  No significant dilatation of the intrahepatic biliary radicles and the extrahepatic duct is also not distended without calcified choledocholithiasis.  No no acute findings of the pancreas or the spleen. The adrenal glands appear within normal limits. The kidneys are unremarkable in size and contour. No solid or cystic renal lesion identified. There is no hydronephrosis. No perinephric fluid strandings or collections identified. Stomach is mostly decompressed and difficult to assess.  There is suspected mild mural thickening of the loops of small bowel suspect for enteritis.  No transition or bowel obstruction.  Colon is nondistended.  Noninflamed diverticulosis coli.  No pneumoperitoneum. Urinary bladder appears within normal limits.  Small volume uterus.  Endometrium is not well delineated.  There is no pelvic free fluid. Lower lumbar degenerative changes.  No acute or aggressive skeletal abnormality no acute or otherwise osseous abnormality identified.     1. Right pleural effusion and right lower lung lobe consolidation. 2. Hepatic cirrhotic morphology and ascites. 3. Mural thickening of small bowel loops suggest nonspecific enteritis. Electronically signed by: Kevyn Abebe Date:    10/12/2023 Time:    19:33    CT Head Without  Contrast    Result Date: 10/12/2023  EXAMINATION: CT HEAD WITHOUT CONTRAST CLINICAL HISTORY: Infusion; TECHNIQUE: Sequential axial images were performed of the brain without contrast. Dose product length of 884 mGycm. Automated exposure control was utilized to minimize radiation dose. COMPARISON: February 8, 2022. FINDINGS: There is no intracranial mass effect, midline shift, hydrocephalus or hemorrhage. There is no sulcal effacement or low attenuation changes to suggest recent large vessel territory infarction. Chronic appearing periventricular and subcortical white matter low attenuation changes are present and are consistent with chronic microangiopathic ischemia.  Beneath left craniotomy is similar appearing dural thickening.  No acute extra-axial fluid collection identified.  The ventricular system and sulcal markings prominence is consistent with atrophy more advanced for the age.  There is no acute extra axial fluid collection.  Similar mucoperiosteal thickening of the right maxillary sinus.  Paranasal sinuses are clear without mucosal thickening, polypoidal abnormality or air-fluid levels. Mastoid air cells aeration is optimal.     No acute intracranial findings identified. Electronically signed by: Kevyn Abebe Date:    10/12/2023 Time:    19:26    X-Ray Chest AP Portable    Result Date: 10/12/2023  EXAMINATION: XR CHEST AP PORTABLE CLINICAL HISTORY: Hypotension; TECHNIQUE: Single frontal view of the chest was performed. COMPARISON: 12/12/2022 FINDINGS: There is a infiltrate developing in the right lower lobe.  There is a small right-sided pleural effusion.  There is a patchy infiltrate in the left lower lobe.  The heart is normal in appearance.  Bones and joints show no acute abnormality.     Infiltrate developing in the right lower lobe with a small right-sided pleural effusion Patchy appearing infiltrate developing in the left lower lobe Electronically signed by: Dariana Linares Date:    10/12/2023  Time:    17:53         Assessment/Plan:  Cirrhosis - ETOH related  Heavy drinker x 30 years  States stopped a few weeks ago  Small volume paracentesis - studies pending  PNA  Hyponatremia    Will continue aggressive supportive care - needs to see hepatology outpatient  Stop all ETOH    10-14-23  Pt ambulating in room with PT this am  Tolerating diet  Hgb stable  Bili trending down slowly    10-15-23  In bathroom  Had bm  Hgb stable  Sodium remains low  No acute changes overnight    10/16/23  Awake.  Continues to have hyponatremia.  This is being addressed by the primary service.  Needs caution with IV fluids in view of underlying liver cirrhosis.  Diuretics on hold for now.  Lactulose/rifaximin as per protocol.  Titrate to 2-3 soft/mushy bowel movements in 24 hours.    10/17/23  Patient continues to remain hyponatremia.  Nephrology consult pending.  Need caution with IV fluids.  Monitor I's and O's closely.  On Lactulose and rifaximin.  Patient had significantly elevated MELD score around 30.  She will need follow up with the hepatology Clinic on discharge.  Also recommend follow up at [facility].  Discussed the nursing staff (Margarita) today.  Recommend case management follow up.    10/19/23  Events noted.  No significant GI issues.  Sodium slightly improved.  IV fluids as per Nephrology Service.  Lactulose and rifaximin.  Titrate lactulose to 2-3 soft/mushy bowel movements a day.  Patient will need follow up with [facility]/ hepatology follow up on discharge.

## 2023-10-20 NOTE — PROGRESS NOTES
[facility]  Hospital Medicine Progress Note      Chief Complaint:  Weakness and poor appetite    HPI:   63-year-old female with significant history of depression/anxiety and history of heavy alcohol abuse since age 38. Presented to the ED with complaints of severe generalized weakness x1 week with associated poor appetite.  Patient was hemodynamically stable except for borderline hypotension mild tachycardia in the ED.  Lab significant for leukocytosis, hyperbilirubinemia, low albumin and severe hyponatremia/hypokalemia.  Lactic acid was elevated.  Imaging revealed right pleural effusion, right lower lobe consolidation, cirrhotic liver with ascites and enteritis.  Patient received IV fluids in the ED.  Initiated on empiric antibiotics paid pulmonology, Gastroenterology consulted.  Admitted to hospitalist medicine service.     Interval Hx:   Seen and examined the patient.  Afebrile vitals stable   Patient has epigastric abdominal pain and mild-to-moderate tenderness.  She stated that for 1st time today she was not feeling nauseous.  Did had 5 times bowel movement yesterday with lactulose.  Has cough with no sputum, no urinary symptoms.  __________________________________________________________________________________________________________________________________  Objective/physical exam:  Vital signs have been personally reviewed by me   Neuro:  Awake, alert, oriented  Resting comfortably in bed.  Hand tremor present with extension  General: Appears comfortable, no acute distress.  Integumentary: Warm, dry, intact.  Musculoskeletal: Purposeful movement noted.   Respiratory: No accessory muscle use. Breath sounds are equal.  Cardiovascular: Regular rate. No peripheral edema.    VITAL SIGNS: 24 HRS MIN & MAX LAST   Temp  Min: 97.4 °F (36.3 °C)  Max: 98.1 °F (36.7 °C) 97.7 °F (36.5 °C)   BP  Min: 100/61  Max: 109/62 100/61   Pulse  Min: 90  Max: 98  91   Resp  Min: 16  Max: 20 16   SpO2   Min: 92 %  Max: 97 % (!) 93 %     US Paracentesis inc Imaging  Narrative: EXAMINATION:  US GUIDED PARACENTESIS INC IMAGING    CLINICAL HISTORY:  new ascites;    Attending: Carlos Enrique Irwin M.D.    Anesthesia: Lidocaine utilized for local anesthesia.    TECHNIQUE:  After the risks, benefits and alternatives were discussed, consent was obtained. A time out was performed to verify the patient's identity and procedure.    The patient was placed supine. Limited ultrasound the abdomen demonstrated ascitic fluid in the right lower quadrant. Static image was obtained. The lower quadrant was cleaned prepped in normal sterile fashion. Subcutaneous tissues were anesthetized with lidocaine solution. The Yueh needle was advanced into the abdominal cavity. Placement was confirmed by return of ascitic fluid. The catheter was advanced and a total of 0.75 liters of clear yellow fluid was aspirated. The patient tolerated the procedure well. There were no immediate complications.    Estimated blood loss: None  Impression: Successful paracentesis.    Electronically signed by: Carlos Enrique Irwin  Date:    10/13/2023  Time:    12:19  US Liver with Doppler (xpd)  Narrative: EXAMINATION:  US LIVER WITH DOPPLER    CLINICAL HISTORY:  Cirrhosis evaluation, Evaluation for hepatic, portal and splenic veins patency;    TECHNIQUE:  Right upper quadrant abdominal ultrasound (including pancreas, aorta, liver, gallbladder, common bile duct, IVC, right kidney, and spleen) was performed.  Spectral Doppler evaluation performed.    COMPARISON:  CT abdomen pelvis 10/12/2023    FINDINGS:  LIMITATIONS: Exam limited due to poor acoustic window related to shadowing bowel gas and/or body habitus.    LIVER: Liver measures 15 cm cranial caudal at the midclavicular line. The liver is echogenic.  Nodular surface contour.  No discrete mass appreciable by sonographic evaluation.    PANCREAS: Obscured by overlying bowel gas.    GALLBLADDER: Not imaged    BILE DUCTS:  Common bile duct not imaged.    RIGHT KIDNEY: Not imaged    SPLEEN: 8 cm.  Normal in size with homogeneous echotexture.    OTHER: There is ascites and bilateral pleural effusions.    DOPPLER EVALUATION:    AORTA: Not imaged    HEPATIC ARTERY: Patent. Peak systolic velocity 173 cm/s.  Normal waveform.    INFERIOR VENA CAVA: Not imaged.  IVC is patent on preceding CT exam.    PORTAL VEINS: Portal vein is patent.  Hepatofugal flow.  Note on CT exam there were recanalized periumbilical veins, gastric varices and a large spleno renal shunt.    SPLENIC VEIN: Patent with appropriate directional flow.    HEPATIC VEINS: Unable to visualize.  Note on CT exam the right and left hepatic veins appeared compressed but patent.  Left hepatic vein was not well visualized.  This can be seen related to fibrotic changes of cirrhosis.  Impression: Cirrhotic morphology of the liver    Changes of portal hypertension with reversed flow at the portal vein.  There were portosystemic collaterals on preceding CT exam    Small pleural effusions and abdominal ascites    Electronically signed by: Lorna Maxwell  Date:    10/13/2023  Time:    10:26    Recent Labs   Lab 10/16/23  0213 10/17/23  0352 10/18/23  0438   WBC 14.54* 13.87* 15.04*   RBC 3.21* 3.25* 3.44*   HGB 9.5* 9.4* 10.3*   HCT 26.7* 27.3* 29.2*   MCV 83.2 84.0 84.9   MCH 29.6 28.9 29.9   MCHC 35.6 34.4 35.3   RDW 19.6* 20.6* 20.1*    154 147   MPV 8.3 8.6 8.6         Recent Labs   Lab 10/16/23  0213 10/16/23  0845 10/17/23  0352 10/17/23  0805 10/18/23  0438 10/18/23  0745 10/19/23  0449 10/19/23  0900 10/20/23  0445   *   < > 123*   < > 122*   < > 125* 124* 123*   K 3.8  --  3.5  --  4.3  --  3.9  --  3.8   CO2 22*  --  24  --  24  --  25  --  24   BUN 10.1  --  8.4*  --  9.0*  --  8.9*  --  8.7*   CREATININE 0.77  --  0.76  --  0.82  --  0.76  --  0.73   CALCIUM 6.9*  --  7.0*  --  7.2*  --  7.1*  --  7.1*   MG  --   --  1.80  --   --   --  1.70  --  1.70   ALBUMIN  1.5*  --  1.4*  --  1.5*  --  1.4*  --   --    ALKPHOS 161*  --  160*  --  167*  --   --   --   --    ALT 33  --  34  --  34  --   --   --   --    *  --  109*  --  112*  --   --   --   --    BILITOT 7.8*  --  8.0*  --  8.0*  --   --   --   --     < > = values in this interval not displayed.            Microbiology Results (last 7 days)       Procedure Component Value Units Date/Time    Body Fluid Culture [[id number]] Collected: 10/13/23 0955    Order Status: Completed Specimen: Peritoneal Fluid from Ascitic Fluid Updated: 10/18/23 0922     Body Fluid Culture Final Report: At 5 days. No growth    Blood culture #1 **CANNOT BE ORDERED STAT** [[id number]]  (Normal) Collected: 10/12/23 1735    Order Status: Completed Specimen: Blood Updated: 10/17/23 2001     CULTURE, BLOOD ([facility]) No Growth at 5 days    Blood culture #2 **CANNOT BE ORDERED STAT** [[id number]]  (Normal) Collected: 10/12/23 1735    Order Status: Completed Specimen: Blood Updated: 10/17/23 2001     CULTURE, BLOOD ([facility]) No Growth at 5 days    Gram Stain [[id number]] Collected: 10/13/23 0955    Order Status: Completed Specimen: Peritoneal Fluid Updated: 10/13/23 1523     GRAM STAIN No WBCs, No bacteria seen             See below for Radiology    Scheduled Med:   busPIRone  5 mg Oral BID    enoxparin  40 mg Subcutaneous Daily    folic acid  1 mg Oral Daily    lactulose 10 gram/15 ml  10 g Oral BID    levothyroxine  100 mcg Oral Before breakfast    midodrine  2.5 mg Oral TID WM    multivitamin  1 tablet Oral Daily    pantoprazole  40 mg Oral Daily    rifAXIMin  550 mg Oral BID    thiamine  100 mg Oral Daily        PRN Meds:  melatonin, methocarbamoL, ondansetron, sodium chloride 0.9%     Assessment/Plan:  Acute hyponatremia   Pneumonia with pleural effusions   End-stage liver disease-MELD score of 30  Chronic anemia  Acute hypothyroidism-diagnoses on this hospitalization      - WBC remains elevated we will check it today.    -get UA chest x-ray.     -epigastric tenderness, get lipase, abdominal ultrasound the liver and gallbladder.  -check CBC mac today and repeat labs tomorrow a.m..  Hyponatremia slightly improved-appropriate response to fluid therapy.  TSH/T3-T4 reviewed-indicative of hypothyroidism, initiated levothyroxine 100 mcg daily------ an empty stomach with water, ideally 30 to 60 minutes before breakfast     Continue PT/OT and diet as tolerated  Continue supportive care  Continue checking vital signs q4hrs.     DVT prophylaxis initiated   Consults:  Nephrologist, gastroenterologist  I have personally reviewed the specialist documentation and/or have spoken to the specialist with regard to the care of this patient; recommendations are noted above.     I have spent >30 minutes on the day of the visit; time spent includes face to face time and non-face to face time preparing to see the patient (eg, review of tests), independently reviewing and interpreting medical records, both past and current; documenting clinical information in the electronic or other health record, and communicating results to the patient/family/caregiver and care coordinator and nursing team.      All diagnosis and differential diagnosis have been reviewed,  interpreted and communicated appropriately to care team. assessment and plan has been documented; I have personally reviewed the labs and test results that are presently available and pertinent to this hospital course; I have reviewed medical records based upon their availability.  All of the patient's questions have been  addressed and answered. Patient's is agreeable to the above stated plan.   I will continue to monitor closely and make adjustments to medical management as needed.    Emre Kerr MD   10/20/2023      This note was created with the assistance of Dragon voice recognition software. There may be transcription errors as a result of using this technology however minimal. Effort has been made to assure accuracy of  transcription but any obvious errors or omissions should be clarified with the author of the document.

## 2023-10-20 NOTE — PROGRESS NOTES
"Gastroenterology Progress Note    Subjective/Interval History:  Awake.  No significant GI issues.  Patient is feeling less bloated today.  Feeling somewhat better.  Nausea improved    ROS:12 point system reviewed and is negative except as noted in HPI     Vital Signs:  /61   Pulse 91   Temp 97.7 °F (36.5 °C) (Oral)   Resp 16   Ht 5' 4" (1.626 m)   Wt 66 kg (145 lb 8.1 oz)   SpO2 (!) 93%   BMI 24.98 kg/m²   Body mass index is 24.98 kg/m².    Physical Exam:  Vitals reviewed.   Constitutional:       Appearance: Normal appearance.   HENT:      Head: Normocephalic and atraumatic.   Cardiovascular:      Rate and Rhythm: Normal rate.      Heart sounds: Normal heart sounds.   Pulmonary:      Effort: Pulmonary effort is normal.      Comments: Diminished in bases  Abdominal:      Palpations: Abdomen is soft.   Musculoskeletal:      Cervical back: Neck supple.   Skin:     Coloration: Skin is jaundiced.   Neurological:      General: No focal deficit present.      Mental Status: She is alert and oriented to person, place, and time.     Labs:  Recent Results (from the past 48 hour(s))   Sodium    Collection Time: 10/18/23  5:00 PM   Result Value Ref Range    Sodium Level 124 (L) 136 - 145 mmol/L   Sodium    Collection Time: 10/19/23 12:12 AM   Result Value Ref Range    Sodium Level 124 (L) 136 - 145 mmol/L   Protime-INR    Collection Time: 10/19/23  4:49 AM   Result Value Ref Range    PT 27.8 (H) 12.5 - 14.5 seconds    INR 2.6 (H) <=1.3   Renal Function Panel    Collection Time: 10/19/23  4:49 AM   Result Value Ref Range    Sodium Level 125 (L) 136 - 145 mmol/L    Potassium Level 3.9 3.5 - 5.1 mmol/L    Chloride 95 (L) 98 - 107 mmol/L    Carbon Dioxide 25 23 - 31 mmol/L    Glucose Level 86 82 - 115 mg/dL    Blood Urea Nitrogen 8.9 (L) 9.8 - 20.1 mg/dL    Creatinine 0.76 0.55 - 1.02 mg/dL    Calcium Level Total 7.1 (L) 8.4 - 10.2 mg/dL    Albumin Level 1.4 (L) 3.4 - 4.8 g/dL    Phosphorus Level 2.9 2.3 - 4.7 mg/dL    " eGFR >60 mls/min/1.73/m2   Magnesium    Collection Time: 10/19/23  4:49 AM   Result Value Ref Range    Magnesium Level 1.70 1.60 - 2.60 mg/dL   T4, Free    Collection Time: 10/19/23  4:49 AM   Result Value Ref Range    Thyroxine Free 0.69 (L) 0.70 - 1.48 ng/dL   T3, Free ([facility])    Collection Time: 10/19/23  4:49 AM   Result Value Ref Range    T3 Free <1.50 (L) 1.58 - 3.91 pg/mL   Sodium    Collection Time: 10/19/23  9:00 AM   Result Value Ref Range    Sodium Level 124 (L) 136 - 145 mmol/L   Protime-INR    Collection Time: 10/20/23  4:45 AM   Result Value Ref Range    PT 27.4 (H) 12.5 - 14.5 seconds    INR 2.6 (H) <=1.3   Basic Metabolic Panel    Collection Time: 10/20/23  4:45 AM   Result Value Ref Range    Sodium Level 123 (L) 136 - 145 mmol/L    Potassium Level 3.8 3.5 - 5.1 mmol/L    Chloride 96 (L) 98 - 107 mmol/L    Carbon Dioxide 24 23 - 31 mmol/L    Glucose Level 90 82 - 115 mg/dL    Blood Urea Nitrogen 8.7 (L) 9.8 - 20.1 mg/dL    Creatinine 0.73 0.55 - 1.02 mg/dL    BUN/Creatinine Ratio 12     Calcium Level Total 7.1 (L) 8.4 - 10.2 mg/dL    Anion Gap 3.0 mEq/L    eGFR >60 mls/min/1.73/m2   Magnesium    Collection Time: 10/20/23  4:45 AM   Result Value Ref Range    Magnesium Level 1.70 1.60 - 2.60 mg/dL   Phosphorus    Collection Time: 10/20/23  4:45 AM   Result Value Ref Range    Phosphorus Level 3.2 2.3 - 4.7 mg/dL       Imaging:  US Paracentesis inc Imaging    Result Date: 10/13/2023  EXAMINATION: US GUIDED PARACENTESIS INC IMAGING CLINICAL HISTORY: new ascites; Attending: Carlos Enrique Irwin M.D. Anesthesia: Lidocaine utilized for local anesthesia. TECHNIQUE: After the risks, benefits and alternatives were discussed, consent was obtained. A time out was performed to verify the patient's identity and procedure. The patient was placed supine. Limited ultrasound the abdomen demonstrated ascitic fluid in the right lower quadrant. Static image was obtained. The lower quadrant was cleaned prepped in normal sterile  fashion. Subcutaneous tissues were anesthetized with lidocaine solution. The Yueh needle was advanced into the abdominal cavity. Placement was confirmed by return of ascitic fluid. The catheter was advanced and a total of 0.75 liters of clear yellow fluid was aspirated. The patient tolerated the procedure well. There were no immediate complications. Estimated blood loss: None     Successful paracentesis. Electronically signed by: Carlos Enrique Irwin Date:    10/13/2023 Time:    12:19    US Liver with Doppler (xpd)    Result Date: 10/13/2023  EXAMINATION: US LIVER WITH DOPPLER CLINICAL HISTORY: Cirrhosis evaluation, Evaluation for hepatic, portal and splenic veins patency; TECHNIQUE: Right upper quadrant abdominal ultrasound (including pancreas, aorta, liver, gallbladder, common bile duct, IVC, right kidney, and spleen) was performed.  Spectral Doppler evaluation performed. COMPARISON: CT abdomen pelvis 10/12/2023 FINDINGS: LIMITATIONS: Exam limited due to poor acoustic window related to shadowing bowel gas and/or body habitus. LIVER: Liver measures 15 cm cranial caudal at the midclavicular line. The liver is echogenic.  Nodular surface contour.  No discrete mass appreciable by sonographic evaluation. PANCREAS: Obscured by overlying bowel gas. GALLBLADDER: Not imaged BILE DUCTS: Common bile duct not imaged. RIGHT KIDNEY: Not imaged SPLEEN: 8 cm.  Normal in size with homogeneous echotexture. OTHER: There is ascites and bilateral pleural effusions. DOPPLER EVALUATION: AORTA: Not imaged HEPATIC ARTERY: Patent. Peak systolic velocity 173 cm/s.  Normal waveform. INFERIOR VENA CAVA: Not imaged.  IVC is patent on preceding CT exam. PORTAL VEINS: Portal vein is patent.  Hepatofugal flow.  Note on CT exam there were recanalized periumbilical veins, gastric varices and a large spleno renal shunt. SPLENIC VEIN: Patent with appropriate directional flow. HEPATIC VEINS: Unable to visualize.  Note on CT exam the right and left hepatic  veins appeared compressed but patent.  Left hepatic vein was not well visualized.  This can be seen related to fibrotic changes of cirrhosis.     Cirrhotic morphology of the liver Changes of portal hypertension with reversed flow at the portal vein.  There were portosystemic collaterals on preceding CT exam Small pleural effusions and abdominal ascites Electronically signed by: Lorna Maxwell Date:    10/13/2023 Time:    10:26    CT Abdomen Pelvis With Contrast    Result Date: 10/12/2023  EXAMINATION: CT ABDOMEN PELVIS WITH CONTRAST CLINICAL HISTORY: Sepsis;Jaundiced; TECHNIQUE: Multidetector axial images were obtained of the abdomen and pelvis following the administration of IV contrast. Oral contrast was not administered. Dose length product of 322 mGycm. Automated exposure control was utilized to minimize radiation dose. COMPARISON: None available FINDINGS: There is moderate volume right pleural effusion and right lower lung lobe dense consolidation.  There is also small left pleural and left basilar atelectasis.  Bilateral mammary implants. There is hepatic cirrhotic morphology with low attenuation and surface nodularity.  No focal hepatic space-occupying lesion.  There is intra-abdominopelvic small to moderate free fluid consistent with ascites.  Gallbladder is distended without intraluminal calcified calculus.  No significant dilatation of the intrahepatic biliary radicles and the extrahepatic duct is also not distended without calcified choledocholithiasis.  No no acute findings of the pancreas or the spleen. The adrenal glands appear within normal limits. The kidneys are unremarkable in size and contour. No solid or cystic renal lesion identified. There is no hydronephrosis. No perinephric fluid strandings or collections identified. Stomach is mostly decompressed and difficult to assess.  There is suspected mild mural thickening of the loops of small bowel suspect for enteritis.  No transition or bowel  obstruction.  Colon is nondistended.  Noninflamed diverticulosis coli.  No pneumoperitoneum. Urinary bladder appears within normal limits.  Small volume uterus.  Endometrium is not well delineated.  There is no pelvic free fluid. Lower lumbar degenerative changes.  No acute or aggressive skeletal abnormality no acute or otherwise osseous abnormality identified.     1. Right pleural effusion and right lower lung lobe consolidation. 2. Hepatic cirrhotic morphology and ascites. 3. Mural thickening of small bowel loops suggest nonspecific enteritis. Electronically signed by: Kevyn Abebe Date:    10/12/2023 Time:    19:33    CT Head Without Contrast    Result Date: 10/12/2023  EXAMINATION: CT HEAD WITHOUT CONTRAST CLINICAL HISTORY: Infusion; TECHNIQUE: Sequential axial images were performed of the brain without contrast. Dose product length of 884 mGycm. Automated exposure control was utilized to minimize radiation dose. COMPARISON: February 8, 2022. FINDINGS: There is no intracranial mass effect, midline shift, hydrocephalus or hemorrhage. There is no sulcal effacement or low attenuation changes to suggest recent large vessel territory infarction. Chronic appearing periventricular and subcortical white matter low attenuation changes are present and are consistent with chronic microangiopathic ischemia.  Beneath left craniotomy is similar appearing dural thickening.  No acute extra-axial fluid collection identified.  The ventricular system and sulcal markings prominence is consistent with atrophy more advanced for the age.  There is no acute extra axial fluid collection.  Similar mucoperiosteal thickening of the right maxillary sinus.  Paranasal sinuses are clear without mucosal thickening, polypoidal abnormality or air-fluid levels. Mastoid air cells aeration is optimal.     No acute intracranial findings identified. Electronically signed by: Kevyn Abebe Date:    10/12/2023 Time:    19:26    X-Ray Chest AP  Portable    Result Date: 10/12/2023  EXAMINATION: XR CHEST AP PORTABLE CLINICAL HISTORY: Hypotension; TECHNIQUE: Single frontal view of the chest was performed. COMPARISON: 12/12/2022 FINDINGS: There is a infiltrate developing in the right lower lobe.  There is a small right-sided pleural effusion.  There is a patchy infiltrate in the left lower lobe.  The heart is normal in appearance.  Bones and joints show no acute abnormality.     Infiltrate developing in the right lower lobe with a small right-sided pleural effusion Patchy appearing infiltrate developing in the left lower lobe Electronically signed by: Dariana Linares Date:    10/12/2023 Time:    17:53         Assessment/Plan:  Cirrhosis - ETOH related  Heavy drinker x 30 years  States stopped a few weeks ago  Small volume paracentesis - studies pending  PNA  Hyponatremia    Will continue aggressive supportive care - needs to see hepatology outpatient  Stop all ETOH    10-14-23  Pt ambulating in room with PT this am  Tolerating diet  Hgb stable  Bili trending down slowly    10-15-23  In bathroom  Had bm  Hgb stable  Sodium remains low  No acute changes overnight    10/16/23  Awake.  Continues to have hyponatremia.  This is being addressed by the primary service.  Needs caution with IV fluids in view of underlying liver cirrhosis.  Diuretics on hold for now.  Lactulose/rifaximin as per protocol.  Titrate to 2-3 soft/mushy bowel movements in 24 hours.    10/17/23  Patient continues to remain hyponatremia.  Nephrology consult pending.  Need caution with IV fluids.  Monitor I's and O's closely.  On Lactulose and rifaximin.  Patient had significantly elevated MELD score around 30.  She will need follow up with the hepatology Clinic on discharge.  Also recommend follow up at [facility] [place], [place].  Discussed the nursing staff (Margarita) today.  Recommend case management follow up.    10/19/23  Events noted.  No significant GI issues.  Sodium slightly improved.  IV  fluids as per Nephrology Service.  Lactulose and rifaximin.  Titrate lactulose to 2-3 soft/mushy bowel movements a day.  Patient will need follow up with [facility]/ hepatology follow up on discharge.    10/20/23  Continues to be hyponatremic.  Clinically better.  MELD Na score around 31.  Hyponatremia being addressed by primary service as well as Nephrology.  No endoscopy procedures at this time.  [facility]/hepatology follow up on discharge.  Please call GI consult service on call for any questions over the weekend.

## 2023-10-20 NOTE — PT/OT/SLP PROGRESS
Physical Therapy Treatment    Patient Name:  Lauren Ewing   MRN:  [id number]    Recommendations:     Discharge therapy intensity: moderate intensity   Discharge Equipment Recommendations: walker, rolling  Barriers to discharge: Decreased caregiver support, Impaired mobility, and Ongoing medical needs    Assessment:     Lauren Ewing is a 63 y.o. female admitted with a medical diagnosis of Hyponatremia, acute hypokalemia, severe weakness, anxiety, jaundice, ascites secondary to alcoholic cirrhosis. .  She presents with the following impairments/functional limitations: weakness, impaired endurance, impaired self care skills, impaired functional mobility, gait instability, impaired balance, decreased safety awareness, decreased lower extremity function, decreased upper extremity function, pain. Pt tolerated PT well; however, appeared anxious throughout session and required moderate encouragement/comfort. Pt was only able to ambulate approx 60 feet total 2/2 to endurance and anxiety. Pt will continue to benefit from acute skilled PT to address functional limitations with recommendation to d/c to moderate intensity therapy.     Rehab Prognosis: Good; patient would benefit from acute skilled PT services to address these deficits and reach maximum level of function.    Recent Surgery: * No surgery found *      Plan:     During this hospitalization, patient to be seen 5 x/week to address the identified rehab impairments via gait training, therapeutic activities, therapeutic exercises, neuromuscular re-education and progress toward the following goals:    Plan of Care Expires:  11/14/23    Subjective     Chief Complaint: feels weak   Patient/Family Comments/goals: to get better   Pain/Comfort:         Objective:     Communicated with nurse prior to session.  Patient found with bed in chair position with peripheral IV, telemetry upon PT entry to room.     General Precautions: Standard, fall  Orthopedic Precautions:  N/A  Braces: N/A  Respiratory Status: Room air      Functional Mobility:  Bed Mobility:     Scooting: stand by assistance  Supine to Sit: stand by assistance  Transfers:     Sit to Stand:  contact guard assistance with rolling walker  Toilet Transfer: contact guard assistance with  rolling walker  using  Step Transfer  Gait: Pt ambulated 20 ft to bathroom, and then 40 feet more with RW and CGA at slow pace    Education:  Patient provided with verbal education education regarding sitting EOB or in chair throughout the day. Discussed seated exercises as well.   Understanding was verbalized.     Patient left with bed in chair position with all lines intact..    GOALS:   Multidisciplinary Problems       Physical Therapy Goals          Problem: Physical Therapy    Goal Priority Disciplines Outcome Goal Variances Interventions   Physical Therapy Goal     PT, PT/OT Ongoing, Progressing     Description: Goals to be met by: 23     Patient will increase functional independence with mobility by performin. Supine to sit with [place]  2. Sit to supine with [place]  3. Sit to stand transfer with Modified [place]  4. Gait  x 300 feet with Modified [place] using Rolling Walker (if needed).                          Time Tracking:     PT Received On: 10/20/23  PT Start Time: 1048     PT Stop Time: 1100  PT Total Time (min): 12 min     Billable Minutes: Therapeutic Activity 12    Treatment Type: Treatment  PT/PTA: PT     Number of PTA visits since last PT visit: 5

## 2023-10-21 LAB
ALBUMIN SERPL-MCNC: 1.4 G/DL (ref 3.4–4.8)
ALBUMIN/GLOB SERPL: 0.4 RATIO (ref 1.1–2)
ALP SERPL-CCNC: 162 UNIT/L (ref 40–150)
ALT SERPL-CCNC: 37 UNIT/L (ref 0–55)
AST SERPL-CCNC: 115 UNIT/L (ref 5–34)
BASOPHILS # BLD AUTO: 0.11 X10(3)/MCL
BASOPHILS NFR BLD AUTO: 0.8 %
BILIRUB SERPL-MCNC: 8.5 MG/DL
BILIRUBIN DIRECT+TOT PNL SERPL-MCNC: 5.2 MG/DL (ref 0–?)
BUN SERPL-MCNC: 10.6 MG/DL (ref 9.8–20.1)
CALCIUM SERPL-MCNC: 7.4 MG/DL (ref 8.4–10.2)
CHLORIDE SERPL-SCNC: 95 MMOL/L (ref 98–107)
CO2 SERPL-SCNC: 25 MMOL/L (ref 23–31)
CREAT SERPL-MCNC: 0.85 MG/DL (ref 0.55–1.02)
EOSINOPHIL # BLD AUTO: 0.39 X10(3)/MCL (ref 0–0.9)
EOSINOPHIL NFR BLD AUTO: 2.9 %
ERYTHROCYTE [DISTWIDTH] IN BLOOD BY AUTOMATED COUNT: 20.1 % (ref 11.5–17)
GFR SERPLBLD CREATININE-BSD FMLA CKD-EPI: >60 MLS/MIN/1.73/M2
GLOBULIN SER-MCNC: 3.4 GM/DL (ref 2.4–3.5)
GLUCOSE SERPL-MCNC: 82 MG/DL (ref 82–115)
HCT VFR BLD AUTO: 29.5 % (ref 37–47)
HGB BLD-MCNC: 10.3 G/DL (ref 12–16)
IMM GRANULOCYTES # BLD AUTO: 0.27 X10(3)/MCL (ref 0–0.04)
IMM GRANULOCYTES NFR BLD AUTO: 2 %
INR PPP: 2.5
LYMPHOCYTES # BLD AUTO: 1.97 X10(3)/MCL (ref 0.6–4.6)
LYMPHOCYTES NFR BLD AUTO: 14.7 %
MAGNESIUM SERPL-MCNC: 1.8 MG/DL (ref 1.6–2.6)
MCH RBC QN AUTO: 30 PG (ref 27–31)
MCHC RBC AUTO-ENTMCNC: 34.9 G/DL (ref 33–36)
MCV RBC AUTO: 86 FL (ref 80–94)
MONOCYTES # BLD AUTO: 1.86 X10(3)/MCL (ref 0.1–1.3)
MONOCYTES NFR BLD AUTO: 13.9 %
NEUTROPHILS # BLD AUTO: 8.82 X10(3)/MCL (ref 2.1–9.2)
NEUTROPHILS NFR BLD AUTO: 65.7 %
NRBC BLD AUTO-RTO: 0.2 %
PLATELET # BLD AUTO: 143 X10(3)/MCL (ref 130–400)
PMV BLD AUTO: 8.3 FL (ref 7.4–10.4)
POTASSIUM SERPL-SCNC: 4.2 MMOL/L (ref 3.5–5.1)
PROT SERPL-MCNC: 4.8 GM/DL (ref 5.8–7.6)
PROTHROMBIN TIME: 26.9 SECONDS (ref 12.5–14.5)
RBC # BLD AUTO: 3.43 X10(6)/MCL (ref 4.2–5.4)
SODIUM SERPL-SCNC: 126 MMOL/L (ref 136–145)
WBC # SPEC AUTO: 13.42 X10(3)/MCL (ref 4.5–11.5)

## 2023-10-21 PROCEDURE — 25000003 PHARM REV CODE 250: Performed by: INTERNAL MEDICINE

## 2023-10-21 PROCEDURE — 21400001 HC TELEMETRY ROOM

## 2023-10-21 PROCEDURE — 85610 PROTHROMBIN TIME: CPT | Performed by: INTERNAL MEDICINE

## 2023-10-21 PROCEDURE — 80053 COMPREHEN METABOLIC PANEL: CPT | Performed by: STUDENT IN AN ORGANIZED HEALTH CARE EDUCATION/TRAINING PROGRAM

## 2023-10-21 PROCEDURE — 63600175 PHARM REV CODE 636 W HCPCS: Performed by: INTERNAL MEDICINE

## 2023-10-21 PROCEDURE — 85025 COMPLETE CBC W/AUTO DIFF WBC: CPT | Performed by: STUDENT IN AN ORGANIZED HEALTH CARE EDUCATION/TRAINING PROGRAM

## 2023-10-21 PROCEDURE — 83735 ASSAY OF MAGNESIUM: CPT | Performed by: STUDENT IN AN ORGANIZED HEALTH CARE EDUCATION/TRAINING PROGRAM

## 2023-10-21 PROCEDURE — 82248 BILIRUBIN DIRECT: CPT | Performed by: STUDENT IN AN ORGANIZED HEALTH CARE EDUCATION/TRAINING PROGRAM

## 2023-10-21 PROCEDURE — 25000003 PHARM REV CODE 250: Performed by: STUDENT IN AN ORGANIZED HEALTH CARE EDUCATION/TRAINING PROGRAM

## 2023-10-21 PROCEDURE — 25000003 PHARM REV CODE 250: Performed by: NURSE PRACTITIONER

## 2023-10-21 RX ADMIN — LACTULOSE 10 G: 10 SOLUTION ORAL at 08:10

## 2023-10-21 RX ADMIN — PANTOPRAZOLE SODIUM 40 MG: 40 TABLET, DELAYED RELEASE ORAL at 09:10

## 2023-10-21 RX ADMIN — RIFAXIMIN 550 MG: 550 TABLET ORAL at 08:10

## 2023-10-21 RX ADMIN — BUSPIRONE HYDROCHLORIDE 5 MG: 5 TABLET ORAL at 08:10

## 2023-10-21 RX ADMIN — THIAMINE HCL TAB 100 MG 100 MG: 100 TAB at 09:10

## 2023-10-21 RX ADMIN — RIFAXIMIN 550 MG: 550 TABLET ORAL at 09:10

## 2023-10-21 RX ADMIN — MIDODRINE HYDROCHLORIDE 2.5 MG: 2.5 TABLET ORAL at 05:10

## 2023-10-21 RX ADMIN — MIDODRINE HYDROCHLORIDE 2.5 MG: 2.5 TABLET ORAL at 12:10

## 2023-10-21 RX ADMIN — ENOXAPARIN SODIUM 40 MG: 40 INJECTION SUBCUTANEOUS at 05:10

## 2023-10-21 RX ADMIN — BUSPIRONE HYDROCHLORIDE 5 MG: 5 TABLET ORAL at 09:10

## 2023-10-21 RX ADMIN — THERA TABS 1 TABLET: TAB at 09:10

## 2023-10-21 RX ADMIN — Medication 6 MG: at 08:10

## 2023-10-21 RX ADMIN — LEVOTHYROXINE SODIUM 100 MCG: 100 TABLET ORAL at 06:10

## 2023-10-21 RX ADMIN — LACTULOSE 10 G: 10 SOLUTION ORAL at 09:10

## 2023-10-21 RX ADMIN — FOLIC ACID 1 MG: 1 TABLET ORAL at 09:10

## 2023-10-21 RX ADMIN — MIDODRINE HYDROCHLORIDE 2.5 MG: 2.5 TABLET ORAL at 09:10

## 2023-10-21 NOTE — PROGRESS NOTES
[facility]  Hospital Medicine Progress Note      Chief Complaint:  Weakness and poor appetite    Subjective  A 63-year-old female with significant history of depression/anxiety and history of heavy alcohol abuse since age 38. Presented to the ED with complaints of severe generalized weakness x1 week with associated poor appetite.  Patient was hemodynamically stable except for borderline hypotension mild tachycardia in the ED.  Lab significant for leukocytosis, hyperbilirubinemia, low albumin and severe hyponatremia/hypokalemia.  Lactic acid was elevated.  Imaging revealed right pleural effusion, right lower lobe consolidation, cirrhotic liver with ascites and enteritis.  Patient received IV fluids in the ED.  Initiated on empiric antibiotics paid pulmonology, Gastroenterology consulted.  Admitted to hospitalist medicine service.     Interval Hx:   Patient denies having any abdominal pain or tenderness this morning.  She is afebrile vitals stable and hemodynamically stable.  White blood cell count is improving.  __________________________________________________________________________________________________________________________________  Objective/physical exam:  Vital signs have been personally reviewed by me   Neuro:  Awake, alert, oriented  Resting comfortably in bed.  Hand tremor present with extension  General: Appears comfortable, no acute distress.  Integumentary: Warm, dry, intact.  Musculoskeletal: Purposeful movement noted.   Respiratory: No accessory muscle use. Breath sounds are equal.  Cardiovascular: Regular rate. No peripheral edema.    VITAL SIGNS: 24 HRS MIN & MAX LAST   Temp  Min: 97.5 °F (36.4 °C)  Max: 98.8 °F (37.1 °C) 98.2 °F (36.8 °C)   BP  Min: 103/68  Max: 121/74 121/74   Pulse  Min: 95  Max: 103  103   Resp  Min: 18  Max: 24 18   SpO2  Min: 90 %  Max: 92 % (!) 92 %     CT Chest Without Contrast  Narrative: EXAMINATION:  CT CHEST WITHOUT CONTRAST    CLINICAL  HISTORY:  Pneumonia, unresolved;    TECHNIQUE:  Helically acquired axial images, sagittal and coronal reformations were obtained from the thoracic inlet to the lung bases without the IV administration of contrast.    Automated tube current modulation, weight-based exposure dosing, and/or iterative reconstruction technique utilized to reach lowest reasonably achievable exposure rate.    DLP: 322 mGy*cm    COMPARISON:  Chest radiograph 10/20/2023, CT abdomen pelvis 10/12/2023    FINDINGS:  BASE OF NECK: No significant abnormality.    AORTA: Left-sided aortic arch.  No aneurysm and no significant atherosclerosis    HEART: Normal size. No effusion.    JAZMINE/MEDIASTINUM: No enlarged lymph nodes by size criteria.  Evaluation of hilar lymphadenopathy is limited without intravenous contrast.    AIRWAYS: Patent.    LUNGS: Bibasilar opacities suggestive of atelectasis in the setting of pleural effusions but poorly characterized without contrast.    PLEURA: Moderate dependently layering right pleural effusion.  Small dependently layering left pleural effusion.    UPPER ABDOMEN: Free intraperitoneal fluid.  Venous collaterals in the left upper quadrant.    THORACIC SOFT TISSUES: Body wall edema.  Bilateral breast implants.    BONES: No acute fracture. No suspicious lytic or sclerotic lesions.  Impression: Dependently layering pleural effusions, right larger than left with adjacent airspace opacities, most commonly atelectasis but incompletely characterized without contrast.  On a prior CT exam 10/12/2023 there was enhancing atelectatic lung at both lung bases.    Electronically signed by: Lorna Maxwell  Date:    10/20/2023  Time:    18:25  X-Ray Chest 1 View  Narrative: EXAMINATION:  XR CHEST 1 VIEW    CLINICAL HISTORY:  pneumonia;    TECHNIQUE:  Frontal view(s) of the chest.    COMPARISON:  Radiography 10/12/2023    FINDINGS:  Small bilateral pleural effusions with adjacent hazy lung opacities.  Pleural fluid volume has  increased since prior study.  No pneumothorax identified.  Stable cardiac silhouette.  Impression: Small bilateral pleural effusions are larger since 10/12/2023.  Adjacent opacities may be infiltrates or atelectasis.    Electronically signed by: Naun Soler  Date:    10/20/2023  Time:    11:15  US Abdomen Limited  Narrative: EXAMINATION:  US ABDOMEN LIMITED    CLINICAL HISTORY:  cholecyctitis;    COMPARISON:  CT 12 October 2023.    FINDINGS:  Grayscale, color and spectral doppler evaluation of the right upper quadrant.    The pancreas is obscured.  Imaged portion of the IVC normal in caliber.    Liver is not significantly enlarged.  There is heterogeneous hepatic parenchymal echogenicity.  No focal liver lesion.  The portal vein is patent but there is reversal of flow.    No gallstones are seen.  Gallbladder wall is not significantly thickened.  Common bile duct is not well visualized, not grossly dilated.    Right kidney measures 10 cm in length. No hydronephrosis.    There is moderate ascites.  Impression: 1. Moderate ascites.  2. Patent portal vein but there is reversal of flow with prominent portosystemic collaterals noted on CT.  3. No gallstones.    Electronically signed by: Gildardo Tracy  Date:    10/20/2023  Time:    11:13    Recent Labs   Lab 10/18/23  0438 10/20/23  1011 10/21/23  0508   WBC 15.04* 13.51* 13.42*   RBC 3.44* 3.36* 3.43*   HGB 10.3* 10.0* 10.3*   HCT 29.2* 29.0* 29.5*   MCV 84.9 86.3 86.0   MCH 29.9 29.8 30.0   MCHC 35.3 34.5 34.9   RDW 20.1* 20.6* 20.1*    160 143   MPV 8.6 8.5 8.3         Recent Labs   Lab 10/17/23  0352 10/17/23  0805 10/18/23  0438 10/18/23  0745 10/19/23  0449 10/19/23  0900 10/20/23  0445 10/21/23  0508   *   < > 122*   < > 125* 124* 123* 126*   K 3.5  --  4.3  --  3.9  --  3.8 4.2   CO2 24  --  24  --  25  --  24 25   BUN 8.4*  --  9.0*  --  8.9*  --  8.7* 10.6   CREATININE 0.76  --  0.82  --  0.76  --  0.73 0.85   CALCIUM 7.0*  --  7.2*  --  7.1*  --   7.1* 7.4*   MG 1.80  --   --   --  1.70  --  1.70 1.80   ALBUMIN 1.4*  --  1.5*  --  1.4*  --   --  1.4*   ALKPHOS 160*  --  167*  --   --   --   --  162*   ALT 34  --  34  --   --   --   --  37   *  --  112*  --   --   --   --  115*   BILITOT 8.0*  --  8.0*  --   --   --   --  8.5*    < > = values in this interval not displayed.            Microbiology Results (last 7 days)       Procedure Component Value Units Date/Time    Body Fluid Culture [[id number]] Collected: 10/13/23 0955    Order Status: Completed Specimen: Peritoneal Fluid from Ascitic Fluid Updated: 10/18/23 0922     Body Fluid Culture Final Report: At 5 days. No growth    Blood culture #1 **CANNOT BE ORDERED STAT** [[id number]]  (Normal) Collected: 10/12/23 1735    Order Status: Completed Specimen: Blood Updated: 10/17/23 2001     CULTURE, BLOOD ([facility]) No Growth at 5 days    Blood culture #2 **CANNOT BE ORDERED STAT** [[id number]]  (Normal) Collected: 10/12/23 1735    Order Status: Completed Specimen: Blood Updated: 10/17/23 2001     CULTURE, BLOOD ([facility]) No Growth at 5 days             See below for Radiology    Scheduled Med:   busPIRone  5 mg Oral BID    enoxparin  40 mg Subcutaneous Daily    folic acid  1 mg Oral Daily    lactulose 10 gram/15 ml  10 g Oral BID    levothyroxine  100 mcg Oral Before breakfast    midodrine  2.5 mg Oral TID WM    multivitamin  1 tablet Oral Daily    pantoprazole  40 mg Oral Daily    rifAXIMin  550 mg Oral BID    thiamine  100 mg Oral Daily        PRN Meds:  melatonin, methocarbamoL, ondansetron, sodium chloride 0.9%     Assessment/Plan:  Acute hyponatremia   Pneumonia with pleural effusions   End-stage liver disease-MELD score of 30  Chronic anemia  Acute hypothyroidism-diagnoses on this hospitalization        - UA is negative.    -CT of the chest shows pleural effusion with dependent atelectasis.  Is less likely that has pneumonia.  She previously received antibiotics for pneumonia.    -ultrasound of the  gallbladder showed no gallstones, gallbladder wall is not thickened.  -his large pleural effusion right greater than left.  Will monitor her respiratory symptoms.  She also has significant ascites.    -hyponatremia is improving we will hold off diuresing now.  We might attempt tomorrow if hyponatremia continues to improve, if she can not tolerate she will need thoracentesis and also paracentesis.  Hyponatremia slightly improved-appropriate response to fluid therapy.  TSH/T3-T4 reviewed-indicative of hypothyroidism, initiated levothyroxine 100 mcg daily------ an empty stomach with water, ideally 30 to 60 minutes before breakfast     Continue PT/OT and diet as tolerated  Continue supportive care  Continue checking vital signs q4hrs.     DVT prophylaxis initiated   Consults:  Nephrologist, gastroenterologist  I have personally reviewed the specialist documentation and/or have spoken to the specialist with regard to the care of this patient; recommendations are noted above.     I have spent >30 minutes on the day of the visit; time spent includes face to face time and non-face to face time preparing to see the patient (eg, review of tests), independently reviewing and interpreting medical records, both past and current; documenting clinical information in the electronic or other health record, and communicating results to the patient/family/caregiver and care coordinator and nursing team.      All diagnosis and differential diagnosis have been reviewed,  interpreted and communicated appropriately to care team. assessment and plan has been documented; I have personally reviewed the labs and test results that are presently available and pertinent to this hospital course; I have reviewed medical records based upon their availability.  All of the patient's questions have been  addressed and answered. Patient's is agreeable to the above stated plan.   I will continue to monitor closely and make adjustments to medical  management as needed.    Emre Kerr MD   10/21/2023      This note was created with the assistance of Dragon voice recognition software. There may be transcription errors as a result of using this technology however minimal. Effort has been made to assure accuracy of transcription but any obvious errors or omissions should be clarified with the author of the document.

## 2023-10-22 LAB
ALBUMIN SERPL-MCNC: 1.4 G/DL (ref 3.4–4.8)
ALBUMIN/GLOB SERPL: 0.5 RATIO (ref 1.1–2)
ALP SERPL-CCNC: 154 UNIT/L (ref 40–150)
ALT SERPL-CCNC: 35 UNIT/L (ref 0–55)
ANION GAP SERPL CALC-SCNC: 5 MEQ/L
APPEARANCE UR: ABNORMAL
AST SERPL-CCNC: 108 UNIT/L (ref 5–34)
BACTERIA #/AREA URNS AUTO: ABNORMAL /HPF
BASOPHILS # BLD AUTO: 0.1 X10(3)/MCL
BASOPHILS NFR BLD AUTO: 0.7 %
BILIRUB SERPL-MCNC: 8.3 MG/DL
BILIRUB UR QL STRIP.AUTO: ABNORMAL
BUN SERPL-MCNC: 12.9 MG/DL (ref 9.8–20.1)
BUN SERPL-MCNC: 14.1 MG/DL (ref 9.8–20.1)
CALCIUM SERPL-MCNC: 7.3 MG/DL (ref 8.4–10.2)
CALCIUM SERPL-MCNC: 7.4 MG/DL (ref 8.4–10.2)
CAOX CRY URNS QL MICRO: ABNORMAL /HPF
CHLORIDE SERPL-SCNC: 95 MMOL/L (ref 98–107)
CHLORIDE SERPL-SCNC: 95 MMOL/L (ref 98–107)
CO2 SERPL-SCNC: 22 MMOL/L (ref 23–31)
CO2 SERPL-SCNC: 26 MMOL/L (ref 23–31)
COLOR UR AUTO: ABNORMAL
CREAT SERPL-MCNC: 0.79 MG/DL (ref 0.55–1.02)
CREAT SERPL-MCNC: 0.89 MG/DL (ref 0.55–1.02)
CREAT UR-MCNC: 262.8 MG/DL (ref 45–106)
CREAT/UREA NIT SERPL: 16
EOSINOPHIL # BLD AUTO: 0.38 X10(3)/MCL (ref 0–0.9)
EOSINOPHIL NFR BLD AUTO: 2.8 %
ERYTHROCYTE [DISTWIDTH] IN BLOOD BY AUTOMATED COUNT: 19.9 % (ref 11.5–17)
GFR SERPLBLD CREATININE-BSD FMLA CKD-EPI: >60 MLS/MIN/1.73/M2
GFR SERPLBLD CREATININE-BSD FMLA CKD-EPI: >60 MLS/MIN/1.73/M2
GLOBULIN SER-MCNC: 3.1 GM/DL (ref 2.4–3.5)
GLUCOSE SERPL-MCNC: 126 MG/DL (ref 82–115)
GLUCOSE SERPL-MCNC: 82 MG/DL (ref 82–115)
GLUCOSE UR QL STRIP.AUTO: NEGATIVE
HCT VFR BLD AUTO: 28.3 % (ref 37–47)
HGB BLD-MCNC: 9.7 G/DL (ref 12–16)
IMM GRANULOCYTES # BLD AUTO: 0.31 X10(3)/MCL (ref 0–0.04)
IMM GRANULOCYTES NFR BLD AUTO: 2.3 %
INR PPP: 2.5
KETONES UR QL STRIP.AUTO: NEGATIVE
LEUKOCYTE ESTERASE UR QL STRIP.AUTO: ABNORMAL
LYMPHOCYTES # BLD AUTO: 2.16 X10(3)/MCL (ref 0.6–4.6)
LYMPHOCYTES NFR BLD AUTO: 16.2 %
MCH RBC QN AUTO: 29.8 PG (ref 27–31)
MCHC RBC AUTO-ENTMCNC: 34.3 G/DL (ref 33–36)
MCV RBC AUTO: 86.8 FL (ref 80–94)
MONOCYTES # BLD AUTO: 1.89 X10(3)/MCL (ref 0.1–1.3)
MONOCYTES NFR BLD AUTO: 14.2 %
NEUTROPHILS # BLD AUTO: 8.51 X10(3)/MCL (ref 2.1–9.2)
NEUTROPHILS NFR BLD AUTO: 63.8 %
NITRITE UR QL STRIP.AUTO: POSITIVE
NRBC BLD AUTO-RTO: 0.3 %
OSMOLALITY UR: 592 MOSM/KG (ref 300–1300)
PH UR STRIP.AUTO: 6 [PH]
PLATELET # BLD AUTO: 137 X10(3)/MCL (ref 130–400)
PMV BLD AUTO: 8.4 FL (ref 7.4–10.4)
POTASSIUM SERPL-SCNC: 4.1 MMOL/L (ref 3.5–5.1)
POTASSIUM SERPL-SCNC: 4.1 MMOL/L (ref 3.5–5.1)
PROT SERPL-MCNC: 4.5 GM/DL (ref 5.8–7.6)
PROT UR QL STRIP.AUTO: NEGATIVE
PROTHROMBIN TIME: 26.4 SECONDS (ref 12.5–14.5)
RBC # BLD AUTO: 3.26 X10(6)/MCL (ref 4.2–5.4)
RBC #/AREA URNS AUTO: ABNORMAL /HPF
RBC UR QL AUTO: NEGATIVE
SODIUM SERPL-SCNC: 122 MMOL/L (ref 136–145)
SODIUM SERPL-SCNC: 123 MMOL/L (ref 136–145)
SODIUM UR-SCNC: <20 MMOL/L
SP GR UR STRIP.AUTO: 1.02 (ref 1–1.03)
SQUAMOUS #/AREA URNS AUTO: ABNORMAL /HPF
UROBILINOGEN UR STRIP-ACNC: 0.2
WBC # SPEC AUTO: 13.35 X10(3)/MCL (ref 4.5–11.5)
WBC #/AREA URNS AUTO: ABNORMAL /HPF
YEAST URNS QL MICRO: ABNORMAL /HPF

## 2023-10-22 PROCEDURE — 25000003 PHARM REV CODE 250: Performed by: INTERNAL MEDICINE

## 2023-10-22 PROCEDURE — 25000003 PHARM REV CODE 250: Performed by: STUDENT IN AN ORGANIZED HEALTH CARE EDUCATION/TRAINING PROGRAM

## 2023-10-22 PROCEDURE — 21400001 HC TELEMETRY ROOM

## 2023-10-22 PROCEDURE — 85610 PROTHROMBIN TIME: CPT | Performed by: INTERNAL MEDICINE

## 2023-10-22 PROCEDURE — 85025 COMPLETE CBC W/AUTO DIFF WBC: CPT | Performed by: STUDENT IN AN ORGANIZED HEALTH CARE EDUCATION/TRAINING PROGRAM

## 2023-10-22 PROCEDURE — 25000003 PHARM REV CODE 250: Performed by: NURSE PRACTITIONER

## 2023-10-22 PROCEDURE — 82570 ASSAY OF URINE CREATININE: CPT | Performed by: STUDENT IN AN ORGANIZED HEALTH CARE EDUCATION/TRAINING PROGRAM

## 2023-10-22 PROCEDURE — 84300 ASSAY OF URINE SODIUM: CPT | Performed by: STUDENT IN AN ORGANIZED HEALTH CARE EDUCATION/TRAINING PROGRAM

## 2023-10-22 PROCEDURE — 81001 URINALYSIS AUTO W/SCOPE: CPT | Performed by: STUDENT IN AN ORGANIZED HEALTH CARE EDUCATION/TRAINING PROGRAM

## 2023-10-22 PROCEDURE — 63600175 PHARM REV CODE 636 W HCPCS: Performed by: INTERNAL MEDICINE

## 2023-10-22 PROCEDURE — 83935 ASSAY OF URINE OSMOLALITY: CPT | Performed by: STUDENT IN AN ORGANIZED HEALTH CARE EDUCATION/TRAINING PROGRAM

## 2023-10-22 PROCEDURE — 63600175 PHARM REV CODE 636 W HCPCS: Performed by: NURSE PRACTITIONER

## 2023-10-22 PROCEDURE — 80053 COMPREHEN METABOLIC PANEL: CPT | Performed by: STUDENT IN AN ORGANIZED HEALTH CARE EDUCATION/TRAINING PROGRAM

## 2023-10-22 RX ADMIN — MIDODRINE HYDROCHLORIDE 2.5 MG: 2.5 TABLET ORAL at 09:10

## 2023-10-22 RX ADMIN — PANTOPRAZOLE SODIUM 40 MG: 40 TABLET, DELAYED RELEASE ORAL at 09:10

## 2023-10-22 RX ADMIN — LACTULOSE 10 G: 10 SOLUTION ORAL at 09:10

## 2023-10-22 RX ADMIN — Medication 6 MG: at 09:10

## 2023-10-22 RX ADMIN — RIFAXIMIN 550 MG: 550 TABLET ORAL at 09:10

## 2023-10-22 RX ADMIN — ONDANSETRON 4 MG: 2 INJECTION INTRAMUSCULAR; INTRAVENOUS at 09:10

## 2023-10-22 RX ADMIN — FOLIC ACID 1 MG: 1 TABLET ORAL at 09:10

## 2023-10-22 RX ADMIN — THERA TABS 1 TABLET: TAB at 09:10

## 2023-10-22 RX ADMIN — LEVOTHYROXINE SODIUM 100 MCG: 100 TABLET ORAL at 06:10

## 2023-10-22 RX ADMIN — BUSPIRONE HYDROCHLORIDE 5 MG: 5 TABLET ORAL at 09:10

## 2023-10-22 RX ADMIN — THIAMINE HCL TAB 100 MG 100 MG: 100 TAB at 09:10

## 2023-10-22 RX ADMIN — MIDODRINE HYDROCHLORIDE 2.5 MG: 2.5 TABLET ORAL at 11:10

## 2023-10-22 RX ADMIN — ENOXAPARIN SODIUM 40 MG: 40 INJECTION SUBCUTANEOUS at 04:10

## 2023-10-22 RX ADMIN — MIDODRINE HYDROCHLORIDE 2.5 MG: 2.5 TABLET ORAL at 04:10

## 2023-10-22 NOTE — PROGRESS NOTES
[facility]  Hospital Medicine Progress Note      Chief Complaint:  Weakness and poor appetite    Subjective  A 63-year-old female with significant history of depression/anxiety and history of heavy alcohol abuse since age 38. Presented to the ED with complaints of severe generalized weakness x1 week with associated poor appetite.  Patient was hemodynamically stable except for borderline hypotension mild tachycardia in the ED.  Lab significant for leukocytosis, hyperbilirubinemia, low albumin and severe hyponatremia/hypokalemia.  Lactic acid was elevated.  Imaging revealed right pleural effusion, right lower lobe consolidation, cirrhotic liver with ascites and enteritis.  Patient received IV fluids in the ED.  Initiated on empiric antibiotics paid pulmonology, Gastroenterology consulted.  Admitted to hospitalist medicine service.     Interval Hx:   Seen and examined the patient.  She does not have any complaints however her sodium level is not improving.  Still maintaining above 120s and she has a good urine output however she is also volume overloaded.  __________________________________________________________________________________________________________________________________  Objective/physical exam:  Vital signs have been personally reviewed by me   Neuro:  Awake, alert, oriented  Resting comfortably in bed.  Hand tremor present with extension  General: Appears comfortable, no acute distress.  Integumentary: Warm, dry, intact.  Musculoskeletal: Purposeful movement noted.   Respiratory: No accessory muscle use. Breath sounds are equal.  Cardiovascular: Regular rate. No peripheral edema.    VITAL SIGNS: 24 HRS MIN & MAX LAST   Temp  Min: 97 °F (36.1 °C)  Max: 98.5 °F (36.9 °C) 97.9 °F (36.6 °C)   BP  Min: 103/65  Max: 112/72 (!) 107/57   Pulse  Min: 89  Max: 100  97   Resp  Min: 18  Max: 20 18   SpO2  Min: 91 %  Max: 94 % (!) 92 %     CT Chest Without Contrast  Narrative:  EXAMINATION:  CT CHEST WITHOUT CONTRAST    CLINICAL HISTORY:  Pneumonia, unresolved;    TECHNIQUE:  Helically acquired axial images, sagittal and coronal reformations were obtained from the thoracic inlet to the lung bases without the IV administration of contrast.    Automated tube current modulation, weight-based exposure dosing, and/or iterative reconstruction technique utilized to reach lowest reasonably achievable exposure rate.    DLP: 322 mGy*cm    COMPARISON:  Chest radiograph 10/20/2023, CT abdomen pelvis 10/12/2023    FINDINGS:  BASE OF NECK: No significant abnormality.    AORTA: Left-sided aortic arch.  No aneurysm and no significant atherosclerosis    HEART: Normal size. No effusion.    JAZMINE/MEDIASTINUM: No enlarged lymph nodes by size criteria.  Evaluation of hilar lymphadenopathy is limited without intravenous contrast.    AIRWAYS: Patent.    LUNGS: Bibasilar opacities suggestive of atelectasis in the setting of pleural effusions but poorly characterized without contrast.    PLEURA: Moderate dependently layering right pleural effusion.  Small dependently layering left pleural effusion.    UPPER ABDOMEN: Free intraperitoneal fluid.  Venous collaterals in the left upper quadrant.    THORACIC SOFT TISSUES: Body wall edema.  Bilateral breast implants.    BONES: No acute fracture. No suspicious lytic or sclerotic lesions.  Impression: Dependently layering pleural effusions, right larger than left with adjacent airspace opacities, most commonly atelectasis but incompletely characterized without contrast.  On a prior CT exam 10/12/2023 there was enhancing atelectatic lung at both lung bases.    Electronically signed by: Lorna Maxwell  Date:    10/20/2023  Time:    18:25  X-Ray Chest 1 View  Narrative: EXAMINATION:  XR CHEST 1 VIEW    CLINICAL HISTORY:  pneumonia;    TECHNIQUE:  Frontal view(s) of the chest.    COMPARISON:  Radiography 10/12/2023    FINDINGS:  Small bilateral pleural effusions with adjacent  hazy lung opacities.  Pleural fluid volume has increased since prior study.  No pneumothorax identified.  Stable cardiac silhouette.  Impression: Small bilateral pleural effusions are larger since 10/12/2023.  Adjacent opacities may be infiltrates or atelectasis.    Electronically signed by: Naun Soler  Date:    10/20/2023  Time:    11:15  US Abdomen Limited  Narrative: EXAMINATION:  US ABDOMEN LIMITED    CLINICAL HISTORY:  cholecyctitis;    COMPARISON:  CT 12 October 2023.    FINDINGS:  Grayscale, color and spectral doppler evaluation of the right upper quadrant.    The pancreas is obscured.  Imaged portion of the IVC normal in caliber.    Liver is not significantly enlarged.  There is heterogeneous hepatic parenchymal echogenicity.  No focal liver lesion.  The portal vein is patent but there is reversal of flow.    No gallstones are seen.  Gallbladder wall is not significantly thickened.  Common bile duct is not well visualized, not grossly dilated.    Right kidney measures 10 cm in length. No hydronephrosis.    There is moderate ascites.  Impression: 1. Moderate ascites.  2. Patent portal vein but there is reversal of flow with prominent portosystemic collaterals noted on CT.  3. No gallstones.    Electronically signed by: Gildardo Tracy  Date:    10/20/2023  Time:    11:13    Recent Labs   Lab 10/20/23  1011 10/21/23  0508 10/22/23  0648   WBC 13.51* 13.42* 13.35*   RBC 3.36* 3.43* 3.26*   HGB 10.0* 10.3* 9.7*   HCT 29.0* 29.5* 28.3*   MCV 86.3 86.0 86.8   MCH 29.8 30.0 29.8   MCHC 34.5 34.9 34.3   RDW 20.6* 20.1* 19.9*    143 137   MPV 8.5 8.3 8.4         Recent Labs   Lab 10/18/23  0438 10/18/23  0745 10/19/23  0449 10/19/23  0900 10/20/23  0445 10/21/23  0508 10/22/23  0648   *   < > 125*   < > 123* 126* 123*   K 4.3  --  3.9  --  3.8 4.2 4.1   CO2 24  --  25  --  24 25 26   BUN 9.0*  --  8.9*  --  8.7* 10.6 12.9   CREATININE 0.82  --  0.76  --  0.73 0.85 0.79   CALCIUM 7.2*  --  7.1*  --  7.1*  7.4* 7.4*   MG  --   --  1.70  --  1.70 1.80  --    ALBUMIN 1.5*  --  1.4*  --   --  1.4* 1.4*   ALKPHOS 167*  --   --   --   --  162* 154*   ALT 34  --   --   --   --  37 35   *  --   --   --   --  115* 108*   BILITOT 8.0*  --   --   --   --  8.5* 8.3*    < > = values in this interval not displayed.            Microbiology Results (last 7 days)       Procedure Component Value Units Date/Time    Body Fluid Culture [[id number]] Collected: 10/13/23 0955    Order Status: Completed Specimen: Peritoneal Fluid from Ascitic Fluid Updated: 10/18/23 0922     Body Fluid Culture Final Report: At 5 days. No growth    Blood culture #1 **CANNOT BE ORDERED STAT** [[id number]]  (Normal) Collected: 10/12/23 1735    Order Status: Completed Specimen: Blood Updated: 10/17/23 2001     CULTURE, BLOOD ([facility]) No Growth at 5 days    Blood culture #2 **CANNOT BE ORDERED STAT** [[id number]]  (Normal) Collected: 10/12/23 1735    Order Status: Completed Specimen: Blood Updated: 10/17/23 2001     CULTURE, BLOOD ([facility]) No Growth at 5 days             See below for Radiology    Scheduled Med:   busPIRone  5 mg Oral BID    enoxparin  40 mg Subcutaneous Daily    folic acid  1 mg Oral Daily    lactulose 10 gram/15 ml  10 g Oral BID    levothyroxine  100 mcg Oral Before breakfast    midodrine  2.5 mg Oral TID WM    multivitamin  1 tablet Oral Daily    pantoprazole  40 mg Oral Daily    rifAXIMin  550 mg Oral BID    thiamine  100 mg Oral Daily        PRN Meds:  melatonin, methocarbamoL, ondansetron, sodium chloride 0.9%     Assessment/Plan:  Acute hyponatremia   Pneumonia with pleural effusions   End-stage liver disease-MELD score of 30  Chronic anemia  Acute hypothyroidism-diagnoses on this hospitalization          - hyponatremia persists, new to previously concern of hypovolemia patient received IV fluids now her sodium is still low.  We will monitor and recheck around 2:00 p.m. today.    -we will check urine sodium and urine osmolality with urine  creatinine and calculate the fractional sodium.  If suggestive of hypovolemia we will consider Lasix.  - will notify Nephrology.  -if urine osmolality elevated might consider tolvaptan.  However bottom line most likely reason of hyponatremia is cirrhosis itself.  -CT of the chest shows pleural effusion with dependent atelectasis.  Is less likely that has pneumonia.  She previously received antibiotics for pneumonia.    -ultrasound of the gallbladder showed no gallstones, gallbladder wall is not thickened.  -his large pleural effusion right greater than left.  Will monitor her respiratory symptoms.  She also has significant ascites.    -hyponatremia is improving we will hold off diuresing now.  We might attempt tomorrow if hyponatremia continues to improve, if she can not tolerate she will need thoracentesis and also paracentesis.  Hyponatremia slightly improved-appropriate response to fluid therapy.  TSH/T3-T4 reviewed-indicative of hypothyroidism, initiated levothyroxine 100 mcg daily------ an empty stomach with water, ideally 30 to 60 minutes before breakfast     Continue PT/OT and diet as tolerated  Continue supportive care  Continue checking vital signs q4hrs.     DVT prophylaxis initiated   Consults:  Nephrologist, gastroenterologist  I have personally reviewed the specialist documentation and/or have spoken to the specialist with regard to the care of this patient; recommendations are noted above.     I have spent >30 minutes on the day of the visit; time spent includes face to face time and non-face to face time preparing to see the patient (eg, review of tests), independently reviewing and interpreting medical records, both past and current; documenting clinical information in the electronic or other health record, and communicating results to the patient/family/caregiver and care coordinator and nursing team.      All diagnosis and differential diagnosis have been reviewed,  interpreted and communicated  appropriately to care team. assessment and plan has been documented; I have personally reviewed the labs and test results that are presently available and pertinent to this hospital course; I have reviewed medical records based upon their availability.  All of the patient's questions have been  addressed and answered. Patient's is agreeable to the above stated plan.   I will continue to monitor closely and make adjustments to medical management as needed.    Emre Kerr MD   10/22/2023      This note was created with the assistance of Dragon voice recognition software. There may be transcription errors as a result of using this technology however minimal. Effort has been made to assure accuracy of transcription but any obvious errors or omissions should be clarified with the author of the document.

## 2023-10-23 LAB
ALBUMIN SERPL-MCNC: 1.3 G/DL (ref 3.4–4.8)
ALBUMIN/GLOB SERPL: 0.4 RATIO (ref 1.1–2)
ALP SERPL-CCNC: 174 UNIT/L (ref 40–150)
ALT SERPL-CCNC: 33 UNIT/L (ref 0–55)
AST SERPL-CCNC: 100 UNIT/L (ref 5–34)
BASOPHILS # BLD AUTO: 0.13 X10(3)/MCL
BASOPHILS NFR BLD AUTO: 0.9 %
BILIRUB SERPL-MCNC: 7.8 MG/DL
BUN SERPL-MCNC: 14.3 MG/DL (ref 9.8–20.1)
CALCIUM SERPL-MCNC: 7 MG/DL (ref 8.4–10.2)
CHLORIDE SERPL-SCNC: 95 MMOL/L (ref 98–107)
CO2 SERPL-SCNC: 23 MMOL/L (ref 23–31)
CREAT SERPL-MCNC: 0.9 MG/DL (ref 0.55–1.02)
EOSINOPHIL # BLD AUTO: 0.5 X10(3)/MCL (ref 0–0.9)
EOSINOPHIL NFR BLD AUTO: 3.3 %
ERYTHROCYTE [DISTWIDTH] IN BLOOD BY AUTOMATED COUNT: 20.2 % (ref 11.5–17)
GFR SERPLBLD CREATININE-BSD FMLA CKD-EPI: >60 MLS/MIN/1.73/M2
GLOBULIN SER-MCNC: 3.2 GM/DL (ref 2.4–3.5)
GLUCOSE SERPL-MCNC: 102 MG/DL (ref 82–115)
HCT VFR BLD AUTO: 27 % (ref 37–47)
HGB BLD-MCNC: 9.3 G/DL (ref 12–16)
IMM GRANULOCYTES # BLD AUTO: 0.35 X10(3)/MCL (ref 0–0.04)
IMM GRANULOCYTES NFR BLD AUTO: 2.3 %
INR PPP: 2.4
LYMPHOCYTES # BLD AUTO: 2.2 X10(3)/MCL (ref 0.6–4.6)
LYMPHOCYTES NFR BLD AUTO: 14.7 %
MCH RBC QN AUTO: 29.5 PG (ref 27–31)
MCHC RBC AUTO-ENTMCNC: 34.4 G/DL (ref 33–36)
MCV RBC AUTO: 85.7 FL (ref 80–94)
MONOCYTES # BLD AUTO: 2.12 X10(3)/MCL (ref 0.1–1.3)
MONOCYTES NFR BLD AUTO: 14.2 %
NEUTROPHILS # BLD AUTO: 9.66 X10(3)/MCL (ref 2.1–9.2)
NEUTROPHILS NFR BLD AUTO: 64.6 %
NRBC BLD AUTO-RTO: 0.3 %
PLATELET # BLD AUTO: 132 X10(3)/MCL (ref 130–400)
PMV BLD AUTO: 8.5 FL (ref 7.4–10.4)
POTASSIUM SERPL-SCNC: 4.1 MMOL/L (ref 3.5–5.1)
PROT SERPL-MCNC: 4.5 GM/DL (ref 5.8–7.6)
PROTHROMBIN TIME: 26 SECONDS (ref 12.5–14.5)
RBC # BLD AUTO: 3.15 X10(6)/MCL (ref 4.2–5.4)
SODIUM SERPL-SCNC: 124 MMOL/L (ref 136–145)
WBC # SPEC AUTO: 14.96 X10(3)/MCL (ref 4.5–11.5)

## 2023-10-23 PROCEDURE — 80053 COMPREHEN METABOLIC PANEL: CPT | Performed by: STUDENT IN AN ORGANIZED HEALTH CARE EDUCATION/TRAINING PROGRAM

## 2023-10-23 PROCEDURE — 51798 US URINE CAPACITY MEASURE: CPT

## 2023-10-23 PROCEDURE — 63600175 PHARM REV CODE 636 W HCPCS: Performed by: INTERNAL MEDICINE

## 2023-10-23 PROCEDURE — 25000003 PHARM REV CODE 250: Performed by: INTERNAL MEDICINE

## 2023-10-23 PROCEDURE — 21400001 HC TELEMETRY ROOM

## 2023-10-23 PROCEDURE — 25000003 PHARM REV CODE 250: Performed by: STUDENT IN AN ORGANIZED HEALTH CARE EDUCATION/TRAINING PROGRAM

## 2023-10-23 PROCEDURE — 85025 COMPLETE CBC W/AUTO DIFF WBC: CPT | Performed by: STUDENT IN AN ORGANIZED HEALTH CARE EDUCATION/TRAINING PROGRAM

## 2023-10-23 PROCEDURE — 25000003 PHARM REV CODE 250: Performed by: NURSE PRACTITIONER

## 2023-10-23 PROCEDURE — 85610 PROTHROMBIN TIME: CPT | Performed by: INTERNAL MEDICINE

## 2023-10-23 PROCEDURE — 63600175 PHARM REV CODE 636 W HCPCS: Performed by: STUDENT IN AN ORGANIZED HEALTH CARE EDUCATION/TRAINING PROGRAM

## 2023-10-23 PROCEDURE — P9047 ALBUMIN (HUMAN), 25%, 50ML: HCPCS | Mod: JZ,JG | Performed by: INTERNAL MEDICINE

## 2023-10-23 RX ORDER — DEXAMETHASONE SODIUM PHOSPHATE 4 MG/ML
12 INJECTION, SOLUTION INTRA-ARTICULAR; INTRALESIONAL; INTRAMUSCULAR; INTRAVENOUS; SOFT TISSUE ONCE
Status: DISCONTINUED | OUTPATIENT
Start: 2023-10-23 | End: 2023-10-23

## 2023-10-23 RX ORDER — ALBUMIN HUMAN 250 G/1000ML
50 SOLUTION INTRAVENOUS EVERY 6 HOURS
Status: DISCONTINUED | OUTPATIENT
Start: 2023-10-23 | End: 2023-10-24

## 2023-10-23 RX ORDER — TAMSULOSIN HYDROCHLORIDE 0.4 MG/1
0.4 CAPSULE ORAL DAILY
Status: DISCONTINUED | OUTPATIENT
Start: 2023-10-24 | End: 2023-10-23

## 2023-10-23 RX ORDER — TOLVAPTAN 15 MG/1
15 TABLET ORAL ONCE
Status: COMPLETED | OUTPATIENT
Start: 2023-10-23 | End: 2023-10-23

## 2023-10-23 RX ORDER — MUPIROCIN 20 MG/G
OINTMENT TOPICAL 2 TIMES DAILY
Status: DISCONTINUED | OUTPATIENT
Start: 2023-10-24 | End: 2023-10-26 | Stop reason: HOSPADM

## 2023-10-23 RX ORDER — TAMSULOSIN HYDROCHLORIDE 0.4 MG/1
0.4 CAPSULE ORAL DAILY
Status: DISCONTINUED | OUTPATIENT
Start: 2023-10-23 | End: 2023-10-24

## 2023-10-23 RX ORDER — MIDODRINE HYDROCHLORIDE 5 MG/1
10 TABLET ORAL EVERY 8 HOURS
Status: DISCONTINUED | OUTPATIENT
Start: 2023-10-23 | End: 2023-10-26 | Stop reason: HOSPADM

## 2023-10-23 RX ADMIN — BUSPIRONE HYDROCHLORIDE 5 MG: 5 TABLET ORAL at 09:10

## 2023-10-23 RX ADMIN — RIFAXIMIN 550 MG: 550 TABLET ORAL at 09:10

## 2023-10-23 RX ADMIN — THERA TABS 1 TABLET: TAB at 09:10

## 2023-10-23 RX ADMIN — PANTOPRAZOLE SODIUM 40 MG: 40 TABLET, DELAYED RELEASE ORAL at 09:10

## 2023-10-23 RX ADMIN — TOLVAPTAN 15 MG: 15 TABLET ORAL at 05:10

## 2023-10-23 RX ADMIN — LEVOTHYROXINE SODIUM 100 MCG: 100 TABLET ORAL at 05:10

## 2023-10-23 RX ADMIN — MIDODRINE HYDROCHLORIDE 10 MG: 5 TABLET ORAL at 09:10

## 2023-10-23 RX ADMIN — FOLIC ACID 1 MG: 1 TABLET ORAL at 09:10

## 2023-10-23 RX ADMIN — ALBUMIN (HUMAN) 50 G: 12.5 SOLUTION INTRAVENOUS at 09:10

## 2023-10-23 RX ADMIN — TAMSULOSIN HYDROCHLORIDE 0.4 MG: 0.4 CAPSULE ORAL at 05:10

## 2023-10-23 RX ADMIN — MIDODRINE HYDROCHLORIDE 2.5 MG: 2.5 TABLET ORAL at 09:10

## 2023-10-23 RX ADMIN — CEFTRIAXONE SODIUM 1 G: 1 INJECTION, POWDER, FOR SOLUTION INTRAMUSCULAR; INTRAVENOUS at 12:10

## 2023-10-23 RX ADMIN — DOXYCYCLINE 100 MG: 100 INJECTION, POWDER, LYOPHILIZED, FOR SOLUTION INTRAVENOUS at 01:10

## 2023-10-23 RX ADMIN — LACTULOSE 10 G: 10 SOLUTION ORAL at 09:10

## 2023-10-23 RX ADMIN — THIAMINE HCL TAB 100 MG 100 MG: 100 TAB at 09:10

## 2023-10-23 RX ADMIN — MIDODRINE HYDROCHLORIDE 2.5 MG: 2.5 TABLET ORAL at 05:10

## 2023-10-23 RX ADMIN — ENOXAPARIN SODIUM 40 MG: 40 INJECTION SUBCUTANEOUS at 05:10

## 2023-10-23 RX ADMIN — DEXAMETHASONE SODIUM PHOSPHATE 12 MG: 4 INJECTION, SOLUTION INTRA-ARTICULAR; INTRALESIONAL; INTRAMUSCULAR; INTRAVENOUS; SOFT TISSUE at 09:10

## 2023-10-23 RX ADMIN — SODIUM CHLORIDE 500 ML: 9 INJECTION, SOLUTION INTRAVENOUS at 09:10

## 2023-10-23 RX ADMIN — MIDODRINE HYDROCHLORIDE 2.5 MG: 2.5 TABLET ORAL at 12:10

## 2023-10-23 NOTE — PROGRESS NOTES
Chief complaint: none    Interval History:  Reevaluating pt for persistent hyponatremia.  She has no acute c/o, notes continued abd distention, though leg swelling improved per pt.  + VAZ with anxiety    Review of Systems   Constitutional: Negative.    HENT: Negative.     Respiratory:  Positive for shortness of breath.    Cardiovascular: Negative.    Gastrointestinal:  Positive for abdominal distention.   Genitourinary: Negative.    Neurological: Negative.    Psychiatric/Behavioral:  The patient is nervous/anxious.       all else Neg     busPIRone  5 mg Oral BID    cefTRIAXone (ROCEPHIN) IVPB  1 g Intravenous Q24H    doxycycline (VIBRAMYCIN) IVPB  100 mg Intravenous Q12H    enoxparin  40 mg Subcutaneous Daily    folic acid  1 mg Oral Daily    lactulose 10 gram/15 ml  10 g Oral BID    levothyroxine  100 mcg Oral Before breakfast    midodrine  2.5 mg Oral TID WM    multivitamin  1 tablet Oral Daily    pantoprazole  40 mg Oral Daily    rifAXIMin  550 mg Oral BID    tamsulosin  0.4 mg Oral Daily    thiamine  100 mg Oral Daily       Objective     VITAL SIGNS: 24 HR MIN & MAX LAST    Temp  Min: 97.7 °F (36.5 °C)  Max: 98.2 °F (36.8 °C)  98 °F (36.7 °C)    BP  Min: 104/68  Max: 107/66  106/66     Pulse  Min: 93  Max: 103  99     Resp  Min: 18  Max: 20  18    SpO2  Min: 92 %  Max: 95 %  (!) 94 %      Wt Readings from Last 3 Encounters:   10/13/23 66 kg (145 lb 8.1 oz)   12/12/22 65.8 kg (145 lb)       Intake/Output Summary (Last 24 hours) at 10/23/2023 1757  Last data filed at 10/23/2023 1300  Gross per 24 hour   Intake 360 ml   Output 240 ml   Net 120 ml       Physical Exam  Constitutional:       General: She is not in acute distress.     Appearance: She is ill-appearing.   Cardiovascular:      Rate and Rhythm: Normal rate.   Pulmonary:      Effort: Pulmonary effort is normal.      Breath sounds: Normal breath sounds.   Abdominal:      General: Bowel sounds are normal. There is distension.      Palpations: Abdomen is  soft.      Tenderness: There is no abdominal tenderness.   Musculoskeletal:         General: Swelling present.   Neurological:      Mental Status: She is alert and oriented to person, place, and time.   Psychiatric:         Mood and Affect: Mood normal.          Recent Labs     10/21/23  0508 10/22/23  0648 10/22/23  1351 10/23/23  0502   * 123* 122* 124*   K 4.2 4.1 4.1 4.1   CHLORIDE 95* 95* 95* 95*   CO2 25 26 22* 23   BUN 10.6 12.9 14.1 14.3   CREATININE 0.85 0.79 0.89 0.90   GLUCOSE 82 82 126* 102   CALCIUM 7.4* 7.4* 7.3* 7.0*   MG 1.80  --   --   --    ALBUMIN 1.4* 1.4*  --  1.3*      Recent Labs     10/21/23  0508 10/22/23  0648 10/23/23  0502   WBC 13.42* 13.35* 14.96*   HGB 10.3* 9.7* 9.3*   HCT 29.5* 28.3* 27.0*    137 132         Assessment & Plan     HypoNa - from cirrhosis +/- hypothyroid, unfortunately Na+ still 124, asymptomatic. Will try samsca 15 mg po, monitor response.  Tighten fluid restriction 1 L, f/u labs in am  PNA with effusion  ESLD, coagulopathy  Anemia - Hb 9.3, stable  New hypothyroid - synthroid 100 mcg started on 10/19/23

## 2023-10-23 NOTE — PROGRESS NOTES
[facility]  Hospital Medicine Progress Note      Chief Complaint:  Weakness and poor appetite    Subjective  A 63-year-old female with significant history of depression/anxiety and history of heavy alcohol abuse since age 38. Presented to the ED with complaints of severe generalized weakness x1 week with associated poor appetite.  Patient was hemodynamically stable except for borderline hypotension mild tachycardia in the ED.  Lab significant for leukocytosis, hyperbilirubinemia, low albumin and severe hyponatremia/hypokalemia.  Lactic acid was elevated.  Imaging revealed right pleural effusion, right lower lobe consolidation, cirrhotic liver with ascites and enteritis.  Patient received IV fluids in the ED.  Initiated on empiric antibiotics paid pulmonology, Gastroenterology consulted.  Admitted to hospitalist medicine service.     Interval Hx:   Seen and examined the patient.  Afebrile vitals stable   Has abdominal distention is more than 30 and most likely pleural effusion is worsening still.        Objective/physical exam:  Vital signs have been personally reviewed by me   Neuro:  Awake, alert, oriented  Resting comfortably in bed.  Hand tremor present with extension  General: Appears comfortable, no acute distress.  Integumentary: Warm, dry, intact.  Musculoskeletal: Purposeful movement noted.   Respiratory: No accessory muscle use. Breath sounds are equal.  Cardiovascular: Regular rate. No peripheral edema.    VITAL SIGNS: 24 HRS MIN & MAX LAST   Temp  Min: 97.7 °F (36.5 °C)  Max: 98.2 °F (36.8 °C) 98 °F (36.7 °C)   BP  Min: 104/68  Max: 111/69 106/66   Pulse  Min: 93  Max: 103  99   Resp  Min: 18  Max: 20 18   SpO2  Min: 92 %  Max: 95 % (!) 94 %     CT Chest Without Contrast  Narrative: EXAMINATION:  CT CHEST WITHOUT CONTRAST    CLINICAL HISTORY:  Pneumonia, unresolved;    TECHNIQUE:  Helically acquired axial images, sagittal and coronal reformations were obtained from the  thoracic inlet to the lung bases without the IV administration of contrast.    Automated tube current modulation, weight-based exposure dosing, and/or iterative reconstruction technique utilized to reach lowest reasonably achievable exposure rate.    DLP: 322 mGy*cm    COMPARISON:  Chest radiograph 10/20/2023, CT abdomen pelvis 10/12/2023    FINDINGS:  BASE OF NECK: No significant abnormality.    AORTA: Left-sided aortic arch.  No aneurysm and no significant atherosclerosis    HEART: Normal size. No effusion.    JAZMINE/MEDIASTINUM: No enlarged lymph nodes by size criteria.  Evaluation of hilar lymphadenopathy is limited without intravenous contrast.    AIRWAYS: Patent.    LUNGS: Bibasilar opacities suggestive of atelectasis in the setting of pleural effusions but poorly characterized without contrast.    PLEURA: Moderate dependently layering right pleural effusion.  Small dependently layering left pleural effusion.    UPPER ABDOMEN: Free intraperitoneal fluid.  Venous collaterals in the left upper quadrant.    THORACIC SOFT TISSUES: Body wall edema.  Bilateral breast implants.    BONES: No acute fracture. No suspicious lytic or sclerotic lesions.  Impression: Dependently layering pleural effusions, right larger than left with adjacent airspace opacities, most commonly atelectasis but incompletely characterized without contrast.  On a prior CT exam 10/12/2023 there was enhancing atelectatic lung at both lung bases.    Electronically signed by: Lorna Maxwell  Date:    10/20/2023  Time:    18:25  X-Ray Chest 1 View  Narrative: EXAMINATION:  XR CHEST 1 VIEW    CLINICAL HISTORY:  pneumonia;    TECHNIQUE:  Frontal view(s) of the chest.    COMPARISON:  Radiography 10/12/2023    FINDINGS:  Small bilateral pleural effusions with adjacent hazy lung opacities.  Pleural fluid volume has increased since prior study.  No pneumothorax identified.  Stable cardiac silhouette.  Impression: Small bilateral pleural effusions are  larger since 10/12/2023.  Adjacent opacities may be infiltrates or atelectasis.    Electronically signed by: Naun Soler  Date:    10/20/2023  Time:    11:15  US Abdomen Limited  Narrative: EXAMINATION:  US ABDOMEN LIMITED    CLINICAL HISTORY:  cholecyctitis;    COMPARISON:  CT 12 October 2023.    FINDINGS:  Grayscale, color and spectral doppler evaluation of the right upper quadrant.    The pancreas is obscured.  Imaged portion of the IVC normal in caliber.    Liver is not significantly enlarged.  There is heterogeneous hepatic parenchymal echogenicity.  No focal liver lesion.  The portal vein is patent but there is reversal of flow.    No gallstones are seen.  Gallbladder wall is not significantly thickened.  Common bile duct is not well visualized, not grossly dilated.    Right kidney measures 10 cm in length. No hydronephrosis.    There is moderate ascites.  Impression: 1. Moderate ascites.  2. Patent portal vein but there is reversal of flow with prominent portosystemic collaterals noted on CT.  3. No gallstones.    Electronically signed by: Gildardo Tracy  Date:    10/20/2023  Time:    11:13    Recent Labs   Lab 10/21/23  0508 10/22/23  0648 10/23/23  0502   WBC 13.42* 13.35* 14.96*   RBC 3.43* 3.26* 3.15*   HGB 10.3* 9.7* 9.3*   HCT 29.5* 28.3* 27.0*   MCV 86.0 86.8 85.7   MCH 30.0 29.8 29.5   MCHC 34.9 34.3 34.4   RDW 20.1* 19.9* 20.2*    137 132   MPV 8.3 8.4 8.5         Recent Labs   Lab 10/19/23  0449 10/19/23  0900 10/20/23  0445 10/21/23  0508 10/22/23  0648 10/22/23  1351 10/23/23  0502   *   < > 123* 126* 123* 122* 124*   K 3.9  --  3.8 4.2 4.1 4.1 4.1   CO2 25  --  24 25 26 22* 23   BUN 8.9*  --  8.7* 10.6 12.9 14.1 14.3   CREATININE 0.76  --  0.73 0.85 0.79 0.89 0.90   CALCIUM 7.1*  --  7.1* 7.4* 7.4* 7.3* 7.0*   MG 1.70  --  1.70 1.80  --   --   --    ALBUMIN 1.4*  --   --  1.4* 1.4*  --  1.3*   ALKPHOS  --   --   --  162* 154*  --  174*   ALT  --   --   --  37 35  --  33   AST  --    --   --  115* 108*  --  100*   BILITOT  --   --   --  8.5* 8.3*  --  7.8*    < > = values in this interval not displayed.            Microbiology Results (last 7 days)       Procedure Component Value Units Date/Time    Body Fluid Culture [[id number]] Collected: 10/13/23 0955    Order Status: Completed Specimen: Peritoneal Fluid from Ascitic Fluid Updated: 10/18/23 0922     Body Fluid Culture Final Report: At 5 days. No growth    Blood culture #1 **CANNOT BE ORDERED STAT** [[id number]]  (Normal) Collected: 10/12/23 1735    Order Status: Completed Specimen: Blood Updated: 10/17/23 2001     CULTURE, BLOOD ([facility]) No Growth at 5 days    Blood culture #2 **CANNOT BE ORDERED STAT** [[id number]]  (Normal) Collected: 10/12/23 1735    Order Status: Completed Specimen: Blood Updated: 10/17/23 2001     CULTURE, BLOOD ([facility]) No Growth at 5 days             See below for Radiology    Scheduled Med:   busPIRone  5 mg Oral BID    cefTRIAXone (ROCEPHIN) IVPB  1 g Intravenous Q24H    doxycycline (VIBRAMYCIN) IVPB  100 mg Intravenous Q12H    enoxparin  40 mg Subcutaneous Daily    folic acid  1 mg Oral Daily    lactulose 10 gram/15 ml  10 g Oral BID    levothyroxine  100 mcg Oral Before breakfast    midodrine  2.5 mg Oral TID WM    multivitamin  1 tablet Oral Daily    pantoprazole  40 mg Oral Daily    rifAXIMin  550 mg Oral BID    thiamine  100 mg Oral Daily        PRN Meds:  melatonin, methocarbamoL, ondansetron, sodium chloride 0.9%     Assessment/Plan:  Acute hyponatremia   Pneumonia with pleural effusions   End-stage liver disease-MELD score of 30  Chronic anemia  Acute hypothyroidism-diagnoses on this hospitalization        - slight improvement in her hyponatremia however still we hypernatremic.    Urine sodium <20, though her extremities are not edematous, she has ascites and pleural effusion.    -involve Nephrology again and also reconsult Pulmonary for thoracentesis    -if urine osmolality elevated might consider  tolvaptan.  However bottom line most likely reason of hyponatremia is cirrhosis itself.  -CT of the chest shows pleural effusion with dependent atelectasis.  Is less likely that has pneumonia.  She previously received antibiotics for pneumonia.    -ultrasound of the gallbladder showed no gallstones, gallbladder wall is not thickened.  -his large pleural effusion right greater than left.  Will monitor her respiratory symptoms.  She also has significant ascites.    -hyponatremia is improving we will hold off diuresing now.  We might attempt tomorrow if hyponatremia continues to improve, if she can not tolerate she will need thoracentesis and also paracentesis.  Hyponatremia slightly improved-appropriate response to fluid therapy.  TSH/T3-T4 reviewed-indicative of hypothyroidism, initiated levothyroxine 100 mcg daily------ an empty stomach with water, ideally 30 to 60 minutes before breakfast     Continue PT/OT and diet as tolerated  Continue supportive care  Continue checking vital signs q4hrs.     DVT prophylaxis initiated   Consults:  Nephrologist, gastroenterologist  I have personally reviewed the specialist documentation and/or have spoken to the specialist with regard to the care of this patient; recommendations are noted above.     I have spent >30 minutes on the day of the visit; time spent includes face to face time and non-face to face time preparing to see the patient (eg, review of tests), independently reviewing and interpreting medical records, both past and current; documenting clinical information in the electronic or other health record, and communicating results to the patient/family/caregiver and care coordinator and nursing team.      All diagnosis and differential diagnosis have been reviewed,  interpreted and communicated appropriately to care team. assessment and plan has been documented; I have personally reviewed the labs and test results that are presently available and pertinent to this  hospital course; I have reviewed medical records based upon their availability.  All of the patient's questions have been  addressed and answered. Patient's is agreeable to the above stated plan.   I will continue to monitor closely and make adjustments to medical management as needed.    Emre Kerr MD   10/23/2023      This note was created with the assistance of Dragon voice recognition software. There may be transcription errors as a result of using this technology however minimal. Effort has been made to assure accuracy of transcription but any obvious errors or omissions should be clarified with the author of the document.

## 2023-10-24 LAB
ALBUMIN SERPL-MCNC: 2.4 G/DL (ref 3.4–4.8)
ALBUMIN/GLOB SERPL: 0.9 RATIO (ref 1.1–2)
ALP SERPL-CCNC: 125 UNIT/L (ref 40–150)
ALT SERPL-CCNC: 27 UNIT/L (ref 0–55)
AST SERPL-CCNC: 83 UNIT/L (ref 5–34)
BASOPHILS # BLD AUTO: 0.02 X10(3)/MCL
BASOPHILS NFR BLD AUTO: 0.2 %
BILIRUB SERPL-MCNC: 7.4 MG/DL
BUN SERPL-MCNC: 16.5 MG/DL (ref 9.8–20.1)
CALCIUM SERPL-MCNC: 7.9 MG/DL (ref 8.4–10.2)
CHLORIDE SERPL-SCNC: 95 MMOL/L (ref 98–107)
CO2 SERPL-SCNC: 21 MMOL/L (ref 23–31)
CREAT SERPL-MCNC: 1.27 MG/DL (ref 0.55–1.02)
EOSINOPHIL # BLD AUTO: 0.01 X10(3)/MCL (ref 0–0.9)
EOSINOPHIL NFR BLD AUTO: 0.1 %
ERYTHROCYTE [DISTWIDTH] IN BLOOD BY AUTOMATED COUNT: 20.2 % (ref 11.5–17)
GFR SERPLBLD CREATININE-BSD FMLA CKD-EPI: 48 MLS/MIN/1.73/M2
GLOBULIN SER-MCNC: 2.6 GM/DL (ref 2.4–3.5)
GLUCOSE SERPL-MCNC: 131 MG/DL (ref 82–115)
HCT VFR BLD AUTO: 23.6 % (ref 37–47)
HGB BLD-MCNC: 8.3 G/DL (ref 12–16)
IMM GRANULOCYTES # BLD AUTO: 0.32 X10(3)/MCL (ref 0–0.04)
IMM GRANULOCYTES NFR BLD AUTO: 3.2 %
INR PPP: 2.7
LYMPHOCYTES # BLD AUTO: 0.87 X10(3)/MCL (ref 0.6–4.6)
LYMPHOCYTES NFR BLD AUTO: 8.6 %
MCH RBC QN AUTO: 29.9 PG (ref 27–31)
MCHC RBC AUTO-ENTMCNC: 35.2 G/DL (ref 33–36)
MCV RBC AUTO: 84.9 FL (ref 80–94)
MONOCYTES # BLD AUTO: 0.17 X10(3)/MCL (ref 0.1–1.3)
MONOCYTES NFR BLD AUTO: 1.7 %
NEUTROPHILS # BLD AUTO: 8.69 X10(3)/MCL (ref 2.1–9.2)
NEUTROPHILS NFR BLD AUTO: 86.2 %
NRBC BLD AUTO-RTO: 0.4 %
PLATELET # BLD AUTO: 116 X10(3)/MCL (ref 130–400)
PMV BLD AUTO: 8.6 FL (ref 7.4–10.4)
POTASSIUM SERPL-SCNC: 4.5 MMOL/L (ref 3.5–5.1)
PROT SERPL-MCNC: 5 GM/DL (ref 5.8–7.6)
PROTHROMBIN TIME: 28.6 SECONDS (ref 12.5–14.5)
RBC # BLD AUTO: 2.78 X10(6)/MCL (ref 4.2–5.4)
SODIUM SERPL-SCNC: 123 MMOL/L (ref 136–145)
WBC # SPEC AUTO: 10.08 X10(3)/MCL (ref 4.5–11.5)

## 2023-10-24 PROCEDURE — 63600175 PHARM REV CODE 636 W HCPCS: Mod: JZ,JG | Performed by: INTERNAL MEDICINE

## 2023-10-24 PROCEDURE — 80053 COMPREHEN METABOLIC PANEL: CPT | Performed by: INTERNAL MEDICINE

## 2023-10-24 PROCEDURE — 25000003 PHARM REV CODE 250: Performed by: INTERNAL MEDICINE

## 2023-10-24 PROCEDURE — 85610 PROTHROMBIN TIME: CPT | Performed by: INTERNAL MEDICINE

## 2023-10-24 PROCEDURE — P9047 ALBUMIN (HUMAN), 25%, 50ML: HCPCS | Mod: JZ,JG | Performed by: INTERNAL MEDICINE

## 2023-10-24 PROCEDURE — 21400001 HC TELEMETRY ROOM

## 2023-10-24 PROCEDURE — 63600175 PHARM REV CODE 636 W HCPCS: Performed by: STUDENT IN AN ORGANIZED HEALTH CARE EDUCATION/TRAINING PROGRAM

## 2023-10-24 PROCEDURE — 25000003 PHARM REV CODE 250: Performed by: NURSE PRACTITIONER

## 2023-10-24 PROCEDURE — 85025 COMPLETE CBC W/AUTO DIFF WBC: CPT | Performed by: INTERNAL MEDICINE

## 2023-10-24 PROCEDURE — 63600175 PHARM REV CODE 636 W HCPCS: Performed by: INTERNAL MEDICINE

## 2023-10-24 PROCEDURE — 25000003 PHARM REV CODE 250: Performed by: STUDENT IN AN ORGANIZED HEALTH CARE EDUCATION/TRAINING PROGRAM

## 2023-10-24 PROCEDURE — 63600175 PHARM REV CODE 636 W HCPCS: Performed by: NURSE PRACTITIONER

## 2023-10-24 RX ORDER — ALBUMIN HUMAN 250 G/1000ML
50 SOLUTION INTRAVENOUS ONCE
Status: COMPLETED | OUTPATIENT
Start: 2023-10-25 | End: 2023-10-25

## 2023-10-24 RX ORDER — OCTREOTIDE ACETATE 50 UG/ML
100 INJECTION, SOLUTION INTRAVENOUS; SUBCUTANEOUS EVERY 8 HOURS
Status: DISPENSED | OUTPATIENT
Start: 2023-10-24 | End: 2023-10-25

## 2023-10-24 RX ORDER — ALBUMIN HUMAN 250 G/1000ML
50 SOLUTION INTRAVENOUS ONCE
Status: DISCONTINUED | OUTPATIENT
Start: 2023-10-24 | End: 2023-10-24

## 2023-10-24 RX ORDER — TOLVAPTAN 15 MG/1
30 TABLET ORAL ONCE
Status: DISCONTINUED | OUTPATIENT
Start: 2023-10-24 | End: 2023-10-24

## 2023-10-24 RX ADMIN — OCTREOTIDE ACETATE 100 MCG: 50 INJECTION, SOLUTION INTRAVENOUS; SUBCUTANEOUS at 10:10

## 2023-10-24 RX ADMIN — DOXYCYCLINE 100 MG: 100 INJECTION, POWDER, LYOPHILIZED, FOR SOLUTION INTRAVENOUS at 12:10

## 2023-10-24 RX ADMIN — THIAMINE HCL TAB 100 MG 100 MG: 100 TAB at 09:10

## 2023-10-24 RX ADMIN — BUSPIRONE HYDROCHLORIDE 5 MG: 5 TABLET ORAL at 09:10

## 2023-10-24 RX ADMIN — ONDANSETRON 4 MG: 2 INJECTION INTRAMUSCULAR; INTRAVENOUS at 11:10

## 2023-10-24 RX ADMIN — PANTOPRAZOLE SODIUM 40 MG: 40 TABLET, DELAYED RELEASE ORAL at 09:10

## 2023-10-24 RX ADMIN — MIDODRINE HYDROCHLORIDE 10 MG: 5 TABLET ORAL at 05:10

## 2023-10-24 RX ADMIN — LACTULOSE 10 G: 10 SOLUTION ORAL at 09:10

## 2023-10-24 RX ADMIN — Medication 6 MG: at 09:10

## 2023-10-24 RX ADMIN — CEFTRIAXONE SODIUM 1 G: 1 INJECTION, POWDER, FOR SOLUTION INTRAMUSCULAR; INTRAVENOUS at 11:10

## 2023-10-24 RX ADMIN — THERA TABS 1 TABLET: TAB at 09:10

## 2023-10-24 RX ADMIN — ALBUMIN (HUMAN) 50 G: 12.5 SOLUTION INTRAVENOUS at 05:10

## 2023-10-24 RX ADMIN — RIFAXIMIN 550 MG: 550 TABLET ORAL at 09:10

## 2023-10-24 RX ADMIN — DOXYCYCLINE 100 MG: 100 INJECTION, POWDER, LYOPHILIZED, FOR SOLUTION INTRAVENOUS at 10:10

## 2023-10-24 RX ADMIN — MUPIROCIN: 20 OINTMENT TOPICAL at 09:10

## 2023-10-24 RX ADMIN — LEVOTHYROXINE SODIUM 100 MCG: 100 TABLET ORAL at 05:10

## 2023-10-24 RX ADMIN — FOLIC ACID 1 MG: 1 TABLET ORAL at 09:10

## 2023-10-24 RX ADMIN — ENOXAPARIN SODIUM 40 MG: 40 INJECTION SUBCUTANEOUS at 04:10

## 2023-10-24 RX ADMIN — ONDANSETRON 4 MG: 2 INJECTION INTRAMUSCULAR; INTRAVENOUS at 09:10

## 2023-10-24 RX ADMIN — MIDODRINE HYDROCHLORIDE 10 MG: 5 TABLET ORAL at 09:10

## 2023-10-24 RX ADMIN — DOXYCYCLINE 100 MG: 100 INJECTION, POWDER, LYOPHILIZED, FOR SOLUTION INTRAVENOUS at 11:10

## 2023-10-24 RX ADMIN — OCTREOTIDE ACETATE 100 MCG: 50 INJECTION, SOLUTION INTRAVENOUS; SUBCUTANEOUS at 11:10

## 2023-10-24 NOTE — PT/OT/SLP PROGRESS
Pt refused PT again today. Pt at first stated she could not mobilize due to having a longoria catheter and anxiety. PT educated pt that those things should not preclude her from treatment, and that we are trained to work with patients with such. Discussed importance of mobility and ill effects of bed rest. Pt still refused. Discussed that it is our policy that if she refuses PT 3x, we will have to d/c from services. Will re-attempt tomorrow.

## 2023-10-24 NOTE — AI DETERIORATION ALERT
Artificial Intelligence Notification  [facility]  [address]  [place] [address]  Phone: [phone]    This documentation was triggered by an Artificial Intelligence Notification:    Admit Date: 10/12/2023   LOS: 11  Code Status: Full Code  : 1960  Age: 63 y.o.  Weight:   Wt Readings from Last 1 Encounters:   10/13/23 66 kg (145 lb 8.1 oz)        Sex: female  Bed: 437/437 A  MRN: [id number]  Attending Physician: Emre Kerr MD     Date of Alert: 10/23/2023  Time AI Alert Received:             Vitals:    10/23/23 2014   BP: (!) 70/44   Pulse: 109   Resp: (!) 24   Temp: 97.7 °F (36.5 °C)     SpO2: (!) 90 %      Artificial Intelligence alert discussed with Provider:     Name: Discussed case with Primary nurse Swathi.    Date/Time of Provider Notification:       Patient Condition: Pt Bp 70's/40's over multiple checks on different locations. Patient initially complained of a headache and everything being fuzzy otherwise ok. Primary nurse had spoke with attending who ordered albumin, midodrine and fluid bolus. Fluid bolus and midodrine given with BP still borderline low but pt stated she felt much better. Nurse was hanging albumin on last check and said she would call if no improvement. Will continue to assess if interventions improve pt bp.

## 2023-10-24 NOTE — PROGRESS NOTES
[facility]  Hospital Medicine Progress Note      Chief Complaint:  Weakness and poor appetite    Subjective  A 63-year-old female with significant history of depression/anxiety and history of heavy alcohol abuse since age 38. Presented to the ED with complaints of severe generalized weakness x1 week with associated poor appetite.  Patient was hemodynamically stable except for borderline hypotension mild tachycardia in the ED.  Lab significant for leukocytosis, hyperbilirubinemia, low albumin and severe hyponatremia/hypokalemia.  Lactic acid was elevated.  Imaging revealed right pleural effusion, right lower lobe consolidation, cirrhotic liver with ascites and enteritis.  Patient received IV fluids in the ED.  Initiated on empiric antibiotics paid pulmonology, Gastroenterology consulted.  Admitted to hospitalist medicine service.     Interval Hx:   Hypotensive overnight with lethargy.  Received tolvaptan however no proper response for now   Night team gave albumin octreotide and increase the midodrine to 10 mg seems patient is responding.        Objective/physical exam:  Vital signs have been personally reviewed by me   Neuro:  Awake, alert, oriented  Resting comfortably in bed.  Hand tremor present with extension  General: Appears comfortable, no acute distress.  Integumentary: Warm, dry, intact.  Musculoskeletal: Purposeful movement noted.   Respiratory: No accessory muscle use. Breath sounds are equal.  Cardiovascular: Regular rate. No peripheral edema.    VITAL SIGNS: 24 HRS MIN & MAX LAST   Temp  Min: 97.7 °F (36.5 °C)  Max: 98.7 °F (37.1 °C) 97.9 °F (36.6 °C)   BP  Min: 70/44  Max: 111/68 109/66   Pulse  Min: 91  Max: 109  98   Resp  Min: 18  Max: 24 18   SpO2  Min: 89 %  Max: 96 % 95 %     X-Ray Chest 1 View  Narrative: EXAMINATION:  XR CHEST 1 VIEW    CLINICAL HISTORY:  pleural effusion;    TECHNIQUE:  Single frontal view of the chest was  performed.    COMPARISON:  10/20/2023    FINDINGS:  There are bilateral pleural effusions.  Findings are similar to the prior examination.  Some pulmonary edema seen.  The heart is normal appearance.  Aorta is unremarkable.  Bones and joints show no acute abnormality.  Impression: No significant change in the bilateral pleural effusions    Electronically signed by: Dariana Linares  Date:    10/23/2023  Time:    18:37    Recent Labs   Lab 10/22/23  0648 10/23/23  0502 10/24/23  0633   WBC 13.35* 14.96* 10.08   RBC 3.26* 3.15* 2.78*   HGB 9.7* 9.3* 8.3*   HCT 28.3* 27.0* 23.6*   MCV 86.8 85.7 84.9   MCH 29.8 29.5 29.9   MCHC 34.3 34.4 35.2   RDW 19.9* 20.2* 20.2*    132 116*   MPV 8.4 8.5 8.6         Recent Labs   Lab 10/19/23  0449 10/19/23  0900 10/20/23  0445 10/21/23  0508 10/22/23  0648 10/22/23  1351 10/23/23  0502 10/24/23  0633   *   < > 123* 126* 123* 122* 124* 123*   K 3.9  --  3.8 4.2 4.1 4.1 4.1 4.5   CO2 25  --  24 25 26 22* 23 21*   BUN 8.9*  --  8.7* 10.6 12.9 14.1 14.3 16.5   CREATININE 0.76  --  0.73 0.85 0.79 0.89 0.90 1.27*   CALCIUM 7.1*  --  7.1* 7.4* 7.4* 7.3* 7.0* 7.9*   MG 1.70  --  1.70 1.80  --   --   --   --    ALBUMIN 1.4*  --   --  1.4* 1.4*  --  1.3* 2.4*   ALKPHOS  --   --   --  162* 154*  --  174* 125   ALT  --   --   --  37 35  --  33 27   AST  --   --   --  115* 108*  --  100* 83*   BILITOT  --   --   --  8.5* 8.3*  --  7.8* 7.4*    < > = values in this interval not displayed.            Microbiology Results (last 7 days)       Procedure Component Value Units Date/Time    Body Fluid Culture [[id number]] Collected: 10/13/23 0955    Order Status: Completed Specimen: Peritoneal Fluid from Ascitic Fluid Updated: 10/18/23 0922     Body Fluid Culture Final Report: At 5 days. No growth    Blood culture #1 **CANNOT BE ORDERED STAT** [[id number]]  (Normal) Collected: 10/12/23 1735    Order Status: Completed Specimen: Blood Updated: 10/17/23 2001     CULTURE, BLOOD ([facility]) No  Growth at 5 days    Blood culture #2 **CANNOT BE ORDERED STAT** [[id number]]  (Normal) Collected: 10/12/23 1735    Order Status: Completed Specimen: Blood Updated: 10/17/23 2001     CULTURE, BLOOD ([facility]) No Growth at 5 days             See below for Radiology    Scheduled Med:   [START ON 10/25/2023] albumin human 25%  50 g Intravenous Once    busPIRone  5 mg Oral BID    cefTRIAXone (ROCEPHIN) IVPB  1 g Intravenous Q24H    doxycycline (VIBRAMYCIN) IVPB  100 mg Intravenous Q12H    enoxparin  40 mg Subcutaneous Daily    folic acid  1 mg Oral Daily    lactulose 10 gram/15 ml  10 g Oral BID    levothyroxine  100 mcg Oral Before breakfast    midodrine  10 mg Oral Q8H    multivitamin  1 tablet Oral Daily    mupirocin   Nasal BID    octreotide  100 mcg Subcutaneous Q8H    pantoprazole  40 mg Oral Daily    rifAXIMin  550 mg Oral BID    thiamine  100 mg Oral Daily        PRN Meds:  melatonin, methocarbamoL, ondansetron, sodium chloride 0.9%     Assessment/Plan:  Acute hyponatremia   Pneumonia with pleural effusions   End-stage liver disease-MELD score of 30  Chronic anemia  Acute hypothyroidism-diagnoses on this hospitalization        - patient is now on albumin midodrine and octreotide we will continue.    - abdomen is not as distended however has large effusion, pulmonary is consulted appreciate their recs.  - will get abdominal ultrasound to see the ascites if it is amenable to paracentesis.  -CT of the chest shows pleural effusion with dependent atelectasis.  Is less likely that has pneumonia.  She previously received antibiotics for pneumonia.    -ultrasound of the gallbladder showed no gallstones, gallbladder wall is not thickened.  -his large pleural effusion right greater than left.  Will monitor her respiratory symptoms.  She also has significant ascites.    -hyponatremia is improving we will hold off diuresing now.  We might attempt tomorrow if hyponatremia continues to improve, if she can not tolerate she will need  thoracentesis and also paracentesis.  Hyponatremia slightly improved-appropriate response to fluid therapy.  TSH/T3-T4 reviewed-indicative of hypothyroidism, initiated levothyroxine 100 mcg daily------ an empty stomach with water, ideally 30 to 60 minutes before breakfast     Continue PT/OT and diet as tolerated  Continue supportive care  Continue checking vital signs q4hrs.     DVT prophylaxis initiated   Consults:  Nephrologist, gastroenterologist  I have personally reviewed the specialist documentation and/or have spoken to the specialist with regard to the care of this patient; recommendations are noted above.     I have spent >30 minutes on the day of the visit; time spent includes face to face time and non-face to face time preparing to see the patient (eg, review of tests), independently reviewing and interpreting medical records, both past and current; documenting clinical information in the electronic or other health record, and communicating results to the patient/family/caregiver and care coordinator and nursing team.      All diagnosis and differential diagnosis have been reviewed,  interpreted and communicated appropriately to care team. assessment and plan has been documented; I have personally reviewed the labs and test results that are presently available and pertinent to this hospital course; I have reviewed medical records based upon their availability.  All of the patient's questions have been  addressed and answered. Patient's is agreeable to the above stated plan.   I will continue to monitor closely and make adjustments to medical management as needed.    Emre Kerr MD   10/24/2023      This note was created with the assistance of Dragon voice recognition software. There may be transcription errors as a result of using this technology however minimal. Effort has been made to assure accuracy of transcription but any obvious errors or omissions should be clarified with the author of the  document.

## 2023-10-24 NOTE — PROGRESS NOTES
Chief complaint: anxious    Interval History:  Pt had hypotensive episode yesterday after receiving 15 mg samsca requiring 500 cc IVF bolus, IV albumin.  Midodrine was increased to 10 mg tid, she was started on octreotide, has no acute c/o today, only anxiety    Review of Systems   Constitutional: Negative.    HENT: Negative.     Respiratory: Negative.     Cardiovascular: Negative.    Gastrointestinal:  Positive for abdominal distention.   Genitourinary: Negative.    Musculoskeletal: Negative.    Neurological:  Positive for weakness.   Psychiatric/Behavioral:  The patient is nervous/anxious.       all else Neg     [START ON 10/25/2023] albumin human 25%  50 g Intravenous Once    busPIRone  5 mg Oral BID    cefTRIAXone (ROCEPHIN) IVPB  1 g Intravenous Q24H    doxycycline (VIBRAMYCIN) IVPB  100 mg Intravenous Q12H    enoxparin  40 mg Subcutaneous Daily    folic acid  1 mg Oral Daily    lactulose 10 gram/15 ml  10 g Oral BID    levothyroxine  100 mcg Oral Before breakfast    midodrine  10 mg Oral Q8H    multivitamin  1 tablet Oral Daily    mupirocin   Nasal BID    octreotide  100 mcg Subcutaneous Q8H    pantoprazole  40 mg Oral Daily    rifAXIMin  550 mg Oral BID    thiamine  100 mg Oral Daily       Objective     VITAL SIGNS: 24 HR MIN & MAX LAST    Temp  Min: 97.7 °F (36.5 °C)  Max: 98.7 °F (37.1 °C)  97.9 °F (36.6 °C)    BP  Min: 70/44  Max: 111/68  109/66     Pulse  Min: 91  Max: 109  98     Resp  Min: 18  Max: 24  18    SpO2  Min: 89 %  Max: 96 %  95 %      Wt Readings from Last 3 Encounters:   10/13/23 66 kg (145 lb 8.1 oz)   12/12/22 65.8 kg (145 lb)       Intake/Output Summary (Last 24 hours) at 10/24/2023 1243  Last data filed at 10/24/2023 1120  Gross per 24 hour   Intake 1140 ml   Output 462 ml   Net 678 ml       Physical Exam  Constitutional:       General: She is not in acute distress.  Cardiovascular:      Rate and Rhythm: Normal rate.   Pulmonary:      Effort: No respiratory distress.      Breath  sounds: No rales.   Abdominal:      General: Bowel sounds are normal. There is distension.      Palpations: Abdomen is soft.      Tenderness: There is no abdominal tenderness.   Musculoskeletal:         General: No swelling.      Cervical back: Normal range of motion.   Neurological:      General: No focal deficit present.      Mental Status: She is alert and oriented to person, place, and time.          Recent Labs     10/22/23  1351 10/23/23  0502 10/24/23  0633   * 124* 123*   K 4.1 4.1 4.5   CHLORIDE 95* 95* 95*   CO2 22* 23 21*   BUN 14.1 14.3 16.5   CREATININE 0.89 0.90 1.27*   GLUCOSE 126* 102 131*   CALCIUM 7.3* 7.0* 7.9*   ALBUMIN  --  1.3* 2.4*      Recent Labs     10/22/23  0648 10/23/23  0502 10/24/23  0633   WBC 13.35* 14.96* 10.08   HGB 9.7* 9.3* 8.3*   HCT 28.3* 27.0* 23.6*    132 116*         Assessment & Plan     HypoNa - from cirrhosis +/- hypothyroid +/- vol depletion, unfortunately Na+ holding at 123 with no response to samsca 15 mg yesterday.  Continue fluid restriction 1 L, monitor response to midodrine/octreotide/IV albumin  Hypotension, ?HRS type 2 - she tolerated samsca poorly developing hypotension yesterday afternoon needing 500cc IVF bolus and IV albumin, with midodrine increased, octreotide started, she is now azotemic, would continue midodrine / octreotide treating for HRS, f/u labs in am   PNA with effusion - seen by pulmonary, doesn't appear to have enough fluid for thoracentesis  ESLD, ascites, coagulopathy - would hold off on paracentesis until BP, GFR stable  Anemia - Hb 8.3 after volume resuscitation as above, stable  New hypothyroid - synthroid 100 mcg started on 10/19/23

## 2023-10-24 NOTE — CONSULTS
[facility] - 4th Floor Medical Telemetry  Pulmonary Critical Care Note    Patient Name: Lauren Ewing  MRN: [id number]  Admission Date: 10/12/2023  Hospital Length of Stay: 12 days  Code Status: Full Code  Attending Provider: Emre Kerr MD  Primary Care Provider: Pennie, Primary Doctor     Subjective:     HPI:   This is a 63-year-old female with a past medical history of depression/anxiety, and alcohol abuse who presented to the emergency department on 10/12/2023 with progressive weakness and yellowing of her skin.  She also endorsed poor appetite over the past week.  On arrival to the emergency department she was found to have leukocytosis of 20,000, a significantly elevated bilirubin at 11.6, hyponatremia, and mildly elevated lactic acid level.  Alcohol level was undetectable.  She reported a heavy history of alcohol abuse since about age 38.  She has been in rehab for alcohol abuse before.  Her last drink was approximately 4 months ago she tells me.  CT of the abdomen and pelvis with contrast revealed  a right-sided pleural effusion, right lower lobe consolidation, hepatic cirrhotic morphology and ascites with mural thickening of the small bowel loops suggesting nonspecific enteritis.  Pulmonary is being consulted for evaluation of right-sided pleural effusion. Dr Cary reviewed imaging and felt this was likely hepatothorax and did not need thoracentesis.     Hospital Course/Significant events:  10/13/2023- US paracentesis with removal of 750 mL of clear yellow fluid.  10/20/2023- repeat CT of chest showed layering pleural effusions right greater than left with adjacent airspace opacities felt to commonly represent atelectasis but incompletely characterized without contrast.  10/23/23- repeat chest x-ray showed bilateral pleural effusions similar to prior exam. Last evening the patient became hypotensive. Feels better this AM      24 Hour Interval History:  She is lying in bed. Oxygenating well on  room air. Continues with hyponatremia.       PMH: depression/anxiety, ETOH abuse  PSH: denies  SOCIAL HX:  history of alcohol abuse since age 38. Has been clean for approximately 4 months. Life long non smoker.        Current Outpatient Medications   Medication Instructions    promethazine (PHENERGAN) 25 mg, Rectal, Every 6 hours PRN    promethazine (PHENERGAN) 25 mg, Oral, Every 6 hours PRN       Current Inpatient Medications   [START ON 10/25/2023] albumin human 25%  50 g Intravenous Once    busPIRone  5 mg Oral BID    cefTRIAXone (ROCEPHIN) IVPB  1 g Intravenous Q24H    doxycycline (VIBRAMYCIN) IVPB  100 mg Intravenous Q12H    enoxparin  40 mg Subcutaneous Daily    folic acid  1 mg Oral Daily    lactulose 10 gram/15 ml  10 g Oral BID    levothyroxine  100 mcg Oral Before breakfast    midodrine  10 mg Oral Q8H    multivitamin  1 tablet Oral Daily    mupirocin   Nasal BID    octreotide  100 mcg Subcutaneous Q8H    pantoprazole  40 mg Oral Daily    rifAXIMin  550 mg Oral BID    tamsulosin  0.4 mg Oral Daily    thiamine  100 mg Oral Daily       Current Intravenous Infusions        Review of Systems   Constitutional:  Positive for malaise/fatigue.   HENT: Negative.     Respiratory:  Positive for shortness of breath.    Cardiovascular:  Positive for leg swelling.   Neurological:  Positive for weakness.          Objective:       Intake/Output Summary (Last 24 hours) at 10/24/2023 0845  Last data filed at 10/24/2023 0518  Gross per 24 hour   Intake 440 ml   Output 462 ml   Net -22 ml       Vital Signs (Most Recent):  Temp: 98.1 °F (36.7 °C) (10/24/23 0809)  Pulse: 105 (10/24/23 0809)  Resp: 18 (10/24/23 0300)  BP: 105/61 (10/24/23 0809)  SpO2: 96 % (10/24/23 0809)  Body mass index is 24.98 kg/m².  Weight: 66 kg (145 lb 8.1 oz) Vital Signs (24h Range):  Temp:  [97.7 °F (36.5 °C)-98.7 °F (37.1 °C)] 98.1 °F (36.7 °C)  Pulse:  [] 105  Resp:  [18-24] 18  SpO2:  [89 %-96 %] 96 %  BP: ()/(44-68) 105/61     Physical  "Exam  Vitals reviewed.   Constitutional:       Appearance: Normal appearance.   HENT:      Head: Normocephalic and atraumatic.   Cardiovascular:      Rate and Rhythm: Normal rate.      Heart sounds: Normal heart sounds.   Pulmonary:      Effort: Pulmonary effort is normal.      Comments: Diminished in bases  Abdominal:      Palpations: Abdomen is soft.   Musculoskeletal:      Cervical back: Neck supple.      Right lower leg: Edema present.      Left lower leg: Edema present.   Skin:     Coloration: Skin is jaundiced.   Neurological:      General: No focal deficit present.      Mental Status: She is alert and oriented to person, place, and time.         Lines/Drains/Airways       Drain  Duration                  Urethral Catheter 10/23/23 1946 16 Fr. <1 day              Peripheral Intravenous Line  Duration                  Peripheral IV - Single Lumen 10/16/23 2300 22 G Posterior;Right Hand 7 days                    Significant Labs:    Lab Results   Component Value Date    WBC 10.08 10/24/2023    HGB 8.3 (L) 10/24/2023    HCT 23.6 (L) 10/24/2023    MCV 84.9 10/24/2023     (L) 10/24/2023       BMP  Lab Results   Component Value Date     (L) 10/24/2023    K 4.5 10/24/2023    CHLORIDE 95 (L) 10/24/2023    CO2 21 (L) 10/24/2023    BUN 16.5 10/24/2023    CREATININE 1.27 (H) 10/24/2023    CALCIUM 7.9 (L) 10/24/2023    AGAP 5.0 10/22/2023    EGFRNONAA >60 02/08/2022         ABG  No results for input(s): "PH", "PO2", "PCO2", "HCO3", "POCBASEDEF" in the last 168 hours.      Significant Imaging:  X-Ray Chest 1 View  Narrative: EXAMINATION:  XR CHEST 1 VIEW    CLINICAL HISTORY:  pleural effusion;    TECHNIQUE:  Single frontal view of the chest was performed.    COMPARISON:  10/20/2023    FINDINGS:  There are bilateral pleural effusions.  Findings are similar to the prior examination.  Some pulmonary edema seen.  The heart is normal appearance.  Aorta is unremarkable.  Bones and joints show no acute " abnormality.  Impression: No significant change in the bilateral pleural effusions    Electronically signed by: Dariana Linares  Date:    10/23/2023  Time:    18:37        Assessment/Plan:     Assessment  Bilateral effusions; right greater than left on imaging   Cirrhosis with resulting ascites and small hepatothorax  Hyponatremia     Plan  Md to review imaging with further recs to follow.        Reta Calvillo, ANP  Pulmonary Critical Care Medicine  [facility] - 4th Floor Medical Telemetry  DOS: 10/24/2023

## 2023-10-24 NOTE — CARE UPDATE
Notified by RN at 8:50 p.m. patient is hypotensive BP 70/40s on multiple checks and appears lethargic.     Chart review:  Admitted on 10/13/2023 with alcoholic hepatitis and newly diagnosed alcoholic cirrhosis with ascites and right hepatic hydrothorax with compressive atelectasis and questionable right lower lobe pneumonia (did not have any pleuritic chest pain, cough, fever, chills--only finding is leukocytosis which can be attributed to alcoholic hepatitis), hepatic encephalopathy, cirrhosis related hypervolemic hyponatremia with decreased effective circulating volume, coagulopathy, and newly diagnosed hypothyroidism.    Started on antibiotics, lactulose, rifaximin, levothyroxine, underwent paracentesis on 10/13/2023 with 0.75 L removed (SAAG>1.1), initially placed on salt tabs, Lasix and Aldactone and fluid restriction which subsequently all discontinued on 10/15/2023.  Midodrine 2.5 q.8 hours added on 10/15/2023.  Repeat abdominal ultrasound and CT scan on 10/20/2023 show increasing bilateral pleural effusion and moderate ascites.    Sodium remained stable at 123-124. Nephrology consulted and was given tolvaptan 10/23/2023.    Labs reviewed below:    Recent Labs     10/22/23  0648 10/23/23  0502   WBC 13.35* 14.96*   RBC 3.26* 3.15*   HGB 9.7* 9.3*   HCT 28.3* 27.0*   MCV 86.8 85.7   MCH 29.8 29.5   MCHC 34.3 34.4   RDW 19.9* 20.2*    132     Recent Labs     10/23/23  0502   PROTIME 26.0*   INR 2.4*      Recent Labs     10/22/23  0648 10/23/23  0502   * 124*   K 4.1 4.1   CHLORIDE 95* 95*   CO2 26 23   BUN 12.9 14.3   CREATININE 0.79 0.90   EGFRNORACEVR >60 >60   GLUCOSE 82 102   CALCIUM 7.4* 7.0*   ALBUMIN 1.4* 1.3*   GLOBULIN 3.1 3.2   ALKPHOS 154* 174*   ALT 35 33   * 100*   BILITOT 8.3* 7.8*          Assessment:    Hypotension due to decreased effective circulating volume  Alcoholic hepatitis - MDF > 60 on admission  Alcoholic cirrhosis - MELD-Na =30  Moderate ascites and hepatic  hydrothorax  Hypervolemic hyponatremia due to cirrhosis and hypothyroid state  Hypothyroidism - initiated levothyroxine on 10/19/2023, no prior glucocorticoid administration.  Severe hypoalbuminemia  Leukocytosis secondary to alcoholic hepatitis- improving    Plan:    Gave  mL bolus  Increased midodrine to 10 mg Q8H + Albumin 1g/kg daily x3 doses + octreotide 100 mcg subq Q8H x 3 doses  (this will improve systemic blood pressure as well as renal excretion of solute free water thus improving hyponatremia).  Dexamethasone 12 mg IV x1    Blood pressure improved to SBP above 100 after above intervention.    Completed 7 days of antibiotic, likely can discontinue and place on SBP prophylaxis with Cipro.  Will defer to primary hospitalist attending.  Could consider initiating prednisolone given high Maddrey score, and infection has been ruled out and/or treated.  Expect morning labs to show worsening H&H due to fluid shift with albumin.    Critical care time 35 minutes  Critical care diagnosis symptomatic hypotension

## 2023-10-25 VITALS
HEART RATE: 95 BPM | SYSTOLIC BLOOD PRESSURE: 120 MMHG | HEIGHT: 64 IN | DIASTOLIC BLOOD PRESSURE: 76 MMHG | BODY MASS INDEX: 24.84 KG/M2 | RESPIRATION RATE: 18 BRPM | WEIGHT: 145.5 LBS | TEMPERATURE: 98 F | OXYGEN SATURATION: 95 %

## 2023-10-25 LAB
ALBUMIN SERPL-MCNC: 3.2 G/DL (ref 3.4–4.8)
ALBUMIN/GLOB SERPL: 1.2 RATIO (ref 1.1–2)
ALP SERPL-CCNC: 118 UNIT/L (ref 40–150)
ALT SERPL-CCNC: 28 UNIT/L (ref 0–55)
AST SERPL-CCNC: 71 UNIT/L (ref 5–34)
AV INDEX (PROSTH): 0.76
AV MEAN GRADIENT: 7 MMHG
AV PEAK GRADIENT: 13 MMHG
AV VELOCITY RATIO: 0.76
BASOPHILS # BLD AUTO: 0.04 X10(3)/MCL
BASOPHILS NFR BLD AUTO: 0.2 %
BILIRUB SERPL-MCNC: 6.2 MG/DL
BSA FOR ECHO PROCEDURE: 1.73 M2
BUN SERPL-MCNC: 19 MG/DL (ref 9.8–20.1)
CALCIUM SERPL-MCNC: 8.2 MG/DL (ref 8.4–10.2)
CHLORIDE SERPL-SCNC: 95 MMOL/L (ref 98–107)
CO2 SERPL-SCNC: 20 MMOL/L (ref 23–31)
CREAT SERPL-MCNC: 1.6 MG/DL (ref 0.55–1.02)
CV ECHO LV RWT: 0.51 CM
DOP CALC AO PEAK VEL: 1.77 M/S
DOP CALC AO VTI: 36.8 CM
DOP CALC LVOT PEAK VEL: 1.34 M/S
DOP CALCLVOT PEAK VEL VTI: 28.1 CM
E WAVE DECELERATION TIME: 187 MSEC
E/A RATIO: 0.96
E/E' RATIO: 7.21 M/S
ECHO LV POSTERIOR WALL: 1 CM (ref 0.6–1.1)
EOSINOPHIL # BLD AUTO: 0 X10(3)/MCL (ref 0–0.9)
EOSINOPHIL NFR BLD AUTO: 0 %
ERYTHROCYTE [DISTWIDTH] IN BLOOD BY AUTOMATED COUNT: 20.3 % (ref 11.5–17)
FRACTIONAL SHORTENING: 49 % (ref 28–44)
GFR SERPLBLD CREATININE-BSD FMLA CKD-EPI: 36 MLS/MIN/1.73/M2
GLOBULIN SER-MCNC: 2.6 GM/DL (ref 2.4–3.5)
GLUCOSE SERPL-MCNC: 146 MG/DL (ref 82–115)
HCT VFR BLD AUTO: 24.7 % (ref 37–47)
HGB BLD-MCNC: 8.6 G/DL (ref 12–16)
IMM GRANULOCYTES # BLD AUTO: 0.43 X10(3)/MCL (ref 0–0.04)
IMM GRANULOCYTES NFR BLD AUTO: 2.1 %
INR PPP: 2.3
INTERVENTRICULAR SEPTUM: 0.8 CM (ref 0.6–1.1)
LEFT ATRIUM SIZE: 3.7 CM
LEFT ATRIUM VOLUME INDEX MOD: 31.4 ML/M2
LEFT ATRIUM VOLUME MOD: 53.7 CM3
LEFT INTERNAL DIMENSION IN SYSTOLE: 2 CM (ref 2.1–4)
LEFT VENTRICLE DIASTOLIC VOLUME INDEX: 38.54 ML/M2
LEFT VENTRICLE DIASTOLIC VOLUME: 65.9 ML
LEFT VENTRICLE MASS INDEX: 62 G/M2
LEFT VENTRICLE SYSTOLIC VOLUME INDEX: 7.4 ML/M2
LEFT VENTRICLE SYSTOLIC VOLUME: 12.7 ML
LEFT VENTRICULAR INTERNAL DIMENSION IN DIASTOLE: 3.9 CM (ref 3.5–6)
LEFT VENTRICULAR MASS: 105.33 G
LV LATERAL E/E' RATIO: 6.73 M/S
LV SEPTAL E/E' RATIO: 7.77 M/S
LVOT MG: 4 MMHG
LVOT MV: 0.93 CM/S
LYMPHOCYTES # BLD AUTO: 2.04 X10(3)/MCL (ref 0.6–4.6)
LYMPHOCYTES NFR BLD AUTO: 10.2 %
MAGNESIUM SERPL-MCNC: 2.2 MG/DL (ref 1.6–2.6)
MCH RBC QN AUTO: 30.5 PG (ref 27–31)
MCHC RBC AUTO-ENTMCNC: 34.8 G/DL (ref 33–36)
MCV RBC AUTO: 87.6 FL (ref 80–94)
MONOCYTES # BLD AUTO: 1.25 X10(3)/MCL (ref 0.1–1.3)
MONOCYTES NFR BLD AUTO: 6.2 %
MV PEAK A VEL: 1.05 M/S
MV PEAK E VEL: 1.01 M/S
NEUTROPHILS # BLD AUTO: 16.29 X10(3)/MCL (ref 2.1–9.2)
NEUTROPHILS NFR BLD AUTO: 81.3 %
NRBC BLD AUTO-RTO: 0.2 %
OHS LV EJECTION FRACTION SIMPSONS BIPLANE MOD: 75 %
PISA TR MAX VEL: 2.48 M/S
PLATELET # BLD AUTO: 139 X10(3)/MCL (ref 130–400)
PMV BLD AUTO: 9.1 FL (ref 7.4–10.4)
POTASSIUM SERPL-SCNC: 4.7 MMOL/L (ref 3.5–5.1)
PROT SERPL-MCNC: 5.8 GM/DL (ref 5.8–7.6)
PROTHROMBIN TIME: 24.7 SECONDS (ref 12.5–14.5)
PV PEAK GRADIENT: 6 MMHG
PV PEAK VELOCITY: 1.22 M/S
RA PRESSURE ESTIMATED: 15 MMHG
RBC # BLD AUTO: 2.82 X10(6)/MCL (ref 4.2–5.4)
RV TB RVSP: 17 MMHG
SODIUM SERPL-SCNC: 123 MMOL/L (ref 136–145)
TDI LATERAL: 0.15 M/S
TDI SEPTAL: 0.13 M/S
TDI: 0.14 M/S
TR MAX PG: 25 MMHG
TRICUSPID ANNULAR PLANE SYSTOLIC EXCURSION: 2.33 CM
TV REST PULMONARY ARTERY PRESSURE: 40 MMHG
WBC # SPEC AUTO: 20.05 X10(3)/MCL (ref 4.5–11.5)
Z-SCORE OF LEFT VENTRICULAR DIMENSION IN END DIASTOLE: -1.97
Z-SCORE OF LEFT VENTRICULAR DIMENSION IN END SYSTOLE: -3.05

## 2023-10-25 PROCEDURE — 63600175 PHARM REV CODE 636 W HCPCS: Performed by: STUDENT IN AN ORGANIZED HEALTH CARE EDUCATION/TRAINING PROGRAM

## 2023-10-25 PROCEDURE — 25000003 PHARM REV CODE 250: Performed by: INTERNAL MEDICINE

## 2023-10-25 PROCEDURE — 80053 COMPREHEN METABOLIC PANEL: CPT | Performed by: INTERNAL MEDICINE

## 2023-10-25 PROCEDURE — 63600175 PHARM REV CODE 636 W HCPCS: Performed by: NURSE PRACTITIONER

## 2023-10-25 PROCEDURE — 63600175 PHARM REV CODE 636 W HCPCS: Mod: JB | Performed by: INTERNAL MEDICINE

## 2023-10-25 PROCEDURE — 83735 ASSAY OF MAGNESIUM: CPT | Performed by: STUDENT IN AN ORGANIZED HEALTH CARE EDUCATION/TRAINING PROGRAM

## 2023-10-25 PROCEDURE — 25000003 PHARM REV CODE 250: Performed by: NURSE PRACTITIONER

## 2023-10-25 PROCEDURE — 25000003 PHARM REV CODE 250: Performed by: STUDENT IN AN ORGANIZED HEALTH CARE EDUCATION/TRAINING PROGRAM

## 2023-10-25 PROCEDURE — P9047 ALBUMIN (HUMAN), 25%, 50ML: HCPCS | Mod: JZ,JG | Performed by: INTERNAL MEDICINE

## 2023-10-25 PROCEDURE — 85025 COMPLETE CBC W/AUTO DIFF WBC: CPT | Performed by: STUDENT IN AN ORGANIZED HEALTH CARE EDUCATION/TRAINING PROGRAM

## 2023-10-25 PROCEDURE — 85610 PROTHROMBIN TIME: CPT | Performed by: INTERNAL MEDICINE

## 2023-10-25 PROCEDURE — 97164 PT RE-EVAL EST PLAN CARE: CPT

## 2023-10-25 RX ORDER — PANTOPRAZOLE SODIUM 40 MG/1
40 TABLET, DELAYED RELEASE ORAL DAILY
Qty: 30 TABLET | Refills: 11 | Status: SHIPPED | OUTPATIENT
Start: 2023-10-26 | End: 2024-10-25

## 2023-10-25 RX ORDER — MIDODRINE HYDROCHLORIDE 10 MG/1
10 TABLET ORAL EVERY 8 HOURS
Qty: 90 TABLET | Refills: 11 | Status: ON HOLD | OUTPATIENT
Start: 2023-10-25 | End: 2023-10-28 | Stop reason: SDUPTHER

## 2023-10-25 RX ORDER — MUPIROCIN 20 MG/G
OINTMENT TOPICAL 2 TIMES DAILY
Status: ON HOLD
Start: 2023-10-25 | End: 2023-10-28 | Stop reason: HOSPADM

## 2023-10-25 RX ORDER — LACTULOSE 10 G/15ML
10 SOLUTION ORAL 2 TIMES DAILY
Refills: 0 | Status: ON HOLD
Start: 2023-10-25 | End: 2023-10-28 | Stop reason: HOSPADM

## 2023-10-25 RX ORDER — FOLIC ACID 1 MG/1
1 TABLET ORAL DAILY
Refills: 0 | Status: ON HOLD
Start: 2023-10-26 | End: 2023-10-28 | Stop reason: SDUPTHER

## 2023-10-25 RX ORDER — ALBUMIN HUMAN 250 G/1000ML
50 SOLUTION INTRAVENOUS ONCE
Status: COMPLETED | OUTPATIENT
Start: 2023-10-25 | End: 2023-10-25

## 2023-10-25 RX ORDER — LEVOTHYROXINE SODIUM 100 UG/1
100 TABLET ORAL
Qty: 30 TABLET | Refills: 11 | Status: ON HOLD | OUTPATIENT
Start: 2023-10-26 | End: 2023-10-28 | Stop reason: HOSPADM

## 2023-10-25 RX ORDER — LANOLIN ALCOHOL/MO/W.PET/CERES
100 CREAM (GRAM) TOPICAL DAILY
Status: ON HOLD
Start: 2023-10-26 | End: 2023-10-28 | Stop reason: SDUPTHER

## 2023-10-25 RX ORDER — OCTREOTIDE ACETATE 50 UG/ML
200 INJECTION, SOLUTION INTRAVENOUS; SUBCUTANEOUS EVERY 8 HOURS
Qty: 360 ML | Refills: 11 | Status: ON HOLD
Start: 2023-10-25 | End: 2023-10-28 | Stop reason: HOSPADM

## 2023-10-25 RX ORDER — PHYTONADIONE 5 MG/1
10 TABLET ORAL DAILY
Status: DISCONTINUED | OUTPATIENT
Start: 2023-10-25 | End: 2023-10-26 | Stop reason: HOSPADM

## 2023-10-25 RX ORDER — BUSPIRONE HYDROCHLORIDE 5 MG/1
5 TABLET ORAL 2 TIMES DAILY
Qty: 60 TABLET | Refills: 11 | Status: ON HOLD | OUTPATIENT
Start: 2023-10-25 | End: 2023-10-28 | Stop reason: HOSPADM

## 2023-10-25 RX ORDER — OCTREOTIDE ACETATE 50 UG/ML
200 INJECTION, SOLUTION INTRAVENOUS; SUBCUTANEOUS EVERY 8 HOURS
Status: DISCONTINUED | OUTPATIENT
Start: 2023-10-25 | End: 2023-10-26 | Stop reason: HOSPADM

## 2023-10-25 RX ORDER — METHOCARBAMOL 750 MG/1
750 TABLET, FILM COATED ORAL EVERY 8 HOURS PRN
Status: ON HOLD
Start: 2023-10-25 | End: 2023-10-28 | Stop reason: HOSPADM

## 2023-10-25 RX ORDER — OCTREOTIDE ACETATE 50 UG/ML
100 INJECTION, SOLUTION INTRAVENOUS; SUBCUTANEOUS EVERY 8 HOURS
Status: DISCONTINUED | OUTPATIENT
Start: 2023-10-25 | End: 2023-10-25

## 2023-10-25 RX ORDER — TALC
6 POWDER (GRAM) TOPICAL NIGHTLY PRN
Refills: 0
Start: 2023-10-25 | End: 2023-12-28

## 2023-10-25 RX ADMIN — THERA TABS 1 TABLET: TAB at 08:10

## 2023-10-25 RX ADMIN — RIFAXIMIN 550 MG: 550 TABLET ORAL at 08:10

## 2023-10-25 RX ADMIN — OCTREOTIDE ACETATE 200 MCG: 50 INJECTION, SOLUTION INTRAVENOUS; SUBCUTANEOUS at 03:10

## 2023-10-25 RX ADMIN — CEFTRIAXONE SODIUM 1 G: 1 INJECTION, POWDER, FOR SOLUTION INTRAMUSCULAR; INTRAVENOUS at 01:10

## 2023-10-25 RX ADMIN — THIAMINE HCL TAB 100 MG 100 MG: 100 TAB at 08:10

## 2023-10-25 RX ADMIN — LACTULOSE 10 G: 10 SOLUTION ORAL at 08:10

## 2023-10-25 RX ADMIN — PANTOPRAZOLE SODIUM 40 MG: 40 TABLET, DELAYED RELEASE ORAL at 08:10

## 2023-10-25 RX ADMIN — FOLIC ACID 1 MG: 1 TABLET ORAL at 08:10

## 2023-10-25 RX ADMIN — ONDANSETRON 4 MG: 2 INJECTION INTRAMUSCULAR; INTRAVENOUS at 04:10

## 2023-10-25 RX ADMIN — OCTREOTIDE ACETATE 100 MCG: 50 INJECTION, SOLUTION INTRAVENOUS; SUBCUTANEOUS at 08:10

## 2023-10-25 RX ADMIN — BUSPIRONE HYDROCHLORIDE 5 MG: 5 TABLET ORAL at 08:10

## 2023-10-25 RX ADMIN — ONDANSETRON 4 MG: 2 INJECTION INTRAMUSCULAR; INTRAVENOUS at 08:10

## 2023-10-25 RX ADMIN — MIDODRINE HYDROCHLORIDE 10 MG: 5 TABLET ORAL at 02:10

## 2023-10-25 RX ADMIN — ALBUMIN (HUMAN) 50 G: 12.5 SOLUTION INTRAVENOUS at 11:10

## 2023-10-25 RX ADMIN — ONDANSETRON 4 MG: 2 INJECTION INTRAMUSCULAR; INTRAVENOUS at 03:10

## 2023-10-25 RX ADMIN — LEVOTHYROXINE SODIUM 100 MCG: 100 TABLET ORAL at 04:10

## 2023-10-25 RX ADMIN — ALBUMIN (HUMAN) 50 G: 12.5 SOLUTION INTRAVENOUS at 04:10

## 2023-10-25 RX ADMIN — MIDODRINE HYDROCHLORIDE 10 MG: 5 TABLET ORAL at 04:10

## 2023-10-25 RX ADMIN — MUPIROCIN: 20 OINTMENT TOPICAL at 08:10

## 2023-10-25 RX ADMIN — PHYTONADIONE 10 MG: 5 TABLET ORAL at 08:10

## 2023-10-25 NOTE — PROGRESS NOTES
[facility]  Hospital Medicine Progress Note      Chief Complaint:  Weakness and poor appetite    Subjective  A 63-year-old female with significant history of depression/anxiety and history of heavy alcohol abuse since age 38. Presented to the ED with complaints of severe generalized weakness x1 week with associated poor appetite.  Patient was hemodynamically stable except for borderline hypotension mild tachycardia in the ED.  Lab significant for leukocytosis, hyperbilirubinemia, low albumin and severe hyponatremia/hypokalemia.  Lactic acid was elevated.  Imaging revealed right pleural effusion, right lower lobe consolidation, cirrhotic liver with ascites and enteritis.  Patient received IV fluids in the ED.  Initiated on empiric antibiotics paid pulmonology, Gastroenterology consulted.  Admitted to hospitalist medicine service.   Patient is overall worsening.  Creatinine increased to 1.6 from 0.8.    Concern for hepatorenal syndrome.    Meld score is 30.  Mentating well for now.    Objective/physical exam:  Vital signs have been personally reviewed by me   Neuro:  Awake, alert, oriented  Resting comfortably in bed.  Hand tremor present with extension  General: Appears comfortable, no acute distress.  Integumentary: Warm, dry, intact.  Musculoskeletal: Purposeful movement noted.   Respiratory: No accessory muscle use. Breath sounds are equal.  Cardiovascular: Regular rate. No peripheral edema.    VITAL SIGNS: 24 HRS MIN & MAX LAST   Temp  Min: 97.6 °F (36.4 °C)  Max: 97.9 °F (36.6 °C) 97.6 °F (36.4 °C)   BP  Min: 106/69  Max: 114/67 110/67   Pulse  Min: 83  Max: 98  88   Resp  Min: 16  Max: 18 16   SpO2  Min: 92 %  Max: 97 % 96 %     X-Ray Chest 1 View  Narrative: EXAMINATION:  XR CHEST 1 VIEW    CLINICAL HISTORY:  pleural effusion;    TECHNIQUE:  Single frontal view of the chest was performed.    COMPARISON:  10/20/2023    FINDINGS:  There are bilateral pleural effusions.  Findings are  similar to the prior examination.  Some pulmonary edema seen.  The heart is normal appearance.  Aorta is unremarkable.  Bones and joints show no acute abnormality.  Impression: No significant change in the bilateral pleural effusions    Electronically signed by: Dariana Linares  Date:    10/23/2023  Time:    18:37    Recent Labs   Lab 10/23/23  0502 10/24/23  0633 10/25/23  0539   WBC 14.96* 10.08 20.05*   RBC 3.15* 2.78* 2.82*   HGB 9.3* 8.3* 8.6*   HCT 27.0* 23.6* 24.7*   MCV 85.7 84.9 87.6   MCH 29.5 29.9 30.5   MCHC 34.4 35.2 34.8   RDW 20.2* 20.2* 20.3*    116* 139   MPV 8.5 8.6 9.1         Recent Labs   Lab 10/20/23  0445 10/21/23  0508 10/22/23  0648 10/23/23  0502 10/24/23  0633 10/25/23  0539   * 126*   < > 124* 123* 123*   K 3.8 4.2   < > 4.1 4.5 4.7   CO2 24 25   < > 23 21* 20*   BUN 8.7* 10.6   < > 14.3 16.5 19.0   CREATININE 0.73 0.85   < > 0.90 1.27* 1.60*   CALCIUM 7.1* 7.4*   < > 7.0* 7.9* 8.2*   MG 1.70 1.80  --   --   --  2.20   ALBUMIN  --  1.4*   < > 1.3* 2.4* 3.2*   ALKPHOS  --  162*   < > 174* 125 118   ALT  --  37   < > 33 27 28   AST  --  115*   < > 100* 83* 71*   BILITOT  --  8.5*   < > 7.8* 7.4* 6.2*    < > = values in this interval not displayed.            Microbiology Results (last 7 days)       ** No results found for the last 168 hours. **             See below for Radiology    Scheduled Med:   busPIRone  5 mg Oral BID    cefTRIAXone (ROCEPHIN) IVPB  1 g Intravenous Q24H    doxycycline (VIBRAMYCIN) IVPB  100 mg Intravenous Q12H    folic acid  1 mg Oral Daily    lactulose 10 gram/15 ml  10 g Oral BID    levothyroxine  100 mcg Oral Before breakfast    midodrine  10 mg Oral Q8H    multivitamin  1 tablet Oral Daily    mupirocin   Nasal BID    octreotide  200 mcg Subcutaneous Q8H    pantoprazole  40 mg Oral Daily    phytonadione (vitamin K1)  10 mg Oral Daily    rifAXIMin  550 mg Oral BID    thiamine  100 mg Oral Daily        PRN Meds:  melatonin, methocarbamoL, ondansetron,  sodium chloride 0.9%     Assessment/Plan:  Acute hyponatremia   Pneumonia with pleural effusions   End-stage liver disease-MELD score of 30  Chronic anemia  Acute hypothyroidism-diagnoses on this hospitalization      - We will continue midodrine 10 octreotide 200 t.i.d. and also albumin.    - patient is not fitting the criteria for the terlipressin, discussed with pharmacy.   I spoke to transplant team from [facility] they accepted the patient and will transfer possible transplant and hepatology Services.      Emre Kerr MD   10/25/2023      This note was created with the assistance of Dragon voice recognition software. There may be transcription errors as a result of using this technology however minimal. Effort has been made to assure accuracy of transcription but any obvious errors or omissions should be clarified with the author of the document.

## 2023-10-25 NOTE — PROGRESS NOTES
Chief complaint: none    Interval History:  Pt had nausea this am resolved p antiemetics.  She reports feeling better today, less confusion, no SOB, only mild LE swelling.  Nsg reports Uout may be improving    Review of Systems   Constitutional: Negative.    HENT: Negative.     Respiratory: Negative.     Cardiovascular:  Positive for leg swelling.   Gastrointestinal:  Positive for abdominal distention.   Genitourinary: Negative.    Musculoskeletal: Negative.    Neurological: Negative.    Psychiatric/Behavioral: Negative.        all else Neg     busPIRone  5 mg Oral BID    cefTRIAXone (ROCEPHIN) IVPB  1 g Intravenous Q24H    doxycycline (VIBRAMYCIN) IVPB  100 mg Intravenous Q12H    folic acid  1 mg Oral Daily    lactulose 10 gram/15 ml  10 g Oral BID    levothyroxine  100 mcg Oral Before breakfast    midodrine  10 mg Oral Q8H    multivitamin  1 tablet Oral Daily    mupirocin   Nasal BID    NON FORMULARY MEDICATION 2 mg  2 mg Intravenous Q6H    octreotide  100 mcg Subcutaneous Q8H    pantoprazole  40 mg Oral Daily    phytonadione (vitamin K1)  10 mg Oral Daily    rifAXIMin  550 mg Oral BID    thiamine  100 mg Oral Daily       Objective     VITAL SIGNS: 24 HR MIN & MAX LAST    Temp  Min: 97.7 °F (36.5 °C)  Max: 97.9 °F (36.6 °C)  97.7 °F (36.5 °C)    BP  Min: 106/69  Max: 114/67  114/67     Pulse  Min: 83  Max: 98  83     Resp  Min: 18  Max: 18  18    SpO2  Min: 92 %  Max: 97 %  95 %      Wt Readings from Last 3 Encounters:   10/13/23 66 kg (145 lb 8.1 oz)   12/12/22 65.8 kg (145 lb)       Intake/Output Summary (Last 24 hours) at 10/25/2023 0856  Last data filed at 10/24/2023 2222  Gross per 24 hour   Intake 700 ml   Output 225 ml   Net 475 ml       Physical Exam  Constitutional:       General: She is not in acute distress.  Cardiovascular:      Rate and Rhythm: Normal rate.   Pulmonary:      Effort: Pulmonary effort is normal.      Breath sounds: Normal breath sounds.   Abdominal:      General: Bowel sounds are  normal. There is distension.      Palpations: Abdomen is soft.      Tenderness: There is no abdominal tenderness.   Musculoskeletal:      Cervical back: Normal range of motion.      Comments: Trace-1+ BLE edema L>R leg   Neurological:      General: No focal deficit present.      Mental Status: She is alert and oriented to person, place, and time.   Psychiatric:         Mood and Affect: Mood normal.          Recent Labs     10/23/23  0502 10/24/23  0633 10/25/23  0539   * 123* 123*   K 4.1 4.5 4.7   CHLORIDE 95* 95* 95*   CO2 23 21* 20*   BUN 14.3 16.5 19.0   CREATININE 0.90 1.27* 1.60*   GLUCOSE 102 131* 146*   CALCIUM 7.0* 7.9* 8.2*   MG  --   --  2.20   ALBUMIN 1.3* 2.4* 3.2*      Recent Labs     10/23/23  0502 10/24/23  0633 10/25/23  0539   WBC 14.96* 10.08 20.05*   HGB 9.3* 8.3* 8.6*   HCT 27.0* 23.6* 24.7*    116* 139         Assessment & Plan     HRS type 1? - she developed symptomatic hypotension 10/23 needing 500cc IVF bolus and IV albumin, with midodrine increased, octreotide started, she now has progressive azotemia, oliguria with U Na<20, would continue midodrine / octreotide / daily IV Albumin treating for HRS, will increase octreotide to 200 mg IV q8' while trying to get terlipressin started as she is so far not responding to Octreotide / midodrine (nonformulary request sent).  She has very poor prognosis if not a transplant candidate, would not be appropriate for dialysis therapy unless as a bridge to transplant  HypoNa - from cirrhosis +/- hypothyroid +/- relative IV vol depletion, unfortunately Na+ holding at 123 with no response to samsca 15 mg on 10/23 nor to midodrine/octreotide/IV albumin thus far  PNA with B effusions - seen by pulmonary, doesn't appear to have enough fluid for thoracentesis.  Check echo assess cardiac fxn  ESLD, ascites, coagulopathy - would hold off on paracentesis until BP, GFR stable  Anemia - Hb 8.6, stable  New hypothyroid - synthroid 100 mcg started on  10/19/23

## 2023-10-25 NOTE — PLAN OF CARE
Received call from SHANNEN Castro at [facility] confirming transfer request received.

## 2023-10-25 NOTE — PT/OT/SLP PROGRESS
Attempted OT session this AM however pt declined to participate. Educated on importance of therapy and 3 refusal policy. Verbalized understanding. Will f/u this PM as schedule permits.    Finasteride Male Counseling: Finasteride Counseling:  I discussed with the patient the risks of use of finasteride including but not limited to decreased libido, decreased ejaculate volume, gynecomastia, and depression. Women should not handle medication.  All of the patient's questions and concerns were addressed. Finasteride Counseling:  I discussed with the patient the risks of use of finasteride including but not limited to decreased libido, decreased ejaculate volume, gynecomastia, and depression. Women should not handle medication.  All of the patient's questions and concerns were addressed.

## 2023-10-25 NOTE — PROVIDER TRANSFER
Outside Transfer Acceptance Note / Regional Referral Center    Referring facility: [facility]   Referring provider: JOCELINE MOURA  Accepting facility: [facility]  Accepting provider: Omid Flood MD  [facility]   Reason for transfer:  Hepatology consultation for liver transplant evaluation   Transfer diagnosis: decompensated alcoholic cirrhosis   Transfer specialty requested: Hepatology  Transfer specialty notified: Yes  Transfer level: NUMBER 1-5: 2  Bed type requested: stepdown  Isolation status: No active isolations   Admission class or status: IP- Inpatient      Narrative     Ms. Laurne Ewing is a 63 year old woman with PMH of  depression/anxiety and alcohol abuse who presented to [facility] ED on 10/12/2023 with chief complaint of progressive weakness x 1 week and yellowing of her skin.  She also endorsed poor appetite over the past week.      Upon ED arrival, WBC 20,000, a significantly elevated bilirubin at 11.6, hyponatremia, and mildly elevated lactic acid level.  Alcohol level was undetectable.  She reported a heavy history of alcohol abuse since about age 38.  She has been in rehab for alcohol abuse before.  Her last drink was approximately 4 months ago she tells me. CT A/P with contrast revealed  a right-sided pleural effusion, right lower lobe consolidation, hepatic cirrhotic morphology and ascites with mural thickening of the small bowel loops suggesting nonspecific enteritis. Patient received IV fluids in the ED. Initiated on empiric antibiotics paid pulmonology, Gastroenterology consulted. Pulmonary consulted and suspected likely hepatothorax and did not need thoracentesis.      Patient given lactulose, rifaximin, levothyroxine, underwent paracentesis on 10/13/2023 with 0.75 Lremoved (SAAG>1.1), placed on salt tabs, Lasix and Aldactone and fluid restriction which subsequently all discontinued on  "10/15/2023.  Midodrine 2.5 q.8 hours added on 10/15/2023.  Repeat abdominal ultrasound and CT scan on 10/20/2023 show increasing bilateral pleural effusion and moderate ascites. Sodium remained stable at 123-124. Nephrology consulted and was given tolvaptan 10/23/2023. Patient required albumin, octreotide and increase the midodrine to 10 mg TID with adequate improvement in blood pressure.     [facility] provider contacted [facility] to evaluate acute decompensated alcoholic cirrhosis and initiate liver transplant evaluation. [facility] notified [facility] to discuss the case and he agreed to consult on the patient with admission to Hospital Medicine. Patient will require stepdown bed.     Objective     Vitals: Temp: 97.6 °F (36.4 °C) (10/25/23 1059)  Pulse: 88 (10/25/23 1059)  Resp: 16 (10/25/23 1059)  BP: 110/67 (10/25/23 1059)  SpO2: 96 % (10/25/23 1059)  Recent Labs: A1C: No results for input(s): "HGBA1C" in the last 4320 hours.  Bilirubin:   Recent Labs   Lab 10/21/23  0508 10/22/23  0648 10/23/23  0502 10/24/23  0633 10/25/23  0539   BILIDIR 5.2*  --   --   --   --    BILITOT 8.5* 8.3* 7.8* 7.4* 6.2*     Blood Culture: No results for input(s): "LABBLOO" in the last 48 hours.  CBC:   Recent Labs   Lab 10/24/23  0633 10/25/23  0539   WBC 10.08 20.05*   HGB 8.3* 8.6*   HCT 23.6* 24.7*   * 139     CMP:   Recent Labs   Lab 10/24/23  0633 10/25/23  0539   * 123*   K 4.5 4.7   CO2 21* 20*   BUN 16.5 19.0   CREATININE 1.27* 1.60*   CALCIUM 7.9* 8.2*   ALBUMIN 2.4* 3.2*   BILITOT 7.4* 6.2*   ALKPHOS 125 118   AST 83* 71*   ALT 27 28     Coagulation:   Recent Labs   Lab 10/25/23  0539   INR 2.3*     Lactic Acid: No results for input(s): "LACTATE" in the last 48 hours.  Lipase: No results for input(s): "LIPASE" in the last 48 hours.  Magnesium:   Recent Labs   Lab 10/25/23  0539   MG 2.20     POCT Glucose: No " "results for input(s): "POCTGLUCOSE" in the last 48 hours.  Troponin: No results for input(s): "TROPONINI", "TROPONINIHS" in the last 48 hours.  TSH:   Recent Labs   Lab 10/18/23  0745   TSH 7.882*     Urine Studies: No results for input(s): "COLORU", "APPEARANCEUA", "PHUR", "SPECGRAV", "PROTEINUA", "GLUCUA", "KETONESU", "BILIRUBINUA", "OCCULTUA", "NITRITE", "UROBILINOGEN", "LEUKOCYTESUR", "RBCUA", "WBCUA", "BACTERIA", "SQUAMEPITHEL", "HYALINECASTS" in the last 48 hours.    Invalid input(s): "WRIGHTSUR"    MELD 3.0: 33 at 10/25/2023  5:39 AM  MELD-Na: 32 at 10/25/2023  5:39 AM  Calculated from:  Serum Creatinine: 1.60 mg/dL at 10/25/2023  5:39 AM  Serum Sodium: 123 mmol/L (Using min of 125 mmol/L) at 10/25/2023  5:39 AM  Total Bilirubin: 6.2 mg/dL at 10/25/2023  5:39 AM  Serum Albumin: 3.2 g/dL at 10/25/2023  5:39 AM  INR(ratio): 2.3 at 10/25/2023  5:39 AM  Age at listing (hypothetical): 63 years  Sex: Female at 10/25/2023  5:39 AM      Recent imaging:   Imaging Results              CT Abdomen Pelvis With Contrast (Final result)  Result time 10/12/23 19:33:53      Final result by Kevyn Abebe MD (10/12/23 19:33:53)                   Impression:      1. Right pleural effusion and right lower lung lobe consolidation.    2. Hepatic cirrhotic morphology and ascites.    3. Mural thickening of small bowel loops suggest nonspecific enteritis.      Electronically signed by: Kevyn Abebe  Date:    10/12/2023  Time:    19:33               Narrative:    EXAMINATION:  CT ABDOMEN PELVIS WITH CONTRAST    CLINICAL HISTORY:  Sepsis;Jaundiced;    TECHNIQUE:  Multidetector axial images were obtained of the abdomen and pelvis following the administration of IV contrast. Oral contrast was not administered.    Dose length product of 322 mGycm. Automated exposure control was utilized to minimize radiation dose.    COMPARISON:  None available    FINDINGS:  There is moderate volume right pleural effusion and right lower lung lobe dense " consolidation.  There is also small left pleural and left basilar atelectasis.  Bilateral mammary implants.    There is hepatic cirrhotic morphology with low attenuation and surface nodularity.  No focal hepatic space-occupying lesion.  There is intra-abdominopelvic small to moderate free fluid consistent with ascites.  Gallbladder is distended without intraluminal calcified calculus.  No significant dilatation of the intrahepatic biliary radicles and the extrahepatic duct is also not distended without calcified choledocholithiasis.  No no acute findings of the pancreas or the spleen.    The adrenal glands appear within normal limits. The kidneys are unremarkable in size and contour. No solid or cystic renal lesion identified. There is no hydronephrosis. No perinephric fluid strandings or collections identified.    Stomach is mostly decompressed and difficult to assess.  There is suspected mild mural thickening of the loops of small bowel suspect for enteritis.  No transition or bowel obstruction.  Colon is nondistended.  Noninflamed diverticulosis coli.  No pneumoperitoneum.    Urinary bladder appears within normal limits.  Small volume uterus.  Endometrium is not well delineated.  There is no pelvic free fluid.    Lower lumbar degenerative changes.  No acute or aggressive skeletal abnormality no acute or otherwise osseous abnormality identified.                                       CT Head Without Contrast (Final result)  Result time 10/12/23 19:26:14      Final result by Kevyn Abebe MD (10/12/23 19:26:14)                   Impression:      No acute intracranial findings identified.      Electronically signed by: Kevyn Abebe  Date:    10/12/2023  Time:    19:26               Narrative:    EXAMINATION:  CT HEAD WITHOUT CONTRAST    CLINICAL HISTORY:  Infusion;    TECHNIQUE:  Sequential axial images were performed of the brain without contrast.    Dose product length of 884 mGycm. Automated exposure control  was utilized to minimize radiation dose.    COMPARISON:  February 8, 2022.    FINDINGS:  There is no intracranial mass effect, midline shift, hydrocephalus or hemorrhage. There is no sulcal effacement or low attenuation changes to suggest recent large vessel territory infarction. Chronic appearing periventricular and subcortical white matter low attenuation changes are present and are consistent with chronic microangiopathic ischemia.  Beneath left craniotomy is similar appearing dural thickening.  No acute extra-axial fluid collection identified.  The ventricular system and sulcal markings prominence is consistent with atrophy more advanced for the age.  There is no acute extra axial fluid collection.  Similar mucoperiosteal thickening of the right maxillary sinus.  Paranasal sinuses are clear without mucosal thickening, polypoidal abnormality or air-fluid levels. Mastoid air cells aeration is optimal.                                       X-Ray Chest AP Portable (Final result)  Result time 10/12/23 17:53:34      Final result by Dariana Linares MD (10/12/23 17:53:34)                   Impression:      Infiltrate developing in the right lower lobe with a small right-sided pleural effusion    Patchy appearing infiltrate developing in the left lower lobe      Electronically signed by: Dariana Linares  Date:    10/12/2023  Time:    17:53               Narrative:    EXAMINATION:  XR CHEST AP PORTABLE    CLINICAL HISTORY:  Hypotension;    TECHNIQUE:  Single frontal view of the chest was performed.    COMPARISON:  12/12/2022    FINDINGS:  There is a infiltrate developing in the right lower lobe.  There is a small right-sided pleural effusion.  There is a patchy infiltrate in the left lower lobe.  The heart is normal in appearance.  Bones and joints show no acute abnormality.                                       Airway:   room air  Vent settings:  N/A       IV access:        Peripheral IV - Single Lumen  10/23/23 2200 20 G Distal;Left;Posterior Forearm (Active)   Site Assessment Clean;Dry;Intact 10/24/23 2100   Line Status Infusing 10/24/23 2100   Dressing Status Clean 10/24/23 2100     Infusions: albumin, octreotide, ceftriaxone  Allergies:   Review of patient's allergies indicates:   Allergen Reactions    Hydrocodone       NPO: No    Anticoagulation:   Anticoagulants       None             Instructions      [facility]-  Admit to Hospital Medicine  Upon patient arrival to floor, please send SecureChat to [facility] or call extension [phone] (if no answer, do NOT leave a callback number after the beep, rather please send a SecureChat to [facility]), for Hospital Medicine admit team assignment and for additional admit orders for the patient.  Do not page the attending physician associated with the patient on arrival (this physician may not be on duty at the time of arrival).  Rather, always send a SecureChat to [facility] or call [phone] to reach the triage physician for orders and team assignment.

## 2023-10-25 NOTE — PLAN OF CARE
P[facility] (Patient Flow Center)  order placed for transfer to [facility] for Hepatology/Liver Transport evaluation.

## 2023-10-25 NOTE — PT/OT/SLP RE-EVAL
Physical Therapy Re-Evaluation    Patient Name:  Lauren Ewing   MRN:  [id number]    Recommendations:     Discharge therapy intensity: moderate intensity   Discharge Equipment Recommendations: walker, rolling   Barriers to discharge: Impaired mobility and Ongoing medical needs    Assessment:     Lauren Ewing is a 63 y.o. female admitted with a medical diagnosis of ETOH cirrhosis, PNA, s/p paracentesis, Hyponatremia.  She presents with the following impairments/functional limitations: weakness, impaired endurance, impaired self care skills, impaired functional mobility, impaired balance, gait instability. Pt reports she is feeling stronger today and is agreeable to PT re-eval. Pt is improving with ambulation distance and transfer strength. CGA-Min A for all mobility with RW. Continue plan of care.     Rehab Prognosis: Good; patient would benefit from acute skilled PT services to address these deficits and reach maximum level of function.    Recent Surgery: * No surgery found *      Plan:     During this hospitalization, patient to be seen 5 x/week to address the identified rehab impairments via gait training, therapeutic activities, therapeutic exercises and progress toward the following goals:    Plan of Care Expires:  11/25/23    Subjective     Chief Complaint: nausea last night  Patient/Family Comments/goals: to get better  Pain/Comfort:  Pain Rating 1: 0/10    Patients cultural, spiritual, Synagogue conflicts given the current situation: no    Objective:     Communicated with nurse prior to session.  Patient found supine with peripheral IV, telemetry  upon PT entry to room.    General Precautions: Standard, fall  Orthopedic Precautions:N/A   Braces: N/A  Respiratory Status: Room air      Exams:  Cognitive Exam:  Patient is oriented to Person, Place, Time, and Situation  Sensation:    -       Intact  RLE ROM: WNL  RLE Strength: Deficits: grossly 4+/5  LLE ROM: WNL  LLE Strength: Deficits: grossly 4+/5  Skin  integrity: Visible skin intact      Functional Mobility:  Bed Mobility:     Scooting: minimum assistance  Supine to Sit: contact guard assistance  Sit to Supine: minimum assistance  Transfers:     Sit to Stand:  contact guard assistance with rolling walker  Gait: 80ft with RW and CGA, slow pace, forward flexed posture.      AM-PAC 6 CLICK MOBILITY  Total Score:16       Treatment & Education:    Patient provided with verbal education education regarding PT POC.  Understanding was verbalized.     Patient left supine with all lines intact and call button in reach.    GOALS:   Multidisciplinary Problems       Physical Therapy Goals          Problem: Physical Therapy    Goal Priority Disciplines Outcome Goal Variances Interventions   Physical Therapy Goal     PT, PT/OT Ongoing, Progressing     Description: Goals to be met by: 23     Patient will increase functional independence with mobility by performin. Supine to sit with [place]  2. Sit to supine with [place]  3. Sit to stand transfer with Modified [place]  4. Gait  x 300 feet with Modified [place] using Rolling Walker (if needed).                          History:     No past medical history on file.    No past surgical history on file.    Time Tracking:     PT Received On: 10/25/23  PT Start Time: 943     PT Stop Time: 957  PT Total Time (min): 14 min     Billable Minutes: Re-eval 14      10/25/2023

## 2023-10-25 NOTE — PLAN OF CARE
Spoke to Mayi with [facility]. Patient has been accepted clinically and is on waiting list for bed. Transport arranged via [facility] and placed in will call. Approved by  Director NEGAR Spencer.

## 2023-10-25 NOTE — PROGRESS NOTES
"Gastroenterology Progress Note    Subjective/Interval History:  Awake.  No significant GI issues.     ROS:12 point system reviewed and is negative except as noted in HPI     Vital Signs:  /67   Pulse 88   Temp 97.6 °F (36.4 °C) (Oral)   Resp 16   Ht 5' 4" (1.626 m)   Wt 66 kg (145 lb 8.1 oz)   SpO2 96%   BMI 24.98 kg/m²   Body mass index is 24.98 kg/m².    Physical Exam:  Vitals reviewed.   Constitutional:       Appearance: Normal appearance.   HENT:      Head: Normocephalic and atraumatic.   Cardiovascular:      Rate and Rhythm: Normal rate.      Heart sounds: Normal heart sounds.   Pulmonary:      Effort: Pulmonary effort is normal.      Comments: Diminished in bases  Abdominal:      Palpations: Abdomen is soft.   Musculoskeletal:      Cervical back: Neck supple.   Skin:     Coloration: Skin is jaundiced.   Neurological:      General: No focal deficit present.      Mental Status: She is alert and oriented to person, place, and time.     Labs:  Recent Results (from the past 48 hour(s))   Protime-INR    Collection Time: 10/24/23  6:33 AM   Result Value Ref Range    PT 28.6 (H) 12.5 - 14.5 seconds    INR 2.7 (H) <=1.3   Comprehensive Metabolic Panel    Collection Time: 10/24/23  6:33 AM   Result Value Ref Range    Sodium Level 123 (L) 136 - 145 mmol/L    Potassium Level 4.5 3.5 - 5.1 mmol/L    Chloride 95 (L) 98 - 107 mmol/L    Carbon Dioxide 21 (L) 23 - 31 mmol/L    Glucose Level 131 (H) 82 - 115 mg/dL    Blood Urea Nitrogen 16.5 9.8 - 20.1 mg/dL    Creatinine 1.27 (H) 0.55 - 1.02 mg/dL    Calcium Level Total 7.9 (L) 8.4 - 10.2 mg/dL    Protein Total 5.0 (L) 5.8 - 7.6 gm/dL    Albumin Level 2.4 (L) 3.4 - 4.8 g/dL    Globulin 2.6 2.4 - 3.5 gm/dL    Albumin/Globulin Ratio 0.9 (L) 1.1 - 2.0 ratio    Bilirubin Total 7.4 (H) <=1.5 mg/dL    Alkaline Phosphatase 125 40 - 150 unit/L    Alanine Aminotransferase 27 0 - 55 unit/L    Aspartate Aminotransferase 83 (H) 5 - 34 unit/L    eGFR 48 mls/min/1.73/m2   CBC " with Differential    Collection Time: 10/24/23  6:33 AM   Result Value Ref Range    WBC 10.08 4.50 - 11.50 x10(3)/mcL    RBC 2.78 (L) 4.20 - 5.40 x10(6)/mcL    Hgb 8.3 (L) 12.0 - 16.0 g/dL    Hct 23.6 (L) 37.0 - 47.0 %    MCV 84.9 80.0 - 94.0 fL    MCH 29.9 27.0 - 31.0 pg    MCHC 35.2 33.0 - 36.0 g/dL    RDW 20.2 (H) 11.5 - 17.0 %    Platelet 116 (L) 130 - 400 x10(3)/mcL    MPV 8.6 7.4 - 10.4 fL    Neut % 86.2 %    Lymph % 8.6 %    Mono % 1.7 %    Eos % 0.1 %    Basophil % 0.2 %    Lymph # 0.87 0.6 - 4.6 x10(3)/mcL    Neut # 8.69 2.1 - 9.2 x10(3)/mcL    Mono # 0.17 0.1 - 1.3 x10(3)/mcL    Eos # 0.01 0 - 0.9 x10(3)/mcL    Baso # 0.02 <=0.2 x10(3)/mcL    IG# 0.32 (H) 0 - 0.04 x10(3)/mcL    IG% 3.2 %    NRBC% 0.4 %   Protime-INR    Collection Time: 10/25/23  5:39 AM   Result Value Ref Range    PT 24.7 (H) 12.5 - 14.5 seconds    INR 2.3 (H) <=1.3   Comprehensive Metabolic Panel    Collection Time: 10/25/23  5:39 AM   Result Value Ref Range    Sodium Level 123 (L) 136 - 145 mmol/L    Potassium Level 4.7 3.5 - 5.1 mmol/L    Chloride 95 (L) 98 - 107 mmol/L    Carbon Dioxide 20 (L) 23 - 31 mmol/L    Glucose Level 146 (H) 82 - 115 mg/dL    Blood Urea Nitrogen 19.0 9.8 - 20.1 mg/dL    Creatinine 1.60 (H) 0.55 - 1.02 mg/dL    Calcium Level Total 8.2 (L) 8.4 - 10.2 mg/dL    Protein Total 5.8 5.8 - 7.6 gm/dL    Albumin Level 3.2 (L) 3.4 - 4.8 g/dL    Globulin 2.6 2.4 - 3.5 gm/dL    Albumin/Globulin Ratio 1.2 1.1 - 2.0 ratio    Bilirubin Total 6.2 (H) <=1.5 mg/dL    Alkaline Phosphatase 118 40 - 150 unit/L    Alanine Aminotransferase 28 0 - 55 unit/L    Aspartate Aminotransferase 71 (H) 5 - 34 unit/L    eGFR 36 mls/min/1.73/m2   Magnesium    Collection Time: 10/25/23  5:39 AM   Result Value Ref Range    Magnesium Level 2.20 1.60 - 2.60 mg/dL   CBC with Differential    Collection Time: 10/25/23  5:39 AM   Result Value Ref Range    WBC 20.05 (H) 4.50 - 11.50 x10(3)/mcL    RBC 2.82 (L) 4.20 - 5.40 x10(6)/mcL    Hgb 8.6 (L) 12.0 -  16.0 g/dL    Hct 24.7 (L) 37.0 - 47.0 %    MCV 87.6 80.0 - 94.0 fL    MCH 30.5 27.0 - 31.0 pg    MCHC 34.8 33.0 - 36.0 g/dL    RDW 20.3 (H) 11.5 - 17.0 %    Platelet 139 130 - 400 x10(3)/mcL    MPV 9.1 7.4 - 10.4 fL    Neut % 81.3 %    Lymph % 10.2 %    Mono % 6.2 %    Eos % 0.0 %    Basophil % 0.2 %    Lymph # 2.04 0.6 - 4.6 x10(3)/mcL    Neut # 16.29 (H) 2.1 - 9.2 x10(3)/mcL    Mono # 1.25 0.1 - 1.3 x10(3)/mcL    Eos # 0.00 0 - 0.9 x10(3)/mcL    Baso # 0.04 <=0.2 x10(3)/mcL    IG# 0.43 (H) 0 - 0.04 x10(3)/mcL    IG% 2.1 %    NRBC% 0.2 %   Echo    Collection Time: 10/25/23 10:25 AM   Result Value Ref Range    BSA 1.73 m2    Green's Biplane MOD Ejection Fraction 75 %    LVIDd 3.90 3.5 - 6.0 cm    LV Systolic Volume 12.70 mL    LV Systolic Volume Index 7.4 mL/m2    LVIDs 2.00 (A) 2.1 - 4.0 cm    LV Diastolic Volume 65.90 mL    LV Diastolic Volume Index 38.54 mL/m2    IVS 0.80 0.6 - 1.1 cm    FS 49 (A) 28 - 44 %    Left Ventricle Relative Wall Thickness 0.51 cm    Posterior Wall 1.00 0.6 - 1.1 cm    LV mass 105.33 g    LV Mass Index 62 g/m2    MV Peak E Martín 1.01 m/s    TDI LATERAL 0.15 m/s    TDI SEPTAL 0.13 m/s    E/E' ratio 7.21 m/s    MV Peak A Martín 1.05 m/s    TR Max Martín 2.48 m/s    E/A ratio 0.96     E wave deceleration time 187.00 msec    LV SEPTAL E/E' RATIO 7.77 m/s    LV LATERAL E/E' RATIO 6.73 m/s    LVOT peak martín 1.34 m/s    Left Ventricular Outflow Tract Mean Velocity 0.93 cm/s    Left Ventricular Outflow Tract Mean Gradient 4.00 mmHg    LA size 3.70 cm    LA volume (mod) 53.70 cm3    LA Volume Index (Mod) 31.4 mL/m2    TAPSE 2.33 cm    AV mean gradient 7 mmHg    AV peak gradient 13 mmHg    Ao peak martín 1.77 m/s    Ao VTI 36.80 cm    LVOT peak VTI 28.10 cm    AV Velocity Ratio 0.76     AV index (prosthetic) 0.76     Triscuspid Valve Regurgitation Peak Gradient 25 mmHg    PV PEAK VELOCITY 1.22 m/s    PV peak gradient 6 mmHg    Mean e' 0.14 m/s    ZLVIDS -3.05     ZLVIDD -1.97        Imaging:  US  Paracentesis inc Imaging    Result Date: 10/13/2023  EXAMINATION: US GUIDED PARACENTESIS INC IMAGING CLINICAL HISTORY: new ascites; Attending: Carlos Enrique Irwin M.D. Anesthesia: Lidocaine utilized for local anesthesia. TECHNIQUE: After the risks, benefits and alternatives were discussed, consent was obtained. A time out was performed to verify the patient's identity and procedure. The patient was placed supine. Limited ultrasound the abdomen demonstrated ascitic fluid in the right lower quadrant. Static image was obtained. The lower quadrant was cleaned prepped in normal sterile fashion. Subcutaneous tissues were anesthetized with lidocaine solution. The Yueh needle was advanced into the abdominal cavity. Placement was confirmed by return of ascitic fluid. The catheter was advanced and a total of 0.75 liters of clear yellow fluid was aspirated. The patient tolerated the procedure well. There were no immediate complications. Estimated blood loss: None     Successful paracentesis. Electronically signed by: Carlos Enrique Irwin Date:    10/13/2023 Time:    12:19    US Liver with Doppler (xpd)    Result Date: 10/13/2023  EXAMINATION: US LIVER WITH DOPPLER CLINICAL HISTORY: Cirrhosis evaluation, Evaluation for hepatic, portal and splenic veins patency; TECHNIQUE: Right upper quadrant abdominal ultrasound (including pancreas, aorta, liver, gallbladder, common bile duct, IVC, right kidney, and spleen) was performed.  Spectral Doppler evaluation performed. COMPARISON: CT abdomen pelvis 10/12/2023 FINDINGS: LIMITATIONS: Exam limited due to poor acoustic window related to shadowing bowel gas and/or body habitus. LIVER: Liver measures 15 cm cranial caudal at the midclavicular line. The liver is echogenic.  Nodular surface contour.  No discrete mass appreciable by sonographic evaluation. PANCREAS: Obscured by overlying bowel gas. GALLBLADDER: Not imaged BILE DUCTS: Common bile duct not imaged. RIGHT KIDNEY: Not imaged SPLEEN: 8 cm.   Normal in size with homogeneous echotexture. OTHER: There is ascites and bilateral pleural effusions. DOPPLER EVALUATION: AORTA: Not imaged HEPATIC ARTERY: Patent. Peak systolic velocity 173 cm/s.  Normal waveform. INFERIOR VENA CAVA: Not imaged.  IVC is patent on preceding CT exam. PORTAL VEINS: Portal vein is patent.  Hepatofugal flow.  Note on CT exam there were recanalized periumbilical veins, gastric varices and a large spleno renal shunt. SPLENIC VEIN: Patent with appropriate directional flow. HEPATIC VEINS: Unable to visualize.  Note on CT exam the right and left hepatic veins appeared compressed but patent.  Left hepatic vein was not well visualized.  This can be seen related to fibrotic changes of cirrhosis.     Cirrhotic morphology of the liver Changes of portal hypertension with reversed flow at the portal vein.  There were portosystemic collaterals on preceding CT exam Small pleural effusions and abdominal ascites Electronically signed by: Lorna Maxwell Date:    10/13/2023 Time:    10:26    CT Abdomen Pelvis With Contrast    Result Date: 10/12/2023  EXAMINATION: CT ABDOMEN PELVIS WITH CONTRAST CLINICAL HISTORY: Sepsis;Jaundiced; TECHNIQUE: Multidetector axial images were obtained of the abdomen and pelvis following the administration of IV contrast. Oral contrast was not administered. Dose length product of 322 mGycm. Automated exposure control was utilized to minimize radiation dose. COMPARISON: None available FINDINGS: There is moderate volume right pleural effusion and right lower lung lobe dense consolidation.  There is also small left pleural and left basilar atelectasis.  Bilateral mammary implants. There is hepatic cirrhotic morphology with low attenuation and surface nodularity.  No focal hepatic space-occupying lesion.  There is intra-abdominopelvic small to moderate free fluid consistent with ascites.  Gallbladder is distended without intraluminal calcified calculus.  No significant  dilatation of the intrahepatic biliary radicles and the extrahepatic duct is also not distended without calcified choledocholithiasis.  No no acute findings of the pancreas or the spleen. The adrenal glands appear within normal limits. The kidneys are unremarkable in size and contour. No solid or cystic renal lesion identified. There is no hydronephrosis. No perinephric fluid strandings or collections identified. Stomach is mostly decompressed and difficult to assess.  There is suspected mild mural thickening of the loops of small bowel suspect for enteritis.  No transition or bowel obstruction.  Colon is nondistended.  Noninflamed diverticulosis coli.  No pneumoperitoneum. Urinary bladder appears within normal limits.  Small volume uterus.  Endometrium is not well delineated.  There is no pelvic free fluid. Lower lumbar degenerative changes.  No acute or aggressive skeletal abnormality no acute or otherwise osseous abnormality identified.     1. Right pleural effusion and right lower lung lobe consolidation. 2. Hepatic cirrhotic morphology and ascites. 3. Mural thickening of small bowel loops suggest nonspecific enteritis. Electronically signed by: Kevyn Abebe Date:    10/12/2023 Time:    19:33    CT Head Without Contrast    Result Date: 10/12/2023  EXAMINATION: CT HEAD WITHOUT CONTRAST CLINICAL HISTORY: Infusion; TECHNIQUE: Sequential axial images were performed of the brain without contrast. Dose product length of 884 mGycm. Automated exposure control was utilized to minimize radiation dose. COMPARISON: February 8, 2022. FINDINGS: There is no intracranial mass effect, midline shift, hydrocephalus or hemorrhage. There is no sulcal effacement or low attenuation changes to suggest recent large vessel territory infarction. Chronic appearing periventricular and subcortical white matter low attenuation changes are present and are consistent with chronic microangiopathic ischemia.  Beneath left craniotomy is similar  appearing dural thickening.  No acute extra-axial fluid collection identified.  The ventricular system and sulcal markings prominence is consistent with atrophy more advanced for the age.  There is no acute extra axial fluid collection.  Similar mucoperiosteal thickening of the right maxillary sinus.  Paranasal sinuses are clear without mucosal thickening, polypoidal abnormality or air-fluid levels. Mastoid air cells aeration is optimal.     No acute intracranial findings identified. Electronically signed by: Kevyn Abebe Date:    10/12/2023 Time:    19:26    X-Ray Chest AP Portable    Result Date: 10/12/2023  EXAMINATION: XR CHEST AP PORTABLE CLINICAL HISTORY: Hypotension; TECHNIQUE: Single frontal view of the chest was performed. COMPARISON: 12/12/2022 FINDINGS: There is a infiltrate developing in the right lower lobe.  There is a small right-sided pleural effusion.  There is a patchy infiltrate in the left lower lobe.  The heart is normal in appearance.  Bones and joints show no acute abnormality.     Infiltrate developing in the right lower lobe with a small right-sided pleural effusion Patchy appearing infiltrate developing in the left lower lobe Electronically signed by: Dariana Linares Date:    10/12/2023 Time:    17:53         Assessment/Plan:  Cirrhosis - ETOH related  Heavy drinker x 30 years  States stopped a few weeks ago  Small volume paracentesis - studies pending  PNA  Hyponatremia    Will continue aggressive supportive care - needs to see hepatology outpatient  Stop all ETOH    10-14-23  Pt ambulating in room with PT this am  Tolerating diet  Hgb stable  Bili trending down slowly    10-15-23  In bathroom  Had bm  Hgb stable  Sodium remains low  No acute changes overnight    10/16/23  Awake.  Continues to have hyponatremia.  This is being addressed by the primary service.  Needs caution with IV fluids in view of underlying liver cirrhosis.  Diuretics on hold for now.  Lactulose/rifaximin as per  protocol.  Titrate to 2-3 soft/mushy bowel movements in 24 hours.    10/17/23  Patient continues to remain hyponatremia.  Nephrology consult pending.  Need caution with IV fluids.  Monitor I's and O's closely.  On Lactulose and rifaximin.  Patient had significantly elevated MELD score around 30.  She will need follow up with the hepatology Clinic on discharge.  Also recommend follow up at [facility].  Discussed the nursing staff (Margarita) today.  Recommend case management follow up.    10/19/23  Events noted.  No significant GI issues.  Sodium slightly improved.  IV fluids as per Nephrology Service.  Lactulose and rifaximin.  Titrate lactulose to 2-3 soft/mushy bowel movements a day.  Patient will need follow up with [facility]/ hepatology follow up on discharge.    10/24/23  Events noted.  Still has hyponatremia.  Nephrology on follow up.  On fluid restriction.  On lactulose and rifaximin.  Episode of hypotension last evening.  According to nursing staff, she was on Flomax which was held today.  Received IV albumin along with octreotide and midodrine.  Elevated MELD score around 30.  On fluid restriction.  Downward drift of hemoglobin noted.  No overt GI bleed.  This could partly be related to fluid overload.    10/25/23  Patient noted to have worsening renal function.  Overall she feels better.  Continues to be hyponatremic.  On octreotide along with midodrine as well as IV albumin.  On rifaximin as well as lactulose.  Meld score around 30.  Fluid restriction.  Agree with transfer to liver transplant center for additional evaluation.  Concern for hepatorenal syndrome.  Nephrology service on follow up.  Noted to have leukocytosis.  Also received a dose of steroids couple of days ago.  On antibiotics.  Discussed with Dr. Kerr.

## 2023-10-26 ENCOUNTER — HOSPITAL ENCOUNTER (INPATIENT)
Facility: HOSPITAL | Age: 63
LOS: 2 days | Discharge: HOME | DRG: 433 | End: 2023-10-28
Attending: INTERNAL MEDICINE | Admitting: INTERNAL MEDICINE
Payer: MEDICAID

## 2023-10-26 DIAGNOSIS — Z87.898 H/O PROLONGED Q-T INTERVAL ON ECG: ICD-10-CM

## 2023-10-26 DIAGNOSIS — E87.1 HYPONATREMIA: ICD-10-CM

## 2023-10-26 DIAGNOSIS — N17.9 AKI (ACUTE KIDNEY INJURY): ICD-10-CM

## 2023-10-26 DIAGNOSIS — F10.20 ALCOHOLISM: ICD-10-CM

## 2023-10-26 DIAGNOSIS — Z74.2 ASSISTANCE NEEDED AT HOME: ICD-10-CM

## 2023-10-26 DIAGNOSIS — Z76.89 ENCOUNTER TO ESTABLISH CARE: ICD-10-CM

## 2023-10-26 DIAGNOSIS — K72.90 LIVER FAILURE: ICD-10-CM

## 2023-10-26 DIAGNOSIS — R07.9 CHEST PAIN: ICD-10-CM

## 2023-10-26 DIAGNOSIS — K76.7 HEPATORENAL SYNDROME: Primary | ICD-10-CM

## 2023-10-26 PROBLEM — F32.9 MAJOR DEPRESSIVE DISORDER: Status: ACTIVE | Noted: 2023-10-26

## 2023-10-26 PROBLEM — E03.9 HYPOTHYROIDISM: Status: ACTIVE | Noted: 2023-10-26

## 2023-10-26 PROBLEM — Z71.89 ACP (ADVANCE CARE PLANNING): Status: ACTIVE | Noted: 2023-10-26

## 2023-10-26 PROBLEM — F10.10 ALCOHOL ABUSE: Status: ACTIVE | Noted: 2023-10-26

## 2023-10-26 LAB
A1AT SERPL-MCNC: 103 MG/DL (ref 100–190)
ALBUMIN SERPL BCP-MCNC: 3 G/DL (ref 3.5–5.2)
ALP SERPL-CCNC: 110 U/L (ref 55–135)
ALT SERPL W/O P-5'-P-CCNC: 31 U/L (ref 10–44)
ANION GAP SERPL CALC-SCNC: 9 MMOL/L (ref 8–16)
APTT PPP: 56.4 SEC (ref 21–32)
AST SERPL-CCNC: 80 U/L (ref 10–40)
BASOPHILS # BLD AUTO: 0.02 K/UL (ref 0–0.2)
BASOPHILS NFR BLD: 0.1 % (ref 0–1.9)
BILIRUB DIRECT SERPL-MCNC: 3.1 MG/DL (ref 0.1–0.3)
BILIRUB SERPL-MCNC: 5.6 MG/DL (ref 0.1–1)
BUN SERPL-MCNC: 19 MG/DL (ref 8–23)
CALCIUM SERPL-MCNC: 8.3 MG/DL (ref 8.7–10.5)
CHLORIDE SERPL-SCNC: 100 MMOL/L (ref 95–110)
CMV DNA SPEC QL NAA+PROBE: ABNORMAL
CO2 SERPL-SCNC: 21 MMOL/L (ref 23–29)
CREAT SERPL-MCNC: 1.3 MG/DL (ref 0.5–1.4)
CREAT UR-MCNC: 32 MG/DL (ref 15–325)
CYTOMEGALOVIRUS LOG (IU/ML): 1.71 LOGIU/ML
CYTOMEGALOVIRUS PCR, QUANT: 51 IU/ML
DIFFERENTIAL METHOD: ABNORMAL
EOSINOPHIL # BLD AUTO: 0 K/UL (ref 0–0.5)
EOSINOPHIL NFR BLD: 0.1 % (ref 0–8)
ERYTHROCYTE [DISTWIDTH] IN BLOOD BY AUTOMATED COUNT: 21 % (ref 11.5–14.5)
EST. GFR  (NO RACE VARIABLE): 46.5 ML/MIN/1.73 M^2
ESTIMATED AVG GLUCOSE: 74 MG/DL (ref 68–131)
FERRITIN SERPL-MCNC: 542 NG/ML (ref 20–300)
GLUCOSE SERPL-MCNC: 115 MG/DL (ref 70–110)
HBA1C MFR BLD: 4.2 % (ref 4–5.6)
HCT VFR BLD AUTO: 24.5 % (ref 37–48.5)
HGB BLD-MCNC: 8.1 G/DL (ref 12–16)
HIV 1+2 AB+HIV1 P24 AG SERPL QL IA: NORMAL
IMM GRANULOCYTES # BLD AUTO: 0.25 K/UL (ref 0–0.04)
IMM GRANULOCYTES NFR BLD AUTO: 1.6 % (ref 0–0.5)
INR PPP: 2.4 (ref 0.8–1.2)
IRON SERPL-MCNC: 58 UG/DL (ref 30–160)
LYMPHOCYTES # BLD AUTO: 1.7 K/UL (ref 1–4.8)
LYMPHOCYTES NFR BLD: 10.9 % (ref 18–48)
MAGNESIUM SERPL-MCNC: 2.1 MG/DL (ref 1.6–2.6)
MCH RBC QN AUTO: 29.5 PG (ref 27–31)
MCHC RBC AUTO-ENTMCNC: 33.1 G/DL (ref 32–36)
MCV RBC AUTO: 89 FL (ref 82–98)
MONOCYTES # BLD AUTO: 1.2 K/UL (ref 0.3–1)
MONOCYTES NFR BLD: 7.8 % (ref 4–15)
NEUTROPHILS # BLD AUTO: 12.6 K/UL (ref 1.8–7.7)
NEUTROPHILS NFR BLD: 79.5 % (ref 38–73)
NRBC BLD-RTO: 0 /100 WBC
OSMOLALITY SERPL: 288 MOSM/KG (ref 275–295)
OSMOLALITY UR: 149 MOSM/KG (ref 50–1200)
PHOSPHATE SERPL-MCNC: 3.8 MG/DL (ref 2.7–4.5)
PLATELET # BLD AUTO: 138 K/UL (ref 150–450)
PMV BLD AUTO: 9.1 FL (ref 9.2–12.9)
POCT GLUCOSE: 111 MG/DL (ref 70–110)
POCT GLUCOSE: 128 MG/DL (ref 70–110)
POCT GLUCOSE: 156 MG/DL (ref 70–110)
POTASSIUM SERPL-SCNC: 4 MMOL/L (ref 3.5–5.1)
PROT SERPL-MCNC: 5.3 G/DL (ref 6–8.4)
PROT UR-MCNC: <7 MG/DL (ref 0–15)
PROT/CREAT UR: NORMAL MG/G{CREAT} (ref 0–0.2)
PROTHROMBIN TIME: 24 SEC (ref 9–12.5)
RBC # BLD AUTO: 2.75 M/UL (ref 4–5.4)
SATURATED IRON: 105 % (ref 20–50)
SODIUM SERPL-SCNC: 130 MMOL/L (ref 136–145)
SODIUM UR-SCNC: <10 MMOL/L (ref 20–250)
TOTAL IRON BINDING CAPACITY: 55 UG/DL (ref 250–450)
TRANSFERRIN SERPL-MCNC: 37 MG/DL (ref 200–375)
UUN UR-MCNC: 236 MG/DL (ref 140–1050)
WBC # BLD AUTO: 15.83 K/UL (ref 3.9–12.7)

## 2023-10-26 PROCEDURE — 99223 PR INITIAL HOSPITAL CARE,LEVL III: ICD-10-PCS | Mod: ,,, | Performed by: INTERNAL MEDICINE

## 2023-10-26 PROCEDURE — 80053 COMPREHEN METABOLIC PANEL: CPT

## 2023-10-26 PROCEDURE — 84466 ASSAY OF TRANSFERRIN: CPT

## 2023-10-26 PROCEDURE — 83935 ASSAY OF URINE OSMOLALITY: CPT

## 2023-10-26 PROCEDURE — 82248 BILIRUBIN DIRECT: CPT

## 2023-10-26 PROCEDURE — 84100 ASSAY OF PHOSPHORUS: CPT

## 2023-10-26 PROCEDURE — 80321 ALCOHOLS BIOMARKERS 1OR 2: CPT

## 2023-10-26 PROCEDURE — 82570 ASSAY OF URINE CREATININE: CPT

## 2023-10-26 PROCEDURE — 87389 HIV-1 AG W/HIV-1&-2 AB AG IA: CPT

## 2023-10-26 PROCEDURE — 63600175 PHARM REV CODE 636 W HCPCS: Performed by: STUDENT IN AN ORGANIZED HEALTH CARE EDUCATION/TRAINING PROGRAM

## 2023-10-26 PROCEDURE — 99223 1ST HOSP IP/OBS HIGH 75: CPT | Mod: ,,, | Performed by: INTERNAL MEDICINE

## 2023-10-26 PROCEDURE — 82103 ALPHA-1-ANTITRYPSIN TOTAL: CPT

## 2023-10-26 PROCEDURE — 83735 ASSAY OF MAGNESIUM: CPT

## 2023-10-26 PROCEDURE — 82728 ASSAY OF FERRITIN: CPT

## 2023-10-26 PROCEDURE — 63600175 PHARM REV CODE 636 W HCPCS: Mod: JB

## 2023-10-26 PROCEDURE — P9047 ALBUMIN (HUMAN), 25%, 50ML: HCPCS | Mod: JZ,JG

## 2023-10-26 PROCEDURE — 20600001 HC STEP DOWN PRIVATE ROOM

## 2023-10-26 PROCEDURE — 83930 ASSAY OF BLOOD OSMOLALITY: CPT

## 2023-10-26 PROCEDURE — 84540 ASSAY OF URINE/UREA-N: CPT

## 2023-10-26 PROCEDURE — 25000003 PHARM REV CODE 250

## 2023-10-26 PROCEDURE — 84300 ASSAY OF URINE SODIUM: CPT

## 2023-10-26 PROCEDURE — 85610 PROTHROMBIN TIME: CPT

## 2023-10-26 PROCEDURE — 87799 DETECT AGENT NOS DNA QUANT: CPT

## 2023-10-26 PROCEDURE — 25000003 PHARM REV CODE 250: Performed by: STUDENT IN AN ORGANIZED HEALTH CARE EDUCATION/TRAINING PROGRAM

## 2023-10-26 PROCEDURE — 83540 ASSAY OF IRON: CPT

## 2023-10-26 PROCEDURE — 83036 HEMOGLOBIN GLYCOSYLATED A1C: CPT

## 2023-10-26 PROCEDURE — 85025 COMPLETE CBC W/AUTO DIFF WBC: CPT

## 2023-10-26 PROCEDURE — 85730 THROMBOPLASTIN TIME PARTIAL: CPT

## 2023-10-26 PROCEDURE — 36415 COLL VENOUS BLD VENIPUNCTURE: CPT

## 2023-10-26 RX ORDER — PANTOPRAZOLE SODIUM 40 MG/1
40 TABLET, DELAYED RELEASE ORAL DAILY
Status: DISCONTINUED | OUTPATIENT
Start: 2023-10-26 | End: 2023-10-28 | Stop reason: HOSPADM

## 2023-10-26 RX ORDER — GLUCAGON 1 MG
1 KIT INJECTION
Status: DISCONTINUED | OUTPATIENT
Start: 2023-10-26 | End: 2023-10-28 | Stop reason: HOSPADM

## 2023-10-26 RX ORDER — INSULIN ASPART 100 [IU]/ML
0-5 INJECTION, SOLUTION INTRAVENOUS; SUBCUTANEOUS
Status: DISCONTINUED | OUTPATIENT
Start: 2023-10-26 | End: 2023-10-28 | Stop reason: HOSPADM

## 2023-10-26 RX ORDER — LORAZEPAM 2 MG/ML
2 INJECTION INTRAMUSCULAR EVERY 4 HOURS PRN
Status: DISCONTINUED | OUTPATIENT
Start: 2023-10-26 | End: 2023-10-28 | Stop reason: HOSPADM

## 2023-10-26 RX ORDER — TALC
6 POWDER (GRAM) TOPICAL NIGHTLY PRN
Status: DISCONTINUED | OUTPATIENT
Start: 2023-10-26 | End: 2023-10-28 | Stop reason: HOSPADM

## 2023-10-26 RX ORDER — IBUPROFEN 200 MG
24 TABLET ORAL
Status: DISCONTINUED | OUTPATIENT
Start: 2023-10-26 | End: 2023-10-28 | Stop reason: HOSPADM

## 2023-10-26 RX ORDER — ONDANSETRON 2 MG/ML
4 INJECTION INTRAMUSCULAR; INTRAVENOUS EVERY 4 HOURS PRN
Status: DISCONTINUED | OUTPATIENT
Start: 2023-10-26 | End: 2023-10-27

## 2023-10-26 RX ORDER — NALOXONE HCL 0.4 MG/ML
0.02 VIAL (ML) INJECTION
Status: DISCONTINUED | OUTPATIENT
Start: 2023-10-26 | End: 2023-10-28 | Stop reason: HOSPADM

## 2023-10-26 RX ORDER — SODIUM CHLORIDE 0.9 % (FLUSH) 0.9 %
10 SYRINGE (ML) INJECTION EVERY 12 HOURS PRN
Status: DISCONTINUED | OUTPATIENT
Start: 2023-10-26 | End: 2023-10-28 | Stop reason: HOSPADM

## 2023-10-26 RX ORDER — IBUPROFEN 200 MG
16 TABLET ORAL
Status: DISCONTINUED | OUTPATIENT
Start: 2023-10-26 | End: 2023-10-28 | Stop reason: HOSPADM

## 2023-10-26 RX ORDER — FOLIC ACID 1 MG/1
1 TABLET ORAL DAILY
Status: DISCONTINUED | OUTPATIENT
Start: 2023-10-26 | End: 2023-10-28 | Stop reason: HOSPADM

## 2023-10-26 RX ORDER — ALBUMIN HUMAN 250 G/1000ML
50 SOLUTION INTRAVENOUS ONCE
Status: COMPLETED | OUTPATIENT
Start: 2023-10-26 | End: 2023-10-26

## 2023-10-26 RX ORDER — THIAMINE HCL 100 MG
100 TABLET ORAL DAILY
Status: DISCONTINUED | OUTPATIENT
Start: 2023-10-26 | End: 2023-10-28 | Stop reason: HOSPADM

## 2023-10-26 RX ORDER — BUSPIRONE HYDROCHLORIDE 5 MG/1
5 TABLET ORAL 2 TIMES DAILY
Status: DISCONTINUED | OUTPATIENT
Start: 2023-10-26 | End: 2023-10-28

## 2023-10-26 RX ORDER — MUPIROCIN 20 MG/G
OINTMENT TOPICAL 2 TIMES DAILY
Status: DISCONTINUED | OUTPATIENT
Start: 2023-10-26 | End: 2023-10-28 | Stop reason: HOSPADM

## 2023-10-26 RX ORDER — PHYTONADIONE 5 MG/1
10 TABLET ORAL DAILY
Status: COMPLETED | OUTPATIENT
Start: 2023-10-26 | End: 2023-10-27

## 2023-10-26 RX ORDER — HEPARIN SODIUM 5000 [USP'U]/ML
5000 INJECTION, SOLUTION INTRAVENOUS; SUBCUTANEOUS EVERY 8 HOURS
Status: DISCONTINUED | OUTPATIENT
Start: 2023-10-26 | End: 2023-10-28 | Stop reason: HOSPADM

## 2023-10-26 RX ORDER — SODIUM CHLORIDE 0.9 % (FLUSH) 0.9 %
10 SYRINGE (ML) INJECTION
Status: DISCONTINUED | OUTPATIENT
Start: 2023-10-26 | End: 2023-10-28 | Stop reason: HOSPADM

## 2023-10-26 RX ORDER — LACTULOSE 10 G/15ML
10 SOLUTION ORAL 2 TIMES DAILY
Status: DISCONTINUED | OUTPATIENT
Start: 2023-10-26 | End: 2023-10-28 | Stop reason: HOSPADM

## 2023-10-26 RX ORDER — MIDODRINE HYDROCHLORIDE 5 MG/1
10 TABLET ORAL EVERY 8 HOURS
Status: DISCONTINUED | OUTPATIENT
Start: 2023-10-26 | End: 2023-10-28 | Stop reason: HOSPADM

## 2023-10-26 RX ORDER — OCTREOTIDE ACETATE 100 UG/ML
200 INJECTION, SOLUTION INTRAVENOUS; SUBCUTANEOUS EVERY 8 HOURS
Status: DISCONTINUED | OUTPATIENT
Start: 2023-10-26 | End: 2023-10-27

## 2023-10-26 RX ORDER — METHOCARBAMOL 750 MG/1
750 TABLET, FILM COATED ORAL EVERY 8 HOURS PRN
Status: DISCONTINUED | OUTPATIENT
Start: 2023-10-26 | End: 2023-10-28 | Stop reason: HOSPADM

## 2023-10-26 RX ORDER — LEVOTHYROXINE SODIUM 100 UG/1
100 TABLET ORAL
Status: DISCONTINUED | OUTPATIENT
Start: 2023-10-26 | End: 2023-10-28 | Stop reason: HOSPADM

## 2023-10-26 RX ADMIN — ONDANSETRON 4 MG: 2 INJECTION INTRAMUSCULAR; INTRAVENOUS at 05:10

## 2023-10-26 RX ADMIN — MIDODRINE HYDROCHLORIDE 10 MG: 5 TABLET ORAL at 05:10

## 2023-10-26 RX ADMIN — ALBUMIN (HUMAN) 50 G: 12.5 SOLUTION INTRAVENOUS at 05:10

## 2023-10-26 RX ADMIN — MUPIROCIN: 20 OINTMENT TOPICAL at 10:10

## 2023-10-26 RX ADMIN — HEPARIN SODIUM 5000 UNITS: 5000 INJECTION INTRAVENOUS; SUBCUTANEOUS at 01:10

## 2023-10-26 RX ADMIN — MIDODRINE HYDROCHLORIDE 10 MG: 5 TABLET ORAL at 10:10

## 2023-10-26 RX ADMIN — THERA TABS 1 TABLET: TAB at 09:10

## 2023-10-26 RX ADMIN — MIDODRINE HYDROCHLORIDE 10 MG: 5 TABLET ORAL at 01:10

## 2023-10-26 RX ADMIN — BUSPIRONE HYDROCHLORIDE 5 MG: 5 TABLET ORAL at 10:10

## 2023-10-26 RX ADMIN — OCTREOTIDE ACETATE 200 MCG: 100 INJECTION, SOLUTION INTRAVENOUS; SUBCUTANEOUS at 01:10

## 2023-10-26 RX ADMIN — PANTOPRAZOLE SODIUM 40 MG: 40 TABLET, DELAYED RELEASE ORAL at 09:10

## 2023-10-26 RX ADMIN — HEPARIN SODIUM 5000 UNITS: 5000 INJECTION INTRAVENOUS; SUBCUTANEOUS at 05:10

## 2023-10-26 RX ADMIN — PHYTONADIONE 10 MG: 5 TABLET ORAL at 09:10

## 2023-10-26 RX ADMIN — THIAMINE HCL TAB 100 MG 100 MG: 100 TAB at 09:10

## 2023-10-26 RX ADMIN — ONDANSETRON 4 MG: 2 INJECTION INTRAMUSCULAR; INTRAVENOUS at 10:10

## 2023-10-26 RX ADMIN — OCTREOTIDE ACETATE 200 MCG: 100 INJECTION, SOLUTION INTRAVENOUS; SUBCUTANEOUS at 05:10

## 2023-10-26 RX ADMIN — OCTREOTIDE ACETATE 200 MCG: 100 INJECTION, SOLUTION INTRAVENOUS; SUBCUTANEOUS at 10:10

## 2023-10-26 RX ADMIN — FOLIC ACID 1 MG: 1 TABLET ORAL at 09:10

## 2023-10-26 RX ADMIN — RIFAXIMIN 550 MG: 550 TABLET ORAL at 09:10

## 2023-10-26 RX ADMIN — HEPARIN SODIUM 5000 UNITS: 5000 INJECTION INTRAVENOUS; SUBCUTANEOUS at 10:10

## 2023-10-26 RX ADMIN — BUSPIRONE HYDROCHLORIDE 5 MG: 5 TABLET ORAL at 09:10

## 2023-10-26 RX ADMIN — RIFAXIMIN 550 MG: 550 TABLET ORAL at 10:10

## 2023-10-26 RX ADMIN — Medication 6 MG: at 10:10

## 2023-10-26 RX ADMIN — CEFTRIAXONE 2 G: 2 INJECTION, POWDER, FOR SOLUTION INTRAMUSCULAR; INTRAVENOUS at 01:10

## 2023-10-26 RX ADMIN — LACTULOSE 10 G: 20 SOLUTION ORAL at 10:10

## 2023-10-26 RX ADMIN — MUPIROCIN: 20 OINTMENT TOPICAL at 09:10

## 2023-10-26 RX ADMIN — LEVOTHYROXINE SODIUM 100 MCG: 100 TABLET ORAL at 05:10

## 2023-10-26 NOTE — ASSESSMENT & PLAN NOTE
Possible that her hypothyroidism may be contributing in part to her hyponatremia    Recommendations  --Agree with Levothyroxine.

## 2023-10-26 NOTE — SUBJECTIVE & OBJECTIVE
No past medical history on file.    No past surgical history on file.    Review of patient's allergies indicates:   Allergen Reactions    Hydrocodone        Current Facility-Administered Medications on File Prior to Encounter   Medication    [COMPLETED] albumin human 25% bottle 50 g    [COMPLETED] albumin human 25% bottle 50 g    octreotide injection 100 mcg    [DISCONTINUED] busPIRone tablet 5 mg    [DISCONTINUED] cefTRIAXone (ROCEPHIN) 1 g in dextrose 5 % in water (D5W) 100 mL IVPB (MB+)    [DISCONTINUED] doxycycline (VIBRAMYCIN) 100 mg in dextrose 5 % in water (D5W) 100 mL IVPB (MB+)    [DISCONTINUED] enoxaparin injection 40 mg    [DISCONTINUED] folic acid tablet 1 mg    [DISCONTINUED] lactulose 10 gram/15 ml solution 10 g    [DISCONTINUED] levothyroxine tablet 100 mcg    [DISCONTINUED] melatonin tablet 6 mg    [DISCONTINUED] methocarbamoL tablet 750 mg    [DISCONTINUED] midodrine tablet 10 mg    [DISCONTINUED] multivitamin tablet    [DISCONTINUED] mupirocin 2 % ointment    [DISCONTINUED] NON FORMULARY MEDICATION 2 mg    [DISCONTINUED] octreotide injection 100 mcg    [DISCONTINUED] octreotide injection 200 mcg    [DISCONTINUED] ondansetron injection 4 mg    [DISCONTINUED] pantoprazole EC tablet 40 mg    [DISCONTINUED] phytonadione (vitamin K1) tablet 10 mg    [DISCONTINUED] rifAXIMin tablet 550 mg    [DISCONTINUED] sodium chloride 0.9% flush 10 mL    [DISCONTINUED] thiamine tablet 100 mg     Current Outpatient Medications on File Prior to Encounter   Medication Sig    busPIRone (BUSPAR) 5 MG Tab Take 1 tablet (5 mg total) by mouth 2 (two) times daily.    folic acid (FOLVITE) 1 MG tablet Take 1 tablet (1 mg total) by mouth once daily.    lactulose 10 gram/15 ml (CHRONULAC) 10 gram/15 mL (15 mL) solution Take 15 mLs (10 g total) by mouth 2 (two) times daily.    levothyroxine (SYNTHROID) 100 MCG tablet Take 1 tablet (100 mcg total) by mouth before breakfast.    melatonin (MELATIN) 3 mg tablet Take 2 tablets (6 mg  total) by mouth nightly as needed for Insomnia.    methocarbamoL (ROBAXIN) 750 MG Tab Take 1 tablet (750 mg total) by mouth every 8 (eight) hours as needed.    midodrine (PROAMATINE) 10 MG tablet Take 1 tablet (10 mg total) by mouth every 8 (eight) hours.    multivitamin Tab Take 1 tablet by mouth once daily.    mupirocin (BACTROBAN) 2 % ointment by Nasal route 2 (two) times daily.    octreotide (SANDOSTATIN) 50 mcg/mL Soln Inject 4 mLs (200 mcg total) into the skin every 8 (eight) hours.    pantoprazole (PROTONIX) 40 MG tablet Take 1 tablet (40 mg total) by mouth once daily.    promethazine (PHENERGAN) 25 MG suppository Place 1 suppository (25 mg total) rectally every 6 (six) hours as needed for Nausea.    promethazine (PHENERGAN) 25 MG tablet Take 1 tablet (25 mg total) by mouth every 6 (six) hours as needed for Nausea.    rifAXIMin (XIFAXAN) 550 mg Tab Take 1 tablet (550 mg total) by mouth 2 (two) times daily.    thiamine 100 MG tablet Take 1 tablet (100 mg total) by mouth once daily.     Family History    None       Tobacco Use    Smoking status: Not on file    Smokeless tobacco: Not on file   Substance and Sexual Activity    Alcohol use: Not on file    Drug use: Not on file    Sexual activity: Not on file     Review of Systems   Constitutional:  Positive for activity change and fatigue. Negative for appetite change, chills, diaphoresis and fever.   HENT:  Negative for congestion, drooling and postnasal drip.    Eyes:  Negative for photophobia, redness and visual disturbance.   Respiratory:  Negative for apnea, cough, choking, chest tightness and shortness of breath.    Cardiovascular:  Negative for chest pain, palpitations and leg swelling.   Gastrointestinal:  Positive for abdominal distention. Negative for abdominal pain, constipation, diarrhea and nausea.   Genitourinary:  Negative for decreased urine volume, dysuria and flank pain.   Musculoskeletal:  Negative for arthralgias and back pain.   Skin:   Positive for color change. Negative for pallor, rash and wound.   Neurological:  Negative for syncope, speech difficulty and weakness.   Psychiatric/Behavioral:  Negative for behavioral problems, confusion and decreased concentration.      Objective:     Vital Signs (Most Recent):    Vital Signs (24h Range):  Temp:  [97.3 °F (36.3 °C)-97.7 °F (36.5 °C)] 97.6 °F (36.4 °C)  Pulse:  [83-97] 95  Resp:  [16-18] 18  SpO2:  [93 %-97 %] 95 %  BP: (106-120)/(67-76) 120/76        There is no height or weight on file to calculate BMI.     Physical Exam  Constitutional:       General: She is not in acute distress.     Appearance: Normal appearance. She is not ill-appearing.   HENT:      Head: Normocephalic and atraumatic.      Right Ear: External ear normal.      Left Ear: External ear normal.      Nose: Nose normal.      Mouth/Throat:      Mouth: Mucous membranes are moist.   Eyes:      General: Scleral icterus present.   Cardiovascular:      Rate and Rhythm: Normal rate and regular rhythm.      Pulses: Normal pulses.      Heart sounds: Normal heart sounds.   Pulmonary:      Effort: Pulmonary effort is normal.      Breath sounds: Normal breath sounds. No rales.   Abdominal:      General: There is distension.      Tenderness: There is no abdominal tenderness. There is no right CVA tenderness or left CVA tenderness.   Musculoskeletal:         General: Normal range of motion.      Cervical back: Normal range of motion.   Skin:     General: Skin is warm and dry.      Capillary Refill: Capillary refill takes less than 2 seconds.      Coloration: Skin is jaundiced.   Neurological:      General: No focal deficit present.      Mental Status: She is alert and oriented to person, place, and time.   Psychiatric:         Mood and Affect: Mood normal.         Behavior: Behavior normal.         Thought Content: Thought content normal.         Judgment: Judgment normal.                Significant Labs: Bilirubin:   Recent Labs   Lab  10/21/23  0508 10/22/23  0648 10/23/23  0502 10/24/23  0633 10/25/23  0539   BILIDIR 5.2*  --   --   --   --    BILITOT 8.5* 8.3* 7.8* 7.4* 6.2*     CBC:   Recent Labs   Lab 10/24/23  0633 10/25/23  0539   WBC 10.08 20.05*   HGB 8.3* 8.6*   HCT 23.6* 24.7*   * 139     CMP:   Recent Labs   Lab 10/24/23  0633 10/25/23  0539   * 123*   K 4.5 4.7   CO2 21* 20*   BUN 16.5 19.0   CREATININE 1.27* 1.60*   CALCIUM 7.9* 8.2*   ALBUMIN 2.4* 3.2*   BILITOT 7.4* 6.2*   ALKPHOS 125 118   AST 83* 71*   ALT 27 28         Significant Imaging: I have reviewed all pertinent imaging results/findings within the past 24 hours.

## 2023-10-26 NOTE — ASSESSMENT & PLAN NOTE
Patient has hyponatremia which is uncontrolled,We will aim to correct the sodium by 4-6mEq in 24 hours. We will monitor sodium Every 6 hours. The hyponatremia is due to hypovolemia, SIADH (possibly 2/2 depression, pain or nausea and vomiting), Cirrhosis, Nephrotic syndrome and renal insufficiency. We will obtain the following studies: Urine sodium, urine osmolality, serum osmolality. We will treat the hyponatremia per Nephro.     Worsening hyponatremia, now 123, refractory to fluid restriction, salt tabs, tolvaptan 15mg on 10/23, or midodrine/octreotide/albumin. Nephrology consulted. Urine sodium, urine osmolality, serum osmolality pending    Also attributed partially to new-onset hypothyroidism, continuing levothyroxine 100mcg qd

## 2023-10-26 NOTE — MEDICAL/APP STUDENT
10/26/2023    Lauren Ewing  1960  10/26/2023     SUBJECTIVE    Hospital Course:  - Originally presented to [facility] on 10/12 for 1 wk of weakness, skin yellowing,and poor appetite. History of alcohol abuse since age 38, has been to rehab, last drink unclear (4 wks ago?). Does say she has hx of seizures, tend to occur after she hasn't had been drinking, associated with periods of confusion. On initial presentation Was hypotensive with leukocytosis, Total Bili 11.6, hyponatremia, and elevated lactic acid.  - Was found to be in acute decompensated liver failure with R hepatothorax and ascites with SAAG >1.1, consistent with portal hypertension.  - BP stabilized with initiation of albumin, octreotide, and midodrine  - also started on lactulose, rifaximin for liver failure  - Hyponatremia differential included hypothyroidism, so levothyroxine was inititaed  - Transferred [place] [facility] [place]26 for hepatology consult regarding acute decompensated alcoholic liver cirrhosis      Post-transfer:  - normotensive   - MELD-3: 33  -Tbili - 6.2  - WBC 25, but received dexamethasone prior to transfer  - Hepatology requested nephrology consult d/t hyponatremia and concern for hepatorenal syndrome.    Today  No complaints, other than some fatigue. States tremor has improved. Endorses good appetite. Understands she is on fluid restriction. Making zeke urine via catheter. Expresses interest in speaking with psychologist. Denies abdominal tension, nausea, vomiting.      Review of Systems   Constitutional:  Positive for malaise/fatigue. Negative for chills and fever.   Eyes:         +scleral icterus   Respiratory:  Negative for hemoptysis.    Cardiovascular:  Positive for leg swelling. Negative for chest pain.   Gastrointestinal:  Negative for blood in stool, nausea and vomiting.   Genitourinary:  Negative for dysuria.   Skin:         +bruising   Neurological:  Positive for tremors. Negative for focal weakness.    Endo/Heme/Allergies:  Bruises/bleeds easily.   Psychiatric/Behavioral:  Positive for depression.        Current Medications  Scheduled Meds:   busPIRone  5 mg Oral BID    cefTRIAXone (ROCEPHIN) IVPB  2 g Intravenous Q24H    folic acid  1 mg Oral Daily    heparin (porcine)  5,000 Units Subcutaneous Q8H    lactulose  10 g Oral BID    levothyroxine  100 mcg Oral Before breakfast    midodrine  10 mg Oral Q8H    multivitamin  1 tablet Oral Daily    mupirocin   Nasal BID    octreotide  200 mcg Subcutaneous Q8H    pantoprazole  40 mg Oral Daily    phytonadione (vitamin K1)  10 mg Oral Daily    rifAXIMin  550 mg Oral BID    thiamine  100 mg Oral Daily     Continuous Infusions:  PRN Meds:.dextrose 10%, dextrose 10%, glucagon (human recombinant), glucose, glucose, insulin aspart U-100, lorazepam, melatonin, methocarbamoL, naloxone, ondansetron, sodium chloride 0.9%, sodium chloride 0.9%       Relevant History  MedHx   - AUD with ? withdrawal seizures  - Depression  - Anxiety    Fhx   - both parents with AUD, father required liver transplant  - Sister with AUD, esophageal varices requiring banding    OBJECTIVE  Vitals  Wt Readings from Last 3 Encounters:   10/13/23 66 kg (145 lb 8.1 oz)   12/12/22 65.8 kg (145 lb)     Temp Readings from Last 3 Encounters:   10/26/23 97.7 °F (36.5 °C) (Oral)   10/25/23 97.6 °F (36.4 °C) (Oral)   12/12/22 97.5 °F (36.4 °C) (Temporal)     BP Readings from Last 3 Encounters:   10/26/23 (!) 112/59   10/25/23 120/76   12/12/22 (!) 182/111     Pulse Readings from Last 3 Encounters:   10/26/23 86   10/25/23 95   12/12/22 91         Physical Exam  Constitutional:       General: She is not in acute distress.     Appearance: She is ill-appearing.   Eyes:      General: Scleral icterus present.      Extraocular Movements: Extraocular movements intact.   Cardiovascular:      Rate and Rhythm: Normal rate and regular rhythm.      Pulses: Normal pulses.   Pulmonary:      Effort: Pulmonary effort is normal.       Breath sounds: No wheezing, rhonchi or rales.   Abdominal:      General: There is no distension.      Tenderness: There is no abdominal tenderness.      Comments: Slightly tense, not distended  No tenderness     Musculoskeletal:      Cervical back: Neck supple.      Right lower leg: Edema present.      Left lower leg: Edema present.      Comments: 2+   Skin:     Coloration: Skin is jaundiced.      Findings: Bruising present.   Neurological:      Mental Status: She is alert and oriented to person, place, and time.      Comments: Slight tremor of outstretched arms L>R             Labs    Lab Results   Component Value Date     (L) 10/26/2023    K 4.0 10/26/2023     10/26/2023    CO2 21 (L) 10/26/2023    BUN 19 10/26/2023    CREATININE 1.3 10/26/2023    CALCIUM 8.3 (L) 10/26/2023    ANIONGAP 9 10/26/2023    EGFRNORACEVR 46.5 (A) 10/26/2023       Lab Results   Component Value Date    WBC 15.83 (H) 10/26/2023    HGB 8.1 (L) 10/26/2023    HCT 24.5 (L) 10/26/2023    MCV 89 10/26/2023     (L) 10/26/2023         Imaging    10/26 US liver  Impression:  1. Sequela of portal venous hypertension including reversed flow throughout the portal venous system, left upper quadrant collateral vessels, and ascites.  2. Hepatic steatosis.  Nodular hepatic contour suggestive for cirrhosis.  3. Gallbladder sludge.  No evidence of acute cholecystitis.  4. Mild intra and extrahepatic biliary ductal dilatation.  5. Bilateral pleural effusions.    10/26: US retroperitoneal  Impression:     No significant renal abnormality.         Consults  - Hepatology  - Nephrology  - psychology    ASSESSMENT & PLAN    Acute decompensated cirrhosis 2/2 alcohol use disorder with evidence of portal hypertension  10/26 - CMV (+), 51...borderline   Normotensive  Symptoms controlled  MELD-3: 33    Currently meds: daily albumin IV, midodrine, octreotide  HE ppx: rifaximin, lactulose, vitK    Under evaluated for TIPS and liver transplant  ?  Hepatorenal syndrome - potential need for teripressin  May transfer to liver unit      2. Hyponatremia with DIANA  Sna -130 < 126 on admit, initially 122  Sosm - 288  Arlyn - <10  Uurea - 236  Uosm - 149  Ddx - hypovolemia, cirrhosis, hypothyroidism, SIADH    -Improved with tolvaptan  -Nephro recs rechecking thyroid studies, could check to see if responding to levothyroxine... but TSH/T4 levels could be more of a euthyroid sick syndrome  - will continue fluid restriction  - DIANA improving, likely d/t hypotension, current MAPs stable...makes hepatorenal syndrome less likely, Terlipressin probably not indicated but will look up  - continue to monitor BMP  - remove longoria     3. Alcohol Use Disorder  CIWA protocol  Ativan prn    4. MDD/SHAQ  Pyschology consulted, reviewing strategies for maintaining sobriety and coping with anxiety  Continue buspar    5.Anemia of Chronic Disease        ___________________________________________________________  Radhika Bhatti OMS-IV  Sub-intern

## 2023-10-26 NOTE — ASSESSMENT & PLAN NOTE
Presented initially with a sodium of 122, placed on fluid restriction and received tolvaptan on 10/23/23. Lab studies initially compatible with pre-renal etiology. Serum osm of 261 on presentation, increased to 288 on 10/26, urine osm initially at 467 decreased to 149 on 10/26, and urine sodium persistently less than 20 with most recent lab value less than 10 on 10/26. Serum sodium increased up to 130 mEq on 10/23/23 and creatinine trending down to 1.3 on 10/23.    Recommendations;  --Continue fluid restriction of 1.0 liters per day.  --Monitor I&Os  --Ok to remove longoria  --Daily BMP  --Maintain MAP>75 mmHg

## 2023-10-26 NOTE — HOSPITAL COURSE
Hepatology was consulted and stated she is not a liver candidate transplant given continued EtOH and would need to demonstrate sobriety. Addition psych consulted. Will consider possible palliative consult.  Per nephrology patient's presentation hypoosmolar hyponatremia-SIADH/liver disease which has improved after tolvaptan, and DIANA improved with saline bolus. DIANA likely from hypotension at OSH. Thus nephrology does not think picture is HRS picture. Continuing fluid restriction and midodrine. As per hepatology team she does not appear to be a candidate for liver transplant. Plans of care have been discussed with the patient and palliative team have been consulted to be onboard with us. Patient has been decided by liver team to be followed up as outpatient basis. Her mentation is back to baseline and thorough discussion has commenced with family and the patient regarding longterm care plans.  She is stable medically and fit for discharge.

## 2023-10-26 NOTE — ASSESSMENT & PLAN NOTE
Undergoing treatment for HRS at OSH after she developed symptomatic hypotension 10/23 needing 500cc IVF bolus and IV albumin, with midodrine and octreotide. Progressive azotemia, oliguria with U Na<20. Octreotide increased to 200 mg IV q8h    Creatinine climbed to 1.6 from 0.8 despite the above interventions, terlipressin not available at OSH, partial impetus for transfer along with hepatology eval.    - Nephrology consult  - Urine lytes, osmolalities  - continue octreotide 200ch q8h, midodrine 10mg q8h, IV albumin qd   - unclear if pt would benefit from terlipressin or norepi   - MAP > 80  - Renal ultrasound  - Strict I&O  - Reportedly very poor prognosis if not a transplant candidate, would not be appropriate for dialysis therapy unless as a bridge to transplant per Nephro at OSH

## 2023-10-26 NOTE — H&P
[facility] - Telemetry [facility] Medicine  History & Physical    Patient Name: Lauren Ewing  MRN: [id number]  Patient Class: IP- Inpatient  Admission Date: 10/26/2023  Attending Physician: Suleman Paige, *   Primary Care Provider: Pennie, Primary Doctor         Patient information was obtained from patient, past medical records and ER records.     Subjective:     Principal Problem:Hepatorenal syndrome    Chief Complaint: No chief complaint on file.       HPI: 63yoF hx including MDD, SHAQ, AUD who presented to [facility] ED on 10/12/2023 with chief complaint of progressive weakness x 1 week and yellowing of her skin.  She also endorsed poor appetite over the past week.       Upon ED arrival, WBC 20,000, a significantly elevated bilirubin at 11.6, hyponatremia, and mildly elevated lactic acid level.  Alcohol level was undetectable.  She reported a heavy history of alcohol abuse since about age 38.  She has been in rehab for alcohol abuse before.  Her last drink was approximately 4 months prior. CT A/P with contrast revealed  a right-sided pleural effusion, right lower lobe consolidation, hepatic cirrhotic morphology and ascites with mural thickening of the small bowel loops suggesting nonspecific enteritis. Patient received IV fluids in the ED. Initiated on empiric antibiotics and pulmonology, Gastroenterology consulted. Pulm suspected likely hepatothorax and determined no need for thoracentesis.      Patient given lactulose, rifaximin, levothyroxine, underwent paracentesis on 10/13/2023 with 0.75L removed (SAAG>1.1), placed on salt tabs, Lasix and Aldactone and fluid restriction which subsequently all discontinued on 10/15/2023.  Midodrine 2.5 q.8 hours added on 10/15/2023.  Repeat abdominal ultrasound and CT scan on 10/20 show increasing bilateral pleural effusion and moderate ascites. Echo on 10/25 w/o systolic/diastolic dysfunction but CVP 15.    Sodium remained stable at 123-124. Nephrology  consulted and was given tolvaptan 10/23 with no response. Patient required albumin, octreotide and increase the midodrine to 10 mg TID with initial improvement in BP. Nephro later recommended Terlipressin but unable to obtain at OSH. Deemed not a HD candidate unless as a bridge to transplant.     [facility] for hepatology consultation to evaluate acute decompensated alcoholic cirrhosis and initiate liver transplant evaluation. Discussed with Dr. Houser; agreed to consult on the patient with admission to Hospital Medicine stepdown.      On arrival, normotensive, satting well on RA. States she feels she has been slowly improving. MELD-3: 33.  T bili 6.2, no transaminitis. Continuing octreotide (increased to 200mg IV q8h on day of transfer), midodrine, albumin, rifaximin, lactulose, rocephin, vit K.     WBC 25 on arrival, but received dexamethasone 12mg injection day prior to transfer, due to elevated maddrey score(?).     Nephrology consulted for HRS, hyponatremia refractory to tolvaptan. Hepatology consulted for acute liver failure, possible transplant eval.       No past medical history on file.    No past surgical history on file.    Review of patient's allergies indicates:   Allergen Reactions    Hydrocodone        Current Facility-Administered Medications on File Prior to Encounter   Medication    [COMPLETED] albumin human 25% bottle 50 g    [COMPLETED] albumin human 25% bottle 50 g    octreotide injection 100 mcg    [DISCONTINUED] busPIRone tablet 5 mg    [DISCONTINUED] cefTRIAXone (ROCEPHIN) 1 g in dextrose 5 % in water (D5W) 100 mL IVPB (MB+)    [DISCONTINUED] doxycycline (VIBRAMYCIN) 100 mg in dextrose 5 % in water (D5W) 100 mL IVPB (MB+)    [DISCONTINUED] enoxaparin injection 40 mg    [DISCONTINUED] folic acid tablet 1 mg    [DISCONTINUED] lactulose 10 gram/15 ml solution 10 g    [DISCONTINUED] levothyroxine tablet 100 mcg    [DISCONTINUED] melatonin tablet 6 mg     [DISCONTINUED] methocarbamoL tablet 750 mg    [DISCONTINUED] midodrine tablet 10 mg    [DISCONTINUED] multivitamin tablet    [DISCONTINUED] mupirocin 2 % ointment    [DISCONTINUED] NON FORMULARY MEDICATION 2 mg    [DISCONTINUED] octreotide injection 100 mcg    [DISCONTINUED] octreotide injection 200 mcg    [DISCONTINUED] ondansetron injection 4 mg    [DISCONTINUED] pantoprazole EC tablet 40 mg    [DISCONTINUED] phytonadione (vitamin K1) tablet 10 mg    [DISCONTINUED] rifAXIMin tablet 550 mg    [DISCONTINUED] sodium chloride 0.9% flush 10 mL    [DISCONTINUED] thiamine tablet 100 mg     Current Outpatient Medications on File Prior to Encounter   Medication Sig    busPIRone (BUSPAR) 5 MG Tab Take 1 tablet (5 mg total) by mouth 2 (two) times daily.    folic acid (FOLVITE) 1 MG tablet Take 1 tablet (1 mg total) by mouth once daily.    lactulose 10 gram/15 ml (CHRONULAC) 10 gram/15 mL (15 mL) solution Take 15 mLs (10 g total) by mouth 2 (two) times daily.    levothyroxine (SYNTHROID) 100 MCG tablet Take 1 tablet (100 mcg total) by mouth before breakfast.    melatonin (MELATIN) 3 mg tablet Take 2 tablets (6 mg total) by mouth nightly as needed for Insomnia.    methocarbamoL (ROBAXIN) 750 MG Tab Take 1 tablet (750 mg total) by mouth every 8 (eight) hours as needed.    midodrine (PROAMATINE) 10 MG tablet Take 1 tablet (10 mg total) by mouth every 8 (eight) hours.    multivitamin Tab Take 1 tablet by mouth once daily.    mupirocin (BACTROBAN) 2 % ointment by Nasal route 2 (two) times daily.    octreotide (SANDOSTATIN) 50 mcg/mL Soln Inject 4 mLs (200 mcg total) into the skin every 8 (eight) hours.    pantoprazole (PROTONIX) 40 MG tablet Take 1 tablet (40 mg total) by mouth once daily.    promethazine (PHENERGAN) 25 MG suppository Place 1 suppository (25 mg total) rectally every 6 (six) hours as needed for Nausea.    promethazine (PHENERGAN) 25 MG tablet Take 1 tablet (25 mg total) by mouth every 6  (six) hours as needed for Nausea.    rifAXIMin (XIFAXAN) 550 mg Tab Take 1 tablet (550 mg total) by mouth 2 (two) times daily.    thiamine 100 MG tablet Take 1 tablet (100 mg total) by mouth once daily.     Family History    None       Tobacco Use    Smoking status: Not on file    Smokeless tobacco: Not on file   Substance and Sexual Activity    Alcohol use: Not on file    Drug use: Not on file    Sexual activity: Not on file     Review of Systems   Constitutional:  Positive for activity change and fatigue. Negative for appetite change, chills, diaphoresis and fever.   HENT:  Negative for congestion, drooling and postnasal drip.    Eyes:  Negative for photophobia, redness and visual disturbance.   Respiratory:  Negative for apnea, cough, choking, chest tightness and shortness of breath.    Cardiovascular:  Negative for chest pain, palpitations and leg swelling.   Gastrointestinal:  Positive for abdominal distention. Negative for abdominal pain, constipation, diarrhea and nausea.   Genitourinary:  Negative for decreased urine volume, dysuria and flank pain.   Musculoskeletal:  Negative for arthralgias and back pain.   Skin:  Positive for color change. Negative for pallor, rash and wound.   Neurological:  Negative for syncope, speech difficulty and weakness.   Psychiatric/Behavioral:  Negative for behavioral problems, confusion and decreased concentration.      Objective:     Vital Signs (Most Recent):    Vital Signs (24h Range):  Temp:  [97.3 °F (36.3 °C)-97.7 °F (36.5 °C)] 97.6 °F (36.4 °C)  Pulse:  [83-97] 95  Resp:  [16-18] 18  SpO2:  [93 %-97 %] 95 %  BP: (106-120)/(67-76) 120/76        There is no height or weight on file to calculate BMI.     Physical Exam  Constitutional:       General: She is not in acute distress.     Appearance: Normal appearance. She is not ill-appearing.   HENT:      Head: Normocephalic and atraumatic.      Right Ear: External ear normal.      Left Ear: External ear normal.       Nose: Nose normal.      Mouth/Throat:      Mouth: Mucous membranes are moist.   Eyes:      General: Scleral icterus present.   Cardiovascular:      Rate and Rhythm: Normal rate and regular rhythm.      Pulses: Normal pulses.      Heart sounds: Normal heart sounds.   Pulmonary:      Effort: Pulmonary effort is normal.      Breath sounds: Normal breath sounds. No rales.   Abdominal:      General: There is distension.      Tenderness: There is no abdominal tenderness. There is no right CVA tenderness or left CVA tenderness.   Musculoskeletal:         General: Normal range of motion.      Cervical back: Normal range of motion.   Skin:     General: Skin is warm and dry.      Capillary Refill: Capillary refill takes less than 2 seconds.      Coloration: Skin is jaundiced.   Neurological:      General: No focal deficit present.      Mental Status: She is alert and oriented to person, place, and time.   Psychiatric:         Mood and Affect: Mood normal.         Behavior: Behavior normal.         Thought Content: Thought content normal.         Judgment: Judgment normal.                Significant Labs: Bilirubin:   Recent Labs   Lab 10/21/23  0508 10/22/23  0648 10/23/23  0502 10/24/23  0633 10/25/23  0539   BILIDIR 5.2*  --   --   --   --    BILITOT 8.5* 8.3* 7.8* 7.4* 6.2*     CBC:   Recent Labs   Lab 10/24/23  0633 10/25/23  0539   WBC 10.08 20.05*   HGB 8.3* 8.6*   HCT 23.6* 24.7*   * 139     CMP:   Recent Labs   Lab 10/24/23  0633 10/25/23  0539   * 123*   K 4.5 4.7   CO2 21* 20*   BUN 16.5 19.0   CREATININE 1.27* 1.60*   CALCIUM 7.9* 8.2*   ALBUMIN 2.4* 3.2*   BILITOT 7.4* 6.2*   ALKPHOS 125 118   AST 83* 71*   ALT 27 28         Significant Imaging: I have reviewed all pertinent imaging results/findings within the past 24 hours.    Assessment/Plan:     * Hepatorenal syndrome  Undergoing treatment for HRS at OSH after she developed symptomatic hypotension 10/23 needing 500cc IVF bolus and IV albumin, with  midodrine and octreotide. Progressive azotemia, oliguria with U Na<20. Octreotide increased to 200 mg IV q8h    Creatinine climbed to 1.6 from 0.8 despite the above interventions, terlipressin not available at OSH, partial impetus for transfer along with hepatology eval.    - Nephrology consult  - Urine lytes, osmolalities  - continue octreotide 200ch q8h, midodrine 10mg q8h, IV albumin qd   - unclear if pt would benefit from terlipressin or norepi   - MAP > 80  - Renal ultrasound  - Strict I&O  - Reportedly very poor prognosis if not a transplant candidate, would not be appropriate for dialysis therapy unless as a bridge to transplant per Nephro at OSH          ACP (advance care planning)  Discussed GOC with patient. Wishes to pursue all curative measures, but would not accept heroic resuscitative measures in the event of cardiac arrest, including chest compressions or cardioversion.  Agreeable to vasopressors or intubation if necessary during treatment course. Patient AAOx4 with full capacity. In accordance with these wishes, code status updated to DNR.      Major depressive disorder  Patient has persistent depression which is moderate and is currently controlled. Will Continue anti-depressant medications. We will consult psychiatry at this time. Patient does not display psychosis at this time. Continue to monitor closely and adjust plan of care as needed.    Patient with extensive history of emotional trauma, including death of multiple partners, family members, traumatic SDH (stable on repeat imaging). Denies active or passive SI/HI. Suspect element of PTSD    States mood has improved greatly since starting buspar at OSH. Will continue, close titration given worsening renal function.     - agreeable to psych consult, placed.         Liver failure  Hx of heavy alcohol use >30 years. Conflicting reports of last drink (6 weeks to 4 months ago) Cirrhosis noted on prior imaging. Concern for HRS, transferred to [facility]  for Hepatology eval, possible transplant evaluation. Liver US with doppler on 10/13 with reversed flow at the portal vein.  Portosystemic collaterals on preceding CT exam. Small pleural effusions and abdominal ascites (SAAG>1.1) Effusion likely hepatothorax per pulm, no thoracentesis necessary, completing rocephin course for possible PNA. No systolic or diastolic dysfunction noted on echo but elevated CVP, possible congestive hepatopathy.  Tbili 6.0 from 12 on arrival.    Initially received lasix, spironolactone with minimal improvement. Started HRS protocol shortly thereafter d/t worsening Cr, hyponatremia. See HRS.     Was started on lactulose, rifaximin to prevent HE. Will continue    - Hepatology consult  - HIV   - EBV   - CMV  - Hep panel negative  - Fe studies, Ferritin   - A1AT   - RUQ U/S with Doppler study    Hyponatremia  Patient has hyponatremia which is uncontrolled,We will aim to correct the sodium by 4-6mEq in 24 hours. We will monitor sodium Every 6 hours. The hyponatremia is due to hypovolemia, SIADH (possibly 2/2 depression, pain or nausea and vomiting), Cirrhosis, Nephrotic syndrome and renal insufficiency. We will obtain the following studies: Urine sodium, urine osmolality, serum osmolality. We will treat the hyponatremia per Nephro.     Worsening hyponatremia, now 123, refractory to fluid restriction, salt tabs, tolvaptan 15mg on 10/23, or midodrine/octreotide/albumin. Nephrology consulted. Urine sodium, urine osmolality, serum osmolality pending    Also attributed partially to new-onset hypothyroidism, continuing levothyroxine 100mcg qd      VTE Risk Mitigation (From admission, onward)         Ordered     IP VTE LOW RISK PATIENT  Once         10/26/23 0143     Place sequential compression device  Until discontinued         10/26/23 0143                 Matilde Altamirano MD  Department of Hospital Medicine  [facility] - Telemetry Stepdown

## 2023-10-26 NOTE — ASSESSMENT & PLAN NOTE
Hx of heavy alcohol use >30 years. Conflicting reports of last drink (6 weeks to 4 months ago) Cirrhosis noted on prior imaging. Concern for HRS, transferred to [facility] for Hepatology eval, possible transplant evaluation. Liver US with doppler on 10/13 with reversed flow at the portal vein.  Portosystemic collaterals on preceding CT exam. Small pleural effusions and abdominal ascites (SAAG>1.1) Effusion likely hepatothorax per pulm, no thoracentesis necessary, completing rocephin course for possible PNA. No systolic or diastolic dysfunction noted on echo but elevated CVP, possible congestive hepatopathy.  Tbili 6.0 from 12 on arrival.    Initially received lasix, spironolactone with minimal improvement. Started HRS protocol shortly thereafter d/t worsening Cr, hyponatremia. See HRS.     Was started on lactulose, rifaximin to prevent HE. Will continue    - Hepatology consult  - HIV; negative   - EBV   - CMV; positive   - Hep panel negative  - Fe studies, Ferritin; anemia of chronic disease  - A1AT   - RUQ U/S with Doppler study; Hepatic steatosis + cirrhosis   - consider pall care consult in the setting of high MELD and possibly not being a transplant candidate if patient cannot demonstrate sobriety with addiction psych

## 2023-10-26 NOTE — SUBJECTIVE & OBJECTIVE
No past medical history on file.    No past surgical history on file.    Review of patient's allergies indicates:   Allergen Reactions    Hydrocodone      Current Facility-Administered Medications   Medication Frequency    busPIRone tablet 5 mg BID    cefTRIAXone (ROCEPHIN) 2 g in dextrose 5 % in water (D5W) 100 mL IVPB (MB+) Q24H    dextrose 10% bolus 125 mL 125 mL PRN    dextrose 10% bolus 250 mL 250 mL PRN    folic acid tablet 1 mg Daily    glucagon (human recombinant) injection 1 mg PRN    glucose chewable tablet 16 g PRN    glucose chewable tablet 24 g PRN    heparin (porcine) injection 5,000 Units Q8H    insulin aspart U-100 pen 0-5 Units QID (AC + HS) PRN    lactulose 20 gram/30 mL solution Soln 10 g BID    levothyroxine tablet 100 mcg Before breakfast    LORazepam injection 2 mg Q4H PRN    melatonin tablet 6 mg Nightly PRN    methocarbamoL tablet 750 mg Q8H PRN    midodrine tablet 10 mg Q8H    multivitamin tablet Daily    mupirocin 2 % ointment BID    naloxone 0.4 mg/mL injection 0.02 mg PRN    octreotide injection 200 mcg Q8H    ondansetron injection 4 mg Q4H PRN    pantoprazole EC tablet 40 mg Daily    phytonadione (vitamin K1) tablet 10 mg Daily    rifAXIMin tablet 550 mg BID    sodium chloride 0.9% flush 10 mL PRN    sodium chloride 0.9% flush 10 mL Q12H PRN    thiamine tablet 100 mg Daily     Family History    None       Tobacco Use    Smoking status: Not on file    Smokeless tobacco: Not on file   Substance and Sexual Activity    Alcohol use: Not on file    Drug use: Not on file    Sexual activity: Not on file     Review of Systems   Constitutional:  Positive for appetite change. Negative for activity change, chills, diaphoresis, fatigue and fever.   HENT:  Negative for congestion.    Respiratory:  Negative for cough, chest tightness, shortness of breath and wheezing.    Cardiovascular:  Positive for leg swelling. Negative for chest pain and palpitations.   Gastrointestinal:  Positive for nausea.  Negative for abdominal distention, abdominal pain, blood in stool, constipation, diarrhea and vomiting.   Endocrine: Negative for cold intolerance, heat intolerance, polydipsia, polyphagia and polyuria.   Genitourinary:  Negative for difficulty urinating, dysuria, flank pain and urgency.   Musculoskeletal:  Negative for arthralgias, back pain and joint swelling.   Neurological:  Positive for weakness. Negative for dizziness, tremors, seizures, syncope, facial asymmetry, light-headedness, numbness and headaches.   Psychiatric/Behavioral:  Negative for agitation, behavioral problems and confusion.      Objective:     Vital Signs (Most Recent):    Vital Signs (24h Range):  Temp:  [97.3 °F (36.3 °C)-97.6 °F (36.4 °C)] 97.6 °F (36.4 °C)  Pulse:  [95-97] 95  Resp:  [18] 18  SpO2:  [93 %-95 %] 95 %  BP: (110-120)/(71-76) 120/76        There is no height or weight on file to calculate BMI.  There is no height or weight on file to calculate BSA.    I/O last 3 completed shifts:  In: -   Out: 2000 [Urine:2000]     Physical Exam  Vitals and nursing note reviewed.   Constitutional:       Comments: Jaundiced   HENT:      Right Ear: External ear normal.      Left Ear: External ear normal.      Mouth/Throat:      Mouth: Mucous membranes are moist.   Eyes:      Pupils: Pupils are equal, round, and reactive to light.   Cardiovascular:      Rate and Rhythm: Normal rate and regular rhythm.      Pulses: Normal pulses.      Heart sounds: Normal heart sounds. No murmur heard.  Pulmonary:      Effort: Pulmonary effort is normal.      Comments: Decreased breath sounds bilateral bases  Abdominal:      General: Abdomen is flat. Bowel sounds are normal.      Palpations: Abdomen is soft.   Musculoskeletal:         General: Swelling present. Normal range of motion.      Cervical back: Normal range of motion and neck supple.      Right lower leg: Edema present.      Left lower leg: Edema present.   Skin:     General: Skin is warm and dry.       Capillary Refill: Capillary refill takes less than 2 seconds.      Coloration: Skin is jaundiced.   Neurological:      General: No focal deficit present.      Mental Status: She is alert and oriented to person, place, and time.   Psychiatric:         Mood and Affect: Mood normal.         Behavior: Behavior normal.         Judgment: Judgment normal.          Significant Labs:  CBC:   Recent Labs   Lab 10/26/23  0444   WBC 15.83*   RBC 2.75*   HGB 8.1*   HCT 24.5*   *   MCV 89   MCH 29.5   MCHC 33.1     CMP:   Recent Labs   Lab 10/26/23  0444   *   CALCIUM 8.3*   ALBUMIN 3.0*   PROT 5.3*   *   K 4.0   CO2 21*      BUN 19   CREATININE 1.3   ALKPHOS 110   ALT 31   AST 80*   BILITOT 5.6*     All labs within the past 24 hours have been reviewed.    Significant Imaging:  US: Reviewed     154.94

## 2023-10-26 NOTE — PLAN OF CARE
Problem: Adult Inpatient Plan of Care  Goal: Plan of Care Review  Outcome: Ongoing, Progressing  Goal: Patient-Specific Goal (Individualized)  Outcome: Ongoing, Progressing  Goal: Absence of Hospital-Acquired Illness or Injury  Outcome: Ongoing, Progressing  Goal: Optimal Comfort and Wellbeing  Outcome: Ongoing, Progressing  Goal: Readiness for Transition of Care  Outcome: Ongoing, Progressing     Problem: Impaired Wound Healing  Goal: Optimal Wound Healing  Outcome: Ongoing, Progressing     Problem: Fluid and Electrolyte Imbalance (Acute Kidney Injury/Impairment)  Goal: Fluid and Electrolyte Balance  Outcome: Ongoing, Progressing     Problem: Oral Intake Inadequate (Acute Kidney Injury/Impairment)  Goal: Optimal Nutrition Intake  Outcome: Ongoing, Progressing   Aaox4. Tran removed. Safety interventions maintained. VSS. Poc reviewed. Questions and concerns answered. Care ongoing.

## 2023-10-26 NOTE — NURSING
Nurses Note -- 4 Eyes      10/26/2023   1:07 AM      Skin assessed during: Transfer      [x] No Altered Skin Integrity Present    []Prevention Measures Documented      [] Yes- Altered Skin Integrity Present or Discovered   [] LDA Added if Not in Epic (Describe Wound)   [] New Altered Skin Integrity was Present on Admit and Documented in LDA   [] Wound Image Taken    Wound Care Consulted? No    Attending Nurse:  Maya Goodrich RN    Second RN/Staff Member:   Nadya Fuller RN

## 2023-10-26 NOTE — NURSING
Nurses Note -- 4 Eyes      10/26/2023   3:17 PM      Skin assessed during: Transfer      [x] No Altered Skin Integrity Present    []Prevention Measures Documented      [] Yes- Altered Skin Integrity Present or Discovered   [] LDA Added if Not in Epic (Describe Wound)   [] New Altered Skin Integrity was Present on Admit and Documented in LDA   [] Wound Image Taken    Wound Care Consulted? No    Attending Nurse:  ELBERT Clement    Second RN/Staff Member:  ELBERT Spencer

## 2023-10-26 NOTE — CONSULTS
[facility]  Psychology  Consult Note      PSYCHOSOCIAL ASSESSMENT  Patient Name: Lauren Ewing  MRN: [id number]  Date: 10/26/2023  Admission Date: 10/26/2023   Length of Stay: Hospital Day: 1   Attending Physician: Suleman Paige, *    Inpatient consult to Psychology  Consult performed by: Omer Enriquez  Consult ordered by: Zhane Villela DO          HISTORY OF PRESENTING ILLNESS: Mrs. Ewing is a pleasant 63-year-old female with a PMH of EtOH use, MDD, and SHAQ who presented to [facility] ED on 10/12/2023 with chief complaint of progressive weakness x 1 week and yellowing of her skin. Patient states that her weakness progressed to the point that she was unable to get out of bed. She was a heavy alcohol user since the age of 38 and stopped about one month prior to presentation. Nephrology was initially consulted for persistent hyponatremia of 122. Initial lab studies showed a serum sodium of 122, serum osmolality of 261, Urine osmolality of 467, urine sodium of less than 10. Presenting creatinine was 0.79. She was initially given IV fluids without improvement in her serum sodium, and it was believed that her hyponatremia was secondary to her liver failure. She developed a hypotensive episode on 10/23/23, and required a 500 mL bolus of normal saline as per outside notes. Her creatinine increased acutely on 10/24 upto 1.27, and continued to trend up on 10/25 to 1.6. She was given tolvaptan on 10/23 to help with her hyponatremia and was transferred to our facility for liver transplant evaluation.     CURRENT SYMPTOMS  Mood: Denied low mood, anhedonia, SI  Anxiety: Mild anxiety - some worry and difficulty controlling worry.  PTSD: Denied history of PTSD but reported periods of disassociation for several months following the loss of her son, boyfriend, and father in close proximity in 2020.   Substance Use: History of alcohol use dependency. Denied current symptoms of substance use  dependency. Patient reported she has been sober for 5 weeks.   Alcohol: Denied current use. Patient reported she began drinking heavily at age 38.    Tobacco: Denied    Marijuana: Denied    Illicit drugs: Denied    Caffeine: Denied    Cognitive Functioning: Patient reported occasional periods of disorientation never lasting more than a few minutes. Patient reports that her short term memory, orientation, and processing speed have all improved considerably since she stopped drinking five weeks ago.   Pain: 0/10. No concerns with pain management.   Sleep: Denied difficulty with sleep onset, nighttime awakenings. Patient reported some difficulty with early awakenings (awaking at 5am).   Appetite: Typical     PSYCHIATRIC HISTORY  Patient reported a history of MDD and SHAQ. Patient previously completed rehab for AUD and an outpatient program for MDD and SHAQ. Patient denied other hospitalizations for mental health concerns, ongoing psychotherapy support, self-harming behavior, or previous suicide attempts.     PSYCHIATRIC MEDICATIONS  Psychotherapeutics (From admission, onward)      Start     Stop Route Frequency Ordered    10/26/23 0900  busPIRone tablet 5 mg         -- Oral 2 times daily 10/26/23 0140    10/26/23 0240  LORazepam injection 2 mg         -- IV Every 4 hours PRN 10/26/23 0141            MEDICAL HISTORY  No past medical history on file.   No past surgical history on file.     SOCIAL HISTORY  Patient identifies as [place]. Patient was raised in the [place], [place] area by her biological parents along with her sister. Patient has three children, two living (ages 40, 37) with whom she is very close. Patient lost her son to a fentanyl overdose in 2020. Patient has been  four times and lost her boyfriend of 15 years in 2020. 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      FAMILY  PSYCHIATRIC HISTORY  Patient reported a history of AUD amongst her family.      STRENGTHS & LIABILITIES  Strengths: accepts guidance and feedback, intelligent, articulate, motivated to change, and good social support   Liabilities: History of AUD, complicated grief    INTERVENTION: Clinician engaged patient in a combination of motivational interviewing and cognitive restructuring. Clinician and patient discussed health goals with relation to returning to rehab treatment for AUD. Patient was very motivated to maintain sobriety. Clinician and patient also identified adaptive coping strategies for managing anxiety and brief periods of disorientation in the hospital. Patient identified a number of adaptive coping skills and has already initiated a plan for her son to bring her a journal and markers for reflection and drawing. Clinician also reviewed strategies for reorientation by checking the white board and looking at pictures of her family on her phone.     MENTAL STATUS EXAM & OBSERVATIONS  Appearance:  Good grooming and hygiene. Dressed in hospital gown. Appeared stated age.      Orientation: Oriented x 4.   Speech: Normal rate, volume, and prosody.    Thought Processes: Logical and goal-oriented.      Thought Content: Normal. No suicidal or homicidal ideation. No indications of obsessions or delusions.   Mood: Euthymic.   Affect: Full range, congruent with mood and session content..   Attention & Concentration: Intact. No deficits noted.   Insight: Good   Judgment: Good.   Behavioral Observations: Cooperative and engaged. Laying in bed with head elevated. Good eye contact. No signs of psychomotor agitation or retardation.     DIAGNOSTIC IMPRESSION & PLAN  Patient Active Problem List   Diagnosis    Hyponatremia    Liver failure    Major depressive disorder    Alcohol abuse    Hepatorenal syndrome    ACP (advance care planning)    Hypothyroidism    DIANA (acute kidney injury)       Impression: Mrs. Ewing is a pleasant  63-year-old female with a PMH of EtOH use, MDD, and SHAQ who presented to [facility] ED on 10/12/2023 with chief complaint of progressive weakness x 1 week and yellowing of her skin. Psychology was consulted to help patient manage anxiety during her hospital stay. Patient appears to be coping well and has good social support. Clinician and patient reviewed strategies and motivations for maintaining sobriety as well as interventions for managing anxiety.     Plan: Psychology will continue to follow.     Recommendations  - Patient would appreciate having a discussing with her treatment team about receiving a Covid booster shot.       Thank you for the opportunity to participate in this patient's care.      Length of Service: 31 minutes    Omer Enriquez  Psychology Fellow   Dept. of Psychiatry  [facility]

## 2023-10-26 NOTE — ASSESSMENT & PLAN NOTE
Discussed GOC with patient. Wishes to pursue all curative measures, but would not accept heroic resuscitative measures in the event of cardiac arrest, including chest compressions or cardioversion.  Agreeable to vasopressors or intubation if necessary during treatment course. Patient AAOx4 with full capacity. In accordance with these wishes, code status updated to DNR.

## 2023-10-26 NOTE — HPI
63yoF hx including MDD, SHAQ, AUD who presented to [facility] ED on 10/12/2023 with chief complaint of progressive weakness x 1 week and yellowing of her skin.  She also endorsed poor appetite over the past week.       Upon ED arrival, WBC 20,000, a significantly elevated bilirubin at 11.6, hyponatremia, and mildly elevated lactic acid level.  Alcohol level was undetectable.  She reported a heavy history of alcohol abuse since about age 38.  She has been in rehab for alcohol abuse before.  Her last drink was approximately 4 months prior. CT A/P with contrast revealed  a right-sided pleural effusion, right lower lobe consolidation, hepatic cirrhotic morphology and ascites with mural thickening of the small bowel loops suggesting nonspecific enteritis. Patient received IV fluids in the ED. Initiated on empiric antibiotics and pulmonology, Gastroenterology consulted. Pulm suspected likely hepatothorax and determined no need for thoracentesis.      Patient given lactulose, rifaximin, levothyroxine, underwent paracentesis on 10/13/2023 with 0.75L removed (SAAG>1.1), placed on salt tabs, Lasix and Aldactone and fluid restriction which subsequently all discontinued on 10/15/2023.  Midodrine 2.5 q.8 hours added on 10/15/2023.  Repeat abdominal ultrasound and CT scan on 10/20 show increasing bilateral pleural effusion and moderate ascites. Echo on 10/25 w/o systolic/diastolic dysfunction but CVP 15.    Sodium remained stable at 123-124. Nephrology consulted and was given tolvaptan 10/23 with no response. Patient required albumin, octreotide and increase the midodrine to 10 mg TID with initial improvement in BP. Nephro later recommended Terlipressin but unable to obtain at OSH. Deemed not a HD candidate unless as a bridge to transplant.     Transferred [facility] for hepatology consultation to evaluate acute decompensated alcoholic cirrhosis and initiate liver transplant evaluation. Discussed with Dr. Houser;  agreed to consult on the patient with admission to Hospital Medicine stepdown.      On arrival, normotensive, satting well on RA. States she feels she has been slowly improving. MELD-3: 33.  T bili 6.2, no transaminitis. Continuing octreotide (increased to 200mg IV q8h on day of transfer), midodrine, albumin, rifaximin, lactulose, rocephin, vit K.     WBC 25 on arrival, but received dexamethasone 12mg injection day prior to transfer, due to elevated maddrey score(?).     Nephrology consulted for HRS, hyponatremia refractory to tolvaptan. Hepatology consulted for acute liver failure, possible transplant eval.

## 2023-10-26 NOTE — ASSESSMENT & PLAN NOTE
Presented initially with a sodium of 122, placed on fluid restriction and received tolvaptan on 10/23/23. Lab studies initially compatible with pre-renal etiology. Serum osm of 261 on presentation, increased to 288 on 10/26, urine osm initially at 467 decreased to 149 on 10/26, and urine sodium persistently less than 20 with most recent lab value less than 10 on 10/26. Serum sodium increased up to 130 mEq on 10/23/23 and creatinine trending down to 1.3 on 10/23.    Suspect that her IDANA was secondary to her hypotensive episode. Timeline, as per records, she developed hypotension on 10/23/23, then and acute rise in creatinine on 10/24/23. At this time her creatinine is trending down with maintaining a MAP greater than 75 mmHg.    Recommendations;  --Continue fluid restriction of 1.0 liters per day.  --Monitor I&Os  --Ok to remove longoria  --Daily BMP  --Maintain MAP>75 mmHg  --Continue Midodrine  --Observe off Terlipressin at this time.

## 2023-10-26 NOTE — ASSESSMENT & PLAN NOTE
Patient has hyponatremia which is uncontrolled,We will aim to correct the sodium by 4-6mEq in 24 hours. We will monitor sodium Every 6 hours. The hyponatremia is due to hypovolemia, SIADH (possibly 2/2 depression, pain or nausea and vomiting), Cirrhosis, Nephrotic syndrome and renal insufficiency. We will obtain the following studies: Urine sodium, urine osmolality, serum osmolality. We will treat the hyponatremia per Nephro.     Worsening hyponatremia, now 123, refractory to fluid restriction, salt tabs, tolvaptan 15mg on 10/23, or midodrine/octreotide/albumin. Undergoing treatment for HRS at OSH after she developed symptomatic hypotension 10/23 needing 500cc IVF bolus and IV albumin, with midodrine and octreotide. Progressive azotemia, oliguria with U Na<20. Octreotide increased to 200 mg IV q8h. Creatinine climbed to 1.6 from 0.8 despite the above interventions, terlipressin not available at OSH, partial impetus for transfer along with hepatology eval.    Hypo-osmolar hyponatremia 2/2 SIADH complicated by liver disease     - Nephrology consult; signed off   - Urine lytes, osmolalities; consistent with SIADH/liver disease which improved after tolvaptan   - continue octreotide 200ch q8h, midodrine 10mg q8h, IV albumin qd   - unclear if pt would benefit from terlipressin or norepi; hold at this time   - MAP > 80  - Renal ultrasound  - Strict I&O  - Reportedly very poor prognosis if not a transplant candidate, would not be appropriate for dialysis therapy unless as a bridge to transplant per Nephro at OSH

## 2023-10-26 NOTE — ASSESSMENT & PLAN NOTE
Hx of heavy alcohol use >30 years. Conflicting reports of last drink (6 weeks to 4 months ago) Cirrhosis noted on prior imaging. Concern for HRS, [place] [facility] [place]atology eval, possible transplant evaluation. Liver US with doppler on 10/13 with reversed flow at the portal vein.  Portosystemic collaterals on preceding CT exam. Small pleural effusions and abdominal ascites (SAAG>1.1) Effusion likely hepatothorax per pulm, no thoracentesis necessary, completing rocephin course for possible PNA. No systolic or diastolic dysfunction noted on echo but elevated CVP, possible congestive hepatopathy.  Tbili 6.0 from 12 on arrival.    Initially received lasix, spironolactone with minimal improvement. Started HRS protocol shortly thereafter d/t worsening Cr, hyponatremia. See HRS.     Was started on lactulose, rifaximin to prevent HE. Will continue    - Hepatology consult  - HIV   - EBV   - CMV  - Hep panel negative  - Fe studies, Ferritin   - A1AT   - RUQ U/S with Doppler study
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
## 2023-10-26 NOTE — HPI
Mrs. Ewing is a pleasant 63-year-old female with a PMH of EtOH use, MDD, and SHAQ who presented to [facility] ED on 10/12/2023 with chief complaint of progressive weakness x 1 week and yellowing of her skin. Patient states that her weakness progressed to the point that she was unable to get out of bed. She was a heavy alcohol user since the age of 38 and stopped about one month prior to presentation. Nephrology was initially consulted for persistent hyponatremia of 122. Initial lab studies showed a serum sodium of 122, serum osmolality of 261, Urine osmolality of 467, urine sodium of less than 10. Presenting creatinine was 0.79. She was initially given IV fluids without improvement in her serum sodium, and it was believed that her hyponatremia was secondary to her liver failure. She developed a hypotensive episode on 10/23/23, and required a 500 mL bolus of normal saline as per outside notes. Her creatinine increased acutely on 10/24 upto 1.27, and continued to trend up on 10/25 to 1.6. She was given tolvaptan on 10/23 to help with her hyponatremia and was transferred to our facility for liver transplant evaluation.     As per outside records, patient given lactulose, rifaximin, levothyroxine, underwent paracentesis on 10/13/2023 with 0.75L removed (SAAG>1.1), placed on salt tabs, Lasix and Aldactone and fluid restriction which subsequently all discontinued on 10/15/2023.  Midodrine 2.5 q.8 hours added on 10/15/2023.  Repeat abdominal ultrasound and CT scan on 10/20 show increasing bilateral pleural effusion and moderate ascites. Echo on 10/25 w/o systolic/diastolic dysfunction but CVP 15.     When she arrived to our facility, her creatinine improved to 1.3, and sodium had corrected to 130 mEq over a 24 hour period. She is currently resting comfortably in bed eating breakfast in no acute distress.

## 2023-10-26 NOTE — CONSULTS
[facility] - Telemetry Stepdown  Nephrology  Consult Note    Patient Name: Lauren Ewing  MRN: [id number]  Admission Date: 10/26/2023  Hospital Length of Stay: 0 days  Attending Provider: Suleman Paige, *   Primary Care Physician: No, Primary Doctor  Principal Problem:Hepatorenal syndrome    Inpatient consult to Nephrology  Consult performed by: Juice El MD  Consult ordered by: Matilde Altamirano MD  Reason for consult: DIANA with hyponatremia        Subjective:     HPI: Mrs. Ewing is a pleasant 63-year-old female with a PMH of EtOH use, MDD, and SHAQ who presented to [facility] ED on 10/12/2023 with chief complaint of progressive weakness x 1 week and yellowing of her skin. Patient states that her weakness progressed to the point that she was unable to get out of bed. She was a heavy alcohol user since the age of 38 and stopped about one month prior to presentation. Nephrology was initially consulted for persistent hyponatremia of 122. Initial lab studies showed a serum sodium of 122, serum osmolality of 261, Urine osmolality of 467, urine sodium of less than 10. Presenting creatinine was 0.79. She was initially given IV fluids without improvement in her serum sodium, and it was believed that her hyponatremia was secondary to her liver failure. She developed a hypotensive episode on 10/23/23, and required a 500 mL bolus of normal saline as per outside notes. Her creatinine increased acutely on 10/24 upto 1.27, and continued to trend up on 10/25 to 1.6. She was given tolvaptan on 10/23 to help with her hyponatremia and was transferred to our facility for liver transplant evaluation.     As per outside records, patient given lactulose, rifaximin, levothyroxine, underwent paracentesis on 10/13/2023 with 0.75L removed (SAAG>1.1), placed on salt tabs, Lasix and Aldactone and fluid restriction which subsequently all discontinued on 10/15/2023.  Midodrine 2.5 q.8 hours added on 10/15/2023.  Repeat  abdominal ultrasound and CT scan on 10/20 show increasing bilateral pleural effusion and moderate ascites. Echo on 10/25 w/o systolic/diastolic dysfunction but CVP 15.     When she arrived to our facility, her creatinine improved to 1.3, and sodium had corrected to 130 mEq over a 24 hour period. She is currently resting comfortably in bed eating breakfast in no acute distress.       No past medical history on file.    No past surgical history on file.    Review of patient's allergies indicates:   Allergen Reactions    Hydrocodone      Current Facility-Administered Medications   Medication Frequency    busPIRone tablet 5 mg BID    cefTRIAXone (ROCEPHIN) 2 g in dextrose 5 % in water (D5W) 100 mL IVPB (MB+) Q24H    dextrose 10% bolus 125 mL 125 mL PRN    dextrose 10% bolus 250 mL 250 mL PRN    folic acid tablet 1 mg Daily    glucagon (human recombinant) injection 1 mg PRN    glucose chewable tablet 16 g PRN    glucose chewable tablet 24 g PRN    heparin (porcine) injection 5,000 Units Q8H    insulin aspart U-100 pen 0-5 Units QID (AC + HS) PRN    lactulose 20 gram/30 mL solution Soln 10 g BID    levothyroxine tablet 100 mcg Before breakfast    LORazepam injection 2 mg Q4H PRN    melatonin tablet 6 mg Nightly PRN    methocarbamoL tablet 750 mg Q8H PRN    midodrine tablet 10 mg Q8H    multivitamin tablet Daily    mupirocin 2 % ointment BID    naloxone 0.4 mg/mL injection 0.02 mg PRN    octreotide injection 200 mcg Q8H    ondansetron injection 4 mg Q4H PRN    pantoprazole EC tablet 40 mg Daily    phytonadione (vitamin K1) tablet 10 mg Daily    rifAXIMin tablet 550 mg BID    sodium chloride 0.9% flush 10 mL PRN    sodium chloride 0.9% flush 10 mL Q12H PRN    thiamine tablet 100 mg Daily     Family History    None       Tobacco Use    Smoking status: Not on file    Smokeless tobacco: Not on file   Substance and Sexual Activity    Alcohol use: Not on file    Drug use: Not on file    Sexual  activity: Not on file     Review of Systems   Constitutional:  Positive for appetite change. Negative for activity change, chills, diaphoresis, fatigue and fever.   HENT:  Negative for congestion.    Respiratory:  Negative for cough, chest tightness, shortness of breath and wheezing.    Cardiovascular:  Positive for leg swelling. Negative for chest pain and palpitations.   Gastrointestinal:  Positive for nausea. Negative for abdominal distention, abdominal pain, blood in stool, constipation, diarrhea and vomiting.   Endocrine: Negative for cold intolerance, heat intolerance, polydipsia, polyphagia and polyuria.   Genitourinary:  Negative for difficulty urinating, dysuria, flank pain and urgency.   Musculoskeletal:  Negative for arthralgias, back pain and joint swelling.   Neurological:  Positive for weakness. Negative for dizziness, tremors, seizures, syncope, facial asymmetry, light-headedness, numbness and headaches.   Psychiatric/Behavioral:  Negative for agitation, behavioral problems and confusion.      Objective:     Vital Signs (Most Recent):    Vital Signs (24h Range):  Temp:  [97.3 °F (36.3 °C)-97.6 °F (36.4 °C)] 97.6 °F (36.4 °C)  Pulse:  [95-97] 95  Resp:  [18] 18  SpO2:  [93 %-95 %] 95 %  BP: (110-120)/(71-76) 120/76        There is no height or weight on file to calculate BMI.  There is no height or weight on file to calculate BSA.    I/O last 3 completed shifts:  In: -   Out: 2000 [Urine:2000]     Physical Exam  Vitals and nursing note reviewed.   Constitutional:       Comments: Jaundiced   HENT:      Right Ear: External ear normal.      Left Ear: External ear normal.      Mouth/Throat:      Mouth: Mucous membranes are moist.   Eyes:      Pupils: Pupils are equal, round, and reactive to light.   Cardiovascular:      Rate and Rhythm: Normal rate and regular rhythm.      Pulses: Normal pulses.      Heart sounds: Normal heart sounds. No murmur heard.  Pulmonary:      Effort: Pulmonary effort is normal.       Comments: Decreased breath sounds bilateral bases  Abdominal:      General: Abdomen is flat. Bowel sounds are normal.      Palpations: Abdomen is soft.   Musculoskeletal:         General: Swelling present. Normal range of motion.      Cervical back: Normal range of motion and neck supple.      Right lower leg: Edema present.      Left lower leg: Edema present.   Skin:     General: Skin is warm and dry.      Capillary Refill: Capillary refill takes less than 2 seconds.      Coloration: Skin is jaundiced.   Neurological:      General: No focal deficit present.      Mental Status: She is alert and oriented to person, place, and time.   Psychiatric:         Mood and Affect: Mood normal.         Behavior: Behavior normal.         Judgment: Judgment normal.          Significant Labs:  CBC:   Recent Labs   Lab 10/26/23  0444   WBC 15.83*   RBC 2.75*   HGB 8.1*   HCT 24.5*   *   MCV 89   MCH 29.5   MCHC 33.1     CMP:   Recent Labs   Lab 10/26/23  0444   *   CALCIUM 8.3*   ALBUMIN 3.0*   PROT 5.3*   *   K 4.0   CO2 21*      BUN 19   CREATININE 1.3   ALKPHOS 110   ALT 31   AST 80*   BILITOT 5.6*     All labs within the past 24 hours have been reviewed.    Significant Imaging:  US: Reviewed      Assessment/Plan:     Psychiatric  Alcohol abuse  -Per primary team     Major depressive disorder  -Per primary team.     Renal/  * Hepatorenal syndrome  See DIANA    DIANA (acute kidney injury)  Presented initially with a sodium of 122, placed on fluid restriction and received tolvaptan on 10/23/23. Lab studies initially compatible with pre-renal etiology. Serum osm of 261 on presentation, increased to 288 on 10/26, urine osm initially at 467 decreased to 149 on 10/26, and urine sodium persistently less than 20 with most recent lab value less than 10 on 10/26. Serum sodium increased up to 130 mEq on 10/23/23 and creatinine trending down to 1.3 on 10/23.    Suspect that her DIANA was secondary to her hypotensive  episode. Timeline, as per records, she developed hypotension on 10/23/23, then and acute rise in creatinine on 10/24/23. At this time her creatinine is trending down with maintaining a MAP greater than 75 mmHg.    Recommendations;  --Continue fluid restriction of 1.0 liters per day.  --Monitor I&Os  --Ok to remove longoria  --Daily BMP  --Maintain MAP>75 mmHg  --Continue Midodrine  --Observe off Terlipressin at this time.     Hyponatremia  Presented initially with a sodium of 122, placed on fluid restriction and received tolvaptan on 10/23/23. Lab studies initially compatible with pre-renal etiology. Serum osm of 261 on presentation, increased to 288 on 10/26, urine osm initially at 467 decreased to 149 on 10/26, and urine sodium persistently less than 20 with most recent lab value less than 10 on 10/26. Serum sodium increased up to 130 mEq on 10/23/23 and creatinine trending down to 1.3 on 10/23.    Recommendations;  --Continue fluid restriction of 1.0 liters per day.  --Monitor I&Os  --Ok to remove longoria  --Daily BMP  --Maintain MAP>75 mmHg    Endocrine  Hypothyroidism  Possible that her hypothyroidism may be contributing in part to her hyponatremia    Recommendations  --Agree with Levothyroxine.     GI  Liver failure  Management per primary team.         Thank you for your consult. I will sign off. Please contact us if you have any additional questions.    Juice El MD  Nephrology  [facility] - Telemetry Stepdown

## 2023-10-26 NOTE — ASSESSMENT & PLAN NOTE
Patient with acute kidney injury/acute renal failure likely due to pre-renal azotemia due to dehydration DIANA is currently improving. Baseline creatinine unknown - Labs reviewed- Renal function/electrolytes with CrCl cannot be calculated (Unknown ideal weight.). according to latest data. Monitor urine output and serial BMP and adjust therapy as needed. Avoid nephrotoxins and renally dose meds for GFR listed above.
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
## 2023-10-27 PROBLEM — Z51.5 PALLIATIVE CARE ENCOUNTER: Status: ACTIVE | Noted: 2023-10-26

## 2023-10-27 LAB
ALBUMIN SERPL BCP-MCNC: 3.3 G/DL (ref 3.5–5.2)
ALP SERPL-CCNC: 106 U/L (ref 55–135)
ALT SERPL W/O P-5'-P-CCNC: 31 U/L (ref 10–44)
ANION GAP SERPL CALC-SCNC: 9 MMOL/L (ref 8–16)
AST SERPL-CCNC: 77 U/L (ref 10–40)
BASOPHILS # BLD AUTO: 0.01 K/UL (ref 0–0.2)
BASOPHILS NFR BLD: 0.1 % (ref 0–1.9)
BILIRUB SERPL-MCNC: 6.8 MG/DL (ref 0.1–1)
BUN SERPL-MCNC: 18 MG/DL (ref 8–23)
CALCIUM SERPL-MCNC: 8.6 MG/DL (ref 8.7–10.5)
CHLORIDE SERPL-SCNC: 102 MMOL/L (ref 95–110)
CO2 SERPL-SCNC: 21 MMOL/L (ref 23–29)
CREAT SERPL-MCNC: 1.2 MG/DL (ref 0.5–1.4)
DIFFERENTIAL METHOD: ABNORMAL
EOSINOPHIL # BLD AUTO: 0.1 K/UL (ref 0–0.5)
EOSINOPHIL NFR BLD: 0.8 % (ref 0–8)
EPSTEIN-BARR VIRUS DNA: NORMAL
EPSTEIN-BARR VIRUS PCR, QUANT: NOT DETECTED IU/ML
ERYTHROCYTE [DISTWIDTH] IN BLOOD BY AUTOMATED COUNT: 21.6 % (ref 11.5–14.5)
EST. GFR  (NO RACE VARIABLE): 51.2 ML/MIN/1.73 M^2
GLUCOSE SERPL-MCNC: 96 MG/DL (ref 70–110)
HAV IGG SER QL IA: REACTIVE
HBV SURFACE AB SER-ACNC: <3 MIU/ML
HBV SURFACE AB SER-ACNC: NORMAL M[IU]/ML
HCT VFR BLD AUTO: 24.7 % (ref 37–48.5)
HGB BLD-MCNC: 8.3 G/DL (ref 12–16)
IMM GRANULOCYTES # BLD AUTO: 0.19 K/UL (ref 0–0.04)
IMM GRANULOCYTES NFR BLD AUTO: 1.5 % (ref 0–0.5)
LYMPHOCYTES # BLD AUTO: 1.8 K/UL (ref 1–4.8)
LYMPHOCYTES NFR BLD: 13.8 % (ref 18–48)
MAGNESIUM SERPL-MCNC: 2.1 MG/DL (ref 1.6–2.6)
MCH RBC QN AUTO: 30.2 PG (ref 27–31)
MCHC RBC AUTO-ENTMCNC: 33.6 G/DL (ref 32–36)
MCV RBC AUTO: 90 FL (ref 82–98)
MONOCYTES # BLD AUTO: 1.7 K/UL (ref 0.3–1)
MONOCYTES NFR BLD: 13.1 % (ref 4–15)
NEUTROPHILS # BLD AUTO: 9.1 K/UL (ref 1.8–7.7)
NEUTROPHILS NFR BLD: 70.7 % (ref 38–73)
NRBC BLD-RTO: 0 /100 WBC
PHOSPHATE SERPL-MCNC: 3 MG/DL (ref 2.7–4.5)
PLATELET # BLD AUTO: 124 K/UL (ref 150–450)
PMV BLD AUTO: 9.8 FL (ref 9.2–12.9)
POCT GLUCOSE: 117 MG/DL (ref 70–110)
POCT GLUCOSE: 129 MG/DL (ref 70–110)
POCT GLUCOSE: 145 MG/DL (ref 70–110)
POCT GLUCOSE: 85 MG/DL (ref 70–110)
POTASSIUM SERPL-SCNC: 4.2 MMOL/L (ref 3.5–5.1)
PROT SERPL-MCNC: 5.3 G/DL (ref 6–8.4)
RBC # BLD AUTO: 2.75 M/UL (ref 4–5.4)
SODIUM SERPL-SCNC: 132 MMOL/L (ref 136–145)
WBC # BLD AUTO: 12.83 K/UL (ref 3.9–12.7)

## 2023-10-27 PROCEDURE — 99223 PR INITIAL HOSPITAL CARE,LEVL III: ICD-10-PCS | Mod: ,,, | Performed by: STUDENT IN AN ORGANIZED HEALTH CARE EDUCATION/TRAINING PROGRAM

## 2023-10-27 PROCEDURE — 25000003 PHARM REV CODE 250

## 2023-10-27 PROCEDURE — 36415 COLL VENOUS BLD VENIPUNCTURE: CPT

## 2023-10-27 PROCEDURE — 20600001 HC STEP DOWN PRIVATE ROOM

## 2023-10-27 PROCEDURE — 82607 VITAMIN B-12: CPT

## 2023-10-27 PROCEDURE — 99222 PR INITIAL HOSPITAL CARE,LEVL II: ICD-10-PCS | Mod: AF,HB,, | Performed by: PSYCHIATRY & NEUROLOGY

## 2023-10-27 PROCEDURE — 99222 1ST HOSP IP/OBS MODERATE 55: CPT | Mod: AF,HB,, | Performed by: PSYCHIATRY & NEUROLOGY

## 2023-10-27 PROCEDURE — 84100 ASSAY OF PHOSPHORUS: CPT

## 2023-10-27 PROCEDURE — 99223 1ST HOSP IP/OBS HIGH 75: CPT | Mod: ,,, | Performed by: STUDENT IN AN ORGANIZED HEALTH CARE EDUCATION/TRAINING PROGRAM

## 2023-10-27 PROCEDURE — 97161 PT EVAL LOW COMPLEX 20 MIN: CPT

## 2023-10-27 PROCEDURE — 97116 GAIT TRAINING THERAPY: CPT

## 2023-10-27 PROCEDURE — 63600175 PHARM REV CODE 636 W HCPCS: Performed by: STUDENT IN AN ORGANIZED HEALTH CARE EDUCATION/TRAINING PROGRAM

## 2023-10-27 PROCEDURE — 93010 ELECTROCARDIOGRAM REPORT: CPT | Mod: ,,, | Performed by: INTERNAL MEDICINE

## 2023-10-27 PROCEDURE — 86790 VIRUS ANTIBODY NOS: CPT

## 2023-10-27 PROCEDURE — 93010 EKG 12-LEAD: ICD-10-PCS | Mod: ,,, | Performed by: INTERNAL MEDICINE

## 2023-10-27 PROCEDURE — 82746 ASSAY OF FOLIC ACID SERUM: CPT

## 2023-10-27 PROCEDURE — 80053 COMPREHEN METABOLIC PANEL: CPT

## 2023-10-27 PROCEDURE — 93005 ELECTROCARDIOGRAM TRACING: CPT

## 2023-10-27 PROCEDURE — 99497 ADVNCD CARE PLAN 30 MIN: CPT | Mod: 25,,, | Performed by: STUDENT IN AN ORGANIZED HEALTH CARE EDUCATION/TRAINING PROGRAM

## 2023-10-27 PROCEDURE — 94761 N-INVAS EAR/PLS OXIMETRY MLT: CPT

## 2023-10-27 PROCEDURE — 63600175 PHARM REV CODE 636 W HCPCS: Mod: JB

## 2023-10-27 PROCEDURE — 99497 PR ADVNCD CARE PLAN 30 MIN: ICD-10-PCS | Mod: 25,,, | Performed by: STUDENT IN AN ORGANIZED HEALTH CARE EDUCATION/TRAINING PROGRAM

## 2023-10-27 PROCEDURE — 85025 COMPLETE CBC W/AUTO DIFF WBC: CPT

## 2023-10-27 PROCEDURE — 86706 HEP B SURFACE ANTIBODY: CPT

## 2023-10-27 PROCEDURE — 83735 ASSAY OF MAGNESIUM: CPT

## 2023-10-27 PROCEDURE — 25000003 PHARM REV CODE 250: Performed by: STUDENT IN AN ORGANIZED HEALTH CARE EDUCATION/TRAINING PROGRAM

## 2023-10-27 RX ADMIN — HEPARIN SODIUM 5000 UNITS: 5000 INJECTION INTRAVENOUS; SUBCUTANEOUS at 07:10

## 2023-10-27 RX ADMIN — MIDODRINE HYDROCHLORIDE 10 MG: 5 TABLET ORAL at 09:10

## 2023-10-27 RX ADMIN — HEPARIN SODIUM 5000 UNITS: 5000 INJECTION INTRAVENOUS; SUBCUTANEOUS at 01:10

## 2023-10-27 RX ADMIN — LACTULOSE 10 G: 20 SOLUTION ORAL at 08:10

## 2023-10-27 RX ADMIN — THERA TABS 1 TABLET: TAB at 08:10

## 2023-10-27 RX ADMIN — RIFAXIMIN 550 MG: 550 TABLET ORAL at 08:10

## 2023-10-27 RX ADMIN — BUSPIRONE HYDROCHLORIDE 5 MG: 5 TABLET ORAL at 08:10

## 2023-10-27 RX ADMIN — LEVOTHYROXINE SODIUM 100 MCG: 100 TABLET ORAL at 07:10

## 2023-10-27 RX ADMIN — MUPIROCIN: 20 OINTMENT TOPICAL at 08:10

## 2023-10-27 RX ADMIN — FOLIC ACID 1 MG: 1 TABLET ORAL at 08:10

## 2023-10-27 RX ADMIN — OCTREOTIDE ACETATE 200 MCG: 100 INJECTION, SOLUTION INTRAVENOUS; SUBCUTANEOUS at 07:10

## 2023-10-27 RX ADMIN — MIDODRINE HYDROCHLORIDE 10 MG: 5 TABLET ORAL at 07:10

## 2023-10-27 RX ADMIN — THIAMINE HCL TAB 100 MG 100 MG: 100 TAB at 08:10

## 2023-10-27 RX ADMIN — PHYTONADIONE 10 MG: 5 TABLET ORAL at 08:10

## 2023-10-27 RX ADMIN — PANTOPRAZOLE SODIUM 40 MG: 40 TABLET, DELAYED RELEASE ORAL at 08:10

## 2023-10-27 RX ADMIN — MIDODRINE HYDROCHLORIDE 10 MG: 5 TABLET ORAL at 01:10

## 2023-10-27 RX ADMIN — CEFTRIAXONE 2 G: 2 INJECTION, POWDER, FOR SOLUTION INTRAMUSCULAR; INTRAVENOUS at 12:10

## 2023-10-27 RX ADMIN — HEPARIN SODIUM 5000 UNITS: 5000 INJECTION INTRAVENOUS; SUBCUTANEOUS at 09:10

## 2023-10-27 NOTE — PLAN OF CARE
Problem: Adult Inpatient Plan of Care  Goal: Plan of Care Review  10/26/2023 1907 by Racquel George RN  Outcome: Ongoing, Progressing  10/26/2023 1714 by Racquel George RN  Outcome: Ongoing, Progressing  Goal: Patient-Specific Goal (Individualized)  10/26/2023 1907 by Racquel George RN  Outcome: Ongoing, Progressing  10/26/2023 1714 by Racquel George RN  Outcome: Ongoing, Progressing  Goal: Absence of Hospital-Acquired Illness or Injury  10/26/2023 1907 by Racquel George RN  Outcome: Ongoing, Progressing  10/26/2023 1714 by Racquel George RN  Outcome: Ongoing, Progressing  Goal: Optimal Comfort and Wellbeing  10/26/2023 1907 by Racquel George RN  Outcome: Ongoing, Progressing  10/26/2023 1714 by Racquel George RN  Outcome: Ongoing, Progressing  Goal: Readiness for Transition of Care  10/26/2023 1907 by Racquel George RN  Outcome: Ongoing, Progressing  10/26/2023 1714 by Racquel George RN  Outcome: Ongoing, Progressing     Problem: Impaired Wound Healing  Goal: Optimal Wound Healing  10/26/2023 1907 by Racquel George RN  Outcome: Ongoing, Progressing  10/26/2023 1714 by Racquel George RN  Outcome: Ongoing, Progressing

## 2023-10-27 NOTE — PT/OT/SLP EVAL
Physical Therapy Evaluation and treatment      Patient Name:  Lauren Ewing   MRN:  [id number]    Recommendations:     Discharge Recommendations: No Therapy Indicated   Discharge Equipment Recommendations: none   Barriers to discharge: None    Assessment:     Lauren Ewing is a 63 y.o. female admitted with a medical diagnosis of Liver failure.  She presents with the following impairments/functional limitations: impaired balance, decreased safety awareness, impaired endurance, impaired functional mobility, gait instability pt tolerated treatment well but had decreased tolerance to activities and decreased balance with mobility. Pt will benefit from skilled PT 3x/wk to progress physically.  Pt is significantly below previous functional level, increased risk of falls and increased burden of care currently. Pt was transferred to [facility] from [place] where she presented with abdominal pain.     Rehab Prognosis: Good; patient would benefit from acute skilled PT services to address these deficits and reach maximum level of function.    Recent Surgery: * No surgery found *      Plan:     During this hospitalization, patient to be seen 3 x/week to address the identified rehab impairments via gait training, therapeutic activities and progress toward the following goals:    Plan of Care Expires:  11/26/23    Subjective     Chief Complaint: pt stated that her ankles were swollen and she had decreased balance.   Patient/Family Comments/goals: to have better balance and go home.   Pain/Comfort:  Pain Rating 1: 0/10  Pain Rating Post-Intervention 1: 0/10    Patients cultural, spiritual, Hoahaoism conflicts given the current situation: no    Living Environment:  Pt does not work and lives in 1 st floor apt with slab entrance.   Prior to admission, patients level of function was Independent .  Equipment used at home: none.  DME owned (not currently used): none.  Upon discharge, patient will have assistance from family that live near  her .    Objective:     Patient found supine with  (hep lock IV)  upon PT entry to room.    General Precautions: Standard, fall  Orthopedic Precautions:    Braces:    Respiratory Status: Room air    Exams:  Cognitive Exam:  Patient is oriented to Person, Place, Time, and Situation  RLE ROM: WFL  RLE Strength: WFL  LLE ROM: WFL  LLE Strength: WFL    Functional Mobility:  Bed Mobility:     Rolling Left:  stand by assistance  Supine to Sit: stand by assistance, head of bed elevated.    Transfers:     Sit to Stand:  contact guard assistance with hand-held assist    Gait: pt received gait training ~ 84 ft with HHA and CGA. Pt had slight decreased balance with gait training.       AM-PAC 6 CLICK MOBILITY  Total Score:18       Treatment & Education:  Pt received verbal instructions in role of PT and POC. Pt verbally expressed understanding of such. BLE elevated. Pt instructed in using call button for mobility and verbally expressed understanding of such.     Patient left up in chair with all lines intact and call button in reach.    GOALS:   Multidisciplinary Problems       Physical Therapy Goals          Problem: Physical Therapy    Goal Priority Disciplines Outcome Goal Variances Interventions   Physical Therapy Goal     PT, PT/OT Ongoing, Progressing     Description: Goals to be met by: 23     Patient will increase functional independence with mobility by performin. Supine to sit with [place]  2. Sit to stand transfer with Modified [place]  3. Gait  x 250 feet with Supervision using AD if needed. .                          History:     No past medical history on file.    No past surgical history on file.    Time Tracking:     PT Received On: 10/27/23  PT Start Time: 1434     PT Stop Time: 1450  PT Total Time (min): 16 min     Billable Minutes: Evaluation 8 min  and Gait Training 8 min       10/27/2023

## 2023-10-27 NOTE — HPI
Ms Laurne Ewing is a pleasant 63 year old female with MDD, SHAQ, alcohol use disorder who presented to [facility] ED on 10/12/2023 with weakness and jaundice found to have acute decompensated liver failure associated with hyponatremia and DIANA.     Patient subsequently transferred to [facility] on 10/26 for hepatology consultation to evaluate acute decompensated alcoholic cirrhosis and initiate liver transplant evaluation. Patient deemed not a candidate for transplant at this time but could be a candidate if she is able to maintain sobriety. Addiction psych and psychology consulted who deemed patient a good candidate for alcohol rehabilitation. Palliative care consulted to assist with advance care planning and goals of care.

## 2023-10-27 NOTE — PLAN OF CARE
Message from received from  informing this CM that it has been confirmed that patient currently has [employer] insurance coverage. Patient notified of above conversation and expressed understanding.    Call placed to patient's son Jasiel ([phone]) to inform him of above. Left voicemail requesting callback, will continue to follow.    Ladi Cardenas RN  Ext [phone]

## 2023-10-27 NOTE — SUBJECTIVE & OBJECTIVE
Interval History: Chart reviewed including 24h medication use. Patient resting comfortably in bed. GOC noted below.     Palliative ROS:  PRNs:  Pain -  Dyspnea -  Nausea + (intermittent, relieved with zofran)  Vomiting -  Constipation -  Anxiety -  Agitation -  Anorexia -  Confusion + (improving)        No past medical history on file.    No past surgical history on file.    Review of patient's allergies indicates:   Allergen Reactions    Hydrocodone        Medications:  Continuous Infusions:  Scheduled Meds:   busPIRone  5 mg Oral BID    cefTRIAXone (ROCEPHIN) IVPB  2 g Intravenous Q24H    folic acid  1 mg Oral Daily    heparin (porcine)  5,000 Units Subcutaneous Q8H    lactulose  10 g Oral BID    levothyroxine  100 mcg Oral Before breakfast    midodrine  10 mg Oral Q8H    multivitamin  1 tablet Oral Daily    mupirocin   Nasal BID    pantoprazole  40 mg Oral Daily    rifAXIMin  550 mg Oral BID    thiamine  100 mg Oral Daily     PRN Meds:dextrose 10%, dextrose 10%, glucagon (human recombinant), glucose, glucose, insulin aspart U-100, lorazepam, melatonin, methocarbamoL, naloxone, sodium chloride 0.9%, sodium chloride 0.9%    Family History    None       Tobacco Use    Smoking status: Not on file    Smokeless tobacco: Not on file   Substance and Sexual Activity    Alcohol use: Not on file    Drug use: Not on file    Sexual activity: Not on file       Objective:     Vital Signs (Most Recent):  Temp: 97.9 °F (36.6 °C) (10/27/23 1117)  Pulse: 79 (10/27/23 1117)  Resp: 18 (10/27/23 1117)  BP: 113/65 (10/27/23 1117)  SpO2: 95 % (10/27/23 1117) Vital Signs (24h Range):  Temp:  [97.9 °F (36.6 °C)-98.1 °F (36.7 °C)] 97.9 °F (36.6 °C)  Pulse:  [73-92] 79  Resp:  [10-19] 18  SpO2:  [92 %-95 %] 95 %  BP: ()/(51-68) 113/65        There is no height or weight on file to calculate BMI.       Physical Exam  Vitals and nursing note reviewed.   Constitutional:       General: She is not in acute distress.     Appearance: Normal  appearance. She is ill-appearing. She is not toxic-appearing.      Comments: Older female lying in bed in no acute distress, awake and interactive   HENT:      Mouth/Throat:      Mouth: Mucous membranes are moist.   Eyes:      General: Scleral icterus present.      Extraocular Movements: Extraocular movements intact.      Pupils: Pupils are equal, round, and reactive to light.   Pulmonary:      Effort: Pulmonary effort is normal. No respiratory distress.   Abdominal:      General: Abdomen is flat.      Palpations: Abdomen is soft.   Musculoskeletal:         General: Normal range of motion.   Skin:     General: Skin is warm and dry.      Coloration: Skin is jaundiced.   Neurological:      General: No focal deficit present.      Mental Status: She is alert and oriented to person, place, and time.   Psychiatric:         Mood and Affect: Mood normal.         Behavior: Behavior normal.         Thought Content: Thought content normal.         Judgment: Judgment normal.              Advance Care Planning   Advance Directives:   Living Will: Yes        Copy on chart: No    Medical Power of : Yes      Decision Making:  Patient answered questions  Goals of Care: Engaged patient in voluntary discussion regarding goals of care. She demonstrates good insight into her disease, noting that liver disease can be life limiting and fatal, especially if she is unable stop drinking. She has a full expectation that she can continue to be sober and hopes that will improve her chances at obtaining a liver transplant and thus extending her life. She describes a robust social support system with her sister, mother, and children as well as her [employer] community. She is clear that if she becomes too ill for transplant, she would want to be made comfortable and spend time with family. She is also clear that she would not want to be kept alive on life support if she were to become critically ill and experience cardiopulmonary arrest.       "    CBC:   Recent Labs   Lab 10/27/23  0243   WBC 12.83*   HGB 8.3*   HCT 24.7*   MCV 90   *     BMP:  Recent Labs   Lab 10/27/23  0243   GLU 96   *   K 4.2      CO2 21*   BUN 18   CREATININE 1.2   CALCIUM 8.6*   MG 2.1     LFT:  Lab Results   Component Value Date    AST 77 (H) 10/27/2023    ALKPHOS 106 10/27/2023    BILITOT 6.8 (H) 10/27/2023     Albumin:   Albumin   Date Value Ref Range Status   10/27/2023 3.3 (L) 3.5 - 5.2 g/dL Final     Protein:   Total Protein   Date Value Ref Range Status   10/27/2023 5.3 (L) 6.0 - 8.4 g/dL Final     Lactic acid:   No results found for: "LACTATE"        "

## 2023-10-27 NOTE — PLAN OF CARE
Problem: Physical Therapy  Goal: Physical Therapy Goal  Description: Goals to be met by: 23     Patient will increase functional independence with mobility by performin. Supine to sit with [place]  2. Sit to stand transfer with Modified [place]  3. Gait  x 250 feet with Supervision using AD if needed. .     Outcome: Ongoing, Progressing   Evaluation completed and goals appropriate. 10/27/2023

## 2023-10-27 NOTE — PLAN OF CARE
[facility] - Telemetry Stepdown  Initial Discharge Assessment       Primary Care Provider: No, Primary Doctor    Admission Diagnosis: Liver failure [K72.90]    Admission Date: 10/26/2023  Expected Discharge Date: 10/30/2023    Transition of Care Barriers: Underinsured    Payor: MEDICAID / Plan: [employer] [place] / Product Type: Managed Medicaid /     Extended Emergency Contact Information  Primary Emergency Contact: Jasiel Cheney  Mobile Phone: [phone]  Relation: Son  Secondary Emergency Contact: Jaycee Theodore  Mobile Phone: [phone]  Relation: Daughter    Discharge Plan A: Home  Discharge Plan B: Home with family      [id number] [address] [place] [address] AT [facility] & [address] [address]  Phone: [phone] Fax: [phone]      Initial Assessment (most recent)       Adult Discharge Assessment - 10/27/23 1107          Discharge Assessment    Assessment Type Discharge Planning Assessment     Confirmed/corrected address, phone number and insurance Yes     Confirmed Demographics Correct on Facesheet     Source of Information patient     Communicated LISA with patient/caregiver Yes     People in Home alone     Facility Arrived From: Transfer from [facility]     Do you expect to return to your current living situation? Yes     Do you have help at home or someone to help you manage your care at home? Yes     Who are your caregiver(s) and their phone number(s)? Jasiel (son) [phone]     Prior to hospitilization cognitive status: Alert/Oriented     Current cognitive status: Alert/Oriented     Equipment Currently Used at Home none     Readmission within 30 days? No     Patient currently being followed by outpatient case management? No     Do you currently have service(s) that help you manage your care at home? No     How do you get to doctors appointments? family or friend will provide     Are you on dialysis? No      Do you take coumadin? No     DME Needed Upon Discharge  other (see comments)   TBD    Discharge Plan discussed with: Patient     Transition of Care Barriers Underinsured     Discharge Plan A Home     Discharge Plan B Home with family                      Patient informed this CM that she does not believe she has insurance coverage at this time. Message sent to  to confirm patient's insurance status.    Ladi Cardenas RN  Ext [phone]

## 2023-10-27 NOTE — PLAN OF CARE
Problem: Adult Inpatient Plan of Care  Goal: Plan of Care Review  Outcome: Ongoing, Progressing  Goal: Patient-Specific Goal (Individualized)  Outcome: Ongoing, Progressing  Goal: Absence of Hospital-Acquired Illness or Injury  Outcome: Ongoing, Progressing  Goal: Optimal Comfort and Wellbeing  Outcome: Ongoing, Progressing  Goal: Readiness for Transition of Care  Outcome: Ongoing, Progressing     Problem: Impaired Wound Healing  Goal: Optimal Wound Healing  Outcome: Ongoing, Progressing     Problem: Fluid and Electrolyte Imbalance (Acute Kidney Injury/Impairment)  Goal: Fluid and Electrolyte Balance  Outcome: Ongoing, Progressing     Problem: Oral Intake Inadequate (Acute Kidney Injury/Impairment)  Goal: Optimal Nutrition Intake  Outcome: Ongoing, Progressing     Problem: Renal Function Impairment (Acute Kidney Injury/Impairment)  Goal: Effective Renal Function  Outcome: Ongoing, Progressing

## 2023-10-27 NOTE — PLAN OF CARE
Problem: Adult Inpatient Plan of Care  Goal: Plan of Care Review  Outcome: Ongoing, Progressing  Goal: Patient-Specific Goal (Individualized)  Outcome: Ongoing, Progressing  Goal: Absence of Hospital-Acquired Illness or Injury  Outcome: Ongoing, Progressing  Goal: Optimal Comfort and Wellbeing  Outcome: Ongoing, Progressing  Goal: Readiness for Transition of Care  Outcome: Ongoing, Progressing     Problem: Impaired Wound Healing  Goal: Optimal Wound Healing  Outcome: Ongoing, Progressing     Problem: Fluid and Electrolyte Imbalance (Acute Kidney Injury/Impairment)  Goal: Fluid and Electrolyte Balance  Outcome: Ongoing, Progressing     Problem: Oral Intake Inadequate (Acute Kidney Injury/Impairment)  Goal: Optimal Nutrition Intake  Outcome: Ongoing, Progressing     Problem: Renal Function Impairment (Acute Kidney Injury/Impairment)  Goal: Effective Renal Function  Outcome: Ongoing, Progressing     Problem: Coping Ineffective  Goal: Effective Coping  Outcome: Ongoing, Progressing     Problem: Fluid and Electrolyte Imbalance (Acute Kidney Injury/Impairment)  Goal: Fluid and Electrolyte Balance  Outcome: Ongoing, Progressing  Pt sitting up in chair watching tv. Resp even and unlabored no distress noted at this time safety measures in place.

## 2023-10-27 NOTE — CONSULTS
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 symptoms/condition impair the ability to safely do so.    Alcohol, Tobacco, and Drug Counseling, as well as resources, has been provided, as warranted.     Shared medical decision making and informed consent are the hallmark and bedrock of good clinical care, and as such have been employed and obtained, respectively, to the degree possible.      Risk Mitigation Strategies, Harm Reduction Techniques, and Safety Netting are important interventions that can reduce acute and chronic risk, and as such have been employed to the degree possible.    Prescription Drug Management entails the review, recommendation, or consideration without recommendation of medications, and as such was employed during the encounter.    Additional Psychoeducation has been provided, as warranted.    Discussed, to the extent possible, diagnosis, risks and benefits of proposed treatment vs alternative treatments vs no treatment, potential side effects of these treatments and the inherent unpredictability of treatment. The patient expresses understanding of the above and displays the  capacity to agree with this treatment given said understanding. Patient also agrees that, currently, the benefits outweigh the risks and consents to treatment at this time.     Written material has been provided to supplement, augment, and reinforce any discussions and interventions, via the AVS or other pre-printed handouts, as warranted.      DIAGNOSTIC TESTING:     The chart was reviewed for recent diagnostic procedures and investigations, and pertinent results are noted below.    Na 132 (L)  10/27/2023   K 4.2  10/27/2023   Ca 8.6 (L)  10/27/2023  Phos 3.0  10/27/2023   Mg 2.1  10/27/2023     Glu 96  10/27/2023   HgA1c 4.2  10/26/2023    BUN 18  10/27/2023   Cr 1.2  10/27/2023   GFR 51.2 (A)  10/27/2023   Specific Gravity *   *   Protein (Urine) Negative  10/22/2023   Microalbumin *   *     T Prot 5.3 (L)  10/27/2023   Alb 3.3 (L)  10/27/2023   T Bili 6.8 (H)  10/27/2023   Alk Phos 106  10/27/2023   AST 77 (H)  10/27/2023   ALT 31  10/27/2023   GGT *   *   NH3 54.7  10/12/2023   Amylase *   *   Lipase 26  10/20/2023    TSH 7.882 (H)  10/18/2023   Free T4 *   *  PTH *   *  Prolactin *   *   CPK *   *   Troponin I <0.010  10/12/2023   PT 24.0 (H)  10/26/2023   INR 2.4 (H)  10/26/2023    WBC 12.83 (H)  10/27/2023   RBC 2.75 (L)  10/27/2023   Hgb 8.3 (L)  10/27/2023   HCT 24.7 (L)  10/27/2023   MCV 90  10/27/2023   (L)  10/27/2023   ANC 9.1; 70.7 (H);   10/27/2023    Cholesterol 131  1/27/2022   Triglycerides 66  1/27/2022   LDL *   *   HDL 42  1/27/2022     B12 *   *   Folate *   *   Thiamine *   *   Vit D *   *     HIV 1/2 Ag/Ab Non-reactive  10/26/2023   Hep B Surface *   *   Hep B Core *   *   Hep A Nonreactive  10/12/2023   Hep C Nonreactive  10/12/2023   RPR *   *     Lithium *   *   VPA *   *   Clozapine *   *     Alcohol *   *   Benzodiazepines *   *   Barbiturates *   *   Cannabis *   *   Cocaine *   *   Amphetamines *   *   PCP *   *    Opiates *   *   Methadone *   *   Buprenorphine *   *   Fentanyl *   *   Oxycodone *   *   Tramadol *   *     Ethanol *   *  PETH *   *   EtG *   *   EtS *   *   Buprenorphine *   *   Norbuprenorphine *   *     Results for orders placed or performed during the hospital encounter of 10/12/23   EKG 12-lead    Collection Time: 10/12/23  5:00 PM    Narrative    Test Reason : E87.1,    Vent. Rate : 100 BPM     Atrial Rate : 100 BPM     P-R Int : 134 ms          QRS Dur : 088 ms      QT Int : 416 ms       P-R-T Axes : 064 060 057 degrees     QTc Int : 536 ms    Normal sinus rhythm  Prolonged QT    Abnormal ECG  Confirmed by Quan Ac MD ([id number]) on 10/13/2023 1:34:58 PM    Referred By:             Confirmed By:Quan Ac MD       Results for orders placed or performed during the hospital encounter of 10/12/23   CT Head Without Contrast    Narrative    EXAMINATION:  CT HEAD WITHOUT CONTRAST    CLINICAL HISTORY:  Infusion;    TECHNIQUE:  Sequential axial images were performed of the brain without contrast.    Dose product length of 884 mGycm. Automated exposure control was utilized to minimize radiation dose.    COMPARISON:  February 8, 2022.    FINDINGS:  There is no intracranial mass effect, midline shift, hydrocephalus or hemorrhage. There is no sulcal effacement or low attenuation changes to suggest recent large vessel territory infarction. Chronic appearing periventricular and subcortical white matter low attenuation changes are present and are consistent with chronic microangiopathic ischemia.  Beneath left craniotomy is similar appearing dural thickening.  No acute extra-axial fluid collection identified.  The ventricular system and sulcal markings prominence is consistent with atrophy more advanced for the age.  There is no acute extra axial fluid collection.  Similar mucoperiosteal thickening of the right maxillary sinus.  Paranasal sinuses are clear without mucosal thickening, polypoidal  abnormality or air-fluid levels. Mastoid air cells aeration is optimal.      Impression    No acute intracranial findings identified.      Electronically signed by: Kevyn Abebe  Date:    10/12/2023  Time:    19:26       CONSULTATION:     A diagnostic psychiatric evaluation was performed and responsiveness to treatment was assessed.  The patient demonstrates adequate ability/capacity to respond to treatment.    Inpatient consult to Psychiatry  Consult performed by: Daniel Salamanca MD  Consult ordered by: Rolando Alva MD          - The consulting clinician was informed of the encounter documentation.  - The case was discussed and care was coordinated with the consulting clinician, including clinical impression, assessment, and treatment recommendations.  - The case was discussed and care was coordinated with facility staff, including clinical impression, assessment, and treatment recommendations.
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
## 2023-10-27 NOTE — SUBJECTIVE & OBJECTIVE
Interval History: improved mentation    Review of Systems   Constitutional:  Negative for activity change and appetite change.   HENT: Negative.     Respiratory:  Positive for chest tightness and shortness of breath (minimal now). Negative for wheezing.    Cardiovascular:  Positive for leg swelling (minimal). Negative for chest pain.   Gastrointestinal:  Negative for abdominal distention and abdominal pain.   Neurological: Negative.    Psychiatric/Behavioral: Negative.       Objective:     Vital Signs (Most Recent):  Temp: 97.9 °F (36.6 °C) (10/27/23 1117)  Pulse: 79 (10/27/23 1117)  Resp: 18 (10/27/23 1117)  BP: 113/65 (10/27/23 1117)  SpO2: 95 % (10/27/23 1117) Vital Signs (24h Range):  Temp:  [97.9 °F (36.6 °C)-98.1 °F (36.7 °C)] 97.9 °F (36.6 °C)  Pulse:  [73-92] 79  Resp:  [10-19] 18  SpO2:  [92 %-95 %] 95 %  BP: ()/(51-68) 113/65        There is no height or weight on file to calculate BMI.    Intake/Output Summary (Last 24 hours) at 10/27/2023 1459  Last data filed at 10/27/2023 0620  Gross per 24 hour   Intake 250 ml   Output --   Net 250 ml         Physical Exam  HENT:      Head: Normocephalic and atraumatic.   Eyes:      Pupils: Pupils are equal, round, and reactive to light.   Cardiovascular:      Rate and Rhythm: Normal rate.      Pulses: Normal pulses.      Heart sounds: Normal heart sounds.   Pulmonary:      Effort: Pulmonary effort is normal. No respiratory distress.   Abdominal:      General: Bowel sounds are normal.      Palpations: Abdomen is soft.   Neurological:      General: No focal deficit present.      Mental Status: She is alert and oriented to person, place, and time. Mental status is at baseline.   Psychiatric:         Mood and Affect: Mood normal.         Behavior: Behavior normal.         Thought Content: Thought content normal.         Judgment: Judgment normal.             Significant Labs: All pertinent labs within the past 24 hours have been reviewed.    Significant Imaging: I  have reviewed all pertinent imaging results/findings within the past 24 hours.

## 2023-10-27 NOTE — ASSESSMENT & PLAN NOTE
Hx of heavy alcohol use >30 years. Conflicting reports of last drink (6 weeks to 4 months ago) Cirrhosis noted on prior imaging. Concern for HRS, transferred to [facility] for Hepatology eval, possible transplant evaluation. Liver US with doppler on 10/13 with reversed flow at the portal vein.  Portosystemic collaterals on preceding CT exam. Small pleural effusions and abdominal ascites (SAAG>1.1) Effusion likely hepatothorax per pulm, no thoracentesis necessary, completing rocephin course for possible PNA. No systolic or diastolic dysfunction noted on echo but elevated CVP, possible congestive hepatopathy.  Tbili 6.0 from 12 on arrival.    Initially received lasix, spironolactone with minimal improvement. Started HRS protocol shortly thereafter d/t worsening Cr, hyponatremia. See HRS.     Was started on lactulose, rifaximin to prevent HE. Will continue  Followed by hepatology  Had two bowel movements since last year    - Hepatology consult  - HIV; negative   - EBV   - CMV; positive   - Hep panel negative  - Fe studies, Ferritin; anemia of chronic disease  - A1AT   - RUQ U/S with Doppler study; Hepatic steatosis + cirrhosis   - consider pall care consult in the setting of high MELD and possibly not being a transplant candidate if patient cannot demonstrate sobriety with addiction psych     PLAN:  - to speak to her son Jasiel regarding care goals  - follows hepatology recs : hepatitis labs  - prn paracentesis  - Fluid restriction and low Na diet.
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
## 2023-10-27 NOTE — ASSESSMENT & PLAN NOTE
Patient has hyponatremia which is uncontrolled,We will aim to correct the sodium by 4-6mEq in 24 hours. We will monitor sodium Every 6 hours. The hyponatremia is due to hypovolemia, SIADH (possibly 2/2 depression, pain or nausea and vomiting), Cirrhosis, Nephrotic syndrome and renal insufficiency. We will obtain the following studies: Urine sodium, urine osmolality, serum osmolality. We will treat the hyponatremia per Nephro.     Worsening hyponatremia, now 123, refractory to fluid restriction, salt tabs, tolvaptan 15mg on 10/23, or midodrine/octreotide/albumin. Undergoing treatment for HRS at OSH after she developed symptomatic hypotension 10/23 needing 500cc IVF bolus and IV albumin, with midodrine and octreotide. Progressive azotemia, oliguria with U Na<20. Octreotide increased to 200 mg IV q8h. Creatinine climbed to 1.6 from 0.8 despite the above interventions, terlipressin not available at OSH, partial impetus for transfer along with hepatology eval.    Current Na 132 << 130<< 123    Hypo-osmolar hyponatremia 2/2 SIADH complicated by liver disease     - Nephrology consult; signed off   - Urine lytes, osmolalities; consistent with SIADH/liver disease which improved after tolvaptan   - continue octreotide 200ch q8h, midodrine 10mg q8h, IV albumin qd   - unclear if pt would benefit from terlipressin or norepi; hold at this time   - MAP > 80  - Renal ultrasound  - Strict I&O  - Reportedly very poor prognosis if not a transplant candidate, would not be appropriate for dialysis therapy unless as a bridge to transplant per Nephro at OSH
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
## 2023-10-27 NOTE — ASSESSMENT & PLAN NOTE
See ACP 10/27    Insight/goals of care- very good, patient understands life-limiting nature of ESLD and has clear limits she would place on her care if she were to become too ill for transplant. Also understands her sobriety is directly linked to her prognosis    ACP- notes she has living will and MPOA docs at home, will bring to next appointment; MPOA is son Jasiel Cheney    Social support- robust, family very involved and supportive; is going to be living alone on d/c which I discouraged. Notes hobbies include design (clothing, blankets)    Psychological- history of anxiety and depression; has had multiple years of heavy grief with passing of her , son, and father within a short time span to which she attributes her loneliness and alcohol abuse    Spiritual-notes she is involved in Muslim and her siri is a source of strength and support    Symptom management- patient primary symptom is nausea which is intermittent without clear triggers. Typically no vomiting. Relieved with IV zofran, has not trialed oral zofran      Recommendations:  -DNR, full support- *this should not limit any life-prolonging interventions including transplant. Code status should only impact care at the time of cardiopulmonary arrest  -spiritual care consult  -recommend trial of ODT zofran 4mg q6hr PRN for nausea, vomiting

## 2023-10-27 NOTE — ASSESSMENT & PLAN NOTE
63 female with alcohol cirrhosis, reports last drink 5 weeks ago, now with decompensated liver failure associated with DIANA, encephalopathy. Improving with supportive care. Transplant candidacy pending rehabilitation efforts and sobriety.     MELD 3.0: 28 at 10/27/2023  2:43 AM  MELD-Na: 27 at 10/27/2023  2:43 AM  Calculated from:  Serum Creatinine: 1.2 mg/dL at 10/27/2023  2:43 AM  Serum Sodium: 132 mmol/L at 10/27/2023  2:43 AM  Total Bilirubin: 6.8 mg/dL at 10/27/2023  2:43 AM  Serum Albumin: 3.3 g/dL at 10/27/2023  2:43 AM  INR(ratio): 2.4 at 10/26/2023  4:44 AM  Age at listing (hypothetical): 63 years  Sex: Female at 10/27/2023  2:43 AM    -continue current level of care in line with patient's stated goals  -recommend continued clear and direct communication regarding prognosis and likelihood of candidacy for transplant

## 2023-10-27 NOTE — ASSESSMENT & PLAN NOTE
Was suspected to have an element of HRS involved  But it turned out to be due to hypotension    Peak CRT reached 1.6 >> 1.3>>>1.2    Resolved on midodrine and normalization of blood pressure    Plan:  Continue to monitor

## 2023-10-27 NOTE — ASSESSMENT & PLAN NOTE
Discussed GOC with patient. Wishes to pursue all curative measures, but would not accept heroic resuscitative measures in the event of cardiac arrest, including chest compressions or cardioversion.  Agreeable to vasopressors but NOT intubation if necessary during treatment course. Patient AAOx4 with full capacity. In accordance with these wishes, code status updated to DNR.

## 2023-10-28 VITALS
HEART RATE: 68 BPM | RESPIRATION RATE: 19 BRPM | DIASTOLIC BLOOD PRESSURE: 61 MMHG | SYSTOLIC BLOOD PRESSURE: 111 MMHG | TEMPERATURE: 99 F | OXYGEN SATURATION: 95 %

## 2023-10-28 LAB
ALBUMIN SERPL BCP-MCNC: 2.9 G/DL (ref 3.5–5.2)
ALP SERPL-CCNC: 101 U/L (ref 55–135)
ALT SERPL W/O P-5'-P-CCNC: 32 U/L (ref 10–44)
ANION GAP SERPL CALC-SCNC: 10 MMOL/L (ref 8–16)
AST SERPL-CCNC: 75 U/L (ref 10–40)
BASOPHILS # BLD AUTO: 0.01 K/UL (ref 0–0.2)
BASOPHILS NFR BLD: 0.1 % (ref 0–1.9)
BILIRUB SERPL-MCNC: 7.7 MG/DL (ref 0.1–1)
BUN SERPL-MCNC: 12 MG/DL (ref 8–23)
CALCIUM SERPL-MCNC: 8.2 MG/DL (ref 8.7–10.5)
CHLORIDE SERPL-SCNC: 104 MMOL/L (ref 95–110)
CO2 SERPL-SCNC: 23 MMOL/L (ref 23–29)
CREAT SERPL-MCNC: 1 MG/DL (ref 0.5–1.4)
DIFFERENTIAL METHOD: ABNORMAL
EOSINOPHIL # BLD AUTO: 0.5 K/UL (ref 0–0.5)
EOSINOPHIL NFR BLD: 5.2 % (ref 0–8)
ERYTHROCYTE [DISTWIDTH] IN BLOOD BY AUTOMATED COUNT: 21.5 % (ref 11.5–14.5)
EST. GFR  (NO RACE VARIABLE): >60 ML/MIN/1.73 M^2
FOLATE SERPL-MCNC: 16.3 NG/ML (ref 4–24)
GLUCOSE SERPL-MCNC: 84 MG/DL (ref 70–110)
HCT VFR BLD AUTO: 27.1 % (ref 37–48.5)
HGB BLD-MCNC: 8.8 G/DL (ref 12–16)
IMM GRANULOCYTES # BLD AUTO: 0.16 K/UL (ref 0–0.04)
IMM GRANULOCYTES NFR BLD AUTO: 1.6 % (ref 0–0.5)
INR PPP: 2.2 (ref 0.8–1.2)
LYMPHOCYTES # BLD AUTO: 1.8 K/UL (ref 1–4.8)
LYMPHOCYTES NFR BLD: 18.5 % (ref 18–48)
MAGNESIUM SERPL-MCNC: 2 MG/DL (ref 1.6–2.6)
MCH RBC QN AUTO: 30 PG (ref 27–31)
MCHC RBC AUTO-ENTMCNC: 32.5 G/DL (ref 32–36)
MCV RBC AUTO: 93 FL (ref 82–98)
MONOCYTES # BLD AUTO: 1.2 K/UL (ref 0.3–1)
MONOCYTES NFR BLD: 12.5 % (ref 4–15)
NEUTROPHILS # BLD AUTO: 6.2 K/UL (ref 1.8–7.7)
NEUTROPHILS NFR BLD: 62.1 % (ref 38–73)
NRBC BLD-RTO: 0 /100 WBC
PHOSPHATE SERPL-MCNC: 3.2 MG/DL (ref 2.7–4.5)
PLATELET # BLD AUTO: 121 K/UL (ref 150–450)
PMV BLD AUTO: 9.9 FL (ref 9.2–12.9)
POCT GLUCOSE: 136 MG/DL (ref 70–110)
POTASSIUM SERPL-SCNC: 4.2 MMOL/L (ref 3.5–5.1)
PROT SERPL-MCNC: 5 G/DL (ref 6–8.4)
PROTHROMBIN TIME: 22.4 SEC (ref 9–12.5)
RBC # BLD AUTO: 2.93 M/UL (ref 4–5.4)
SODIUM SERPL-SCNC: 137 MMOL/L (ref 136–145)
VIT B12 SERPL-MCNC: >2000 PG/ML (ref 210–950)
WBC # BLD AUTO: 9.94 K/UL (ref 3.9–12.7)

## 2023-10-28 PROCEDURE — 94761 N-INVAS EAR/PLS OXIMETRY MLT: CPT

## 2023-10-28 PROCEDURE — 85610 PROTHROMBIN TIME: CPT | Performed by: STUDENT IN AN ORGANIZED HEALTH CARE EDUCATION/TRAINING PROGRAM

## 2023-10-28 PROCEDURE — 84100 ASSAY OF PHOSPHORUS: CPT

## 2023-10-28 PROCEDURE — 83735 ASSAY OF MAGNESIUM: CPT

## 2023-10-28 PROCEDURE — 25000003 PHARM REV CODE 250

## 2023-10-28 PROCEDURE — 80053 COMPREHEN METABOLIC PANEL: CPT

## 2023-10-28 PROCEDURE — 63600175 PHARM REV CODE 636 W HCPCS

## 2023-10-28 PROCEDURE — 85025 COMPLETE CBC W/AUTO DIFF WBC: CPT

## 2023-10-28 RX ORDER — FOLIC ACID 1 MG/1
1 TABLET ORAL DAILY
Qty: 90 TABLET | Refills: 3 | Status: SHIPPED | OUTPATIENT
Start: 2023-10-28 | End: 2024-10-27

## 2023-10-28 RX ORDER — LACTULOSE 10 G/15ML
10 SOLUTION ORAL; RECTAL 2 TIMES DAILY
Qty: 15 ML | Refills: 2 | Status: SHIPPED | OUTPATIENT
Start: 2023-10-28 | End: 2023-10-28 | Stop reason: SDUPTHER

## 2023-10-28 RX ORDER — LANOLIN ALCOHOL/MO/W.PET/CERES
100 CREAM (GRAM) TOPICAL DAILY
Qty: 30 TABLET | Refills: 11 | Status: SHIPPED | OUTPATIENT
Start: 2023-10-28 | End: 2024-01-08 | Stop reason: SDUPTHER

## 2023-10-28 RX ORDER — LACTULOSE 10 G/15ML
10 SOLUTION ORAL; RECTAL 2 TIMES DAILY
Qty: 900 ML | Refills: 11 | Status: SHIPPED | OUTPATIENT
Start: 2023-10-28 | End: 2024-10-22

## 2023-10-28 RX ORDER — LACTULOSE 10 G/15ML
10 SOLUTION ORAL; RECTAL 2 TIMES DAILY
Qty: 900 ML | Refills: 11 | Status: SHIPPED | OUTPATIENT
Start: 2023-10-28 | End: 2023-10-28 | Stop reason: SDUPTHER

## 2023-10-28 RX ORDER — ONDANSETRON 4 MG/1
4 TABLET, ORALLY DISINTEGRATING ORAL EVERY 6 HOURS PRN
Status: DISCONTINUED | OUTPATIENT
Start: 2023-10-28 | End: 2023-10-28 | Stop reason: HOSPADM

## 2023-10-28 RX ORDER — ACAMPROSATE CALCIUM 333 MG/1
666 TABLET, DELAYED RELEASE ORAL 3 TIMES DAILY
Qty: 180 TABLET | Refills: 11 | Status: SHIPPED | OUTPATIENT
Start: 2023-10-28 | End: 2024-01-25 | Stop reason: SDUPTHER

## 2023-10-28 RX ORDER — MIDODRINE HYDROCHLORIDE 10 MG/1
10 TABLET ORAL EVERY 8 HOURS
Qty: 90 TABLET | Refills: 11 | Status: ON HOLD | OUTPATIENT
Start: 2023-10-28 | End: 2023-11-13 | Stop reason: HOSPADM

## 2023-10-28 RX ORDER — MIDODRINE HYDROCHLORIDE 10 MG/1
10 TABLET ORAL EVERY 8 HOURS
Qty: 90 TABLET | Refills: 11 | Status: SHIPPED | OUTPATIENT
Start: 2023-10-28 | End: 2024-03-01 | Stop reason: SDUPTHER

## 2023-10-28 RX ORDER — FOLIC ACID 1 MG/1
1 TABLET ORAL DAILY
Qty: 90 TABLET | Refills: 3 | Status: ON HOLD | OUTPATIENT
Start: 2023-10-28 | End: 2023-11-13 | Stop reason: HOSPADM

## 2023-10-28 RX ORDER — LEVOTHYROXINE SODIUM 100 UG/1
100 TABLET ORAL
Qty: 30 TABLET | Refills: 11 | Status: SHIPPED | OUTPATIENT
Start: 2023-10-29 | End: 2024-01-25

## 2023-10-28 RX ADMIN — FOLIC ACID 1 MG: 1 TABLET ORAL at 09:10

## 2023-10-28 RX ADMIN — MIDODRINE HYDROCHLORIDE 10 MG: 5 TABLET ORAL at 06:10

## 2023-10-28 RX ADMIN — THERA TABS 1 TABLET: TAB at 09:10

## 2023-10-28 RX ADMIN — LACTULOSE 10 G: 20 SOLUTION ORAL at 09:10

## 2023-10-28 RX ADMIN — MUPIROCIN: 20 OINTMENT TOPICAL at 09:10

## 2023-10-28 RX ADMIN — HEPARIN SODIUM 5000 UNITS: 5000 INJECTION INTRAVENOUS; SUBCUTANEOUS at 06:10

## 2023-10-28 RX ADMIN — MIDODRINE HYDROCHLORIDE 10 MG: 5 TABLET ORAL at 01:10

## 2023-10-28 RX ADMIN — HEPARIN SODIUM 5000 UNITS: 5000 INJECTION INTRAVENOUS; SUBCUTANEOUS at 01:10

## 2023-10-28 RX ADMIN — THIAMINE HCL TAB 100 MG 100 MG: 100 TAB at 09:10

## 2023-10-28 RX ADMIN — RIFAXIMIN 550 MG: 550 TABLET ORAL at 09:10

## 2023-10-28 RX ADMIN — LEVOTHYROXINE SODIUM 100 MCG: 100 TABLET ORAL at 06:10

## 2023-10-28 RX ADMIN — BUSPIRONE HYDROCHLORIDE 5 MG: 5 TABLET ORAL at 09:10

## 2023-10-28 RX ADMIN — PANTOPRAZOLE SODIUM 40 MG: 40 TABLET, DELAYED RELEASE ORAL at 09:10

## 2023-10-28 NOTE — PLAN OF CARE
[facility] - Telemetry Stepdown  Discharge Final Note    Primary Care Provider: No, Primary Doctor    Expected Discharge Date: 10/28/2023    Patient is ready to discharge from case management standpoint.    Final Discharge Note (most recent)       Final Note - 10/28/23 1813          Final Note    Assessment Type Final Discharge Note     Anticipated Discharge Disposition Home or Self Care     What phone number can be called within the next 1-3 days to see how you are doing after discharge? [phone]     Hospital Resources/Appts/Education Provided Provided patient/caregiver with written discharge plan information;Provided education on problems/symptoms using teachback;Appointments scheduled and added to AVS;Post-Acute resouces added to AVS        Post-Acute Status    Discharge Delays None known at this time                   Important Message from Medicare         Contact [facility], [facility]        Next Steps: Schedule an appointment as soon as possible for a visit    [facility]   Specialty: Hepatology    [address] [facility] [address] [place] [address]   Phone: [phone]       Next Steps: Schedule an appointment as soon as possible for a visit in 1 week(s)    Instructions: call ASAP to get appointment for your liver    [facility]   Specialty: Nephrology    [address] [facility] [address]   Phone: [phone]       Next Steps: Schedule an appointment as soon as possible for a visit in 1 week(s)    Instructions: make appointment ASAP to followup on your kidney function          Marge Russo RN  Weekend  - [facility]  Spectralink: [phone]

## 2023-10-28 NOTE — ASSESSMENT & PLAN NOTE
Was suspected to have an element of HRS involved  But it turned out to be due to hypotension    Peak CRT reached 1.6 >> 1.3>>>1.2>>1.0    Resolved on midodrine and normalization of blood pressure    Plan:  Issue resolved

## 2023-10-28 NOTE — ASSESSMENT & PLAN NOTE
Hx of heavy alcohol use >30 years. Conflicting reports of last drink (6 weeks to 4 months ago) Cirrhosis noted on prior imaging. Concern for HRS, transferred to [facility] for Hepatology eval, possible transplant evaluation. Liver US with doppler on 10/13 with reversed flow at the portal vein.  Portosystemic collaterals on preceding CT exam. Small pleural effusions and abdominal ascites (SAAG>1.1) Effusion likely hepatothorax per pulm, no thoracentesis necessary, completing rocephin course for possible PNA. No systolic or diastolic dysfunction noted on echo but elevated CVP, possible congestive hepatopathy.  Tbili 5.6 from 12 on arrival.    Initially received lasix, spironolactone with minimal improvement. Started HRS protocol shortly thereafter d/t worsening Cr, hyponatremia. See HRS.     Had three bowel movements since last day  Mentation at baseline    - Hepatology consult  - HIV; negative   - EBV   - CMV; positive   - Hep panel negative  - Fe studies, Ferritin; anemia of chronic disease  - A1AT   - RUQ U/S with Doppler study; Hepatic steatosis + cirrhosis       PLAN:  Follow hepatology recs, for follow up as outpatient basis  Follow up by IM doctor, addiction pysch, and nephrology for the DIANA  naltrexone is contraindicated in liver failure  Discontinue ceftriaxone, octreotide on discharge  Continue midodrine on discharge  Continue lactulose on discharge

## 2023-10-28 NOTE — ASSESSMENT & PLAN NOTE
Discussed GOC with patient. Wishes to pursue all curative measures, but would not accept heroic resuscitative measures in the event of cardiac arrest, including chest compressions or cardioversion.  After extensive talk with the family and the patient she wishes to be made full code.

## 2023-10-28 NOTE — DISCHARGE SUMMARY
[facility] - Telemetry [facility] Medicine  Discharge Summary      Patient Name: Lauren Ewing  MRN: [id number]  CAM: [id number]  Patient Class: IP- Inpatient  Admission Date: 10/26/2023  Hospital Length of Stay: 2 days  Discharge Date and Time:  10/28/2023 12:32 PM  Attending Physician: Ceci Martinez MD   Discharging Provider: Leanna Coulter MD  Primary Care Provider: Pennie Primary Doctor  [facility] Medicine Team: [facility] MED 5 Leanna Coulter MD  Primary Care Team: [facility] 5    HPI:   63yoF hx including MDD, SHAQ, AUD who presented to [facility] ED on 10/12/2023 with chief complaint of progressive weakness x 1 week and yellowing of her skin.  She also endorsed poor appetite over the past week.       Upon ED arrival, WBC 20,000, a significantly elevated bilirubin at 11.6, hyponatremia, and mildly elevated lactic acid level.  Alcohol level was undetectable.  She reported a heavy history of alcohol abuse since about age 38.  She has been in rehab for alcohol abuse before.  Her last drink was approximately 4 months prior. CT A/P with contrast revealed  a right-sided pleural effusion, right lower lobe consolidation, hepatic cirrhotic morphology and ascites with mural thickening of the small bowel loops suggesting nonspecific enteritis. Patient received IV fluids in the ED. Initiated on empiric antibiotics and pulmonology, Gastroenterology consulted. Pulm suspected likely hepatothorax and determined no need for thoracentesis.      Patient given lactulose, rifaximin, levothyroxine, underwent paracentesis on 10/13/2023 with 0.75L removed (SAAG>1.1), placed on salt tabs, Lasix and Aldactone and fluid restriction which subsequently all discontinued on 10/15/2023.  Midodrine 2.5 q.8 hours added on 10/15/2023.  Repeat abdominal ultrasound and CT scan on 10/20 show increasing bilateral pleural effusion and moderate ascites. Echo on 10/25 w/o systolic/diastolic dysfunction but CVP 15.    Sodium remained  stable at 123-124. Nephrology consulted and was given tolvaptan 10/23 with no response. Patient required albumin, octreotide and increase the midodrine to 10 mg TID with initial improvement in BP. Nephro later recommended Terlipressin but unable to obtain at OSH. Deemed not a HD candidate unless as a bridge to transplant.     [facility] to evaluate acute decompensated alcoholic cirrhosis and initiate liver transplant evaluation. Discussed with Dr. Houser; agreed to consult on the patient with admission to Hospital Medicine stepdown.      On arrival, normotensive, satting well on RA. States she feels she has been slowly improving. MELD-3: 33.  T bili 6.2, no transaminitis. Continuing octreotide (increased to 200mg IV q8h on day of transfer), midodrine, albumin, rifaximin, lactulose, rocephin, vit K.     WBC 25 on arrival, but received dexamethasone 12mg injection day prior to transfer, due to elevated maddrey score(?).     Nephrology consulted for HRS, hyponatremia refractory to tolvaptan. Hepatology consulted for acute liver failure, possible transplant eval.       * No surgery found *      Hospital Course:   Hepatology was consulted and stated she is not a liver candidate transplant given continued EtOH and would need to demonstrate sobriety. Addition psych consulted. Will consider possible palliative consult.  Per nephrology patient's presentation hypoosmolar hyponatremia-SIADH/liver disease which has improved after tolvaptan, and DIANA improved with saline bolus. DIANA likely from hypotension at OSH. Thus nephrology does not think picture is HRS picture. Continuing fluid restriction and midodrine. As per hepatology team she does not appear to be a candidate for liver transplant. Plans of care have been discussed with the patient and palliative team have been consulted to be onboard with us. Patient has been decided by liver team to be followed up as outpatient basis. Her  mentation is back to baseline and thorough discussion has commenced with family and the patient regarding longterm care plans.  She is stable medically and fit for discharge.       Goals of Care Treatment Preferences:  Code Status: Full Code    Living Will: Yes          Vitals:    10/27/23 2355 10/28/23 0430 10/28/23 0800 10/28/23 1102   BP: 115/65 117/70 121/72 111/61   BP Location:    Right arm   Patient Position:    Lying   Pulse: 79 81 82 68   Resp: 20 18 17 19   Temp: 98.3 °F (36.8 °C) 98 °F (36.7 °C) 98 °F (36.7 °C) 98.5 °F (36.9 °C)   TempSrc:  Oral  Oral   SpO2: 98% 97% 97% 95%     Review of Systems   Constitutional:  Negative for chills and fever.   HENT: Negative.     Respiratory:  Negative for cough, sputum production and shortness of breath.    Cardiovascular:  Negative for chest pain, palpitations and leg swelling.   Gastrointestinal:  Negative for diarrhea, nausea and vomiting.   Musculoskeletal:  Negative for myalgias.   Skin:  Negative for itching and rash.   Neurological: Negative.    Psychiatric/Behavioral: Negative.       Physical Exam  HENT:      Head: Normocephalic and atraumatic.   Eyes:      Pupils: Pupils are equal, round, and reactive to light.   Cardiovascular:      Rate and Rhythm: Normal rate.      Pulses: Normal pulses.      Heart sounds: Normal heart sounds.   Pulmonary:      Effort: Pulmonary effort is normal. No respiratory distress.      Breath sounds: Normal breath sounds.   Abdominal:      General: Bowel sounds are normal.      Palpations: Abdomen is soft.   Neurological:      General: No focal deficit present.      Mental Status: She is alert and oriented to person, place, and time. Mental status is at baseline.   Psychiatric:         Mood and Affect: Mood normal.         Behavior: Behavior normal.         Thought Content: Thought content normal.         Judgment: Judgment normal.         Consults:   Consults (From admission, onward)          Status Ordering Provider     Inpatient  consult to Spiritual Care  Once        Provider:  (Not yet assigned)    Acknowledged CAMPOS LARA     Inpatient consult to Palliative Care  Once        Provider:  (Not yet assigned)    Completed PEYTON METZGER     Inpatient consult to Psychiatry  Once        Provider:  (Not yet assigned)    Completed YULI LEVY     Inpatient consult to Psychology  Once        Provider:  (Not yet assigned)    Completed MICHEAL HEIN     Inpatient consult to Hepatology  Once        Provider:  (Not yet assigned)    Completed TYLER PATRICIA     Inpatient consult to Nephrology  Once        Provider:  (Not yet assigned)    Completed TYLER PATRICIA            Palliative Care  Palliative care encounter  Discussed GOC with patient. Wishes to pursue all curative measures, but would not accept heroic resuscitative measures in the event of cardiac arrest, including chest compressions or cardioversion.  After extensive talk with the family and the patient she wishes to be made full code.      Final Active Diagnoses:    Diagnosis Date Noted POA    PRINCIPAL PROBLEM:  Liver failure [K72.90] 10/26/2023 Unknown    Major depressive disorder [F32.9] 10/26/2023 Yes    Palliative care encounter [Z51.5] 10/26/2023 Not Applicable    Hypothyroidism [E03.9] 10/26/2023 Yes    Alcohol abuse [F10.10] 10/26/2023 Yes    DIANA (acute kidney injury) [N17.9] 10/26/2023 Yes    Hyponatremia [E87.1] 10/13/2023 Yes      Problems Resolved During this Admission:    Diagnosis Date Noted Date Resolved POA    Hepatorenal syndrome [K76.7] 10/26/2023 10/26/2023 Yes       Discharged Condition: fair    Disposition: Home or Self Care    Follow Up:   Follow-up Information       Hospital, [facility]. Schedule an appointment as soon as possible for a visit.               [facility] - Hepatology. Schedule an appointment as soon as possible for a visit in 1 week(s).    Specialty: Hepatology  Why: call ASAP to get appointment for your  liver  Contact information:  [address] [facility], [address]  [facility] [address] [phone]  Additional information:  1st Floor, [address]             [facility]. Schedule an appointment as soon as possible for a visit in 1 week(s).    Specialty: Nephrology  Why: make appointment ASAP to followup on your kidney function  Contact information:  [address] [facility] [address] [phone]                         Patient Instructions:      COMPREHENSIVE METABOLIC PANEL   Standing Status: Future Standing Exp. Date: 12/26/24     PROTIME-INR   Standing Status: Future Standing Exp. Date: 12/26/24     CBC W/ AUTO DIFFERENTIAL   Standing Status: Future Standing Exp. Date: 12/26/24     Ambulatory referral/consult to Hepatology   Standing Status: Future   Referral Priority: Routine Referral Type: Consultation   Referral Reason: Specialty Services Required   Requested Specialty: Hepatology   Number of Visits Requested: 1     Ambulatory referral/consult to Internal Medicine   Standing Status: Future   Referral Priority: Routine Referral Type: Consultation   Referral Reason: Specialty Services Required   Requested Specialty: Internal Medicine   Number of Visits Requested: 1     Ambulatory referral/consult to Nephrology   Standing Status: Future   Referral Priority: Routine Referral Type: Consultation   Referral Reason: Specialty Services Required   Requested Specialty: Nephrology   Number of Visits Requested: 1     Ambulatory referral/consult to Addiction Psychiatry   Standing Status: Future   Referral Priority: Routine Referral Type: Psychiatric   Referral Reason: Specialty Services Required   Requested Specialty: Addiction Medicine   Number of Visits Requested: 1     Ambulatory referral/consult to [facility]   Standing Status: Future   Referral Priority: Routine Referral Type: Consultation   Referral Reason: Specialty Services  "Required   Number of Visits Requested: 1       Significant Diagnostic Studies: Labs:   CMP   Recent Labs   Lab 10/27/23  0243 10/28/23  0631   * 137   K 4.2 4.2    104   CO2 21* 23   GLU 96 84   BUN 18 12   CREATININE 1.2 1.0   CALCIUM 8.6* 8.2*   PROT 5.3* 5.0*   ALBUMIN 3.3* 2.9*   BILITOT 6.8* 7.7*   ALKPHOS 106 101   AST 77* 75*   ALT 31 32   ANIONGAP 9 10   , CBC   Recent Labs   Lab 10/27/23  0243 10/28/23  0631   WBC 12.83* 9.94   HGB 8.3* 8.8*   HCT 24.7* 27.1*   * 121*   , INR   Lab Results   Component Value Date    INR 2.2 (H) 10/28/2023    INR 2.4 (H) 10/26/2023    INR 2.3 (H) 10/25/2023    PROTIME 24.7 (H) 10/25/2023    PROTIME 28.6 (H) 10/24/2023    PROTIME 26.0 (H) 10/23/2023   , Lipid Panel   Lab Results   Component Value Date    CHOL 131 01/27/2022    HDL 42 01/27/2022    TRIG 66 01/27/2022   , Troponin No results for input(s): "TROPONINI" in the last 168 hours. and A1C:   Recent Labs   Lab 10/26/23  0444   HGBA1C 4.2       Pending Diagnostic Studies:       Procedure Component Value Units Date/Time    Phosphatidylethanol (PETH) [[id number]] Collected: 10/26/23 1723    Order Status: Sent Lab Status: In process Updated: 10/26/23 1743    Specimen: Blood            Medications:  Reconciled Home Medications:      Medication List        START taking these medications      acamprosate 333 mg tablet  Commonly known as: CAMPRAL  Take 2 tablets (666 mg total) by mouth 3 (three) times daily.            CHANGE how you take these medications      CONSTULOSE 10 gram/15 mL solution  Generic drug: lactulose  Take 15 mLs (10 g total) by mouth 2 (two) times daily. Take an extra dose if needed to achieve 2-3 bowel movements per day to prevent confusion  What changed:   medication strength  additional instructions     * folic acid 1 MG tablet  Commonly known as: FOLVITE  Take 1 tablet (1 mg total) by mouth once daily.  What changed: Another medication with the same name was added. Make sure you " understand how and when to take each.     * folic acid 1 MG tablet  Commonly known as: FOLVITE  Take 1 tablet (1 mg total) by mouth once daily.  What changed: You were already taking a medication with the same name, and this prescription was added. Make sure you understand how and when to take each.     * midodrine 10 MG tablet  Commonly known as: PROAMATINE  Take 1 tablet (10 mg total) by mouth every 8 (eight) hours.  What changed: Another medication with the same name was added. Make sure you understand how and when to take each.     * midodrine 10 MG tablet  Commonly known as: PROAMATINE  Take 1 tablet (10 mg total) by mouth every 8 (eight) hours.  What changed: You were already taking a medication with the same name, and this prescription was added. Make sure you understand how and when to take each.           * This list has 4 medication(s) that are the same as other medications prescribed for you. Read the directions carefully, and ask your doctor or other care provider to review them with you.                CONTINUE taking these medications      levothyroxine 100 MCG tablet  Commonly known as: SYNTHROID  Take 1 tablet (100 mcg total) by mouth before breakfast.  Start taking on: October 29, 2023     melatonin 3 mg tablet  Commonly known as: MELATIN  Take 2 tablets (6 mg total) by mouth nightly as needed for Insomnia.     multivitamin Tab  Take 1 tablet by mouth once daily.     pantoprazole 40 MG tablet  Commonly known as: PROTONIX  Take 1 tablet (40 mg total) by mouth once daily.     thiamine 100 MG tablet  Take 1 tablet (100 mg total) by mouth once daily.            STOP taking these medications      busPIRone 5 MG Tab  Commonly known as: BUSPAR     methocarbamoL 750 MG Tab  Commonly known as: ROBAXIN     mupirocin 2 % ointment  Commonly known as: BACTROBAN     octreotide 50 mcg/mL Soln  Commonly known as: SANDOSTATIN     promethazine 25 MG suppository  Commonly known as: PHENERGAN     promethazine 25 MG  tablet  Commonly known as: PHENERGAN     rifAXIMin 550 mg Tab  Commonly known as: XIFAXAN              Indwelling Lines/Drains at time of discharge:   Lines/Drains/Airways       None                   Time spent on the discharge of patient: 45 minutes         Leanna Coulter MD  Department of Hospital Medicine  [facility] - Telemetry Stepdown
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
## 2023-10-28 NOTE — ASSESSMENT & PLAN NOTE
Patient has hyponatremia which is uncontrolled,We will aim to correct the sodium by 4-6mEq in 24 hours. We will monitor sodium Every 6 hours. The hyponatremia is due to hypovolemia, SIADH (possibly 2/2 depression, pain or nausea and vomiting), Cirrhosis, Nephrotic syndrome and renal insufficiency. We will obtain the following studies: Urine sodium, urine osmolality, serum osmolality. We will treat the hyponatremia per Nephro.     Worsening hyponatremia, now 123, refractory to fluid restriction, salt tabs, tolvaptan 15mg on 10/23, or midodrine/octreotide/albumin. Undergoing treatment for HRS at OSH after she developed symptomatic hypotension 10/23 needing 500cc IVF bolus and IV albumin, with midodrine and octreotide. Progressive azotemia, oliguria with U Na<20. Octreotide increased to 200 mg IV q8h. Creatinine climbed to 1.6 from 0.8 despite the above interventions, terlipressin not available at OSH, partial impetus for transfer along with hepatology eval.    Current Na today 137 <<132 << 130<< 123    Plan:  Issue resolved on discharge day

## 2023-10-30 LAB
CLINICAL BIOCHEMIST REVIEW: NORMAL
PLPETH BLD-MCNC: <10 NG/ML
POPETH BLD-MCNC: <10 NG/ML

## 2023-11-01 ENCOUNTER — NURSE TRIAGE (OUTPATIENT)
Dept: ADMINISTRATIVE | Facility: CLINIC | Age: 63
End: 2023-11-01
Payer: MEDICAID

## 2023-11-01 ENCOUNTER — HOSPITAL ENCOUNTER (INPATIENT)
Facility: HOSPITAL | Age: 63
LOS: 12 days | Discharge: NURSING, REHAB OR LONG-TERM CARE | DRG: 433 | End: 2023-11-13
Attending: STUDENT IN AN ORGANIZED HEALTH CARE EDUCATION/TRAINING PROGRAM | Admitting: INTERNAL MEDICINE
Payer: MEDICAID

## 2023-11-01 ENCOUNTER — TELEPHONE (OUTPATIENT)
Dept: HEPATOLOGY | Facility: CLINIC | Age: 63
End: 2023-11-01
Payer: MEDICAID

## 2023-11-01 DIAGNOSIS — R06.02 SOB (SHORTNESS OF BREATH): ICD-10-CM

## 2023-11-01 DIAGNOSIS — R60.9 SWELLING: ICD-10-CM

## 2023-11-01 DIAGNOSIS — R17 JAUNDICE: ICD-10-CM

## 2023-11-01 DIAGNOSIS — K70.30 ALCOHOLIC CIRRHOSIS, UNSPECIFIED WHETHER ASCITES PRESENT: ICD-10-CM

## 2023-11-01 DIAGNOSIS — M79.89 LEG SWELLING: ICD-10-CM

## 2023-11-01 DIAGNOSIS — R60.0 PERIPHERAL EDEMA: Primary | ICD-10-CM

## 2023-11-01 DIAGNOSIS — R60.0 BILATERAL LOWER EXTREMITY EDEMA: ICD-10-CM

## 2023-11-01 LAB
ALBUMIN SERPL-MCNC: 2.8 G/DL (ref 3.4–4.8)
ALBUMIN/GLOB SERPL: 1.2 RATIO (ref 1.1–2)
ALP SERPL-CCNC: 101 UNIT/L (ref 40–150)
ALT SERPL-CCNC: 29 UNIT/L (ref 0–55)
AMMONIA PLAS-MSCNC: 35.5 UMOL/L (ref 18–72)
AST SERPL-CCNC: 64 UNIT/L (ref 5–34)
BASOPHILS # BLD AUTO: 0.07 X10(3)/MCL
BASOPHILS NFR BLD AUTO: 0.6 %
BILIRUB SERPL-MCNC: 9.1 MG/DL
BILIRUBIN DIRECT+TOT PNL SERPL-MCNC: 5 MG/DL (ref 0–?)
BNP BLD-MCNC: 396.7 PG/ML
BUN SERPL-MCNC: 8.1 MG/DL (ref 9.8–20.1)
CALCIUM SERPL-MCNC: 7.9 MG/DL (ref 8.4–10.2)
CHLORIDE SERPL-SCNC: 102 MMOL/L (ref 98–107)
CO2 SERPL-SCNC: 24 MMOL/L (ref 23–31)
CREAT SERPL-MCNC: 0.83 MG/DL (ref 0.55–1.02)
EOSINOPHIL # BLD AUTO: 0.36 X10(3)/MCL (ref 0–0.9)
EOSINOPHIL NFR BLD AUTO: 3 %
ERYTHROCYTE [DISTWIDTH] IN BLOOD BY AUTOMATED COUNT: 22.4 % (ref 11.5–17)
GFR SERPLBLD CREATININE-BSD FMLA CKD-EPI: >60 MLS/MIN/1.73/M2
GLOBULIN SER-MCNC: 2.3 GM/DL (ref 2.4–3.5)
GLUCOSE SERPL-MCNC: 94 MG/DL (ref 82–115)
HCT VFR BLD AUTO: 28 % (ref 37–47)
HGB BLD-MCNC: 9.4 G/DL (ref 12–16)
IMM GRANULOCYTES # BLD AUTO: 0.1 X10(3)/MCL (ref 0–0.04)
IMM GRANULOCYTES NFR BLD AUTO: 0.8 %
LACTATE SERPL-SCNC: 1.8 MMOL/L (ref 0.5–2.2)
LIPASE SERPL-CCNC: 41 U/L
LYMPHOCYTES # BLD AUTO: 2.39 X10(3)/MCL (ref 0.6–4.6)
LYMPHOCYTES NFR BLD AUTO: 20.1 %
MAGNESIUM SERPL-MCNC: 1.9 MG/DL (ref 1.6–2.6)
MCH RBC QN AUTO: 30.5 PG (ref 27–31)
MCHC RBC AUTO-ENTMCNC: 33.6 G/DL (ref 33–36)
MCV RBC AUTO: 90.9 FL (ref 80–94)
MONOCYTES # BLD AUTO: 1.65 X10(3)/MCL (ref 0.1–1.3)
MONOCYTES NFR BLD AUTO: 13.9 %
NEUTROPHILS # BLD AUTO: 7.3 X10(3)/MCL (ref 2.1–9.2)
NEUTROPHILS NFR BLD AUTO: 61.6 %
NRBC BLD AUTO-RTO: 0 %
PLATELET # BLD AUTO: 132 X10(3)/MCL (ref 130–400)
PMV BLD AUTO: 9.9 FL (ref 7.4–10.4)
POTASSIUM SERPL-SCNC: 4.1 MMOL/L (ref 3.5–5.1)
PROT SERPL-MCNC: 5.1 GM/DL (ref 5.8–7.6)
RBC # BLD AUTO: 3.08 X10(6)/MCL (ref 4.2–5.4)
SODIUM SERPL-SCNC: 134 MMOL/L (ref 136–145)
WBC # SPEC AUTO: 11.87 X10(3)/MCL (ref 4.5–11.5)

## 2023-11-01 PROCEDURE — 85025 COMPLETE CBC W/AUTO DIFF WBC: CPT

## 2023-11-01 PROCEDURE — 83690 ASSAY OF LIPASE: CPT

## 2023-11-01 PROCEDURE — 83880 ASSAY OF NATRIURETIC PEPTIDE: CPT

## 2023-11-01 PROCEDURE — 80053 COMPREHEN METABOLIC PANEL: CPT

## 2023-11-01 PROCEDURE — 82140 ASSAY OF AMMONIA: CPT | Performed by: STUDENT IN AN ORGANIZED HEALTH CARE EDUCATION/TRAINING PROGRAM

## 2023-11-01 PROCEDURE — 82248 BILIRUBIN DIRECT: CPT | Performed by: STUDENT IN AN ORGANIZED HEALTH CARE EDUCATION/TRAINING PROGRAM

## 2023-11-01 PROCEDURE — 93005 ELECTROCARDIOGRAM TRACING: CPT

## 2023-11-01 PROCEDURE — 81001 URINALYSIS AUTO W/SCOPE: CPT

## 2023-11-01 PROCEDURE — 82105 ALPHA-FETOPROTEIN SERUM: CPT | Performed by: NURSE PRACTITIONER

## 2023-11-01 PROCEDURE — 11000001 HC ACUTE MED/SURG PRIVATE ROOM

## 2023-11-01 PROCEDURE — 96374 THER/PROPH/DIAG INJ IV PUSH: CPT

## 2023-11-01 PROCEDURE — 63600175 PHARM REV CODE 636 W HCPCS: Performed by: STUDENT IN AN ORGANIZED HEALTH CARE EDUCATION/TRAINING PROGRAM

## 2023-11-01 PROCEDURE — 87040 BLOOD CULTURE FOR BACTERIA: CPT | Performed by: STUDENT IN AN ORGANIZED HEALTH CARE EDUCATION/TRAINING PROGRAM

## 2023-11-01 PROCEDURE — 83735 ASSAY OF MAGNESIUM: CPT

## 2023-11-01 PROCEDURE — 82077 ASSAY SPEC XCP UR&BREATH IA: CPT | Performed by: NURSE PRACTITIONER

## 2023-11-01 PROCEDURE — 83605 ASSAY OF LACTIC ACID: CPT

## 2023-11-01 PROCEDURE — 99285 EMERGENCY DEPT VISIT HI MDM: CPT | Mod: 25

## 2023-11-01 RX ORDER — FUROSEMIDE 10 MG/ML
40 INJECTION INTRAMUSCULAR; INTRAVENOUS
Status: COMPLETED | OUTPATIENT
Start: 2023-11-01 | End: 2023-11-01

## 2023-11-01 RX ADMIN — FUROSEMIDE 40 MG: 10 INJECTION, SOLUTION INTRAMUSCULAR; INTRAVENOUS at 09:11

## 2023-11-01 NOTE — TELEPHONE ENCOUNTER
Patient's son (Jasiel) contacted to schedule an appointment from patient call back message and referral.  An appointment have been scheduled with Dr. Sanders on Tuesday, November 14, 2023 at 4:30PM.  A copy of the appointment have been mailed out.  Mr. Weathers confirmed to appointment scheduled.

## 2023-11-01 NOTE — TELEPHONE ENCOUNTER
Pts son calling with pt. States that pt has 100.2F oral temp. States she is sleepy and has swelling to bilat legs up to her thighs. States she has trouble breathing at rest. Per protocol advised to ED NOW. verbalized understanding. Denies any further questions or concerns at this time, advised to call back if they have any that come up. Advised pt to call back with any other concerns or worsening symptoms. Verbalized understanding and will route message to provider.     Reason for Disposition   Difficulty breathing at rest    Additional Information   Negative: SEVERE difficulty breathing (e.g., struggling for each breath, speaks in single words)   Negative: Looks like a broken bone or dislocated joint (e.g., crooked or deformed)   Negative: Sounds like a life-threatening emergency to the triager    Protocols used: Leg Swelling and Edema-A-AH

## 2023-11-02 LAB
AFP-TM SERPL-MCNC: 2.1 NG/ML
AMMONIA PLAS-MSCNC: 27.7 UMOL/L (ref 18–72)
APPEARANCE UR: CLEAR
BACTERIA #/AREA URNS AUTO: ABNORMAL /HPF
BILIRUB UR QL STRIP.AUTO: ABNORMAL
COLOR UR AUTO: YELLOW
ETHANOL SERPL-MCNC: <10 MG/DL
FERRITIN SERPL-MCNC: 393.34 NG/ML (ref 4.63–204)
FLUAV AG UPPER RESP QL IA.RAPID: NOT DETECTED
FLUBV AG UPPER RESP QL IA.RAPID: NOT DETECTED
FOLATE SERPL-MCNC: 12.4 NG/ML (ref 7–31.4)
GLUCOSE UR QL STRIP.AUTO: NORMAL
HYALINE CASTS #/AREA URNS LPF: ABNORMAL /LPF
IRON SATN MFR SERPL: 50 % (ref 20–50)
IRON SERPL-MCNC: 35 UG/DL (ref 50–170)
KETONES UR QL STRIP.AUTO: NEGATIVE
LEUKOCYTE ESTERASE UR QL STRIP.AUTO: NEGATIVE
MUCOUS THREADS URNS QL MICRO: ABNORMAL /LPF
NITRITE UR QL STRIP.AUTO: NEGATIVE
PH UR STRIP.AUTO: 5.5 [PH]
PROT UR QL STRIP.AUTO: NEGATIVE
RBC #/AREA URNS AUTO: ABNORMAL /HPF
RBC UR QL AUTO: NEGATIVE
SARS-COV-2 RNA RESP QL NAA+PROBE: NOT DETECTED
SP GR UR STRIP.AUTO: 1.01 (ref 1–1.03)
SQUAMOUS #/AREA URNS LPF: ABNORMAL /HPF
TIBC SERPL-MCNC: 35 UG/DL (ref 70–310)
TIBC SERPL-MCNC: 70 UG/DL (ref 250–450)
TRANSFERRIN SERPL-MCNC: 43 MG/DL (ref 173–360)
UNIDENT CRYS #/AREA URNS HPF: ABNORMAL /HPF
UROBILINOGEN UR STRIP-ACNC: NORMAL
VIT B12 SERPL-MCNC: >2000 PG/ML (ref 213–816)
WBC #/AREA URNS AUTO: ABNORMAL /HPF
YEAST BUDDING URNS QL: ABNORMAL /HPF

## 2023-11-02 PROCEDURE — G0378 HOSPITAL OBSERVATION PER HR: HCPCS

## 2023-11-02 PROCEDURE — 63600175 PHARM REV CODE 636 W HCPCS: Mod: JG | Performed by: INTERNAL MEDICINE

## 2023-11-02 PROCEDURE — 96372 THER/PROPH/DIAG INJ SC/IM: CPT | Performed by: PHYSICIAN ASSISTANT

## 2023-11-02 PROCEDURE — 25000003 PHARM REV CODE 250: Performed by: NURSE PRACTITIONER

## 2023-11-02 PROCEDURE — 82607 VITAMIN B-12: CPT | Performed by: INTERNAL MEDICINE

## 2023-11-02 PROCEDURE — 83540 ASSAY OF IRON: CPT | Performed by: INTERNAL MEDICINE

## 2023-11-02 PROCEDURE — 21400001 HC TELEMETRY ROOM

## 2023-11-02 PROCEDURE — 63600175 PHARM REV CODE 636 W HCPCS: Performed by: NURSE PRACTITIONER

## 2023-11-02 PROCEDURE — 82728 ASSAY OF FERRITIN: CPT | Performed by: INTERNAL MEDICINE

## 2023-11-02 PROCEDURE — 25000003 PHARM REV CODE 250: Performed by: INTERNAL MEDICINE

## 2023-11-02 PROCEDURE — 82746 ASSAY OF FOLIC ACID SERUM: CPT | Performed by: INTERNAL MEDICINE

## 2023-11-02 PROCEDURE — P9047 ALBUMIN (HUMAN), 25%, 50ML: HCPCS | Mod: JG | Performed by: INTERNAL MEDICINE

## 2023-11-02 PROCEDURE — 82140 ASSAY OF AMMONIA: CPT | Performed by: INTERNAL MEDICINE

## 2023-11-02 PROCEDURE — 96376 TX/PRO/DX INJ SAME DRUG ADON: CPT

## 2023-11-02 PROCEDURE — 0240U COVID/FLU A&B PCR: CPT | Performed by: PHYSICIAN ASSISTANT

## 2023-11-02 PROCEDURE — 63600175 PHARM REV CODE 636 W HCPCS: Performed by: PHYSICIAN ASSISTANT

## 2023-11-02 RX ORDER — POTASSIUM CHLORIDE 20 MEQ/1
40 TABLET, EXTENDED RELEASE ORAL ONCE
Status: COMPLETED | OUTPATIENT
Start: 2023-11-02 | End: 2023-11-02

## 2023-11-02 RX ORDER — MIDODRINE HYDROCHLORIDE 5 MG/1
10 TABLET ORAL EVERY 8 HOURS
Status: DISCONTINUED | OUTPATIENT
Start: 2023-11-02 | End: 2023-11-13 | Stop reason: HOSPADM

## 2023-11-02 RX ORDER — FUROSEMIDE 10 MG/ML
40 INJECTION INTRAMUSCULAR; INTRAVENOUS
Status: DISCONTINUED | OUTPATIENT
Start: 2023-11-02 | End: 2023-11-02

## 2023-11-02 RX ORDER — ONDANSETRON 2 MG/ML
4 INJECTION INTRAMUSCULAR; INTRAVENOUS EVERY 8 HOURS PRN
Status: DISCONTINUED | OUTPATIENT
Start: 2023-11-02 | End: 2023-11-13 | Stop reason: HOSPADM

## 2023-11-02 RX ORDER — ENOXAPARIN SODIUM 100 MG/ML
40 INJECTION SUBCUTANEOUS EVERY 24 HOURS
Status: DISCONTINUED | OUTPATIENT
Start: 2023-11-02 | End: 2023-11-13 | Stop reason: HOSPADM

## 2023-11-02 RX ORDER — TRAMADOL HYDROCHLORIDE 50 MG/1
50 TABLET ORAL EVERY 6 HOURS PRN
Status: DISCONTINUED | OUTPATIENT
Start: 2023-11-02 | End: 2023-11-13 | Stop reason: HOSPADM

## 2023-11-02 RX ORDER — GLUCAGON 1 MG
1 KIT INJECTION
Status: DISCONTINUED | OUTPATIENT
Start: 2023-11-02 | End: 2023-11-13 | Stop reason: HOSPADM

## 2023-11-02 RX ORDER — ALBUMIN HUMAN 250 G/1000ML
12.5 SOLUTION INTRAVENOUS 2 TIMES DAILY
Status: COMPLETED | OUTPATIENT
Start: 2023-11-02 | End: 2023-11-04

## 2023-11-02 RX ORDER — FUROSEMIDE 10 MG/ML
40 INJECTION INTRAMUSCULAR; INTRAVENOUS DAILY
Status: DISCONTINUED | OUTPATIENT
Start: 2023-11-03 | End: 2023-11-08

## 2023-11-02 RX ORDER — LEVOTHYROXINE SODIUM 100 UG/1
100 TABLET ORAL
Status: DISCONTINUED | OUTPATIENT
Start: 2023-11-03 | End: 2023-11-13 | Stop reason: HOSPADM

## 2023-11-02 RX ORDER — THIAMINE HCL 100 MG
100 TABLET ORAL DAILY
Status: DISCONTINUED | OUTPATIENT
Start: 2023-11-03 | End: 2023-11-13 | Stop reason: HOSPADM

## 2023-11-02 RX ORDER — IBUPROFEN 200 MG
24 TABLET ORAL
Status: DISCONTINUED | OUTPATIENT
Start: 2023-11-02 | End: 2023-11-13 | Stop reason: HOSPADM

## 2023-11-02 RX ORDER — LACTULOSE 10 G/15ML
10 SOLUTION ORAL 3 TIMES DAILY
Status: DISCONTINUED | OUTPATIENT
Start: 2023-11-02 | End: 2023-11-13 | Stop reason: HOSPADM

## 2023-11-02 RX ORDER — MAGNESIUM SULFATE HEPTAHYDRATE 40 MG/ML
2 INJECTION, SOLUTION INTRAVENOUS ONCE
Status: COMPLETED | OUTPATIENT
Start: 2023-11-02 | End: 2023-11-02

## 2023-11-02 RX ORDER — PANTOPRAZOLE SODIUM 40 MG/10ML
40 INJECTION, POWDER, LYOPHILIZED, FOR SOLUTION INTRAVENOUS DAILY
Status: DISCONTINUED | OUTPATIENT
Start: 2023-11-03 | End: 2023-11-08

## 2023-11-02 RX ORDER — IBUPROFEN 200 MG
16 TABLET ORAL
Status: DISCONTINUED | OUTPATIENT
Start: 2023-11-02 | End: 2023-11-13 | Stop reason: HOSPADM

## 2023-11-02 RX ADMIN — LACTULOSE 10 G: 10 SOLUTION ORAL at 06:11

## 2023-11-02 RX ADMIN — FUROSEMIDE 40 MG: 10 INJECTION, SOLUTION INTRAMUSCULAR; INTRAVENOUS at 01:11

## 2023-11-02 RX ADMIN — MAGNESIUM SULFATE 2 G: 2 INJECTION INTRAVENOUS at 07:11

## 2023-11-02 RX ADMIN — ALBUMIN (HUMAN) 12.5 G: 12.5 SOLUTION INTRAVENOUS at 09:11

## 2023-11-02 RX ADMIN — TRAMADOL HYDROCHLORIDE 50 MG: 50 TABLET, COATED ORAL at 10:11

## 2023-11-02 RX ADMIN — ENOXAPARIN SODIUM 40 MG: 40 INJECTION SUBCUTANEOUS at 05:11

## 2023-11-02 RX ADMIN — POTASSIUM CHLORIDE 40 MEQ: 1500 TABLET, EXTENDED RELEASE ORAL at 05:11

## 2023-11-02 RX ADMIN — MIDODRINE HYDROCHLORIDE 10 MG: 5 TABLET ORAL at 10:11

## 2023-11-02 NOTE — CONSULTS
Nephrology on call  Consults  Chief Complaint   Patient presents with    Leg Swelling     Hx of liver cirrhosis, d/c'd from [facility] on 10/28, states she has had worsening kike leg swelling, fever (highest temp 100.2 as of today), and orthopnea.       HPI: 63 y/ F, consulted for edema. She was here a few weeks ago for new EtOH ESLD diagnosis.  She had hypoNa, developed hypotension and subsequent DIANA. It looked like she was going into HRS. She was transferred to [facility] [place] and was denied liver transplant due to only recently stopping EtOH. DIANA improved with IVF, likley sec to hypotension-less likley HRS. HypoNa improved with tolvaptan. She was discharged on 10/28/23 with lactulose, midodrine, levothyroxine but no diuretics and came here yesterday for fever, SOB and edema.     Review of Systems   All other systems negative except for HPI    No past medical history on file.   No past surgical history on file.   No family history on file.     Social History     Socioeconomic History    Marital status: Significant Other        Review of patient's allergies indicates:   Allergen Reactions    Hydrocodone      Nausea..     Current Outpatient Medications   Medication Instructions    acamprosate (CAMPRAL) 666 mg, Oral, 3 times daily    CONSTULOSE 10 g, Oral, 2 times daily, Take an extra dose if needed to achieve 2-3 bowel movements per day to prevent confusion    folic acid (FOLVITE) 1 mg, Oral, Daily    folic acid (FOLVITE) 1 mg, Oral, Daily    levothyroxine (SYNTHROID) 100 mcg, Oral, Before breakfast    melatonin (MELATIN) 6 mg, Oral, Nightly PRN    midodrine (PROAMATINE) 10 mg, Oral, Every 8 hours    midodrine (PROAMATINE) 10 mg, Oral, Every 8 hours    multivitamin Tab 1 tablet, Oral, Daily    pantoprazole (PROTONIX) 40 mg, Oral, Daily    thiamine 100 mg, Oral, Daily     Objective   VITAL SIGNS: 24 HR MIN & MAX LAST    Temp  Min: 98 °F (36.7 °C)  Max: 99.1 °F (37.3 °C)  99.1 °F (37.3 °C)         BP  Min: 106/69  Max: 125/68  110/68     Pulse  Min: 86  Max: 104  102     Resp  Min: 18  Max: 22  18    SpO2  Min: 94 %  Max: 99 %  95 %      Wt Readings from Last 3 Encounters:   11/02/23 65.4 kg (144 lb 2.9 oz)   10/13/23 66 kg (145 lb 8.1 oz)   12/12/22 65.8 kg (145 lb)       Intake/Output Summary (Last 24 hours) at 11/2/2023 1740  Last data filed at 11/2/2023 1100  Gross per 24 hour   Intake 240 ml   Output 350 ml   Net -110 ml     General:  Alert and oriented x 4, slow-sae speech  Psychiatric:  Cooperative, Appropriate mood & affect.    Eye:  Within normal limits, Normal conjunctiva.    HENT:  Normocephalic, Oral mucosa is moist.    Respiratory: Bilaterally CTA, somewhat labored, breathless  Cardiovascular:  Normal rate, Regular rhythm, No murmur  Gastrointestinal:  Soft, Normal bowel sounds.    Musculoskeletal:  Normal strength, No deformity.    Integumentary:  Jaundice, LLE petechia, 2+ edema    Recent Labs     11/01/23 2015   *   K 4.1   CHLORIDE 102   CO2 24   BUN 8.1*   CREATININE 0.83   GLUCOSE 94   CALCIUM 7.9*   MG 1.90   ALBUMIN 2.8*      Recent Labs     11/01/23 2015   WBC 11.87*   HGB 9.4*        X-Ray Chest 1 View   Final Result      Small layering pleural effusions.         Electronically signed by: Lorna Maxwell   Date:    11/02/2023   Time:    06:19      US Abdomen Complete with Doppler (xpd)    (Results Pending)   US Lower Extremity Veins Bilateral    (Results Pending)        ASSESSMENT PLAN      Edema  HypoNa,  EtOH ESLD - quit EtOH about 5 weeks ago  Hypothyroid  Pleural effusions   SOB  Fever    Very poor prognosis without liver transplant  Currently on albumin BID, lasix 40 IV BID, midodrine 10 TID  Would be careful about diuresing with her recent h/o volume depletion DIANA.   Decr lasix to q Day  Might want to try a dose of tolvaptan in the AM, that will help with hypoNa and fluid removal    Early encephalopathy, no BM past 2 days.   Restart Lactulose TID    Not sure why  she is so SOB, CXR not that bad, no wheeze on exam. Consider pulm roopa    Her son is interested in SNF, will consult case management         Omer Caballero M.D.  Nephrology       > 60 min spent on patient evaluation and care, discussing with her son, reviewing records, reviewing and interpreting prior results, ordering medical test, adjusting medications, coordinating care with other providers.

## 2023-11-02 NOTE — NURSING
Nurses Note -- 4 Eyes      11/2/2023   4:25 AM      Skin assessed during: Admit      [x] No Altered Skin Integrity Present    [x]Prevention Measures Documented      [] Yes- Altered Skin Integrity Present or Discovered   [] LDA Added if Not in Epic (Describe Wound)   [] New Altered Skin Integrity was Present on Admit and Documented in LDA   [] Wound Image Taken    Wound Care Consulted? No    Attending Nurse:  Ingrid Toro RN/Staff Member:   Katerin

## 2023-11-02 NOTE — PLAN OF CARE
Problem: Adult Inpatient Plan of Care  Goal: Plan of Care Review  Outcome: Ongoing, Progressing  Goal: Patient-Specific Goal (Individualized)  Outcome: Ongoing, Progressing  Goal: Absence of Hospital-Acquired Illness or Injury  Outcome: Ongoing, Progressing  Goal: Optimal Comfort and Wellbeing  Outcome: Ongoing, Progressing  Goal: Readiness for Transition of Care  Outcome: Ongoing, Progressing     Problem: Impaired Wound Healing  Goal: Optimal Wound Healing  Outcome: Ongoing, Progressing     Problem: Fluid and Electrolyte Imbalance (Acute Kidney Injury/Impairment)  Goal: Fluid and Electrolyte Balance  Outcome: Ongoing, Progressing     Problem: Oral Intake Inadequate (Acute Kidney Injury/Impairment)  Goal: Optimal Nutrition Intake  Outcome: Ongoing, Progressing     Problem: Renal Function Impairment (Acute Kidney Injury/Impairment)  Goal: Effective Renal Function  Outcome: Ongoing, Progressing     Problem: Skin Injury Risk Increased  Goal: Skin Health and Integrity  Outcome: Ongoing, Progressing

## 2023-11-02 NOTE — CONSULTS
Gastroenterology Consultation Note    Reason for Consult:  The primary encounter diagnosis was Peripheral edema. Diagnoses of SOB (shortness of breath), Bilateral lower extremity edema, Alcoholic cirrhosis, unspecified whether ascites present, and Jaundice were also pertinent to this visit.    PCP:   Pennie, Primary Doctor    Referring MD:  Self, Aaareferral  No address on file    Hospital Day: 0     Initial History of Present Illness (HPI):  This is a 63 y.o. female (Patient of Dr. Pennington- has never been seen outpatient by GI, only inpatient) pmhx of alcoholic cirrhosis, hypothyroidism, anxiety, and depression presented to ED on 11/1/2023 for LE swelling and dyspnea since discharge from [facility] in [place] on 10/28. She reported dry cough, chills, and low-grade fever with T-max of 100.2° F. Admitted with volume overload.     On arrival, pt afebrile and VSS. Labs notable for leukocytosis 11.87, normocytic anemia 9.4, T bili 9.1, direct bili 5.0, AST 64, ALT nml 29, alp nml 101, ammonia nml 35.5, alcohol nml, nml renal function.    Pt recently admitted here on 10/12 with cc of progressive weakness x 1 week with jaundice and decreased appetite. Workup revealed newly found cirrhosis with ascites, significant hyponatremia, and DIANA. Patient was transferred to [facility] for hepatology consultation where she was ultimately deemed not a liver transplant candidate at that time secondary to alcohol abuse.    Upon exam today, patient continues to be extremely SOB with dyspnea on mild exertion while trying to talk. Dry cough noted as well. Small pleural effusions noted on CXR    Abdomen with small amount of ascites and hepatomegaly noted on palpation.     Prognosis:  MELD 3.0: 25 at 10/28/2023  6:31 AM  MELD-Na: 23 at 10/28/2023  6:31 AM  Calculated from:  Serum Creatinine: 1.0 mg/dL at 10/28/2023  6:31 AM  Serum Sodium: 137 mmol/L at 10/28/2023  6:31 AM  Total Bilirubin: 7.7 mg/dL at 10/28/2023  6:31 AM  Serum Albumin:  2.9 g/dL at 10/28/2023  6:31 AM  INR(ratio): 2.2 at 10/28/2023  6:31 AM  Age at listing (hypothetical): 63 years  Sex: Female at 10/28/2023  6:31 AM       Origin: Etoh   Rehab: Patient quit drinking cold turkey 5 weeks ago at home with Dts and seizures occurring resulting in EMS transporting patient to hospital  Transplant candidacy. Cpc score: Class C  Ascites- present  Lifestyle- ETOH abstinence x 5 weeks   Varices- no previous EGD  Encephalopathy- no AMS at this time  Infection, immunizations- Elevated WBC at 11.87 with temp max 100.2  Neoplasm- AFP: pending    Plts 124  INR 2.2 (10/28/23)    Echo 10/25/23:   Left Ventricle: The left ventricle is normal in size. Normal wall thickness. Normal wall motion. There is normal systolic function with a visually estimated ejection fraction of 65 - 70%. There is normal diastolic function.    Right Ventricle: Normal right ventricular cavity size. Wall thickness is normal. Right ventricle wall motion  is normal. Systolic function is normal.    Mitral Valve: There is mild regurgitation.    Tricuspid Valve: There is mild regurgitation.    IVC/SVC: Elevated venous pressure at 15 mmHg.    ROS:  Review of Systems   Constitutional:  Positive for fever (low grade). Negative for chills.   HENT:  Negative for hearing loss.    Eyes:  Negative for blurred vision.   Respiratory:  Positive for cough and shortness of breath. Negative for hemoptysis, sputum production and wheezing.    Cardiovascular:  Positive for leg swelling. Negative for chest pain and palpitations.   Gastrointestinal:  Negative for abdominal pain, blood in stool, heartburn, nausea and vomiting.   Skin:  Negative for rash.   Neurological:  Positive for weakness. Negative for dizziness and headaches.   Psychiatric/Behavioral:  Negative for depression and memory loss. The patient is not nervous/anxious.        Medical History:   No past medical history on file.    Surgical History:   No past surgical history on  "file.    Family History:   No family history on file..     Social History:   Social History     Tobacco Use    Smoking status: Not on file    Smokeless tobacco: Not on file   Substance Use Topics    Alcohol use: Not on file       Allergies:  Review of patient's allergies indicates:   Allergen Reactions    Hydrocodone      Nausea..       Medications Prior to Admission   Medication Sig Dispense Refill Last Dose    acamprosate (CAMPRAL) 333 mg tablet Take 2 tablets (666 mg total) by mouth 3 (three) times daily. 180 tablet 11 11/1/2023    folic acid (FOLVITE) 1 MG tablet Take 1 tablet (1 mg total) by mouth once daily. 90 tablet 3 11/1/2023    folic acid (FOLVITE) 1 MG tablet Take 1 tablet (1 mg total) by mouth once daily. 90 tablet 3 11/1/2023    lactulose (CHRONULAC) 10 gram/15 mL solution Take 15 mLs (10 g total) by mouth 2 (two) times daily. Take an extra dose if needed to achieve 2-3 bowel movements per day to prevent confusion 900 mL 11 11/1/2023    levothyroxine (SYNTHROID) 100 MCG tablet Take 1 tablet (100 mcg total) by mouth before breakfast. 30 tablet 11 11/1/2023    melatonin (MELATIN) 3 mg tablet Take 2 tablets (6 mg total) by mouth nightly as needed for Insomnia.  0 11/1/2023    midodrine (PROAMATINE) 10 MG tablet Take 1 tablet (10 mg total) by mouth every 8 (eight) hours. 90 tablet 11 11/1/2023    midodrine (PROAMATINE) 10 MG tablet Take 1 tablet (10 mg total) by mouth every 8 (eight) hours. 90 tablet 11 11/1/2023    multivitamin Tab Take 1 tablet by mouth once daily.   11/1/2023    pantoprazole (PROTONIX) 40 MG tablet Take 1 tablet (40 mg total) by mouth once daily. 30 tablet 11 11/1/2023    thiamine 100 MG tablet Take 1 tablet (100 mg total) by mouth once daily. 30 tablet 11 11/1/2023         Objective Findings:    Vital Signs:  /67   Pulse 94   Temp 98 °F (36.7 °C) (Oral)   Resp 18   Ht 5' 4" (1.626 m)   Wt 65.4 kg (144 lb 2.9 oz)   SpO2 95%   BMI 24.75 kg/m²   Body mass index is 24.75 " kg/m².    Physical Exam:  Physical Exam  Constitutional:       General: She is not in acute distress.     Appearance: She is ill-appearing.   HENT:      Head: Normocephalic.      Mouth/Throat:      Mouth: Mucous membranes are moist.      Pharynx: Oropharynx is clear.   Eyes:      General: Scleral icterus present.      Extraocular Movements: Extraocular movements intact.      Pupils: Pupils are equal, round, and reactive to light.   Cardiovascular:      Rate and Rhythm: Normal rate and regular rhythm.      Pulses: Normal pulses.      Heart sounds: Normal heart sounds. No murmur heard.  Pulmonary:      Breath sounds: Normal breath sounds. No wheezing or rhonchi.      Comments: VAZ, Increased work of breathing while lying down in bed and talking  Abdominal:      General: Bowel sounds are normal. There is distension.      Palpations: Abdomen is soft.      Tenderness: There is no abdominal tenderness. There is no guarding.   Musculoskeletal:      Right lower leg: Edema present.      Left lower leg: Edema present.   Skin:     General: Skin is warm and dry.      Coloration: Skin is jaundiced.   Neurological:      Mental Status: She is alert and oriented to person, place, and time.      Motor: Weakness present.   Psychiatric:         Mood and Affect: Mood normal.         Behavior: Behavior normal.         Labs:  Recent Results (from the past 48 hour(s))   BNP    Collection Time: 11/01/23  8:15 PM   Result Value Ref Range    Natriuretic Peptide 396.7 (H) <=100.0 pg/mL   Comprehensive Metabolic Panel    Collection Time: 11/01/23  8:15 PM   Result Value Ref Range    Sodium Level 134 (L) 136 - 145 mmol/L    Potassium Level 4.1 3.5 - 5.1 mmol/L    Chloride 102 98 - 107 mmol/L    Carbon Dioxide 24 23 - 31 mmol/L    Glucose Level 94 82 - 115 mg/dL    Blood Urea Nitrogen 8.1 (L) 9.8 - 20.1 mg/dL    Creatinine 0.83 0.55 - 1.02 mg/dL    Calcium Level Total 7.9 (L) 8.4 - 10.2 mg/dL    Protein Total 5.1 (L) 5.8 - 7.6 gm/dL    Albumin  Level 2.8 (L) 3.4 - 4.8 g/dL    Globulin 2.3 (L) 2.4 - 3.5 gm/dL    Albumin/Globulin Ratio 1.2 1.1 - 2.0 ratio    Bilirubin Total 9.1 (H) <=1.5 mg/dL    Alkaline Phosphatase 101 40 - 150 unit/L    Alanine Aminotransferase 29 0 - 55 unit/L    Aspartate Aminotransferase 64 (H) 5 - 34 unit/L    eGFR >60 mls/min/1.73/m2   Lactic Acid, Plasma    Collection Time: 11/01/23  8:15 PM   Result Value Ref Range    Lactic Acid Level 1.8 0.5 - 2.2 mmol/L   Magnesium    Collection Time: 11/01/23  8:15 PM   Result Value Ref Range    Magnesium Level 1.90 1.60 - 2.60 mg/dL   Lipase    Collection Time: 11/01/23  8:15 PM   Result Value Ref Range    Lipase Level 41 <=60 U/L   CBC with Differential    Collection Time: 11/01/23  8:15 PM   Result Value Ref Range    WBC 11.87 (H) 4.50 - 11.50 x10(3)/mcL    RBC 3.08 (L) 4.20 - 5.40 x10(6)/mcL    Hgb 9.4 (L) 12.0 - 16.0 g/dL    Hct 28.0 (L) 37.0 - 47.0 %    MCV 90.9 80.0 - 94.0 fL    MCH 30.5 27.0 - 31.0 pg    MCHC 33.6 33.0 - 36.0 g/dL    RDW 22.4 (H) 11.5 - 17.0 %    Platelet 132 130 - 400 x10(3)/mcL    MPV 9.9 7.4 - 10.4 fL    Neut % 61.6 %    Lymph % 20.1 %    Mono % 13.9 %    Eos % 3.0 %    Basophil % 0.6 %    Lymph # 2.39 0.6 - 4.6 x10(3)/mcL    Neut # 7.30 2.1 - 9.2 x10(3)/mcL    Mono # 1.65 (H) 0.1 - 1.3 x10(3)/mcL    Eos # 0.36 0 - 0.9 x10(3)/mcL    Baso # 0.07 <=0.2 x10(3)/mcL    IG# 0.10 (H) 0 - 0.04 x10(3)/mcL    IG% 0.8 %    NRBC% 0.0 %   Bilirubin, Direct    Collection Time: 11/01/23  8:15 PM   Result Value Ref Range    Bilirubin Direct 5.0 (H) 0.0 - <0.5 mg/dL   Ethanol    Collection Time: 11/01/23  8:15 PM   Result Value Ref Range    Ethanol Level <10.0 <=10.0 mg/dL   Ammonia    Collection Time: 11/01/23  8:16 PM   Result Value Ref Range    Ammonia Level 35.5 18.0 - 72.0 umol/L   Urinalysis, Reflex to Urine Culture    Collection Time: 11/01/23 11:50 PM    Specimen: Urine   Result Value Ref Range    Color, UA Yellow Yellow, Light-Yellow, Straw, Dark-Yellow    Appearance, UA  Clear Clear    Specific Gravity, UA 1.009 1.005 - 1.030    pH, UA 5.5 5.0 - 8.5    Protein, UA Negative Negative    Glucose, UA Normal Negative, Normal    Ketones, UA Negative Negative    Blood, UA Negative Negative    Bilirubin, UA 1+ (A) Negative    Urobilinogen, UA Normal 0.2, 1.0, Normal    Nitrites, UA Negative Negative    Leukocyte Esterase, UA Negative Negative    WBC, UA 0-5 None Seen, 0-2, 3-5, 0-5 /HPF    Bacteria, UA Trace None Seen, Trace /HPF    Budding Yeast, UA Moderate (A) None Seen /HPF    Squamous Epithelial Cells, UA None Seen None Seen /HPF    Mucous, UA Occasional (A) None Seen /LPF    Hyaline Casts, UA 0-2 (A) None Seen /lpf    Unclassified Crystal, UA Occasional (A) None Seen /HPF    RBC, UA 0-5 None Seen, 0-2, 3-5, 0-5 /HPF       X-Ray Chest 1 View   Final Result      Small layering pleural effusions.         Electronically signed by: Lorna Maxwell   Date:    11/02/2023   Time:    06:19          Imaging:  Imaging Results              X-Ray Chest 1 View (Final result)  Result time 11/02/23 06:19:15      Final result by Lorna Maxwell MD (11/02/23 06:19:15)                   Impression:      Small layering pleural effusions.      Electronically signed by: Lorna Maxwell  Date:    11/02/2023  Time:    06:19               Narrative:    EXAMINATION:  XR CHEST 1 VIEW    CLINICAL HISTORY:  shortness of breath;    TECHNIQUE:  Single frontal view of the chest was performed.    COMPARISON:  10/23/2023    FINDINGS:  LINES AND TUBES: EKG/telemetry leads overlie the chest.    MEDIASTINUM AND JAZMINE: The cardiac silhouette is normal.    LUNGS: Probable basilar atelectasis in the setting of effusions.    PLEURA:Small pleural effusions layering dependently.No pneumothorax.    OTHER: No acute osseous abnormality.                                     Endoscopy:    No previous endoscopy on file    Assessment/Plan:  Cirrhosis- ETOH induced- 5 weeks without etoh; MELD 25; Child Mcgee Class C- she is  established with [facility] [place]- not a candidate for transplant at this time  Abdominal u/s to quantify ascites  Can consider paracentesis for diagnostic purposes (r/o SBP) if no other source of infection identified.   SOB-VAZ: small pleural effusions- Pulmonary consult?  Peripheral Edema- on furosemide. Patient states this is an improvement    Patient will eventually need EGD for Esophageal varices surveillance once more stable    Will follow    Thank you for allowing us to participate in the care of Lauren Ewing.    Lyric Munroe NP acting as scribe for Dr. Noble Pennington MD.

## 2023-11-02 NOTE — FIRST PROVIDER EVALUATION
"Medical screening examination initiated.  I have conducted a focused provider triage encounter, findings are as follows:    Brief history of present illness:  63 year old female w/ PMHx liver cirrhosis 2/2 ethanol presents to the ER for evaluation of fever/chills, SOB, and ANGEL LUIS lower extremity swelling. TMAX was 100.2, did not take any OTC medications following.    Patient reports LE swelling is painful and causes trouble walking. Reports SOB which is worse when laying down.     Of note, the patient was recently hospitalized in [place] due to cirrhosis, d/c home 10/28/23.     Vitals:    11/01/23 1953   BP: 106/65   Pulse: 104   Resp: 20   Temp: 99 °F (37.2 °C)   TempSrc: Oral   SpO2: 99%   Weight: 59 kg (130 lb)   Height: 5' 4" (1.626 m)       Pertinent physical exam:  alert, nonlabored, in wheelchair    Brief workup plan:  labs, cxr     Preliminary workup initiated; this workup will be continued and followed by the physician or advanced practice provider that is assigned to the patient when roomed.  "

## 2023-11-02 NOTE — H&P
[facility]  Hospital Medicine History & Physical Examination       Patient Name: Lauren Ewing  MRN: [id number]  Patient Class: OP- Observation   Admission Date: 11/1/2023   Admitting Physician: Bairon Knox MD  Length of Stay: 0  Attending Physician: Lucia Reyna MD   Primary Care Provider: Pennie, Primary Doctor  Face-to-Face encounter date: 11/02/2023  Code Status: Full code   Chief Complaint: Leg Swelling (Hx of liver cirrhosis, d/c'd from [facility] on 10/28, states she has had worsening kike leg swelling, fever (highest temp 100.2 as of today), and orthopnea.  )        Patient information was obtained from patient, patient's family, past medical records and ER records.     HISTORY OF PRESENT ILLNESS:   Lauren Ewing is a 63 y.o. female with a past medical history of cirrhosis, alcohol abuse, hypothyroidism, anxiety, and depression presented to [facility] on 11/1/2023 for lower extremity swelling.  Patient reported worsening lower extremity swelling in addition to shortness of breath and orthopnea since discharge from [facility] in [place] on 10/28/2023. Patient initially presented to [facility] ED on 10/12/2023 with chief complaint of progressive weakness x 1 week with  yellowing of her skin and decreased appetite.  Workup revealed newly found cirrhosis with ascites, significant hyponatremia,  and DIANA. Patient was transferred to [facility] for hepatology consultation.  Patient was not deemed a liver transplant candidate at this time secondary to alcohol abuse. Patient was discharged and recommended close follow-up with PCP, hepatology, and nephrology.  On exam patient also endorsed dry cough, chills, and low-grade fever with T-max of 100.2° F. Patient denied chest pain,  rectal bleeding, dark stools, dysuria, hematuria, abdominal pain, diarrhea, and vomiting.    Initial vital signs in ED were /65, pulse 104, respirations 20, temperature 37.2° C, and SpO2 99% on room  air.  Labs revealed WBC 11.87, RBC 3.08, hemoglobin 9.4, hematocrit 28, sodium 134, calcium 7.9, protein total 5.1, bilirubin 9.1, direct bilirubin 5, AST 64, ALT 29, .7, and lactic acid 1.8.  Blood cultures were obtained.  Chest x-ray revealed small layering pleural effusions. Patient was admitted to hospital medicine service for further medical management.     PAST MEDICAL HISTORY:   Cirrhosis   Alcohol abuse  Hypothyroidism   Anxiety  Depression    PAST SURGICAL HISTORY:   [place] teeth removal     Breast augmentation   Craniectomy x 2    ALLERGIES:   Hydrocodone    FAMILY HISTORY:   Reviewed and negative    SOCIAL HISTORY:   Former alcohol abuse, quit 6 weeks ago, used to drink about a 5th of vodka every 2-3 days  Denies tobacco and illicit drug use    HOME MEDICATIONS:     Prior to Admission medications    Medication Sig Start Date End Date Taking? Authorizing Provider   acamprosate (CAMPRAL) 333 mg tablet Take 2 tablets (666 mg total) by mouth 3 (three) times daily. 10/28/23 10/27/24 Yes Re Flowers MD   folic acid (FOLVITE) 1 MG tablet Take 1 tablet (1 mg total) by mouth once daily. 10/28/23 10/27/24 Yes Ceci Martinez MD   folic acid (FOLVITE) 1 MG tablet Take 1 tablet (1 mg total) by mouth once daily. 10/28/23 10/27/24 Yes Re Flowers MD   lactulose (CHRONULAC) 10 gram/15 mL solution Take 15 mLs (10 g total) by mouth 2 (two) times daily. Take an extra dose if needed to achieve 2-3 bowel movements per day to prevent confusion 10/28/23 10/22/24 Yes Re Flowers MD   levothyroxine (SYNTHROID) 100 MCG tablet Take 1 tablet (100 mcg total) by mouth before breakfast. 10/29/23 10/28/24 Yes Leanna Coulter MD   melatonin (MELATIN) 3 mg tablet Take 2 tablets (6 mg total) by mouth nightly as needed for Insomnia. 10/25/23  Yes Emre Kerr MD   midodrine (PROAMATINE) 10 MG tablet Take 1 tablet (10 mg total) by mouth every 8 (eight) hours. 10/28/23 10/27/24 Yes Ceci Martinez MD    midodrine (PROAMATINE) 10 MG tablet Take 1 tablet (10 mg total) by mouth every 8 (eight) hours. 10/28/23 10/27/24 Yes Re Flowers MD   multivitamin Tab Take 1 tablet by mouth once daily. 10/26/23  Yes Emre Kerr MD   pantoprazole (PROTONIX) 40 MG tablet Take 1 tablet (40 mg total) by mouth once daily. 10/26/23 10/25/24 Yes Emre Kerr MD   thiamine 100 MG tablet Take 1 tablet (100 mg total) by mouth once daily. 10/28/23 10/22/24 Yes Ceci Martinez MD       REVIEW OF SYSTEMS:   Except as documented, all other systems reviewed and negative     PHYSICAL EXAM:     VITAL SIGNS: 24 HRS MIN & MAX LAST   Temp  Min: 98 °F (36.7 °C)  Max: 99 °F (37.2 °C) 98 °F (36.7 °C)   BP  Min: 106/69  Max: 125/68 (!) 112/58   Pulse  Min: 86  Max: 104  90   Resp  Min: 18  Max: 22 18   SpO2  Min: 94 %  Max: 99 % 97 %       General appearance: Female in no apparent distress.  HEENNT: Atraumatic head. Scleral icterus   Lungs: Bibasilar crackles   Heart: Regular rate and rhythm.    Abdomen: Soft, non-distended, non-tender. Bowel sounds are normal.   Extremities:  Bilateral lower extremity 4+ pitting edema  Skin:  Warm and dry. +Jaundice. Bruising to lower abdomen  Neuro: Awake, alert, and oriented. Motor and sensory exams grossly intact.   Psych/mental status: Appropriate mood and affect. Responds appropriately to questions.     LABS AND IMAGING:     Recent Labs   Lab 10/27/23  0243 10/28/23  0631 11/01/23 2015   WBC 12.83* 9.94 11.87*   RBC 2.75* 2.93* 3.08*   HGB 8.3* 8.8* 9.4*   HCT 24.7* 27.1* 28.0*   MCV 90 93 90.9   MCH 30.2 30.0 30.5   MCHC 33.6 32.5 33.6   RDW 21.6* 21.5* 22.4*   * 121* 132   MPV 9.8 9.9 9.9   GRAN 70.7  9.1* 62.1  6.2  --    LYMPH 13.8*  1.8 18.5  1.8  --    MONO 13.1  1.7* 12.5  1.2*  --    BASO 0.01 0.01  --    NRBC 0 0  --        Recent Labs   Lab 10/27/23  0243 10/28/23  0631 11/01/23 2015   * 137 134*   K 4.2 4.2 4.1    104  --    CO2 21* 23 24   ANIONGAP 9 10  --    BUN  18 12 8.1*   CREATININE 1.2 1.0 0.83   GLU 96 84  --    CALCIUM 8.6* 8.2* 7.9*   MG 2.1 2.0 1.90   ALBUMIN 3.3* 2.9* 2.8*   PROT 5.3* 5.0*  --    ALKPHOS 106 101 101   ALT 31 32 29   AST 77* 75* 64*   BILITOT 6.8* 7.7* 9.1*       Microbiology Results (last 7 days)       Procedure Component Value Units Date/Time    Blood culture #1 **CANNOT BE ORDERED STAT** [[id number]] Collected: 11/01/23 2016    Order Status: Resulted Specimen: Blood from Antecubital, Right Updated: 11/01/23 2028    Blood culture #2 **CANNOT BE ORDERED STAT** [[id number]] Collected: 11/01/23 2016    Order Status: Resulted Specimen: Blood from Arm, Right Updated: 11/01/23 2028             X-Ray Chest 1 View  Narrative: EXAMINATION:  XR CHEST 1 VIEW    CLINICAL HISTORY:  shortness of breath;    TECHNIQUE:  Single frontal view of the chest was performed.    COMPARISON:  10/23/2023    FINDINGS:  LINES AND TUBES: EKG/telemetry leads overlie the chest.    MEDIASTINUM AND JAZMINE: The cardiac silhouette is normal.    LUNGS: Probable basilar atelectasis in the setting of effusions.    PLEURA:Small pleural effusions layering dependently.No pneumothorax.    OTHER: No acute osseous abnormality.  Impression: Small layering pleural effusions.    Electronically signed by: Lorna Maxwell  Date:    11/02/2023  Time:    06:19        ASSESSMENT & PLAN:   Assessment:  Volume overload  Alcoholic cirrhosis   Hyperbilirubinemia   Normocytic anemia  Hyponatremia, mild  History of end stage liver disease, hypothyroidism, depression and anxiety      Plan:  IV Lasix 40 mg BID  Daily weights   Strict intake/output   Echo 10/25/2023:  Normal systolic function with a visually estimated ejection fraction of 65-70%, normal diastolic function, and mild mitral/tricuspid valve regurgitation  Close H/H and electrolyte monitoring   GI and nephrology consulted, appreciate recommendations   Blood cultures pending, follow up results  Covid/flu pending, follow up results   Continue  appropriate home medications once med rec updated   Labs in a.m.  Further recommendations per attending MD     VTE Prophylaxis: Lovenox     ________________________________________________  INPATIENT LIST OF MEDICATIONS     Scheduled Meds:   furosemide (LASIX) injection  40 mg Intravenous Q12H     Continuous Infusions:  PRN Meds:.      Mani SOMERS, PA-C, have reviewed and discussed the case with Dr. Lucia Reyna MD   Please see the following addendum for further assessment and plan from there attending MD.    11/02/2023    ________________________________________________________________________________    MD Addendum:  I,  Dr. Reyna assumed care of this patient today  For the patient encounter, I performed the substantive portion of the visit, I reviewed the PA documentation, treatment plan, and medical decision making.  I had face to face time with this patient     A. History:63 y.o. female who reports that he was recently diagnosed with cirrhosis, also with a history of alcohol use disorder, hypothyroidism, anxiety, depression presenting for lower extremity swelling and shortness of breath.  Family at bedside, report that she spiked a temp of 100.2° at home.    B. Physical exam:  General: In no acute distress, afebrile  Chest:  Crackles  Heart: RRR, +S1, S2, no appreciable murmur  Abdomen: Soft, nontender, BS +  MSK: Warm,  lower extremity edema, no clubbing or cyanosis.  Area of birthmark left lower extremity ?  as reported by the patient  Neurologic: Alert and oriented     C. Medical decision making:  Volume overload secondary to Cirrhosis secondary to alcohol  Hyperbilirubinemia, elevated AST.  Leukocytosis   Normocytic anemia  Hyponatremia, mild  History of end stage liver disease, hypothyroidism, depression and anxiety    Plan   Chest x-ray-small layering pleural effusions.  Echo with normal systolic and diastolic function.  Rule out DVTs of lower extremities.  Continue IV Lasix, add albumin prior to  Lasix.Monitor Is&Os.  Monitor electrolytes and renal function.  GI consulted.  Monitor LFTs.  Check PT INR and ammonia.  Check for ascites, may eventually need EGD  Family reports that the patient had a fever, would add antibiotics for SBP prophylaxis.  Follow up on abdominal ultrasound.  Follow up on Blood cultures.  Urine cultures.  Respiratory. panel, COVID/flu.  Patient was noted to have an area of erythema on her left lower extremity which she attributes to be as a birthmark, we will continue to monitor.Monitor fever curve and WBC.  Monitor H&H, check iron panel, B12, folate,fobt  Monitor sodium levels  Continue supportive care, fall precautions, decubitus precautions.  CIWA scale, update hospital medicine if withdrawing.  Multivitamin, folic acid, thiamine  Continue with the appropriate home medications.      Critical care Time Spent>35 min   Volume overload requiring IV Lasix    Full code   DVT prophylaxis-Lovenox    Discharge planning-to be decided pending clinical improvement.      All diagnosis and differential diagnosis have been reviewed; assessment and plan has been documented; I have personally reviewed the labs and test results that are presently available; I have reviewed the patients medication list; I have reviewed the consulting providers response and recommendations. I have reviewed or attempted to review medical records based upon their availability.    All of the patient and family questions have been addressed and answered. Patient's is agreeable to the above stated plan. I will continue to monitor closely and make adjustments to medical management as needed.      11/02/2023

## 2023-11-02 NOTE — ED PROVIDER NOTES
Encounter Date: 11/1/2023    SCRIBE #1 NOTE: I, Devorah Lozano, am scribing for, and in the presence of,  Iftikhar Beach MD. I have scribed the entire note.       History     Chief Complaint   Patient presents with    Leg Swelling     Hx of liver cirrhosis, d/c'd from [facility] on 10/28, states she has had worsening kike leg swelling, fever (highest temp 100.2 as of today), and orthopnea.       63 year old female with a hx of cirrhosis presents to the ED for leg swelling. Pt was discharged from [facility] on 10/28. Pt states since then she has had worsening SOB and LE edema. Pt is experiencing orthopnea. Pt reportedly had an DIANA while admitted. Today the pt reports that she is experiencing a fever with a T max of 100.2.     The history is provided by the patient. No  was used.     Review of patient's allergies indicates:   Allergen Reactions    Hydrocodone      Nausea..     No past medical history on file.  No past surgical history on file.  No family history on file.     Review of Systems   Constitutional:  Positive for chills and fever.   Respiratory:  Positive for shortness of breath. Negative for cough, chest tightness and wheezing.         Orthopnea   Cardiovascular:  Positive for leg swelling. Negative for chest pain.   Gastrointestinal:  Negative for abdominal pain, constipation, nausea and vomiting.   Genitourinary:  Negative for dysuria.   Neurological:  Negative for dizziness, light-headedness and headaches.       Physical Exam     Initial Vitals [11/01/23 1953]   BP Pulse Resp Temp SpO2   106/65 104 20 99 °F (37.2 °C) 99 %      MAP       --         Physical Exam    Nursing note and vitals reviewed.  Constitutional: She appears well-developed and well-nourished. She is not diaphoretic. No distress.   HENT:   Head: Normocephalic and atraumatic.   Right Ear: External ear normal.   Left Ear: External ear normal.   Nose: Nose normal.   Eyes: Conjunctivae and EOM are normal. Pupils  are equal, round, and reactive to light. Right eye exhibits no discharge. Left eye exhibits no discharge. Scleral icterus is present.   Cardiovascular:  Normal rate, regular rhythm, normal heart sounds and intact distal pulses.     Exam reveals no gallop and no friction rub.       No murmur heard.  Pulmonary/Chest: Breath sounds normal. No respiratory distress. She has no wheezes. She exhibits no tenderness.   Crackles at the bilateral bases   Abdominal: Abdomen is soft. Bowel sounds are normal. She exhibits no distension and no mass. There is no abdominal tenderness.   Contusions to the abdomen  There is no rebound and no guarding.   Musculoskeletal:         General: No edema. Normal range of motion.      Comments: 4+ bilateral LE pitting edema up to the mid thigh.      Neurological: She is alert and oriented to person, place, and time. No cranial nerve deficit or sensory deficit.   Skin: Skin is warm and dry. Capillary refill takes less than 2 seconds. No erythema. No pallor.   Jaundiced.    Psychiatric: Her mood appears anxious.         ED Course   Procedures  Labs Reviewed   B-TYPE NATRIURETIC PEPTIDE - Abnormal; Notable for the following components:       Result Value    Natriuretic Peptide 396.7 (*)     All other components within normal limits   COMPREHENSIVE METABOLIC PANEL - Abnormal; Notable for the following components:    Sodium Level 134 (*)     Blood Urea Nitrogen 8.1 (*)     Calcium Level Total 7.9 (*)     Protein Total 5.1 (*)     Albumin Level 2.8 (*)     Globulin 2.3 (*)     Bilirubin Total 9.1 (*)     Aspartate Aminotransferase 64 (*)     All other components within normal limits   URINALYSIS, REFLEX TO URINE CULTURE - Abnormal; Notable for the following components:    Bilirubin, UA 1+ (*)     Budding Yeast, UA Moderate (*)     Mucous, UA Occasional (*)     Hyaline Casts, UA 0-2 (*)     Unclassified Crystal, UA Occasional (*)     All other components within normal limits   CBC WITH DIFFERENTIAL -  Abnormal; Notable for the following components:    WBC 11.87 (*)     RBC 3.08 (*)     Hgb 9.4 (*)     Hct 28.0 (*)     RDW 22.4 (*)     Mono # 1.65 (*)     IG# 0.10 (*)     All other components within normal limits   BILIRUBIN, DIRECT - Abnormal; Notable for the following components:    Bilirubin Direct 5.0 (*)     All other components within normal limits   LACTIC ACID, PLASMA - Normal   MAGNESIUM - Normal   LIPASE - Normal   AMMONIA - Normal   ALCOHOL,MEDICAL (ETHANOL) - Normal   BLOOD CULTURE [facility]   BLOOD CULTURE [facility]   CBC W/ AUTO DIFFERENTIAL    Narrative:     The following orders were created for panel order CBC Auto Differential.  Procedure                               Abnormality         Status                     ---------                               -----------         ------                     CBC with Differential[[id number]]       Abnormal            Final result                 Please view results for these tests on the individual orders.          Imaging Results              X-Ray Chest 1 View (In process)                      Medications   furosemide injection 40 mg (40 mg Intravenous Given 11/2/23 0139)   furosemide injection 40 mg (40 mg Intravenous Given 11/1/23 2153)     Medical Decision Making  Patient presents with worsening edema, orthopnea    The differential diagnosis includes, but is not limited to, congestive heart failure, dependent edema, renal failure, fluid overload, as cirrhosis, jaundice, hepatic encephalopathy, pleural effusion, ascites    Patient seems to be fluid overloaded.  Significant edema.  She responds well to Lasix.  She was reports feeling somewhat better however her BNP is markedly elevated when compared to prior.  Given her recent history she was amenable to staying for further IV diuresis.  Her liver enzymes are fairly stable.  Her vitals are stable.  Both patient and son are comfortable with plan.  Patient admitted to hospitalist.      Amount and/or Complexity of  Data Reviewed  External Data Reviewed: notes.     Details: See ED course  Labs: ordered. Decision-making details documented in ED Course.  Radiology: ordered and independent interpretation performed.     Details: Concern for possible congestive changes.    Risk  Prescription drug management.            Scribe Attestation:   Scribe #1: I performed the above scribed service and the documentation accurately describes the services I performed. I attest to the accuracy of the note.    Attending Attestation:           Physician Attestation for Scribe:  Physician Attestation Statement for Scribe #1: I, Iftikhar Beach MD, reviewed documentation, as scribed by Devoarh Lozano in my presence, and it is both accurate and complete.             ED Course as of 11/02/23 0500   Wed Nov 01, 2023 2013 From 10/28/2023 from [employer] should the patient was transferred for acute decompensated liver cirrhosis suspected secondary to alcohol abuse.  Initially encephalopathic significant hyponatremia and acute kidney injury.  Improvement in green octreotide.  Hyponatremia improved with fluid resuscitation.  Mentation returned to baseline with lactulose.  Referrals replaced hepatology, PCP, nephrology.  Meds were delivered at bedside prior to discharge. [MM]   2014 EKG from 1956 shows sinus rhythm rate 98.  .  Normal axis.  No ST elevation or depression.   [MM]   2055 Ammonia: 35.5 [MM]   2055 Lipase: 41 [MM]   2055 Magnesium : 1.90 [MM]   2055 Comprehensive Metabolic Panel(!)  Bilirubin slightly worse than prior.  Prior 7.7.  Today 9.1.  No significant electrolyte abnormalities.  Renal function similar to prior.  Liver enzymes similar to prior as well. [MM]   2055 CBC Auto Differential(!) [MM]   2056 Mild leukocytosis similar to prior.  Anemia mildly improved.  No left shift. [MM]   2056 X-Ray Chest 1 View  Similar appearing pleural effusion on left.  Improved, decreased opacities in the right [MM]   2144 BNP(!):  396.7  Significantly elevated when compared to prior from 2 weeks ago [MM]   2148 Bilirubin Direct(!): 5.0  Elevated when compared to prior from several days ago [MM]      ED Course User Index  [MM] Iftikhar Beach MD                    Clinical Impression:   Final diagnoses:  [R06.02] SOB (shortness of breath)  [R60.9] Peripheral edema (Primary)  [R60.0] Bilateral lower extremity edema  [K70.30] Alcoholic cirrhosis, unspecified whether ascites present  [R17] Jaundice        ED Disposition Condition    Observation                 Iftikhar Beach MD  11/02/23 1122

## 2023-11-03 LAB
ALBUMIN SERPL-MCNC: 2.6 G/DL (ref 3.4–4.8)
ALBUMIN/GLOB SERPL: 1.4 RATIO (ref 1.1–2)
ALP SERPL-CCNC: 98 UNIT/L (ref 40–150)
ALT SERPL-CCNC: 24 UNIT/L (ref 0–55)
AST SERPL-CCNC: 54 UNIT/L (ref 5–34)
BASOPHILS # BLD AUTO: 0.08 X10(3)/MCL
BASOPHILS NFR BLD AUTO: 0.6 %
BILIRUB SERPL-MCNC: 7.9 MG/DL
BUN SERPL-MCNC: 9 MG/DL (ref 9.8–20.1)
CALCIUM SERPL-MCNC: 7.3 MG/DL (ref 8.4–10.2)
CHLORIDE SERPL-SCNC: 103 MMOL/L (ref 98–107)
CO2 SERPL-SCNC: 24 MMOL/L (ref 23–31)
CREAT SERPL-MCNC: 0.84 MG/DL (ref 0.55–1.02)
EOSINOPHIL # BLD AUTO: 0.47 X10(3)/MCL (ref 0–0.9)
EOSINOPHIL NFR BLD AUTO: 3.7 %
ERYTHROCYTE [DISTWIDTH] IN BLOOD BY AUTOMATED COUNT: 22.3 % (ref 11.5–17)
GFR SERPLBLD CREATININE-BSD FMLA CKD-EPI: >60 MLS/MIN/1.73/M2
GLOBULIN SER-MCNC: 1.9 GM/DL (ref 2.4–3.5)
GLUCOSE SERPL-MCNC: 143 MG/DL (ref 82–115)
HCT VFR BLD AUTO: 25.6 % (ref 37–47)
HGB BLD-MCNC: 8.3 G/DL (ref 12–16)
IMM GRANULOCYTES # BLD AUTO: 0.08 X10(3)/MCL (ref 0–0.04)
IMM GRANULOCYTES NFR BLD AUTO: 0.6 %
LYMPHOCYTES # BLD AUTO: 2.18 X10(3)/MCL (ref 0.6–4.6)
LYMPHOCYTES NFR BLD AUTO: 17.2 %
MAGNESIUM SERPL-MCNC: 2.1 MG/DL (ref 1.6–2.6)
MCH RBC QN AUTO: 30.3 PG (ref 27–31)
MCHC RBC AUTO-ENTMCNC: 32.4 G/DL (ref 33–36)
MCV RBC AUTO: 93.4 FL (ref 80–94)
MONOCYTES # BLD AUTO: 1.82 X10(3)/MCL (ref 0.1–1.3)
MONOCYTES NFR BLD AUTO: 14.4 %
NEUTROPHILS # BLD AUTO: 8.02 X10(3)/MCL (ref 2.1–9.2)
NEUTROPHILS NFR BLD AUTO: 63.5 %
NRBC BLD AUTO-RTO: 0 %
PHOSPHATE SERPL-MCNC: 2.2 MG/DL (ref 2.3–4.7)
PLATELET # BLD AUTO: 124 X10(3)/MCL (ref 130–400)
PMV BLD AUTO: 10 FL (ref 7.4–10.4)
POTASSIUM SERPL-SCNC: 3.7 MMOL/L (ref 3.5–5.1)
PROT SERPL-MCNC: 4.5 GM/DL (ref 5.8–7.6)
RBC # BLD AUTO: 2.74 X10(6)/MCL (ref 4.2–5.4)
SODIUM SERPL-SCNC: 134 MMOL/L (ref 136–145)
WBC # SPEC AUTO: 12.65 X10(3)/MCL (ref 4.5–11.5)

## 2023-11-03 PROCEDURE — 63600175 PHARM REV CODE 636 W HCPCS: Performed by: PHYSICIAN ASSISTANT

## 2023-11-03 PROCEDURE — 85025 COMPLETE CBC W/AUTO DIFF WBC: CPT | Performed by: PHYSICIAN ASSISTANT

## 2023-11-03 PROCEDURE — 63600175 PHARM REV CODE 636 W HCPCS: Mod: JG | Performed by: INTERNAL MEDICINE

## 2023-11-03 PROCEDURE — 25000003 PHARM REV CODE 250: Performed by: INTERNAL MEDICINE

## 2023-11-03 PROCEDURE — C9113 INJ PANTOPRAZOLE SODIUM, VIA: HCPCS | Performed by: INTERNAL MEDICINE

## 2023-11-03 PROCEDURE — 84100 ASSAY OF PHOSPHORUS: CPT | Performed by: INTERNAL MEDICINE

## 2023-11-03 PROCEDURE — 63600175 PHARM REV CODE 636 W HCPCS: Performed by: INTERNAL MEDICINE

## 2023-11-03 PROCEDURE — 21400001 HC TELEMETRY ROOM

## 2023-11-03 PROCEDURE — P9047 ALBUMIN (HUMAN), 25%, 50ML: HCPCS | Mod: JG | Performed by: INTERNAL MEDICINE

## 2023-11-03 PROCEDURE — 83735 ASSAY OF MAGNESIUM: CPT | Performed by: INTERNAL MEDICINE

## 2023-11-03 PROCEDURE — 80053 COMPREHEN METABOLIC PANEL: CPT | Performed by: PHYSICIAN ASSISTANT

## 2023-11-03 RX ADMIN — ALBUMIN (HUMAN) 12.5 G: 12.5 SOLUTION INTRAVENOUS at 09:11

## 2023-11-03 RX ADMIN — LEVOTHYROXINE SODIUM 100 MCG: 100 TABLET ORAL at 05:11

## 2023-11-03 RX ADMIN — THERA TABS 1 TABLET: TAB at 09:11

## 2023-11-03 RX ADMIN — LACTULOSE 10 G: 10 SOLUTION ORAL at 09:11

## 2023-11-03 RX ADMIN — MIDODRINE HYDROCHLORIDE 10 MG: 5 TABLET ORAL at 02:11

## 2023-11-03 RX ADMIN — FUROSEMIDE 40 MG: 10 INJECTION, SOLUTION INTRAMUSCULAR; INTRAVENOUS at 10:11

## 2023-11-03 RX ADMIN — PANTOPRAZOLE SODIUM 40 MG: 40 INJECTION, POWDER, FOR SOLUTION INTRAVENOUS at 10:11

## 2023-11-03 RX ADMIN — THIAMINE HCL TAB 100 MG 100 MG: 100 TAB at 09:11

## 2023-11-03 RX ADMIN — ENOXAPARIN SODIUM 40 MG: 40 INJECTION SUBCUTANEOUS at 05:11

## 2023-11-03 RX ADMIN — LACTULOSE 10 G: 10 SOLUTION ORAL at 02:11

## 2023-11-03 RX ADMIN — MIDODRINE HYDROCHLORIDE 10 MG: 5 TABLET ORAL at 11:11

## 2023-11-03 RX ADMIN — MIDODRINE HYDROCHLORIDE 10 MG: 5 TABLET ORAL at 05:11

## 2023-11-03 NOTE — CLINICAL REVIEW
PA review       In Progress   Last Updated by Austin Bauer MD on 11/3/2023 5310     Review Status Created By   In Primary Austin Bauer MD      Criteria Review   63 year old female with cirrhosis, alcohol abuse, presented with lower extremity swelling.  Recent admission and diagnosis of cirrhosis.  He was evaluated deemed to not be a candidate for liver transplant secondary to alcohol abuse.  On admission, tachycardic at 1:04 a.m., hemoglobin 9.4, sodium 134, hyperbilirubinemia with a direct predominance, AST 64, ALT 29.  Started on IV Lasix.  On hospital day 2, the patient was noted to be extremely short of breath with mild exertion.  In the setting of persistent hyponatremia hypervolemia, Nephrology is also recommending possible Tolvaptan. .  On hospital day 3, the patient continues to be significantly hypervolemic, continuation of IV diuretics.  Multiple comorbidities including high MELD Na score raising her risk for adverse clinical outcomes.  Would favor IP LOc                 Austin Bauer MD  Utilization Management  Physician Advisor  [facility]  11/03/2023

## 2023-11-03 NOTE — PROGRESS NOTES
"Gastroenterology Progress Note    Subjective/Interval History:  She is feeling good today. In good spirits. Lower ext swelling is improved but still present. Improvement in VAZ/SOB    Abdomen is soft without any notable increase in ascites    Abdominal u/s report still pending    ROS:  Review of Systems   Constitutional:  Negative for chills and fever.   HENT:  Negative for hearing loss.    Eyes:  Negative for blurred vision.   Respiratory:  Positive for cough and shortness of breath. Negative for hemoptysis, sputum production and wheezing.    Cardiovascular:  Positive for leg swelling. Negative for chest pain and palpitations.   Gastrointestinal:  Negative for abdominal pain, blood in stool, heartburn, nausea and vomiting.   Skin:  Negative for rash.   Neurological:  Positive for weakness. Negative for dizziness and headaches.   Psychiatric/Behavioral:  Negative for depression and memory loss. The patient is not nervous/anxious.        Vital Signs:  /63   Pulse 89   Temp 98.6 °F (37 °C) (Oral)   Resp 18   Ht 5' 4" (1.626 m)   Wt 71.3 kg (157 lb 3 oz)   SpO2 (!) 92%   BMI 26.98 kg/m²   Body mass index is 26.98 kg/m².    Physical Exam:  Physical Exam  Constitutional:       General: She is not in acute distress.     Appearance: She is ill-appearing.   HENT:      Head: Normocephalic.      Mouth/Throat:      Mouth: Mucous membranes are moist.      Pharynx: Oropharynx is clear.   Eyes:      General: Scleral icterus present.      Extraocular Movements: Extraocular movements intact.      Pupils: Pupils are equal, round, and reactive to light.   Cardiovascular:      Rate and Rhythm: Normal rate and regular rhythm.      Pulses: Normal pulses.      Heart sounds: Normal heart sounds. No murmur heard.  Pulmonary:      Breath sounds: Normal breath sounds. No wheezing or rhonchi.      Comments: VAZ, Increased work of breathing while lying down in bed and talking  Abdominal:      General: Bowel sounds are normal. " There is no distension.      Palpations: Abdomen is soft.      Tenderness: There is no abdominal tenderness. There is no guarding.   Musculoskeletal:      Right lower leg: Edema present.      Left lower leg: Edema present.   Skin:     General: Skin is warm and dry.      Coloration: Skin is jaundiced.   Neurological:      Mental Status: She is alert and oriented to person, place, and time.      Motor: Weakness present.   Psychiatric:         Mood and Affect: Mood normal.         Behavior: Behavior normal.         Labs:  Recent Results (from the past 48 hour(s))   BNP    Collection Time: 11/01/23  8:15 PM   Result Value Ref Range    Natriuretic Peptide 396.7 (H) <=100.0 pg/mL   Comprehensive Metabolic Panel    Collection Time: 11/01/23  8:15 PM   Result Value Ref Range    Sodium Level 134 (L) 136 - 145 mmol/L    Potassium Level 4.1 3.5 - 5.1 mmol/L    Chloride 102 98 - 107 mmol/L    Carbon Dioxide 24 23 - 31 mmol/L    Glucose Level 94 82 - 115 mg/dL    Blood Urea Nitrogen 8.1 (L) 9.8 - 20.1 mg/dL    Creatinine 0.83 0.55 - 1.02 mg/dL    Calcium Level Total 7.9 (L) 8.4 - 10.2 mg/dL    Protein Total 5.1 (L) 5.8 - 7.6 gm/dL    Albumin Level 2.8 (L) 3.4 - 4.8 g/dL    Globulin 2.3 (L) 2.4 - 3.5 gm/dL    Albumin/Globulin Ratio 1.2 1.1 - 2.0 ratio    Bilirubin Total 9.1 (H) <=1.5 mg/dL    Alkaline Phosphatase 101 40 - 150 unit/L    Alanine Aminotransferase 29 0 - 55 unit/L    Aspartate Aminotransferase 64 (H) 5 - 34 unit/L    eGFR >60 mls/min/1.73/m2   Lactic Acid, Plasma    Collection Time: 11/01/23  8:15 PM   Result Value Ref Range    Lactic Acid Level 1.8 0.5 - 2.2 mmol/L   Magnesium    Collection Time: 11/01/23  8:15 PM   Result Value Ref Range    Magnesium Level 1.90 1.60 - 2.60 mg/dL   Lipase    Collection Time: 11/01/23  8:15 PM   Result Value Ref Range    Lipase Level 41 <=60 U/L   CBC with Differential    Collection Time: 11/01/23  8:15 PM   Result Value Ref Range    WBC 11.87 (H) 4.50 - 11.50 x10(3)/mcL    RBC 3.08  (L) 4.20 - 5.40 x10(6)/mcL    Hgb 9.4 (L) 12.0 - 16.0 g/dL    Hct 28.0 (L) 37.0 - 47.0 %    MCV 90.9 80.0 - 94.0 fL    MCH 30.5 27.0 - 31.0 pg    MCHC 33.6 33.0 - 36.0 g/dL    RDW 22.4 (H) 11.5 - 17.0 %    Platelet 132 130 - 400 x10(3)/mcL    MPV 9.9 7.4 - 10.4 fL    Neut % 61.6 %    Lymph % 20.1 %    Mono % 13.9 %    Eos % 3.0 %    Basophil % 0.6 %    Lymph # 2.39 0.6 - 4.6 x10(3)/mcL    Neut # 7.30 2.1 - 9.2 x10(3)/mcL    Mono # 1.65 (H) 0.1 - 1.3 x10(3)/mcL    Eos # 0.36 0 - 0.9 x10(3)/mcL    Baso # 0.07 <=0.2 x10(3)/mcL    IG# 0.10 (H) 0 - 0.04 x10(3)/mcL    IG% 0.8 %    NRBC% 0.0 %   Bilirubin, Direct    Collection Time: 11/01/23  8:15 PM   Result Value Ref Range    Bilirubin Direct 5.0 (H) 0.0 - <0.5 mg/dL   Ethanol    Collection Time: 11/01/23  8:15 PM   Result Value Ref Range    Ethanol Level <10.0 <=10.0 mg/dL   AFP Tumor Marker    Collection Time: 11/01/23  8:15 PM   Result Value Ref Range    Alpha Fetoprotein Level 2.10 <=8.90 ng/mL   Ammonia    Collection Time: 11/01/23  8:16 PM   Result Value Ref Range    Ammonia Level 35.5 18.0 - 72.0 umol/L   Blood culture #1 **CANNOT BE ORDERED STAT**    Collection Time: 11/01/23  8:16 PM    Specimen: Antecubital, Right; Blood   Result Value Ref Range    CULTURE, BLOOD ([facility]) No Growth At 24 Hours    Blood culture #2 **CANNOT BE ORDERED STAT**    Collection Time: 11/01/23  8:16 PM    Specimen: Arm, Right; Blood   Result Value Ref Range    CULTURE, BLOOD ([facility]) No Growth At 24 Hours    Urinalysis, Reflex to Urine Culture    Collection Time: 11/01/23 11:50 PM    Specimen: Urine   Result Value Ref Range    Color, UA Yellow Yellow, Light-Yellow, Straw, Dark-Yellow    Appearance, UA Clear Clear    Specific Gravity, UA 1.009 1.005 - 1.030    pH, UA 5.5 5.0 - 8.5    Protein, UA Negative Negative    Glucose, UA Normal Negative, Normal    Ketones, UA Negative Negative    Blood, UA Negative Negative    Bilirubin, UA 1+ (A) Negative    Urobilinogen, UA Normal 0.2, 1.0, Normal     Nitrites, UA Negative Negative    Leukocyte Esterase, UA Negative Negative    WBC, UA 0-5 None Seen, 0-2, 3-5, 0-5 /HPF    Bacteria, UA Trace None Seen, Trace /HPF    Budding Yeast, UA Moderate (A) None Seen /HPF    Squamous Epithelial Cells, UA None Seen None Seen /HPF    Mucous, UA Occasional (A) None Seen /LPF    Hyaline Casts, UA 0-2 (A) None Seen /lpf    Unclassified Crystal, UA Occasional (A) None Seen /HPF    RBC, UA 0-5 None Seen, 0-2, 3-5, 0-5 /HPF   COVID/FLU A&B PCR    Collection Time: 11/02/23  5:16 PM   Result Value Ref Range    Influenza A PCR Not Detected Not Detected    Influenza B PCR Not Detected Not Detected    SARS-CoV-2 PCR Not Detected Not Detected, Negative, Invalid   Ammonia    Collection Time: 11/02/23  5:22 PM   Result Value Ref Range    Ammonia Level 27.7 18.0 - 72.0 umol/L   Folate    Collection Time: 11/02/23  5:22 PM   Result Value Ref Range    Folate Level 12.4 7.0 - 31.4 ng/mL   Iron Panel    Collection Time: 11/02/23  5:22 PM   Result Value Ref Range    Iron Binding Capacity Unsaturated 35 (L) 70 - 310 ug/dL    Iron Level 35 (L) 50 - 170 ug/dL    Transferrin 43 (L) 173 - 360 mg/dL    Iron Binding Capacity Total 70 (L) 250 - 450 ug/dL    Iron Saturation 50 20 - 50 %   Vitamin B12    Collection Time: 11/02/23  5:22 PM   Result Value Ref Range    Vitamin B12 Level >2,000 (H) 213 - 816 pg/mL   Ferritin    Collection Time: 11/02/23  5:22 PM   Result Value Ref Range    Ferritin Level 393.34 (H) 4.63 - 204.00 ng/mL   Comprehensive Metabolic Panel    Collection Time: 11/03/23  5:39 AM   Result Value Ref Range    Sodium Level 134 (L) 136 - 145 mmol/L    Potassium Level 3.7 3.5 - 5.1 mmol/L    Chloride 103 98 - 107 mmol/L    Carbon Dioxide 24 23 - 31 mmol/L    Glucose Level 143 (H) 82 - 115 mg/dL    Blood Urea Nitrogen 9.0 (L) 9.8 - 20.1 mg/dL    Creatinine 0.84 0.55 - 1.02 mg/dL    Calcium Level Total 7.3 (L) 8.4 - 10.2 mg/dL    Protein Total 4.5 (L) 5.8 - 7.6 gm/dL    Albumin Level 2.6 (L)  3.4 - 4.8 g/dL    Globulin 1.9 (L) 2.4 - 3.5 gm/dL    Albumin/Globulin Ratio 1.4 1.1 - 2.0 ratio    Bilirubin Total 7.9 (H) <=1.5 mg/dL    Alkaline Phosphatase 98 40 - 150 unit/L    Alanine Aminotransferase 24 0 - 55 unit/L    Aspartate Aminotransferase 54 (H) 5 - 34 unit/L    eGFR >60 mls/min/1.73/m2   Magnesium    Collection Time: 11/03/23  5:39 AM   Result Value Ref Range    Magnesium Level 2.10 1.60 - 2.60 mg/dL   Phosphorus    Collection Time: 11/03/23  5:39 AM   Result Value Ref Range    Phosphorus Level 2.2 (L) 2.3 - 4.7 mg/dL   CBC with Differential    Collection Time: 11/03/23  5:39 AM   Result Value Ref Range    WBC 12.65 (H) 4.50 - 11.50 x10(3)/mcL    RBC 2.74 (L) 4.20 - 5.40 x10(6)/mcL    Hgb 8.3 (L) 12.0 - 16.0 g/dL    Hct 25.6 (L) 37.0 - 47.0 %    MCV 93.4 80.0 - 94.0 fL    MCH 30.3 27.0 - 31.0 pg    MCHC 32.4 (L) 33.0 - 36.0 g/dL    RDW 22.3 (H) 11.5 - 17.0 %    Platelet 124 (L) 130 - 400 x10(3)/mcL    MPV 10.0 7.4 - 10.4 fL    Neut % 63.5 %    Lymph % 17.2 %    Mono % 14.4 %    Eos % 3.7 %    Basophil % 0.6 %    Lymph # 2.18 0.6 - 4.6 x10(3)/mcL    Neut # 8.02 2.1 - 9.2 x10(3)/mcL    Mono # 1.82 (H) 0.1 - 1.3 x10(3)/mcL    Eos # 0.47 0 - 0.9 x10(3)/mcL    Baso # 0.08 <=0.2 x10(3)/mcL    IG# 0.08 (H) 0 - 0.04 x10(3)/mcL    IG% 0.6 %    NRBC% 0.0 %       Imaging:  X-Ray Chest 1 View    Result Date: 11/2/2023  EXAMINATION: XR CHEST 1 VIEW CLINICAL HISTORY: shortness of breath; TECHNIQUE: Single frontal view of the chest was performed. COMPARISON: 10/23/2023 FINDINGS: LINES AND TUBES: EKG/telemetry leads overlie the chest. MEDIASTINUM AND JAZMINE: The cardiac silhouette is normal. LUNGS: Probable basilar atelectasis in the setting of effusions. PLEURA:Small pleural effusions layering dependently.No pneumothorax. OTHER: No acute osseous abnormality.     Small layering pleural effusions. Electronically signed by: Lorna Maxwell Date:    11/02/2023 Time:    06:19    US Liver with Doppler (xpd)    Result  Date: 10/26/2023  EXAMINATION: US LIVER WITH DOPPLER CLINICAL HISTORY: HRS; TECHNIQUE: Complete abdominal ultrasound with doppler.  Color and spectral Doppler were performed. COMPARISON: Ultrasound 10/13/2023 FINDINGS: The visualized portion of the pancreas is unremarkable. The liver is normal size measuring 15.3 cm.  The liver demonstrates diffusely hyperechoic parenchyma consistent with steatosis.  HRI measures 1.9. Nodular hepatic contour suggestive for cirrhosis.  No focal hepatic lesion. The gallbladder contains prominent biliary sludge.  No wall thickening or hypervascularity.  No sonographic Castrejon sign reported.  The common bile duct is mildly dilated measuring 8-10 mm.  Mild intrahepatic biliary ductal dilatation. The spleen is normal in size measuring 7.8 x 2.7 cm with a homogeneous echotexture. Small volume ascites. Reversed flow throughout the main, right, and left portal veins.  Middle hepatic vein, right hepatic vein, left hepatic vein, SMV, and IVC are patent with proper directional flow.  The main hepatic artery is patent. The umbilical vein is not patent.  Left upper quadrant collaterals vessels noted. Bilateral pleural effusions.     1. Sequela of portal venous hypertension including reversed flow throughout the portal venous system, left upper quadrant collateral vessels, and ascites. 2. Hepatic steatosis.  Nodular hepatic contour suggestive for cirrhosis. 3. Gallbladder sludge.  No evidence of acute cholecystitis. 4. Mild intra and extrahepatic biliary ductal dilatation. 5. Bilateral pleural effusions. Electronically signed by: Simba Fisher Date:    10/26/2023 Time:    09:24    US Retroperitoneal Complete    Result Date: 10/26/2023  EXAMINATION: US RETROPERITONEAL COMPLETE CLINICAL HISTORY: patricia; TECHNIQUE: Ultrasound of the kidneys and urinary bladder was performed including color flow and Doppler evaluation of the kidneys. COMPARISON: CT 10/12/2023 FINDINGS: Right kidney: The right kidney  measures 10.9 cm. No cortical thinning. No loss of corticomedullary distinction. Resistive index measures 0.76.  No mass. No renal stone. No hydronephrosis. Left kidney: The left kidney measures 10.8 cm. No cortical thinning. No loss of corticomedullary distinction. Resistive index measures 0.71.  No mass. No renal stone. No hydronephrosis. Bladder is decompressed by Tran catheter and not well visualized. Abdominopelvic ascites.  Right pleural effusion.     No significant renal abnormality. Abdominopelvic ascites.  Right pleural effusion. Electronically signed by: Simba Fisher Date:    10/26/2023 Time:    09:15    Echo    Result Date: 10/25/2023    Left Ventricle: The left ventricle is normal in size. Normal wall thickness. Normal wall motion. There is normal systolic function with a visually estimated ejection fraction of 65 - 70%. There is normal diastolic function.   Right Ventricle: Normal right ventricular cavity size. Wall thickness is normal. Right ventricle wall motion  is normal. Systolic function is normal.   Mitral Valve: There is mild regurgitation.   Tricuspid Valve: There is mild regurgitation.   IVC/SVC: Elevated venous pressure at 15 mmHg.     X-Ray Chest 1 View    Result Date: 10/23/2023  EXAMINATION: XR CHEST 1 VIEW CLINICAL HISTORY: pleural effusion; TECHNIQUE: Single frontal view of the chest was performed. COMPARISON: 10/20/2023 FINDINGS: There are bilateral pleural effusions.  Findings are similar to the prior examination.  Some pulmonary edema seen.  The heart is normal appearance.  Aorta is unremarkable.  Bones and joints show no acute abnormality.     No significant change in the bilateral pleural effusions Electronically signed by: Dariana Linares Date:    10/23/2023 Time:    18:37    CT Chest Without Contrast    Result Date: 10/20/2023  EXAMINATION: CT CHEST WITHOUT CONTRAST CLINICAL HISTORY: Pneumonia, unresolved; TECHNIQUE: Helically acquired axial images, sagittal and coronal  reformations were obtained from the thoracic inlet to the lung bases without the IV administration of contrast. Automated tube current modulation, weight-based exposure dosing, and/or iterative reconstruction technique utilized to reach lowest reasonably achievable exposure rate. DLP: 322 mGy*cm COMPARISON: Chest radiograph 10/20/2023, CT abdomen pelvis 10/12/2023 FINDINGS: BASE OF NECK: No significant abnormality. AORTA: Left-sided aortic arch.  No aneurysm and no significant atherosclerosis HEART: Normal size. No effusion. JAZMINE/MEDIASTINUM: No enlarged lymph nodes by size criteria.  Evaluation of hilar lymphadenopathy is limited without intravenous contrast. AIRWAYS: Patent. LUNGS: Bibasilar opacities suggestive of atelectasis in the setting of pleural effusions but poorly characterized without contrast. PLEURA: Moderate dependently layering right pleural effusion.  Small dependently layering left pleural effusion. UPPER ABDOMEN: Free intraperitoneal fluid.  Venous collaterals in the left upper quadrant. THORACIC SOFT TISSUES: Body wall edema.  Bilateral breast implants. BONES: No acute fracture. No suspicious lytic or sclerotic lesions.     Dependently layering pleural effusions, right larger than left with adjacent airspace opacities, most commonly atelectasis but incompletely characterized without contrast.  On a prior CT exam 10/12/2023 there was enhancing atelectatic lung at both lung bases. Electronically signed by: Lorna Maxwell Date:    10/20/2023 Time:    18:25    X-Ray Chest 1 View    Result Date: 10/20/2023  EXAMINATION: XR CHEST 1 VIEW CLINICAL HISTORY: pneumonia; TECHNIQUE: Frontal view(s) of the chest. COMPARISON: Radiography 10/12/2023 FINDINGS: Small bilateral pleural effusions with adjacent hazy lung opacities.  Pleural fluid volume has increased since prior study.  No pneumothorax identified.  Stable cardiac silhouette.     Small bilateral pleural effusions are larger since 10/12/2023.   Adjacent opacities may be infiltrates or atelectasis. Electronically signed by: Naun Soler Date:    10/20/2023 Time:    11:15    US Abdomen Limited    Result Date: 10/20/2023  EXAMINATION: US ABDOMEN LIMITED CLINICAL HISTORY: cholecyctitis; COMPARISON: CT 12 October 2023. FINDINGS: Grayscale, color and spectral doppler evaluation of the right upper quadrant. The pancreas is obscured.  Imaged portion of the IVC normal in caliber. Liver is not significantly enlarged.  There is heterogeneous hepatic parenchymal echogenicity.  No focal liver lesion.  The portal vein is patent but there is reversal of flow. No gallstones are seen.  Gallbladder wall is not significantly thickened.  Common bile duct is not well visualized, not grossly dilated. Right kidney measures 10 cm in length. No hydronephrosis. There is moderate ascites.     1. Moderate ascites. 2. Patent portal vein but there is reversal of flow with prominent portosystemic collaterals noted on CT. 3. No gallstones. Electronically signed by: Gildardo Tracy Date:    10/20/2023 Time:    11:13    US Paracentesis inc Imaging    Result Date: 10/13/2023  EXAMINATION: US GUIDED PARACENTESIS INC IMAGING CLINICAL HISTORY: new ascites; Attending: Carlos Enrique Irwin M.D. Anesthesia: Lidocaine utilized for local anesthesia. TECHNIQUE: After the risks, benefits and alternatives were discussed, consent was obtained. A time out was performed to verify the patient's identity and procedure. The patient was placed supine. Limited ultrasound the abdomen demonstrated ascitic fluid in the right lower quadrant. Static image was obtained. The lower quadrant was cleaned prepped in normal sterile fashion. Subcutaneous tissues were anesthetized with lidocaine solution. The Yueh needle was advanced into the abdominal cavity. Placement was confirmed by return of ascitic fluid. The catheter was advanced and a total of 0.75 liters of clear yellow fluid was aspirated. The patient tolerated the  procedure well. There were no immediate complications. Estimated blood loss: None     Successful paracentesis. Electronically signed by: Carlos Enrique Irwin Date:    10/13/2023 Time:    12:19    US Liver with Doppler (xpd)    Result Date: 10/13/2023  EXAMINATION: US LIVER WITH DOPPLER CLINICAL HISTORY: Cirrhosis evaluation, Evaluation for hepatic, portal and splenic veins patency; TECHNIQUE: Right upper quadrant abdominal ultrasound (including pancreas, aorta, liver, gallbladder, common bile duct, IVC, right kidney, and spleen) was performed.  Spectral Doppler evaluation performed. COMPARISON: CT abdomen pelvis 10/12/2023 FINDINGS: LIMITATIONS: Exam limited due to poor acoustic window related to shadowing bowel gas and/or body habitus. LIVER: Liver measures 15 cm cranial caudal at the midclavicular line. The liver is echogenic.  Nodular surface contour.  No discrete mass appreciable by sonographic evaluation. PANCREAS: Obscured by overlying bowel gas. GALLBLADDER: Not imaged BILE DUCTS: Common bile duct not imaged. RIGHT KIDNEY: Not imaged SPLEEN: 8 cm.  Normal in size with homogeneous echotexture. OTHER: There is ascites and bilateral pleural effusions. DOPPLER EVALUATION: AORTA: Not imaged HEPATIC ARTERY: Patent. Peak systolic velocity 173 cm/s.  Normal waveform. INFERIOR VENA CAVA: Not imaged.  IVC is patent on preceding CT exam. PORTAL VEINS: Portal vein is patent.  Hepatofugal flow.  Note on CT exam there were recanalized periumbilical veins, gastric varices and a large spleno renal shunt. SPLENIC VEIN: Patent with appropriate directional flow. HEPATIC VEINS: Unable to visualize.  Note on CT exam the right and left hepatic veins appeared compressed but patent.  Left hepatic vein was not well visualized.  This can be seen related to fibrotic changes of cirrhosis.     Cirrhotic morphology of the liver Changes of portal hypertension with reversed flow at the portal vein.  There were portosystemic collaterals on  preceding CT exam Small pleural effusions and abdominal ascites Electronically signed by: Lorna Maxwell Date:    10/13/2023 Time:    10:26    CT Abdomen Pelvis With Contrast    Result Date: 10/12/2023  EXAMINATION: CT ABDOMEN PELVIS WITH CONTRAST CLINICAL HISTORY: Sepsis;Jaundiced; TECHNIQUE: Multidetector axial images were obtained of the abdomen and pelvis following the administration of IV contrast. Oral contrast was not administered. Dose length product of 322 mGycm. Automated exposure control was utilized to minimize radiation dose. COMPARISON: None available FINDINGS: There is moderate volume right pleural effusion and right lower lung lobe dense consolidation.  There is also small left pleural and left basilar atelectasis.  Bilateral mammary implants. There is hepatic cirrhotic morphology with low attenuation and surface nodularity.  No focal hepatic space-occupying lesion.  There is intra-abdominopelvic small to moderate free fluid consistent with ascites.  Gallbladder is distended without intraluminal calcified calculus.  No significant dilatation of the intrahepatic biliary radicles and the extrahepatic duct is also not distended without calcified choledocholithiasis.  No no acute findings of the pancreas or the spleen. The adrenal glands appear within normal limits. The kidneys are unremarkable in size and contour. No solid or cystic renal lesion identified. There is no hydronephrosis. No perinephric fluid strandings or collections identified. Stomach is mostly decompressed and difficult to assess.  There is suspected mild mural thickening of the loops of small bowel suspect for enteritis.  No transition or bowel obstruction.  Colon is nondistended.  Noninflamed diverticulosis coli.  No pneumoperitoneum. Urinary bladder appears within normal limits.  Small volume uterus.  Endometrium is not well delineated.  There is no pelvic free fluid. Lower lumbar degenerative changes.  No acute or aggressive  skeletal abnormality no acute or otherwise osseous abnormality identified.     1. Right pleural effusion and right lower lung lobe consolidation. 2. Hepatic cirrhotic morphology and ascites. 3. Mural thickening of small bowel loops suggest nonspecific enteritis. Electronically signed by: Kevyn Abebe Date:    10/12/2023 Time:    19:33    CT Head Without Contrast    Result Date: 10/12/2023  EXAMINATION: CT HEAD WITHOUT CONTRAST CLINICAL HISTORY: Infusion; TECHNIQUE: Sequential axial images were performed of the brain without contrast. Dose product length of 884 mGycm. Automated exposure control was utilized to minimize radiation dose. COMPARISON: February 8, 2022. FINDINGS: There is no intracranial mass effect, midline shift, hydrocephalus or hemorrhage. There is no sulcal effacement or low attenuation changes to suggest recent large vessel territory infarction. Chronic appearing periventricular and subcortical white matter low attenuation changes are present and are consistent with chronic microangiopathic ischemia.  Beneath left craniotomy is similar appearing dural thickening.  No acute extra-axial fluid collection identified.  The ventricular system and sulcal markings prominence is consistent with atrophy more advanced for the age.  There is no acute extra axial fluid collection.  Similar mucoperiosteal thickening of the right maxillary sinus.  Paranasal sinuses are clear without mucosal thickening, polypoidal abnormality or air-fluid levels. Mastoid air cells aeration is optimal.     No acute intracranial findings identified. Electronically signed by: Kevyn Abebe Date:    10/12/2023 Time:    19:26    X-Ray Chest AP Portable    Result Date: 10/12/2023  EXAMINATION: XR CHEST AP PORTABLE CLINICAL HISTORY: Hypotension; TECHNIQUE: Single frontal view of the chest was performed. COMPARISON: 12/12/2022 FINDINGS: There is a infiltrate developing in the right lower lobe.  There is a small right-sided pleural effusion.   There is a patchy infiltrate in the left lower lobe.  The heart is normal in appearance.  Bones and joints show no acute abnormality.     Infiltrate developing in the right lower lobe with a small right-sided pleural effusion Patchy appearing infiltrate developing in the left lower lobe Electronically signed by: Dariana Linares Date:    10/12/2023 Time:    17:53         Assessment/Plan:    She is established with hepatology at [facility]- seen inpatient by Dr. 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 Patient states this is an improvement         Lyric Munroe NP acting as scribe for Dr. Noble Pennington MD

## 2023-11-03 NOTE — PLAN OF CARE
Problem: Adult Inpatient Plan of Care  Goal: Plan of Care Review  Outcome: Ongoing, Progressing  Flowsheets (Taken 11/3/2023 0800)  Plan of Care Reviewed With: patient  Goal: Patient-Specific Goal (Individualized)  Outcome: Ongoing, Progressing  Flowsheets (Taken 11/3/2023 0800)  Anxieties, Fears or Concerns: none  Individualized Care Needs: none  Goal: Absence of Hospital-Acquired Illness or Injury  Outcome: Ongoing, Progressing  Intervention: Identify and Manage Fall Risk  Flowsheets (Taken 11/3/2023 0800)  Safety Promotion/Fall Prevention:   assistive device/personal item within reach   Fall Risk reviewed with patient/family   high risk medications identified   medications reviewed   Fall Risk signage in place   nonskid shoes/socks when out of bed   side rails raised x 3   room near unit station   instructed to call staff for mobility  Intervention: Prevent Skin Injury  Flowsheets (Taken 11/3/2023 0800)  Body Position: position changed independently  Skin Protection:   adhesive use limited   incontinence pads utilized   tubing/devices free from skin contact  Intervention: Prevent and Manage VTE (Venous Thromboembolism) Risk  Flowsheets (Taken 11/3/2023 0800)  Activity Management:   Rolling - L1   Arm raise - L1  VTE Prevention/Management:   bleeding risk assessed   bleeding precations maintained  Range of Motion: active ROM (range of motion) encouraged  Intervention: Prevent Infection  Flowsheets (Taken 11/3/2023 0800)  Infection Prevention:   cohorting utilized   environmental surveillance performed   hand hygiene promoted   single patient room provided  Goal: Optimal Comfort and Wellbeing  Outcome: Ongoing, Progressing  Intervention: Monitor Pain and Promote Comfort  Flowsheets (Taken 11/3/2023 0800)  Pain Management Interventions:   quiet environment facilitated   relaxation techniques promoted  Intervention: Provide Person-Centered Care  Flowsheets (Taken 11/3/2023 0800)  Trust Relationship/Rapport:   care  explained   choices provided   empathic listening provided   questions encouraged   thoughts/feelings acknowledged  Goal: Readiness for Transition of Care  Outcome: Ongoing, Progressing     Problem: Impaired Wound Healing  Goal: Optimal Wound Healing  Outcome: Ongoing, Progressing  Intervention: Promote Wound Healing  Flowsheets (Taken 11/3/2023 0800)  Oral Nutrition Promotion: safe use of adaptive equipment encouraged  Activity Management:   Rolling - L1   Arm raise - L1  Pain Management Interventions:   quiet environment facilitated   relaxation techniques promoted     Problem: Fluid and Electrolyte Imbalance (Acute Kidney Injury/Impairment)  Goal: Fluid and Electrolyte Balance  Outcome: Ongoing, Progressing  Intervention: Monitor and Manage Fluid and Electrolyte Balance  Flowsheets (Taken 11/3/2023 0800)  Fluid/Electrolyte Management: fluids provided     Problem: Oral Intake Inadequate (Acute Kidney Injury/Impairment)  Goal: Optimal Nutrition Intake  Outcome: Ongoing, Progressing  Intervention: Promote and Optimize Nutrition  Flowsheets (Taken 11/3/2023 0800)  Oral Nutrition Promotion: safe use of adaptive equipment encouraged     Problem: Renal Function Impairment (Acute Kidney Injury/Impairment)  Goal: Effective Renal Function  Outcome: Ongoing, Progressing  Intervention: Monitor and Support Renal Function  Flowsheets (Taken 11/3/2023 0800)  Medication Review/Management:   medications reviewed   high-risk medications identified     Problem: Skin Injury Risk Increased  Goal: Skin Health and Integrity  Outcome: Ongoing, Progressing  Intervention: Optimize Skin Protection  Flowsheets (Taken 11/3/2023 0800)  Pressure Reduction Techniques:   frequent weight shift encouraged   weight shift assistance provided  Skin Protection:   adhesive use limited   incontinence pads utilized   tubing/devices free from skin contact  Head of Bed (HOB) Positioning: HOB elevated  Intervention: Promote and Optimize Oral  Intake  Flowsheets (Taken 11/3/2023 0800)  Oral Nutrition Promotion: safe use of adaptive equipment encouraged

## 2023-11-03 NOTE — PROGRESS NOTES
[facility]  Hospital Medicine Progress Note        Chief Complaint: Inpatient Follow-up for volume overload/ alcoholic cirrhosis     HPI: Lauren Ewing is a 63 y.o. female with a past medical history of cirrhosis, alcohol abuse, hypothyroidism, anxiety, and depression presented to [facility] on 11/1/2023 for lower extremity swelling.  Patient reported worsening lower extremity swelling in addition to shortness of breath and orthopnea since discharge from [facility] in [place] on 10/28/2023. Patient initially presented to [facility] ED on 10/12/2023 with chief complaint of progressive weakness x 1 week with  yellowing of her skin and decreased appetite.  Workup revealed newly found cirrhosis with ascites, significant hyponatremia,  and DIANA. Patient was transferred to [facility] for hepatology consultation.  Patient was not deemed a liver transplant candidate at this time secondary to alcohol abuse. Patient was discharged and recommended close follow-up with PCP, hepatology, and nephrology.  On exam patient also endorsed dry cough, chills, and low-grade fever with T-max of 100.2° F. Patient denied chest pain,  rectal bleeding, dark stools, dysuria, hematuria, abdominal pain, diarrhea, and vomiting.    Initial vital signs in ED were /65, pulse 104, respirations 20, temperature 37.2° C, and SpO2 99% on room air.  Labs revealed WBC 11.87, RBC 3.08, hemoglobin 9.4, hematocrit 28, sodium 134, calcium 7.9, protein total 5.1, bilirubin 9.1, direct bilirubin 5, AST 64, ALT 29, .7, and lactic acid 1.8.  Blood cultures were obtained.  Chest x-ray revealed small layering pleural effusions. Patient was admitted to hospital medicine service for further medical management.     Interval Hx:   Laying in bed, reports her leg swelling has markedly improved.  Reports she wants to get better, interested in SNF  No family at bedside  Case was discussed with patient's nurse on the  floor.    Objective/physical exam:  General: In no acute distress, afebrile, +Jaundice, scleral icterus   Chest: Bibasilar crackles   Heart: RRR, +S1, S2, no appreciable murmur  Abdomen: Soft, nontender, BS +  MSK: Bilateral lower extremity 3+ soft pitting edema  Neurologic: Alert and oriented x4, Cranial nerve II-XII intact, Strength 5/5 in all 4 extremities    VITAL SIGNS: 24 HRS MIN & MAX LAST   Temp  Min: 98 °F (36.7 °C)  Max: 98.6 °F (37 °C) 98.6 °F (37 °C)   BP  Min: 100/63  Max: 109/63 100/63   Pulse  Min: 83  Max: 108  89   Resp  Min: 18  Max: 18 18   SpO2  Min: 92 %  Max: 95 % (!) 92 %     I reviewed the labs below:  Recent Labs   Lab 10/28/23  0631 11/01/23  2015 11/03/23  0539   WBC 9.94 11.87* 12.65*   RBC 2.93* 3.08* 2.74*   HGB 8.8* 9.4* 8.3*   HCT 27.1* 28.0* 25.6*   MCV 93 90.9 93.4   MCH 30.0 30.5 30.3   MCHC 32.5 33.6 32.4*   RDW 21.5* 22.4* 22.3*   * 132 124*   MPV 9.9 9.9 10.0   GRAN 62.1  6.2  --   --    LYMPH 18.5  1.8  --   --    MONO 12.5  1.2*  --   --    BASO 0.01  --   --    NRBC 0  --   --      Recent Labs   Lab 10/28/23  0631 11/01/23  2015 11/03/23  0539    134* 134*   K 4.2 4.1 3.7     --   --    CO2 23 24 24   ANIONGAP 10  --   --    BUN 12 8.1* 9.0*   CREATININE 1.0 0.83 0.84   GLU 84  --   --    CALCIUM 8.2* 7.9* 7.3*   MG 2.0 1.90 2.10   ALBUMIN 2.9* 2.8* 2.6*   PROT 5.0*  --   --    ALKPHOS 101 101 98   ALT 32 29 24   AST 75* 64* 54*   BILITOT 7.7* 9.1* 7.9*     Microbiology Results (last 7 days)       Procedure Component Value Units Date/Time    Blood culture #1 **CANNOT BE ORDERED STAT** [[id number]]  (Normal) Collected: 11/01/23 2016    Order Status: Completed Specimen: Blood from Antecubital, Right Updated: 11/03/23 0000     CULTURE, BLOOD ([facility]) No Growth At 24 Hours    Blood culture #2 **CANNOT BE ORDERED STAT** [[id number]]  (Normal) Collected: 11/01/23 2016    Order Status: Completed Specimen: Blood from Arm, Right Updated: 11/03/23 0000     CULTURE,  BLOOD ([facility]) No Growth At 24 Hours    Blood Culture [[id number]]     Order Status: Canceled Specimen: Blood from Arm, Left              See below for Radiology    Scheduled Med:   albumin human 25%  12.5 g Intravenous BID    enoxparin  40 mg Subcutaneous Q24H (prophylaxis, 1700)    furosemide (LASIX) injection  40 mg Intravenous Daily    lactulose 10 gram/15 ml  10 g Oral TID    levothyroxine  100 mcg Oral Before breakfast    midodrine  10 mg Oral Q8H    multivitamin  1 tablet Oral Daily    pantoprazole  40 mg Intravenous Daily    thiamine  100 mg Oral Daily      Continuous Infusions:     PRN Meds:  dextrose 10%, dextrose 10%, glucagon (human recombinant), glucose, glucose, ondansetron, traMADoL     Assessment/Plan:  Volume overload  EtOH ESLD - quit EtOH about 5 weeks ago  Hyperbilirubinemia, elevated AST due to above - trending down  Normocytic anemia  Hyponatremia, mild  History of end stage liver disease, hypothyroidism, depression and anxiety  Tachycardia        Chest x-ray-small layering pleural effusions.    10/25/2023- Echo- Normal systolic function with a visually estimated ejection fraction of 65-70%, normal diastolic function, and mild mitral/tricuspid valve regurgitation  B.L LE Venous US shows no DVT  Nephrology on board, appreciate recs, started on albumin BID, decreased lasix 40 IV to daily, midodrine 10 TID, recommended careful diuresing with her recent h/o volume depletion DIANA.   Restarted lactulose t.i.d.  Daily weights   Strict intake/output   Close H/H and electrolyte monitoring   GI also on board, quit alcohol 5 weeks ago, MELD 25; Child Mcgee Class C- she is established with [facility]- not a candidate for transplant at this time  AFP 2.1, within normal range  Abdominal u/s to quantify ascites- pending   Can consider paracentesis for diagnostic purposes (r/o SBP) if no other source of infection identified.   Per GI note, patient will eventually need EGD for Esophageal  varices surveillance once more stable  Leukocytosis persist- 11 K--> 12K  Blood cultures x2--> no growth at 24 hours  Covid/flu - negative  Continue appropriate home medications for chronic med conditions   Morning CBC, CMP ordered    VTE Prophylaxis: Lovenox     Patient condition:  Guarded    Anticipated discharge and Disposition:   [facility]     Critical care note:  Critical care diagnosis:  Fluid overload on IV Lasix pushes  Critical care interventions: Hands-on evaluation, review of labs/radiographs/records and discussion with patient and family if present  Critical care time spent: 35 minutes        All diagnosis and differential diagnosis have been reviewed; assessment and plan has been documented; I have personally reviewed the labs and test results that are presently available; I have reviewed the patients medication list; I have reviewed the consulting providers response and recommendations. I have reviewed or attempted to review medical records based upon their availability    All of the patient's questions have been  addressed and answered. Patient's is agreeable to the above stated plan. I will continue to monitor closely and make adjustments to medical management as needed.  _____________________________________________________________________    Nutrition Status:    Radiology:   I have personally reviewed the images and agree with radiologist report  X-Ray Chest 1 View  Narrative: EXAMINATION:  XR CHEST 1 VIEW    CLINICAL HISTORY:  shortness of breath;    TECHNIQUE:  Single frontal view of the chest was performed.    COMPARISON:  10/23/2023    FINDINGS:  LINES AND TUBES: EKG/telemetry leads overlie the chest.    MEDIASTINUM AND JAZMINE: The cardiac silhouette is normal.    LUNGS: Probable basilar atelectasis in the setting of effusions.    PLEURA:Small pleural effusions layering dependently.No pneumothorax.    OTHER: No acute osseous abnormality.  Impression: Small layering pleural effusions.    Electronically  signed by: Lorna Maxwell  Date:    11/02/2023  Time:    06:19      Jhon Mata MD  Department of Hospital Medicine   [facility]   11/03/2023

## 2023-11-03 NOTE — PROGRESS NOTES
"Inpatient Nutrition Evaluation    Admit Date: 11/1/2023   Total duration of encounter: 2 days    Nutrition Recommendation/Prescription     -Add low sodium diet restriction to diet order.   -Add Boost Plus TID for additional nourishment; provides an additional 360 kcal and 14 gm protein per container.   -Monitor wt, labs, and intake.     Nutrition Assessment     Chart Review    Reason Seen: continuous nutrition monitoring    Malnutrition Screening Tool Results   Have you recently lost weight without trying?: No  Have you been eating poorly because of a decreased appetite?: No   MST Score: 0     Diagnosis:  Edema  HypoNa,  EtOH ESLD - quit EtOH about 5 weeks ago  Hypothyroid  Pleural effusions   SOB  Fever    Relevant Medical History: none other noted    Nutrition-Related Medications: furosemide, lactulose TID, MVI, Kcl, Thiamine    Nutrition-Related Labs:  11/3/23: Na 134, phos 2.2, Glu 143, GFR>60    Diet Order: Diet Adult Regular  Oral Supplement Order: none  Appetite/Oral Intake: fair/50-75% of meals  Factors Affecting Nutritional Intake: none identified  Food/Judaism/Cultural Preferences: unable to obtain  Food Allergies: no known food allergies       Wound(s):   altered skin integrity; right lateral lower quadrant puncture    Comments    11/3/23: Pt asleep during rounds and not responding to RD questions; noted ~ half of breakfast consumed; noted weight gain per EMR weights; most likely 2/2 fluid shifts as pt is on lasix; will continue to monitor and f/u early with pt.     Anthropometrics    Height: 5' 4" (162.6 cm) Height Method: Stated  Last Weight: 71.3 kg (157 lb 3 oz) (11/03/23 0554) Weight Method: Bed Scale  BMI (Calculated): 27  BMI Classification: overweight (BMI 25-29.9)     Ideal Body Weight (IBW), Female: 120 lb     % Ideal Body Weight, Female (lb): 108.33 %                             Usual Weight Provided By: EMR weight history    Wt Readings from Last 5 Encounters:   11/03/23 71.3 kg (157 lb 3 " oz)   10/13/23 66 kg (145 lb 8.1 oz)   12/12/22 65.8 kg (145 lb)     Weight Change(s) Since Admission:  Admit Weight: 59 kg (130 lb) (11/01/23 1953)      Patient Education    Not applicable.    Monitoring & Evaluation     Dietitian will monitor energy intake and weight change.  Nutrition Risk/Follow-Up: low (follow-up in 5-7 days)  Patients assigned 'low nutrition risk' status do not qualify for a full nutritional assessment but will be monitored and re-evaluated in a 5-7 day time period. Please consult if re-evaluation needed sooner.

## 2023-11-04 LAB
ALBUMIN SERPL-MCNC: 3.1 G/DL (ref 3.4–4.8)
ALBUMIN/GLOB SERPL: 1.6 RATIO (ref 1.1–2)
ALP SERPL-CCNC: 99 UNIT/L (ref 40–150)
ALT SERPL-CCNC: 22 UNIT/L (ref 0–55)
AST SERPL-CCNC: 54 UNIT/L (ref 5–34)
BASOPHILS # BLD AUTO: 0.06 X10(3)/MCL
BASOPHILS NFR BLD AUTO: 0.5 %
BILIRUB SERPL-MCNC: 9 MG/DL
BUN SERPL-MCNC: 7.7 MG/DL (ref 9.8–20.1)
CALCIUM SERPL-MCNC: 7.5 MG/DL (ref 8.4–10.2)
CHLORIDE SERPL-SCNC: 99 MMOL/L (ref 98–107)
CO2 SERPL-SCNC: 23 MMOL/L (ref 23–31)
CREAT SERPL-MCNC: 0.77 MG/DL (ref 0.55–1.02)
EOSINOPHIL # BLD AUTO: 0.31 X10(3)/MCL (ref 0–0.9)
EOSINOPHIL NFR BLD AUTO: 2.7 %
ERYTHROCYTE [DISTWIDTH] IN BLOOD BY AUTOMATED COUNT: 21.4 % (ref 11.5–17)
GFR SERPLBLD CREATININE-BSD FMLA CKD-EPI: >60 MLS/MIN/1.73/M2
GLOBULIN SER-MCNC: 1.9 GM/DL (ref 2.4–3.5)
GLUCOSE SERPL-MCNC: 160 MG/DL (ref 82–115)
HCT VFR BLD AUTO: 26.1 % (ref 37–47)
HGB BLD-MCNC: 9 G/DL (ref 12–16)
IMM GRANULOCYTES # BLD AUTO: 0.07 X10(3)/MCL (ref 0–0.04)
IMM GRANULOCYTES NFR BLD AUTO: 0.6 %
LYMPHOCYTES # BLD AUTO: 2.55 X10(3)/MCL (ref 0.6–4.6)
LYMPHOCYTES NFR BLD AUTO: 22.4 %
MCH RBC QN AUTO: 31 PG (ref 27–31)
MCHC RBC AUTO-ENTMCNC: 34.5 G/DL (ref 33–36)
MCV RBC AUTO: 90 FL (ref 80–94)
MONOCYTES # BLD AUTO: 1.13 X10(3)/MCL (ref 0.1–1.3)
MONOCYTES NFR BLD AUTO: 9.9 %
NEUTROPHILS # BLD AUTO: 7.26 X10(3)/MCL (ref 2.1–9.2)
NEUTROPHILS NFR BLD AUTO: 63.9 %
NRBC BLD AUTO-RTO: 0 %
PLATELET # BLD AUTO: 128 X10(3)/MCL (ref 130–400)
PLATELETS.RETICULATED NFR BLD AUTO: 2.9 % (ref 0.9–11.2)
PMV BLD AUTO: 9.9 FL (ref 7.4–10.4)
POTASSIUM SERPL-SCNC: 3.1 MMOL/L (ref 3.5–5.1)
PROT SERPL-MCNC: 5 GM/DL (ref 5.8–7.6)
RBC # BLD AUTO: 2.9 X10(6)/MCL (ref 4.2–5.4)
SODIUM SERPL-SCNC: 132 MMOL/L (ref 136–145)
WBC # SPEC AUTO: 11.38 X10(3)/MCL (ref 4.5–11.5)

## 2023-11-04 PROCEDURE — 63600175 PHARM REV CODE 636 W HCPCS: Performed by: PHYSICIAN ASSISTANT

## 2023-11-04 PROCEDURE — 25000003 PHARM REV CODE 250: Performed by: INTERNAL MEDICINE

## 2023-11-04 PROCEDURE — 85025 COMPLETE CBC W/AUTO DIFF WBC: CPT | Performed by: INTERNAL MEDICINE

## 2023-11-04 PROCEDURE — C9113 INJ PANTOPRAZOLE SODIUM, VIA: HCPCS | Performed by: INTERNAL MEDICINE

## 2023-11-04 PROCEDURE — 21400001 HC TELEMETRY ROOM

## 2023-11-04 PROCEDURE — P9047 ALBUMIN (HUMAN), 25%, 50ML: HCPCS | Mod: JG | Performed by: INTERNAL MEDICINE

## 2023-11-04 PROCEDURE — 63600175 PHARM REV CODE 636 W HCPCS: Performed by: INTERNAL MEDICINE

## 2023-11-04 PROCEDURE — 80053 COMPREHEN METABOLIC PANEL: CPT | Performed by: INTERNAL MEDICINE

## 2023-11-04 RX ORDER — POTASSIUM CHLORIDE 20 MEQ/1
40 TABLET, EXTENDED RELEASE ORAL EVERY 6 HOURS
Status: COMPLETED | OUTPATIENT
Start: 2023-11-04 | End: 2023-11-04

## 2023-11-04 RX ADMIN — POTASSIUM CHLORIDE 40 MEQ: 1500 TABLET, EXTENDED RELEASE ORAL at 07:11

## 2023-11-04 RX ADMIN — FUROSEMIDE 40 MG: 10 INJECTION, SOLUTION INTRAMUSCULAR; INTRAVENOUS at 10:11

## 2023-11-04 RX ADMIN — MIDODRINE HYDROCHLORIDE 10 MG: 5 TABLET ORAL at 09:11

## 2023-11-04 RX ADMIN — THERA TABS 1 TABLET: TAB at 08:11

## 2023-11-04 RX ADMIN — ALBUMIN (HUMAN) 12.5 G: 12.5 SOLUTION INTRAVENOUS at 08:11

## 2023-11-04 RX ADMIN — LACTULOSE 10 G: 10 SOLUTION ORAL at 08:11

## 2023-11-04 RX ADMIN — MIDODRINE HYDROCHLORIDE 10 MG: 5 TABLET ORAL at 06:11

## 2023-11-04 RX ADMIN — MIDODRINE HYDROCHLORIDE 10 MG: 5 TABLET ORAL at 03:11

## 2023-11-04 RX ADMIN — LACTULOSE 10 G: 10 SOLUTION ORAL at 09:11

## 2023-11-04 RX ADMIN — THIAMINE HCL TAB 100 MG 100 MG: 100 TAB at 08:11

## 2023-11-04 RX ADMIN — PANTOPRAZOLE SODIUM 40 MG: 40 INJECTION, POWDER, FOR SOLUTION INTRAVENOUS at 10:11

## 2023-11-04 RX ADMIN — POTASSIUM CHLORIDE 40 MEQ: 1500 TABLET, EXTENDED RELEASE ORAL at 01:11

## 2023-11-04 RX ADMIN — LEVOTHYROXINE SODIUM 100 MCG: 100 TABLET ORAL at 06:11

## 2023-11-04 RX ADMIN — ONDANSETRON 4 MG: 2 INJECTION INTRAMUSCULAR; INTRAVENOUS at 10:11

## 2023-11-04 RX ADMIN — ENOXAPARIN SODIUM 40 MG: 40 INJECTION SUBCUTANEOUS at 05:11

## 2023-11-04 RX ADMIN — LACTULOSE 10 G: 10 SOLUTION ORAL at 03:11

## 2023-11-04 NOTE — PROGRESS NOTES
[facility]  Hospital Medicine Progress Note        Chief Complaint: Inpatient Follow-up for volume overload/ alcoholic cirrhosis     HPI: Lauren Ewing is a 63 y.o. female with a past medical history of cirrhosis, alcohol abuse, hypothyroidism, anxiety, and depression presented to [facility] on 11/1/2023 for lower extremity swelling.  Patient reported worsening lower extremity swelling in addition to shortness of breath and orthopnea since discharge from [facility] in [place] on 10/28/2023. Patient initially presented to [facility] ED on 10/12/2023 with chief complaint of progressive weakness x 1 week with  yellowing of her skin and decreased appetite.  Workup revealed newly found cirrhosis with ascites, significant hyponatremia,  and DIANA. Patient was transferred to [facility] for hepatology consultation.  Patient was not deemed a liver transplant candidate at this time secondary to alcohol abuse. Patient was discharged and recommended close follow-up with PCP, hepatology, and nephrology.  On exam patient also endorsed dry cough, chills, and low-grade fever with T-max of 100.2° F. Patient denied chest pain,  rectal bleeding, dark stools, dysuria, hematuria, abdominal pain, diarrhea, and vomiting.    Initial vital signs in ED were /65, pulse 104, respirations 20, temperature 37.2° C, and SpO2 99% on room air.  Labs revealed WBC 11.87, RBC 3.08, hemoglobin 9.4, hematocrit 28, sodium 134, calcium 7.9, protein total 5.1, bilirubin 9.1, direct bilirubin 5, AST 64, ALT 29, .7, and lactic acid 1.8.  Blood cultures were obtained.  Chest x-ray revealed small layering pleural effusions. Patient was admitted to hospital medicine service for further medical management.     Interval Hx:   Laying in bed, reports her back is hurting from laying in bed too much.  Discuss have consulted PT, they will either come move her today or tomorrow.  Verbalized understanding   No family at  bedside  Case was discussed with patient's nurse on the floor.    Objective/physical exam:  General: In no acute distress, afebrile, +Jaundice, scleral icterus   Chest: Bibasilar crackles   Heart: RRR, +S1, S2, no appreciable murmur  Abdomen: Soft, nontender, BS +  MSK: Bilateral lower extremity 3+ soft pitting edema  Neurologic: Alert and oriented x4, Cranial nerve II-XII intact, able to move all 4 extremities without difficulty    VITAL SIGNS: 24 HRS MIN & MAX LAST   Temp  Min: 98 °F (36.7 °C)  Max: 98.6 °F (37 °C) 98.3 °F (36.8 °C)   BP  Min: 100/62  Max: 112/68 107/60   Pulse  Min: 83  Max: 95  91   Resp  Min: 17  Max: 18 17   SpO2  Min: 92 %  Max: 94 % (!) 94 %     I reviewed the labs below:  Recent Labs   Lab 11/01/23 2015 11/03/23 0539 11/04/23  0328   WBC 11.87* 12.65* 11.38   RBC 3.08* 2.74* 2.90*   HGB 9.4* 8.3* 9.0*   HCT 28.0* 25.6* 26.1*   MCV 90.9 93.4 90.0   MCH 30.5 30.3 31.0   MCHC 33.6 32.4* 34.5   RDW 22.4* 22.3* 21.4*    124* 128*   MPV 9.9 10.0 9.9       Recent Labs   Lab 11/01/23 2015 11/03/23 0539 11/04/23  0328   * 134* 132*   K 4.1 3.7 3.1*   CO2 24 24 23   BUN 8.1* 9.0* 7.7*   CREATININE 0.83 0.84 0.77   CALCIUM 7.9* 7.3* 7.5*   MG 1.90 2.10  --    ALBUMIN 2.8* 2.6* 3.1*   ALKPHOS 101 98 99   ALT 29 24 22   AST 64* 54* 54*   BILITOT 9.1* 7.9* 9.0*       Microbiology Results (last 7 days)       Procedure Component Value Units Date/Time    Blood culture #2 **CANNOT BE ORDERED STAT** [[id number]]  (Normal) Collected: 11/01/23 2016    Order Status: Completed Specimen: Blood from Arm, Right Updated: 11/04/23 0004     CULTURE, BLOOD ([facility]) No Growth At 48 Hours    Blood culture #1 **CANNOT BE ORDERED STAT** [[id number]]  (Normal) Collected: 11/01/23 2016    Order Status: Completed Specimen: Blood from Antecubital, Right Updated: 11/04/23 0004     CULTURE, BLOOD ([facility]) No Growth At 48 Hours    Blood Culture [[id number]]     Order Status: Canceled Specimen: Blood from Arm, Left               See below for Radiology    Scheduled Med:   albumin human 25%  12.5 g Intravenous BID    enoxparin  40 mg Subcutaneous Q24H (prophylaxis, 1700)    furosemide (LASIX) injection  40 mg Intravenous Daily    lactulose 10 gram/15 ml  10 g Oral TID    levothyroxine  100 mcg Oral Before breakfast    midodrine  10 mg Oral Q8H    multivitamin  1 tablet Oral Daily    pantoprazole  40 mg Intravenous Daily    thiamine  100 mg Oral Daily      Continuous Infusions:     PRN Meds:  dextrose 10%, dextrose 10%, glucagon (human recombinant), glucose, glucose, ondansetron, traMADoL     Assessment/Plan:  Volume overload- slowly improving  EtOH ESLD - quit EtOH about 5 weeks ago  Hyperbilirubinemia, elevated AST due to above  Normocytic anemia  Hyponatremia, mild  History of end stage liver disease, hypothyroidism, depression and anxiety  Tachycardia - resolved  Mild hypokalemia       Chest x-ray-small layering pleural effusions.    10/25/2023- Echo- Normal systolic function with a visually estimated ejection fraction of 65-70%, normal diastolic function, and mild mitral/tricuspid valve regurgitation  B.L LE Venous US shows no DVT  Nephrology has now signed off, appreciate recs, recommended to continue albumin albumin 12.5 g IV BID for 2 days (will end today), lasix 40 IV to daily, midodrine 10 TID, recommended careful diuresing with her recent h/o volume depletion DIANA.   Continue lactulose t.i.d.  Daily weights   Strict intake/output   Close H/H and electrolyte monitoring   GI also on board, quit alcohol 5 weeks ago, MELD 25; Child Mcgee Class C- she is established with [facility]- not a candidate for transplant at this time  AFP 2.1, within normal range  T bili trending, 7.9--> 9.0  AST/ALT 54/22  Abdominal u/s to quantify ascites- limited assessment, small volume ascites with hepatofugal flow in the main portal vein  Per GI note, patient will eventually need EGD for Esophageal varices surveillance once  more stable  Leukocytosis now resolved- 11 K--> 12K--> 11k (normalized)  Blood cultures x2--> no growth at 48 hours  Covid/flu - negative  Potassium 3.1, ordered KCl 40 mEq p.o. q.6 x2 doses  Continue appropriate home medications for chronic med conditions   Morning CMP ordered    VTE Prophylaxis: Lovenox     Patient condition:  Guarded    Anticipated discharge and Disposition:   [facility]     Critical care note:  Critical care diagnosis:  Fluid overload on IV Lasix pushes  Critical care interventions: Hands-on evaluation, review of labs/radiographs/records and discussion with patient and family if present  Critical care time spent: 35 minutes        All diagnosis and differential diagnosis have been reviewed; assessment and plan has been documented; I have personally reviewed the labs and test results that are presently available; I have reviewed the patients medication list; I have reviewed the consulting providers response and recommendations. I have reviewed or attempted to review medical records based upon their availability    All of the patient's questions have been  addressed and answered. Patient's is agreeable to the above stated plan. I will continue to monitor closely and make adjustments to medical management as needed.  _____________________________________________________________________    Nutrition Status:    Radiology:   I have personally reviewed the images and agree with radiologist report  US Abdomen Limited  Narrative: EXAMINATION:  US ABDOMEN LIMITED    CLINICAL HISTORY:  cirrhosis, quantify ascites, evaluate for portal htn;    TECHNIQUE:  Gray-scale and color Doppler ultrasound images of the abdomen.    COMPARISON:  Ultrasound 10/26/2023    FINDINGS:  Challenging exam due to bowel gas.  Obscured pancreatic body and tail.  Abdominal aorta largely obscured.  Normal caliber of the superior IVC.    Liver has normal size with increased overall hepatic echogenicity.  Limited assessment of the hepatic  vasculature, but main portal vein has hepatofugal flow.  Obscured gallbladder.  No biliary dilatation appreciated.  No right-sided hydronephrosis.  Right pleural effusion.  Small volume ascites.    Measurements:    - Liver: 15.7 cm    - CBD diameter: 3 mm    - Right kidney: 10.6 cm in length  Impression: Limited assessment.  Small volume ascites with hepatofugal flow in the main portal vein.    Electronically signed by: Naun Soler  Date:    11/03/2023  Time:    16:09      Jhon Mata MD  Department of Hospital Medicine   [facility]   11/04/2023

## 2023-11-04 NOTE — PROGRESS NOTES
Renal on call    Labs stable / improved  UOP good  Cont current management  Slow and steady diuresis is the goal    *Will sign off  Please call PRN

## 2023-11-04 NOTE — PLAN OF CARE
Problem: Adult Inpatient Plan of Care  Goal: Plan of Care Review  Outcome: Ongoing, Progressing  Flowsheets (Taken 11/4/2023 0800)  Plan of Care Reviewed With: patient  Goal: Patient-Specific Goal (Individualized)  Outcome: Ongoing, Progressing  Flowsheets (Taken 11/4/2023 0800)  Anxieties, Fears or Concerns: none  Individualized Care Needs: none  Goal: Absence of Hospital-Acquired Illness or Injury  Outcome: Ongoing, Progressing  Intervention: Identify and Manage Fall Risk  Flowsheets (Taken 11/4/2023 0800)  Safety Promotion/Fall Prevention:   assistive device/personal item within reach   Fall Risk reviewed with patient/family   high risk medications identified   medications reviewed   nonskid shoes/socks when out of bed   side rails raised x 3   instructed to call staff for mobility  Intervention: Prevent Skin Injury  Flowsheets (Taken 11/4/2023 0800)  Body Position: position changed independently  Skin Protection:   adhesive use limited   incontinence pads utilized   tubing/devices free from skin contact  Intervention: Prevent and Manage VTE (Venous Thromboembolism) Risk  Flowsheets (Taken 11/4/2023 0800)  Activity Management: Rolling - L1  VTE Prevention/Management:   bleeding risk assessed   bleeding precations maintained  Range of Motion: active ROM (range of motion) encouraged  Intervention: Prevent Infection  Flowsheets (Taken 11/4/2023 0800)  Infection Prevention:   cohorting utilized   environmental surveillance performed   hand hygiene promoted   single patient room provided  Goal: Optimal Comfort and Wellbeing  Outcome: Ongoing, Progressing  Intervention: Monitor Pain and Promote Comfort  Flowsheets (Taken 11/4/2023 0800)  Pain Management Interventions:   quiet environment facilitated   relaxation techniques promoted  Intervention: Provide Person-Centered Care  Flowsheets (Taken 11/4/2023 0800)  Trust Relationship/Rapport:   care explained   choices provided   empathic listening provided   reassurance  provided   thoughts/feelings acknowledged  Goal: Readiness for Transition of Care  Outcome: Ongoing, Progressing     Problem: Impaired Wound Healing  Goal: Optimal Wound Healing  Outcome: Ongoing, Progressing  Intervention: Promote Wound Healing  Flowsheets (Taken 11/4/2023 0800)  Oral Nutrition Promotion: safe use of adaptive equipment encouraged  Activity Management: Rolling - L1  Pain Management Interventions:   quiet environment facilitated   relaxation techniques promoted     Problem: Fluid and Electrolyte Imbalance (Acute Kidney Injury/Impairment)  Goal: Fluid and Electrolyte Balance  Outcome: Ongoing, Progressing  Intervention: Monitor and Manage Fluid and Electrolyte Balance  Flowsheets (Taken 11/4/2023 0800)  Fluid/Electrolyte Management: fluids provided     Problem: Oral Intake Inadequate (Acute Kidney Injury/Impairment)  Goal: Optimal Nutrition Intake  Outcome: Ongoing, Progressing  Intervention: Promote and Optimize Nutrition  Flowsheets (Taken 11/4/2023 0800)  Oral Nutrition Promotion: safe use of adaptive equipment encouraged     Problem: Renal Function Impairment (Acute Kidney Injury/Impairment)  Goal: Effective Renal Function  Outcome: Ongoing, Progressing  Intervention: Monitor and Support Renal Function  Flowsheets (Taken 11/4/2023 0800)  Medication Review/Management:   medications reviewed   high-risk medications identified     Problem: Skin Injury Risk Increased  Goal: Skin Health and Integrity  Outcome: Ongoing, Progressing  Intervention: Optimize Skin Protection  Flowsheets (Taken 11/4/2023 0800)  Pressure Reduction Techniques: frequent weight shift encouraged  Skin Protection:   adhesive use limited   incontinence pads utilized   tubing/devices free from skin contact  Head of Bed (HOB) Positioning: HOB at 20-30 degrees  Intervention: Promote and Optimize Oral Intake  Flowsheets (Taken 11/4/2023 0800)  Oral Nutrition Promotion: safe use of adaptive equipment encouraged

## 2023-11-05 LAB
ALBUMIN SERPL-MCNC: 2.7 G/DL (ref 3.4–4.8)
ALBUMIN/GLOB SERPL: 1.7 RATIO (ref 1.1–2)
ALP SERPL-CCNC: 90 UNIT/L (ref 40–150)
ALT SERPL-CCNC: 19 UNIT/L (ref 0–55)
AST SERPL-CCNC: 47 UNIT/L (ref 5–34)
BILIRUB SERPL-MCNC: 8.2 MG/DL
BUN SERPL-MCNC: 7.5 MG/DL (ref 9.8–20.1)
CALCIUM SERPL-MCNC: 7.2 MG/DL (ref 8.4–10.2)
CHLORIDE SERPL-SCNC: 102 MMOL/L (ref 98–107)
CO2 SERPL-SCNC: 25 MMOL/L (ref 23–31)
CREAT SERPL-MCNC: 0.79 MG/DL (ref 0.55–1.02)
GFR SERPLBLD CREATININE-BSD FMLA CKD-EPI: >60 MLS/MIN/1.73/M2
GLOBULIN SER-MCNC: 1.6 GM/DL (ref 2.4–3.5)
GLUCOSE SERPL-MCNC: 94 MG/DL (ref 82–115)
POTASSIUM SERPL-SCNC: 3.6 MMOL/L (ref 3.5–5.1)
PROT SERPL-MCNC: 4.3 GM/DL (ref 5.8–7.6)
SODIUM SERPL-SCNC: 133 MMOL/L (ref 136–145)

## 2023-11-05 PROCEDURE — 21400001 HC TELEMETRY ROOM

## 2023-11-05 PROCEDURE — C9113 INJ PANTOPRAZOLE SODIUM, VIA: HCPCS | Performed by: INTERNAL MEDICINE

## 2023-11-05 PROCEDURE — 97162 PT EVAL MOD COMPLEX 30 MIN: CPT

## 2023-11-05 PROCEDURE — 25000003 PHARM REV CODE 250: Performed by: INTERNAL MEDICINE

## 2023-11-05 PROCEDURE — 25000003 PHARM REV CODE 250: Performed by: NURSE PRACTITIONER

## 2023-11-05 PROCEDURE — 63600175 PHARM REV CODE 636 W HCPCS: Performed by: INTERNAL MEDICINE

## 2023-11-05 PROCEDURE — 63600175 PHARM REV CODE 636 W HCPCS: Performed by: PHYSICIAN ASSISTANT

## 2023-11-05 PROCEDURE — 80053 COMPREHEN METABOLIC PANEL: CPT | Performed by: INTERNAL MEDICINE

## 2023-11-05 RX ADMIN — MIDODRINE HYDROCHLORIDE 10 MG: 5 TABLET ORAL at 09:11

## 2023-11-05 RX ADMIN — MIDODRINE HYDROCHLORIDE 10 MG: 5 TABLET ORAL at 06:11

## 2023-11-05 RX ADMIN — THERA TABS 1 TABLET: TAB at 09:11

## 2023-11-05 RX ADMIN — LACTULOSE 10 G: 10 SOLUTION ORAL at 09:11

## 2023-11-05 RX ADMIN — ENOXAPARIN SODIUM 40 MG: 40 INJECTION SUBCUTANEOUS at 04:11

## 2023-11-05 RX ADMIN — LACTULOSE 10 G: 10 SOLUTION ORAL at 04:11

## 2023-11-05 RX ADMIN — TRAMADOL HYDROCHLORIDE 50 MG: 50 TABLET, COATED ORAL at 09:11

## 2023-11-05 RX ADMIN — LEVOTHYROXINE SODIUM 100 MCG: 100 TABLET ORAL at 06:11

## 2023-11-05 RX ADMIN — PANTOPRAZOLE SODIUM 40 MG: 40 INJECTION, POWDER, FOR SOLUTION INTRAVENOUS at 09:11

## 2023-11-05 RX ADMIN — FUROSEMIDE 40 MG: 10 INJECTION, SOLUTION INTRAMUSCULAR; INTRAVENOUS at 09:11

## 2023-11-05 RX ADMIN — THIAMINE HCL TAB 100 MG 100 MG: 100 TAB at 09:11

## 2023-11-05 NOTE — PT/OT/SLP EVAL
Physical Therapy Evaluation    Patient Name:  Lauren Ewing   MRN:  [id number]    Recommendations:     Discharge therapy intensity: moderate intensity   Discharge Equipment Recommendations: walker, rolling   Barriers to discharge: Impaired mobility and Ongoing medical needs    Assessment:     Lauren Ewing is a 63 y.o. female admitted with a medical diagnosis of volume overload, EtOH ESLD,  hyperbilirubinemia, normocytic anemia, hyponatremia.  Pt recently admitted to facility and transferred to [facility] for hepatology workup. Pt deemed not a candidate for transplant at this time and discharged home 10/28. Pt returned to [facility] 11/1 for pain in Les due to edema and SOB. She presents with the following impairments/functional limitations: weakness, impaired endurance, impaired self care skills, impaired functional mobility, gait instability, impaired balance, decreased lower extremity function, impaired cognition, edema. Overall pt requires moderate assistance for bed mobility and transfers. Not safe for gait training at this time due to instability and dizziness.  Pt will require placement for moderate intensity therapy services upon discharge.    Rehab Prognosis: Fair; patient would benefit from acute skilled PT services to address these deficits and reach maximum level of function.    Recent Surgery: * No surgery found *      Plan:     During this hospitalization, patient to be seen 5 x/week to address the identified rehab impairments via gait training, therapeutic activities, therapeutic exercises and progress toward the following goals:    Plan of Care Expires:  12/05/23    Subjective     Chief Complaint: painful Les, fatigue, dizziness  Patient/Family Comments/goals: go somewhere to get strongers  Pain/Comfort:  Pain Rating 1: 4/10  Location - Orientation 1: generalized    Patients cultural, spiritual, Episcopal conflicts given the current situation: no    Living Environment:  Pt lives in first floor  apartment. Since return home from previous hospital stay pt reports limited mobility and increasing difficulty with ambulation. Son was coming over to assist as needed but was not present .  Prior to admission, patients level of function was dependent.  Equipment used at home: none.  DME owned (not currently used): none.  Upon discharge, patient will have assistance from unknown.    Objective:     Communicated with nurse prior to session.  Patient found HOB elevated with peripheral IV, pulse ox (continuous), telemetry  upon PT entry to room.    General Precautions: Standard, fall  Orthopedic Precautions:N/A   Braces: N/A  Respiratory Status: Room air  Blood Pressure:   -supine: 99/63, 105  - seated EOB: 93/52, 108  -seated after 5 min: 108/67, 102  - standin/49, 107  O2: 92-94%      Exams:  Cognitive Exam:  Patient is oriented to Person, Place, Time, and Situation  Gross Motor Coordination:  WFL  Skin Integrity/Edema:      -       Edema: Severe ANGEL LUIS Les, sensitive to touch  RLE ROM: WFL  RLE Strength: grossly 3+/5  LLE ROM: WFL  LLE Strength: grossly 3+/5  Skin integrity: Visible skin intact      Functional Mobility:  Bed Mobility:     Rolling Right: moderate assistance  Scooting: moderate assistance  Supine to Sit: moderate assistance  Transfers:     Sit to Stand:  moderate assistance with rolling walker  Bed to Chair: moderate assistance with  rolling walker  using  Step Transfer  Balance: static standing CGA with RW and wide NANY      AM-PAC 6 CLICK MOBILITY  Total Score:10       Treatment & Education:  BP monitored throughout session due to complaints of dizziness    Patient provided with verbal education education regarding PT POC, safety with mobility.  Understanding was verbalized, however additional teaching warranted.     Patient left up in chair with all lines intact, call button in reach, and nurse notified.    GOALS:   Multidisciplinary Problems       Physical Therapy Goals          Problem:  Physical Therapy    Goal Priority Disciplines Outcome Goal Variances Interventions   Physical Therapy Goal     PT, PT/OT Ongoing, Progressing     Description: Goals to be met by: 23     Patient will increase functional independence with mobility by performin. Supine to sit with Modified [place]  2. Sit to stand transfer with Modified [place]  3. Bed to chair transfer with Modified [place] using Rolling Walker  4. Gait  x 300 feet with Modified [place] using Rolling Walker.                          History:     No past medical history on file.    No past surgical history on file.    Time Tracking:     PT Received On: 23  PT Start Time: 846     PT Stop Time: 903  PT Total Time (min): 17 min     Billable Minutes: Evaluation moderate      2023

## 2023-11-05 NOTE — PROGRESS NOTES
[facility]  Hospital Medicine Progress Note        Chief Complaint: Inpatient Follow-up for volume overload/ alcoholic cirrhosis     HPI: Lauren Ewing is a 63 y.o. female with a past medical history of cirrhosis, alcohol abuse, hypothyroidism, anxiety, and depression presented to [facility] on 11/1/2023 for lower extremity swelling.  Patient reported worsening lower extremity swelling in addition to shortness of breath and orthopnea since discharge from [facility] in [place] on 10/28/2023. Patient initially presented to [facility] ED on 10/12/2023 with chief complaint of progressive weakness x 1 week with  yellowing of her skin and decreased appetite.  Workup revealed newly found cirrhosis with ascites, significant hyponatremia,  and DIANA. Patient was transferred to [facility] for hepatology consultation.  Patient was not deemed a liver transplant candidate at this time secondary to alcohol abuse. Patient was discharged and recommended close follow-up with PCP, hepatology, and nephrology.  On exam patient also endorsed dry cough, chills, and low-grade fever with T-max of 100.2° F. Patient denied chest pain,  rectal bleeding, dark stools, dysuria, hematuria, abdominal pain, diarrhea, and vomiting.    Initial vital signs in ED were /65, pulse 104, respirations 20, temperature 37.2° C, and SpO2 99% on room air.  Labs revealed WBC 11.87, RBC 3.08, hemoglobin 9.4, hematocrit 28, sodium 134, calcium 7.9, protein total 5.1, bilirubin 9.1, direct bilirubin 5, AST 64, ALT 29, .7, and lactic acid 1.8.  Blood cultures were obtained.  Chest x-ray revealed small layering pleural effusions. Patient was admitted to hospital medicine service for further medical management.     Interval Hx:   Sitting in chair, her back feels so much better once she got out of the bed.  Advised patient to not to attempt moving herself from bed to chair or chair to bed given her weakness.  Verbalized  understanding   No family at bedside  Case was discussed with patient's nurse in the room    Objective/physical exam:  General: In no acute distress, afebrile, +Jaundice, scleral icterus   Chest: Bibasilar crackles   Heart: RRR, +S1, S2, no appreciable murmur  Abdomen: Soft, nontender, BS +  MSK: Bilateral lower extremity 3+ soft pitting edema  Neurologic: Alert and oriented x4, Cranial nerve II-XII intact, able to move all 4 extremities without difficulty    VITAL SIGNS: 24 HRS MIN & MAX LAST   Temp  Min: 98 °F (36.7 °C)  Max: 98.9 °F (37.2 °C) 98.3 °F (36.8 °C)   BP  Min: 100/63  Max: 107/68 100/63   Pulse  Min: 88  Max: 95  93   Resp  Min: 17  Max: 18 18   SpO2  Min: 93 %  Max: 95 % (!) 93 %     I reviewed the labs below:  Recent Labs   Lab 11/01/23 2015 11/03/23 0539 11/04/23 0328   WBC 11.87* 12.65* 11.38   RBC 3.08* 2.74* 2.90*   HGB 9.4* 8.3* 9.0*   HCT 28.0* 25.6* 26.1*   MCV 90.9 93.4 90.0   MCH 30.5 30.3 31.0   MCHC 33.6 32.4* 34.5   RDW 22.4* 22.3* 21.4*    124* 128*   MPV 9.9 10.0 9.9       Recent Labs   Lab 11/01/23 2015 11/03/23 0539 11/04/23 0328 11/05/23  0608   * 134* 132* 133*   K 4.1 3.7 3.1* 3.6   CO2 24 24 23 25   BUN 8.1* 9.0* 7.7* 7.5*   CREATININE 0.83 0.84 0.77 0.79   CALCIUM 7.9* 7.3* 7.5* 7.2*   MG 1.90 2.10  --   --    ALBUMIN 2.8* 2.6* 3.1* 2.7*   ALKPHOS 101 98 99 90   ALT 29 24 22 19   AST 64* 54* 54* 47*   BILITOT 9.1* 7.9* 9.0* 8.2*       Microbiology Results (last 7 days)       Procedure Component Value Units Date/Time    Blood culture #1 **CANNOT BE ORDERED STAT** [[id number]]  (Normal) Collected: 11/01/23 2016    Order Status: Completed Specimen: Blood from Antecubital, Right Updated: 11/05/23 0000     CULTURE, BLOOD ([facility]) No Growth At 72 Hours    Blood culture #2 **CANNOT BE ORDERED STAT** [[id number]]  (Normal) Collected: 11/01/23 2016    Order Status: Completed Specimen: Blood from Arm, Right Updated: 11/05/23 0000     CULTURE, BLOOD ([facility]) No Growth At 72  Hours    Blood Culture [[id number]]     Order Status: Canceled Specimen: Blood from Arm, Left              See below for Radiology    Scheduled Med:   enoxparin  40 mg Subcutaneous Q24H (prophylaxis, 1700)    furosemide (LASIX) injection  40 mg Intravenous Daily    lactulose 10 gram/15 ml  10 g Oral TID    levothyroxine  100 mcg Oral Before breakfast    midodrine  10 mg Oral Q8H    multivitamin  1 tablet Oral Daily    pantoprazole  40 mg Intravenous Daily    thiamine  100 mg Oral Daily      Continuous Infusions:     PRN Meds:  dextrose 10%, dextrose 10%, glucagon (human recombinant), glucose, glucose, ondansetron, traMADoL     Assessment/Plan:  Volume overload- slowly improving  EtOH ESLD - quit EtOH about 5 weeks prior to admission  Hyperbilirubinemia, elevated AST due to above  Normocytic anemia  Hyponatremia, mild- stable  History of end stage liver disease, hypothyroidism, depression and anxiety  Tachycardia - resolved  Mild hypokalemia- resolved       Chest x-ray-small layering pleural effusions.    10/25/2023- Echo- Normal systolic function with a visually estimated ejection fraction of 65-70%, normal diastolic function, and mild mitral/tricuspid valve regurgitation  B.L LE Venous US shows no DVT  Nephrology has now signed off, appreciate recs, completed albumin 12.5 g IV BID for 2 days (on 11/3), to continue lasix 40 IV daily, midodrine 10 mg TID, recommended careful diuresing with her recent h/o volume depletion DIANA.   Continue lactulose t.i.d.  Daily weights   Strict intake/output   Close H/H and electrolyte monitoring   GI also on board, quit alcohol 5 weeks ago, MELD 25; Child Mcgee Class C- she is established with [facility]- not a candidate for transplant at this time  AFP 2.1, within normal range  T bili 7.9--> 9.0--> 8.2  AST/ALT trending down 54/22--> 47/19  Abdominal u/s done to quantify ascites- limited assessment, small volume ascites with hepatofugal flow in the main  portal vein  Per GI note, patient will eventually need EGD for Esophageal varices surveillance once more stable, will reach out to them tomorrow to see given patient is stable now  Leukocytosis now resolved- 11 K--> 12K--> 11k (normalized)  Blood cultures x2--> no growth at 72 hours  Covid/flu - negative  Potassium 3.6, normalized with replacement  Continue appropriate home medications for chronic med conditions   Morning CBC CMP, Mag ordered    VTE Prophylaxis: Lovenox     Patient condition:  Guarded    Anticipated discharge and Disposition:   [facility]     Critical care note:  Critical care diagnosis:  Fluid overload on IV Lasix pushes  Critical care interventions: Hands-on evaluation, review of labs/radiographs/records and discussion with patient and family if present  Critical care time spent: 35 minutes        All diagnosis and differential diagnosis have been reviewed; assessment and plan has been documented; I have personally reviewed the labs and test results that are presently available; I have reviewed the patients medication list; I have reviewed the consulting providers response and recommendations. I have reviewed or attempted to review medical records based upon their availability    All of the patient's questions have been  addressed and answered. Patient's is agreeable to the above stated plan. I will continue to monitor closely and make adjustments to medical management as needed.  _____________________________________________________________________    Nutrition Status:    Radiology:   I have personally reviewed the images and agree with radiologist report  US Abdomen Limited  Narrative: EXAMINATION:  US ABDOMEN LIMITED    CLINICAL HISTORY:  cirrhosis, quantify ascites, evaluate for portal htn;    TECHNIQUE:  Gray-scale and color Doppler ultrasound images of the abdomen.    COMPARISON:  Ultrasound 10/26/2023    FINDINGS:  Challenging exam due to bowel gas.  Obscured pancreatic body and tail.  Abdominal  aorta largely obscured.  Normal caliber of the superior IVC.    Liver has normal size with increased overall hepatic echogenicity.  Limited assessment of the hepatic vasculature, but main portal vein has hepatofugal flow.  Obscured gallbladder.  No biliary dilatation appreciated.  No right-sided hydronephrosis.  Right pleural effusion.  Small volume ascites.    Measurements:    - Liver: 15.7 cm    - CBD diameter: 3 mm    - Right kidney: 10.6 cm in length  Impression: Limited assessment.  Small volume ascites with hepatofugal flow in the main portal vein.    Electronically signed by: Naun Soler  Date:    11/03/2023  Time:    16:09      Jhon Mata MD  Department of Hospital Medicine   [facility]   11/05/2023

## 2023-11-06 LAB
ALBUMIN SERPL-MCNC: 2.6 G/DL (ref 3.4–4.8)
ALBUMIN/GLOB SERPL: 1.5 RATIO (ref 1.1–2)
ALP SERPL-CCNC: 88 UNIT/L (ref 40–150)
ALT SERPL-CCNC: 18 UNIT/L (ref 0–55)
AST SERPL-CCNC: 49 UNIT/L (ref 5–34)
BACTERIA BLD CULT: NORMAL
BACTERIA BLD CULT: NORMAL
BILIRUB SERPL-MCNC: 8.4 MG/DL
BUN SERPL-MCNC: 7.1 MG/DL (ref 9.8–20.1)
CALCIUM SERPL-MCNC: 7.8 MG/DL (ref 8.4–10.2)
CHLORIDE SERPL-SCNC: 101 MMOL/L (ref 98–107)
CO2 SERPL-SCNC: 26 MMOL/L (ref 23–31)
CREAT SERPL-MCNC: 0.77 MG/DL (ref 0.55–1.02)
ERYTHROCYTE [DISTWIDTH] IN BLOOD BY AUTOMATED COUNT: 21.1 % (ref 11.5–17)
GFR SERPLBLD CREATININE-BSD FMLA CKD-EPI: >60 MLS/MIN/1.73/M2
GLOBULIN SER-MCNC: 1.7 GM/DL (ref 2.4–3.5)
GLUCOSE SERPL-MCNC: 76 MG/DL (ref 82–115)
HCT VFR BLD AUTO: 23.2 % (ref 37–47)
HCT VFR BLD AUTO: 26.4 % (ref 37–47)
HGB BLD-MCNC: 7.9 G/DL (ref 12–16)
HGB BLD-MCNC: 8.8 G/DL (ref 12–16)
MAGNESIUM SERPL-MCNC: 1.9 MG/DL (ref 1.6–2.6)
MCH RBC QN AUTO: 30.5 PG (ref 27–31)
MCHC RBC AUTO-ENTMCNC: 34.1 G/DL (ref 33–36)
MCV RBC AUTO: 89.6 FL (ref 80–94)
NRBC BLD AUTO-RTO: 0 %
PLATELET # BLD AUTO: 158 X10(3)/MCL (ref 130–400)
PMV BLD AUTO: 9.9 FL (ref 7.4–10.4)
POTASSIUM SERPL-SCNC: 3.3 MMOL/L (ref 3.5–5.1)
PROT SERPL-MCNC: 4.3 GM/DL (ref 5.8–7.6)
RBC # BLD AUTO: 2.59 X10(6)/MCL (ref 4.2–5.4)
SODIUM SERPL-SCNC: 135 MMOL/L (ref 136–145)
WBC # SPEC AUTO: 11.91 X10(3)/MCL (ref 4.5–11.5)

## 2023-11-06 PROCEDURE — 25000003 PHARM REV CODE 250: Performed by: INTERNAL MEDICINE

## 2023-11-06 PROCEDURE — 85014 HEMATOCRIT: CPT | Performed by: INTERNAL MEDICINE

## 2023-11-06 PROCEDURE — C9113 INJ PANTOPRAZOLE SODIUM, VIA: HCPCS | Performed by: INTERNAL MEDICINE

## 2023-11-06 PROCEDURE — 80053 COMPREHEN METABOLIC PANEL: CPT | Performed by: INTERNAL MEDICINE

## 2023-11-06 PROCEDURE — 21400001 HC TELEMETRY ROOM

## 2023-11-06 PROCEDURE — 25000003 PHARM REV CODE 250: Performed by: NURSE PRACTITIONER

## 2023-11-06 PROCEDURE — 85027 COMPLETE CBC AUTOMATED: CPT | Performed by: INTERNAL MEDICINE

## 2023-11-06 PROCEDURE — 83735 ASSAY OF MAGNESIUM: CPT | Performed by: INTERNAL MEDICINE

## 2023-11-06 PROCEDURE — 63600175 PHARM REV CODE 636 W HCPCS: Performed by: INTERNAL MEDICINE

## 2023-11-06 PROCEDURE — 63600175 PHARM REV CODE 636 W HCPCS: Performed by: PHYSICIAN ASSISTANT

## 2023-11-06 RX ORDER — POTASSIUM CHLORIDE 20 MEQ/1
40 TABLET, EXTENDED RELEASE ORAL ONCE
Status: COMPLETED | OUTPATIENT
Start: 2023-11-06 | End: 2023-11-06

## 2023-11-06 RX ADMIN — THERA TABS 1 TABLET: TAB at 09:11

## 2023-11-06 RX ADMIN — LACTULOSE 10 G: 10 SOLUTION ORAL at 04:11

## 2023-11-06 RX ADMIN — LEVOTHYROXINE SODIUM 100 MCG: 100 TABLET ORAL at 05:11

## 2023-11-06 RX ADMIN — ENOXAPARIN SODIUM 40 MG: 40 INJECTION SUBCUTANEOUS at 04:11

## 2023-11-06 RX ADMIN — PANTOPRAZOLE SODIUM 40 MG: 40 INJECTION, POWDER, FOR SOLUTION INTRAVENOUS at 09:11

## 2023-11-06 RX ADMIN — LACTULOSE 10 G: 10 SOLUTION ORAL at 09:11

## 2023-11-06 RX ADMIN — MIDODRINE HYDROCHLORIDE 10 MG: 5 TABLET ORAL at 05:11

## 2023-11-06 RX ADMIN — FUROSEMIDE 40 MG: 10 INJECTION, SOLUTION INTRAMUSCULAR; INTRAVENOUS at 09:11

## 2023-11-06 RX ADMIN — TRAMADOL HYDROCHLORIDE 50 MG: 50 TABLET, COATED ORAL at 05:11

## 2023-11-06 RX ADMIN — MIDODRINE HYDROCHLORIDE 10 MG: 5 TABLET ORAL at 04:11

## 2023-11-06 RX ADMIN — POTASSIUM CHLORIDE 40 MEQ: 1500 TABLET, EXTENDED RELEASE ORAL at 09:11

## 2023-11-06 RX ADMIN — THIAMINE HCL TAB 100 MG 100 MG: 100 TAB at 09:11

## 2023-11-06 NOTE — PLAN OF CARE
Spoke to patient and son (Jasiel) and he stated that he would like for his mom to try and go to [facility], referral sent.     11/06/23 1212   Discharge Assessment   Assessment Type Discharge Planning Assessment   Confirmed/corrected address, phone number and insurance Yes   Confirmed Demographics Correct on Facesheet   Source of Information patient;family   Does patient/caregiver understand observation status Yes   Communicated LISA with patient/caregiver Yes;Date not available/Unable to determine   People in Home alone   Prior to hospitilization cognitive status: Alert/Oriented   Current cognitive status: Alert/Oriented   Walking or Climbing Stairs ambulation difficulty, requires equipment   Equipment Currently Used at Home none   Readmission within 30 days? Yes   Do you currently have service(s) that help you manage your care at home? No   Do you have prescription coverage? Yes   Coverage Medicaid/ARI   Who is going to help you get home at discharge? Family can take home.   How do you get to doctors appointments? family or friend will provide   Are you on dialysis? No   Discharge Plan discussed with: Patient;Adult children   Discharge Plan A Skilled Nursing Facility   Discharge Plan B Other  (To be determined)

## 2023-11-06 NOTE — PLAN OF CARE
Problem: Adult Inpatient Plan of Care  Goal: Plan of Care Review  Outcome: Ongoing, Progressing  Goal: Patient-Specific Goal (Individualized)  Outcome: Ongoing, Progressing  Goal: Optimal Comfort and Wellbeing  Outcome: Ongoing, Progressing     Problem: Impaired Wound Healing  Goal: Optimal Wound Healing  Outcome: Ongoing, Progressing

## 2023-11-06 NOTE — PROGRESS NOTES
[facility]  Hospital Medicine Progress Note        Chief Complaint: Inpatient Follow-up for volume overload/ alcoholic cirrhosis     HPI: Lauren Ewing is a 63 y.o. female with a past medical history of cirrhosis, alcohol abuse, hypothyroidism, anxiety, and depression presented to [facility] on 11/1/2023 for lower extremity swelling.  Patient reported worsening lower extremity swelling in addition to shortness of breath and orthopnea since discharge from [facility] in [place] on 10/28/2023. Patient initially presented to [facility] ED on 10/12/2023 with chief complaint of progressive weakness x 1 week with  yellowing of her skin and decreased appetite.  Workup revealed newly found cirrhosis with ascites, significant hyponatremia,  and DIANA. Patient was transferred to [facility] for hepatology consultation.  Patient was not deemed a liver transplant candidate at this time secondary to alcohol abuse. Patient was discharged and recommended close follow-up with PCP, hepatology, and nephrology.  On exam patient also endorsed dry cough, chills, and low-grade fever with T-max of 100.2° F. Patient denied chest pain,  rectal bleeding, dark stools, dysuria, hematuria, abdominal pain, diarrhea, and vomiting.    Initial vital signs in ED were /65, pulse 104, respirations 20, temperature 37.2° C, and SpO2 99% on room air.  Labs revealed WBC 11.87, RBC 3.08, hemoglobin 9.4, hematocrit 28, sodium 134, calcium 7.9, protein total 5.1, bilirubin 9.1, direct bilirubin 5, AST 64, ALT 29, .7, and lactic acid 1.8.  Blood cultures were obtained.  Chest x-ray revealed small layering pleural effusions. Patient was admitted to hospital medicine service for further medical management.     Interval Hx:   Sitting in bed noted bag of lay's chip next to her, advised patient to avoid high salt food given we are trying to treat her very gently for her fluid overload already and this will make the swelling  worse.  Verbalized understanding   Discussed hemoglobin slightly lower today and will repeat H&H.   No family at bedside  Case was discussed with patient's nurse on the floor    Objective/physical exam:  General: In no acute distress, afebrile, +Jaundice, scleral icterus   Chest: Bibasilar crackles   Heart: RRR, +S1, S2, no appreciable murmur  Abdomen: Soft, nontender, BS +  MSK: Bilateral lower extremity 3+ soft pitting edema  Neurologic: Alert and oriented x4, Cranial nerve II-XII intact, able to move all 4 extremities without difficulty    VITAL SIGNS: 24 HRS MIN & MAX LAST   Temp  Min: 98 °F (36.7 °C)  Max: 98.8 °F (37.1 °C) 98.3 °F (36.8 °C)   BP  Min: 99/60  Max: 156/85 104/65   Pulse  Min: 79  Max: 101  86   Resp  Min: 18  Max: 20 18   SpO2  Min: 93 %  Max: 98 % (!) 94 %     I reviewed the labs below:  Recent Labs   Lab 11/03/23 0539 11/04/23 0328 11/06/23  0502   WBC 12.65* 11.38 11.91*   RBC 2.74* 2.90* 2.59*   HGB 8.3* 9.0* 7.9*   HCT 25.6* 26.1* 23.2*   MCV 93.4 90.0 89.6   MCH 30.3 31.0 30.5   MCHC 32.4* 34.5 34.1   RDW 22.3* 21.4* 21.1*   * 128* 158   MPV 10.0 9.9 9.9       Recent Labs   Lab 11/01/23 2015 11/03/23  0539 11/04/23 0328 11/05/23  0608 11/06/23  0502   * 134* 132* 133* 135*   K 4.1 3.7 3.1* 3.6 3.3*   CO2 24 24 23 25 26   BUN 8.1* 9.0* 7.7* 7.5* 7.1*   CREATININE 0.83 0.84 0.77 0.79 0.77   CALCIUM 7.9* 7.3* 7.5* 7.2* 7.8*   MG 1.90 2.10  --   --  1.90   ALBUMIN 2.8* 2.6* 3.1* 2.7* 2.6*   ALKPHOS 101 98 99 90 88   ALT 29 24 22 19 18   AST 64* 54* 54* 47* 49*   BILITOT 9.1* 7.9* 9.0* 8.2* 8.4*       Microbiology Results (last 7 days)       Procedure Component Value Units Date/Time    Blood culture #1 **CANNOT BE ORDERED STAT** [[id number]]  (Normal) Collected: 11/01/23 2016    Order Status: Completed Specimen: Blood from Antecubital, Right Updated: 11/05/23 2300     CULTURE, BLOOD ([facility]) No Growth At 96 Hours    Blood culture #2 **CANNOT BE ORDERED STAT** [[id number]]   (Normal) Collected: 11/01/23 2016    Order Status: Completed Specimen: Blood from Arm, Right Updated: 11/05/23 2300     CULTURE, BLOOD ([facility]) No Growth At 96 Hours    Blood Culture [[id number]]     Order Status: Canceled Specimen: Blood from Arm, Left              See below for Radiology    Scheduled Med:   enoxparin  40 mg Subcutaneous Q24H (prophylaxis, 1700)    furosemide (LASIX) injection  40 mg Intravenous Daily    lactulose 10 gram/15 ml  10 g Oral TID    levothyroxine  100 mcg Oral Before breakfast    midodrine  10 mg Oral Q8H    multivitamin  1 tablet Oral Daily    pantoprazole  40 mg Intravenous Daily    thiamine  100 mg Oral Daily      Continuous Infusions:     PRN Meds:  dextrose 10%, dextrose 10%, glucagon (human recombinant), glucose, glucose, ondansetron, traMADoL     Assessment/Plan:  Volume overload- slowly improving  EtOH ESLD - quit EtOH about 5 weeks prior to admission  Hyperbilirubinemia, elevated AST due to above  Normocytic anemia  Hyponatremia, mild- stable  History of end stage liver disease, hypothyroidism, depression and anxiety  Tachycardia - resolved  Mild hypokalemia       Chest x-ray-small layering pleural effusions.    10/25/2023- Echo- Normal systolic function with a visually estimated ejection fraction of 65-70%, normal diastolic function, and mild mitral/tricuspid valve regurgitation  B.L LE Venous US shows no DVT  Nephrology has now signed off, appreciate recs, completed albumin 12.5 g IV BID for 2 days (on 11/3), to continue lasix 40 IV daily, midodrine 10 mg TID, recommended careful diuresing with her recent h/o volume depletion DIANA.   Advised patient to avoid high salt to prevent worsening of her swelling  Continue lactulose t.i.d.  Daily weights   Strict intake/output   Noted hemoglobin trickle down, repeat H&H.  Monitor closely  GI also on board, quit alcohol 5 weeks ago, MELD 25; Child Mcgee Class C- she is established with [facility]- not a candidate  for transplant at this time  Per GI note, patient will eventually need EGD for Esophageal varices surveillance once more stable, I have reached out to and walked in [facility], waiting for the response regarding inpatient versus outpatient EGD plans  AFP 2.1, within normal range  T bili 7.9--> 9.0--> 8.2--> 8.4  AST/ALT trending down 54/22--> 47/19--> 49/18  Abdominal u/s done to quantify ascites- limited assessment, small volume ascites with hepatofugal flow in the main portal vein  Leukocytosis now resolved- 11 K--> 12K--> 11k (normalized)--> 11k  Blood cultures x2--> no growth at 96 hours  Covid/flu - negative  Potassium 3.3, ordered KCl 40 mEq p.o. x1 dose  Mag 1.9, within normal range  Continue appropriate home medications for chronic med conditions   Morning CBC, BMP ordered    VTE Prophylaxis: Lovenox     Patient condition:  Guarded    Anticipated discharge and Disposition:   [facility]     Critical care note:  Critical care diagnosis:  Fluid overload on IV Lasix pushes  Critical care interventions: Hands-on evaluation, review of labs/radiographs/records and discussion with patient and family if present  Critical care time spent: 35 minutes        All diagnosis and differential diagnosis have been reviewed; assessment and plan has been documented; I have personally reviewed the labs and test results that are presently available; I have reviewed the patients medication list; I have reviewed the consulting providers response and recommendations. I have reviewed or attempted to review medical records based upon their availability    All of the patient's questions have been  addressed and answered. Patient's is agreeable to the above stated plan. I will continue to monitor closely and make adjustments to medical management as needed.  _____________________________________________________________________    Nutrition Status:    Radiology:   I have personally reviewed the images and agree with radiologist report  US Abdomen  Limited  Narrative: EXAMINATION:  US ABDOMEN LIMITED    CLINICAL HISTORY:  cirrhosis, quantify ascites, evaluate for portal htn;    TECHNIQUE:  Gray-scale and color Doppler ultrasound images of the abdomen.    COMPARISON:  Ultrasound 10/26/2023    FINDINGS:  Challenging exam due to bowel gas.  Obscured pancreatic body and tail.  Abdominal aorta largely obscured.  Normal caliber of the superior IVC.    Liver has normal size with increased overall hepatic echogenicity.  Limited assessment of the hepatic vasculature, but main portal vein has hepatofugal flow.  Obscured gallbladder.  No biliary dilatation appreciated.  No right-sided hydronephrosis.  Right pleural effusion.  Small volume ascites.    Measurements:    - Liver: 15.7 cm    - CBD diameter: 3 mm    - Right kidney: 10.6 cm in length  Impression: Limited assessment.  Small volume ascites with hepatofugal flow in the main portal vein.    Electronically signed by: Naun Soler  Date:    11/03/2023  Time:    16:09      Jhon Mata MD  Department of Hospital Medicine   [facility]   11/06/2023

## 2023-11-07 LAB
ANION GAP SERPL CALC-SCNC: 6 MEQ/L
BUN SERPL-MCNC: 6.7 MG/DL (ref 9.8–20.1)
CALCIUM SERPL-MCNC: 7.5 MG/DL (ref 8.4–10.2)
CHLORIDE SERPL-SCNC: 99 MMOL/L (ref 98–107)
CO2 SERPL-SCNC: 27 MMOL/L (ref 23–31)
CREAT SERPL-MCNC: 0.75 MG/DL (ref 0.55–1.02)
CREAT/UREA NIT SERPL: 9
ERYTHROCYTE [DISTWIDTH] IN BLOOD BY AUTOMATED COUNT: 20.9 % (ref 11.5–17)
GFR SERPLBLD CREATININE-BSD FMLA CKD-EPI: >60 MLS/MIN/1.73/M2
GLUCOSE SERPL-MCNC: 75 MG/DL (ref 82–115)
HCT VFR BLD AUTO: 25.3 % (ref 37–47)
HGB BLD-MCNC: 8.1 G/DL (ref 12–16)
MCH RBC QN AUTO: 29.8 PG (ref 27–31)
MCHC RBC AUTO-ENTMCNC: 32 G/DL (ref 33–36)
MCV RBC AUTO: 93 FL (ref 80–94)
NRBC BLD AUTO-RTO: 0 %
PLATELET # BLD AUTO: 173 X10(3)/MCL (ref 130–400)
PMV BLD AUTO: 10.2 FL (ref 7.4–10.4)
POTASSIUM SERPL-SCNC: 3.6 MMOL/L (ref 3.5–5.1)
RBC # BLD AUTO: 2.72 X10(6)/MCL (ref 4.2–5.4)
SODIUM SERPL-SCNC: 132 MMOL/L (ref 136–145)
WBC # SPEC AUTO: 10.37 X10(3)/MCL (ref 4.5–11.5)

## 2023-11-07 PROCEDURE — 63600175 PHARM REV CODE 636 W HCPCS: Performed by: INTERNAL MEDICINE

## 2023-11-07 PROCEDURE — 25000003 PHARM REV CODE 250: Performed by: INTERNAL MEDICINE

## 2023-11-07 PROCEDURE — 21400001 HC TELEMETRY ROOM

## 2023-11-07 PROCEDURE — 63600175 PHARM REV CODE 636 W HCPCS: Performed by: PHYSICIAN ASSISTANT

## 2023-11-07 PROCEDURE — 97530 THERAPEUTIC ACTIVITIES: CPT | Mod: CQ

## 2023-11-07 PROCEDURE — 97116 GAIT TRAINING THERAPY: CPT | Mod: CQ

## 2023-11-07 PROCEDURE — 85027 COMPLETE CBC AUTOMATED: CPT | Performed by: INTERNAL MEDICINE

## 2023-11-07 PROCEDURE — C9113 INJ PANTOPRAZOLE SODIUM, VIA: HCPCS | Performed by: INTERNAL MEDICINE

## 2023-11-07 PROCEDURE — 80048 BASIC METABOLIC PNL TOTAL CA: CPT | Performed by: INTERNAL MEDICINE

## 2023-11-07 RX ADMIN — LACTULOSE 10 G: 10 SOLUTION ORAL at 09:11

## 2023-11-07 RX ADMIN — FUROSEMIDE 40 MG: 10 INJECTION, SOLUTION INTRAMUSCULAR; INTRAVENOUS at 09:11

## 2023-11-07 RX ADMIN — MIDODRINE HYDROCHLORIDE 10 MG: 5 TABLET ORAL at 08:11

## 2023-11-07 RX ADMIN — MIDODRINE HYDROCHLORIDE 10 MG: 5 TABLET ORAL at 06:11

## 2023-11-07 RX ADMIN — MIDODRINE HYDROCHLORIDE 10 MG: 5 TABLET ORAL at 04:11

## 2023-11-07 RX ADMIN — ENOXAPARIN SODIUM 40 MG: 40 INJECTION SUBCUTANEOUS at 04:11

## 2023-11-07 RX ADMIN — THERA TABS 1 TABLET: TAB at 09:11

## 2023-11-07 RX ADMIN — LACTULOSE 10 G: 10 SOLUTION ORAL at 04:11

## 2023-11-07 RX ADMIN — THIAMINE HCL TAB 100 MG 100 MG: 100 TAB at 09:11

## 2023-11-07 RX ADMIN — LEVOTHYROXINE SODIUM 100 MCG: 100 TABLET ORAL at 06:11

## 2023-11-07 RX ADMIN — PANTOPRAZOLE SODIUM 40 MG: 40 INJECTION, POWDER, FOR SOLUTION INTRAVENOUS at 09:11

## 2023-11-07 NOTE — PT/OT/SLP PROGRESS
Physical Therapy Treatment    Patient Name:  Lauren Ewing   MRN:  [id number]    Recommendations:     Discharge therapy intensity: moderate intensity   Discharge Equipment Recommendations: walker, rolling  Barriers to discharge: Impaired mobility and Ongoing medical needs    Assessment:     Lauren Ewing is a 63 y.o. female admitted with a medical diagnosis of <principal problem not specified>.  She presents with the following impairments/functional limitations: weakness, impaired endurance, impaired self care skills, impaired functional mobility, gait instability, impaired balance, decreased lower extremity function, impaired cognition, edema .    Rehab Prognosis: Good; patient would benefit from acute skilled PT services to address these deficits and reach maximum level of function.    Recent Surgery: * No surgery found *      Plan:     During this hospitalization, patient to be seen 5 x/week to address the identified rehab impairments via gait training, therapeutic activities, therapeutic exercises and progress toward the following goals:    Plan of Care Expires:  12/05/23    Subjective     Chief Complaint: afraid to amb  Patient/Family Comments/goals: ANGEL LUIS feet sensitive   Pain/Comfort:  Pain Rating 1:  (did not rate; reported sensitivity in ANGEL LUIS feet)  Pain Addressed 2: Reposition, Distraction      Objective:     Communicated with RN prior to session.  Patient found HOB elevated with telemetry upon PT entry to room.     General Precautions: Standard, fall  Orthopedic Precautions: N/A  Braces: N/A  Respiratory Status: Room air  Blood Pressure:   Skin Integrity: Visible skin intact; ANGEL LUIS feet swollen      Functional Mobility:  Bed Mobility:     Supine to Sit: minimum assistance  Sit to Supine: contact guard assistance  Transfers:     Sit to Stand:  Min A with rolling walker  Toilet Transfer: minimum assistance with  rolling walker  using  Step Transfer  Gait: Pt amb 15ft, 25ft with RW, Min A. Mike'joan varying miguel  with no major LOB. Pt appeared very anxious and became SOB during. Verbal cues for proper breathing technique.      Therapeutic Activities/Exercises:  Bed > BSC t/f with RW. (+) BM noted. Sit to stand activity for pt to perform hygiene care while standing, min A.   BSC > chair t/f with RW.     Education:  Patient provided with verbal education education regarding PT poc, safety with mobility.  Understanding was verbalized.     Patient left HOB elevated with all lines intact and call button in reach..    GOALS:   Multidisciplinary Problems       Physical Therapy Goals          Problem: Physical Therapy    Goal Priority Disciplines Outcome Goal Variances Interventions   Physical Therapy Goal     PT, PT/OT Ongoing, Progressing     Description: Goals to be met by: 23     Patient will increase functional independence with mobility by performin. Supine to sit with Modified Desha  2. Sit to stand transfer with Modified Desha  3. Bed to chair transfer with Modified Desha using Rolling Walker  4. Gait  x 300 feet with Modified Desha using Rolling Walker.                          Time Tracking:     PT Received On:    PT Start Time: 1453     PT Stop Time: 1516  PT Total Time (min): 23 min     Billable Minutes: Gait Training 10 and Therapeutic Activity 13    Treatment Type: Treatment  PT/PTA: PTA     Number of PTA visits since last PT visit: 1     2023

## 2023-11-07 NOTE — PROGRESS NOTES
"Gastroenterology Progress Note    Subjective/Interval History:  She is feeling good today. In good spirits. Lower ext swelling is improved but still present. Improvement in VAZ/SOB    Abdomen is soft without any notable increase in ascites    Abdominal u/s report still pending    ROS:  Review of Systems   Constitutional:  Negative for chills and fever.   HENT:  Negative for hearing loss.    Eyes:  Negative for blurred vision.   Respiratory:  Positive for cough and shortness of breath. Negative for hemoptysis, sputum production and wheezing.    Cardiovascular:  Positive for leg swelling. Negative for chest pain and palpitations.   Gastrointestinal:  Negative for abdominal pain, blood in stool, heartburn, nausea and vomiting.   Skin:  Negative for rash.   Neurological:  Positive for weakness. Negative for dizziness and headaches.   Psychiatric/Behavioral:  Negative for depression and memory loss. The patient is not nervous/anxious.        Vital Signs:  /62   Pulse 98   Temp 98.6 °F (37 °C) (Oral)   Resp 20   Ht 5' 4" (1.626 m)   Wt 71.3 kg (157 lb 3 oz)   SpO2 (!) 94%   BMI 26.98 kg/m²   Body mass index is 26.98 kg/m².    Physical Exam:  Physical Exam  Constitutional:       General: She is not in acute distress.     Appearance: She is ill-appearing.   HENT:      Head: Normocephalic.      Mouth/Throat:      Mouth: Mucous membranes are moist.      Pharynx: Oropharynx is clear.   Eyes:      General: Scleral icterus present.      Extraocular Movements: Extraocular movements intact.      Pupils: Pupils are equal, round, and reactive to light.   Cardiovascular:      Rate and Rhythm: Normal rate and regular rhythm.      Pulses: Normal pulses.      Heart sounds: Normal heart sounds. No murmur heard.  Pulmonary:      Breath sounds: Normal breath sounds. No wheezing or rhonchi.      Comments: VAZ, Increased work of breathing while lying down in bed and talking  Abdominal:      General: Bowel sounds are normal. " There is no distension.      Palpations: Abdomen is soft.      Tenderness: There is no abdominal tenderness. There is no guarding.   Musculoskeletal:      Right lower leg: Edema present.      Left lower leg: Edema present.   Skin:     General: Skin is warm and dry.      Coloration: Skin is jaundiced.   Neurological:      Mental Status: She is alert and oriented to person, place, and time.      Motor: Weakness present.   Psychiatric:         Mood and Affect: Mood normal.         Behavior: Behavior normal.         Labs:  Recent Results (from the past 48 hour(s))   Comprehensive Metabolic Panel    Collection Time: 11/05/23  6:08 AM   Result Value Ref Range    Sodium Level 133 (L) 136 - 145 mmol/L    Potassium Level 3.6 3.5 - 5.1 mmol/L    Chloride 102 98 - 107 mmol/L    Carbon Dioxide 25 23 - 31 mmol/L    Glucose Level 94 82 - 115 mg/dL    Blood Urea Nitrogen 7.5 (L) 9.8 - 20.1 mg/dL    Creatinine 0.79 0.55 - 1.02 mg/dL    Calcium Level Total 7.2 (L) 8.4 - 10.2 mg/dL    Protein Total 4.3 (L) 5.8 - 7.6 gm/dL    Albumin Level 2.7 (L) 3.4 - 4.8 g/dL    Globulin 1.6 (L) 2.4 - 3.5 gm/dL    Albumin/Globulin Ratio 1.7 1.1 - 2.0 ratio    Bilirubin Total 8.2 (H) <=1.5 mg/dL    Alkaline Phosphatase 90 40 - 150 unit/L    Alanine Aminotransferase 19 0 - 55 unit/L    Aspartate Aminotransferase 47 (H) 5 - 34 unit/L    eGFR >60 mls/min/1.73/m2   CBC Without Differential    Collection Time: 11/06/23  5:02 AM   Result Value Ref Range    WBC 11.91 (H) 4.50 - 11.50 x10(3)/mcL    RBC 2.59 (L) 4.20 - 5.40 x10(6)/mcL    Hgb 7.9 (L) 12.0 - 16.0 g/dL    Hct 23.2 (L) 37.0 - 47.0 %    MCV 89.6 80.0 - 94.0 fL    MCH 30.5 27.0 - 31.0 pg    MCHC 34.1 33.0 - 36.0 g/dL    RDW 21.1 (H) 11.5 - 17.0 %    Platelet 158 130 - 400 x10(3)/mcL    MPV 9.9 7.4 - 10.4 fL    NRBC% 0.0 %   Comprehensive Metabolic Panel    Collection Time: 11/06/23  5:02 AM   Result Value Ref Range    Sodium Level 135 (L) 136 - 145 mmol/L    Potassium Level 3.3 (L) 3.5 - 5.1  mmol/L    Chloride 101 98 - 107 mmol/L    Carbon Dioxide 26 23 - 31 mmol/L    Glucose Level 76 (L) 82 - 115 mg/dL    Blood Urea Nitrogen 7.1 (L) 9.8 - 20.1 mg/dL    Creatinine 0.77 0.55 - 1.02 mg/dL    Calcium Level Total 7.8 (L) 8.4 - 10.2 mg/dL    Protein Total 4.3 (L) 5.8 - 7.6 gm/dL    Albumin Level 2.6 (L) 3.4 - 4.8 g/dL    Globulin 1.7 (L) 2.4 - 3.5 gm/dL    Albumin/Globulin Ratio 1.5 1.1 - 2.0 ratio    Bilirubin Total 8.4 (H) <=1.5 mg/dL    Alkaline Phosphatase 88 40 - 150 unit/L    Alanine Aminotransferase 18 0 - 55 unit/L    Aspartate Aminotransferase 49 (H) 5 - 34 unit/L    eGFR >60 mls/min/1.73/m2   Magnesium    Collection Time: 11/06/23  5:02 AM   Result Value Ref Range    Magnesium Level 1.90 1.60 - 2.60 mg/dL   Hemoglobin and Hematocrit    Collection Time: 11/06/23  9:32 AM   Result Value Ref Range    Hgb 8.8 (L) 12.0 - 16.0 g/dL    Hct 26.4 (L) 37.0 - 47.0 %       Imaging:  X-Ray Chest 1 View    Result Date: 11/2/2023  EXAMINATION: XR CHEST 1 VIEW CLINICAL HISTORY: shortness of breath; TECHNIQUE: Single frontal view of the chest was performed. COMPARISON: 10/23/2023 FINDINGS: LINES AND TUBES: EKG/telemetry leads overlie the chest. MEDIASTINUM AND JAZMINE: The cardiac silhouette is normal. LUNGS: Probable basilar atelectasis in the setting of effusions. PLEURA:Small pleural effusions layering dependently.No pneumothorax. OTHER: No acute osseous abnormality.     Small layering pleural effusions. Electronically signed by: Lorna Maxwell Date:    11/02/2023 Time:    06:19    US Liver with Doppler (xpd)    Result Date: 10/26/2023  EXAMINATION: US LIVER WITH DOPPLER CLINICAL HISTORY: HRS; TECHNIQUE: Complete abdominal ultrasound with doppler.  Color and spectral Doppler were performed. COMPARISON: Ultrasound 10/13/2023 FINDINGS: The visualized portion of the pancreas is unremarkable. The liver is normal size measuring 15.3 cm.  The liver demonstrates diffusely hyperechoic parenchyma consistent with  steatosis.  HRI measures 1.9. Nodular hepatic contour suggestive for cirrhosis.  No focal hepatic lesion. The gallbladder contains prominent biliary sludge.  No wall thickening or hypervascularity.  No sonographic Castrejon sign reported.  The common bile duct is mildly dilated measuring 8-10 mm.  Mild intrahepatic biliary ductal dilatation. The spleen is normal in size measuring 7.8 x 2.7 cm with a homogeneous echotexture. Small volume ascites. Reversed flow throughout the main, right, and left portal veins.  Middle hepatic vein, right hepatic vein, left hepatic vein, SMV, and IVC are patent with proper directional flow.  The main hepatic artery is patent. The umbilical vein is not patent.  Left upper quadrant collaterals vessels noted. Bilateral pleural effusions.     1. Sequela of portal venous hypertension including reversed flow throughout the portal venous system, left upper quadrant collateral vessels, and ascites. 2. Hepatic steatosis.  Nodular hepatic contour suggestive for cirrhosis. 3. Gallbladder sludge.  No evidence of acute cholecystitis. 4. Mild intra and extrahepatic biliary ductal dilatation. 5. Bilateral pleural effusions. Electronically signed by: Simba Fisher Date:    10/26/2023 Time:    09:24    US Retroperitoneal Complete    Result Date: 10/26/2023  EXAMINATION: US RETROPERITONEAL COMPLETE CLINICAL HISTORY: patricia; TECHNIQUE: Ultrasound of the kidneys and urinary bladder was performed including color flow and Doppler evaluation of the kidneys. COMPARISON: CT 10/12/2023 FINDINGS: Right kidney: The right kidney measures 10.9 cm. No cortical thinning. No loss of corticomedullary distinction. Resistive index measures 0.76.  No mass. No renal stone. No hydronephrosis. Left kidney: The left kidney measures 10.8 cm. No cortical thinning. No loss of corticomedullary distinction. Resistive index measures 0.71.  No mass. No renal stone. No hydronephrosis. Bladder is decompressed by Tran catheter and not well  visualized. Abdominopelvic ascites.  Right pleural effusion.     No significant renal abnormality. Abdominopelvic ascites.  Right pleural effusion. Electronically signed by: Simba Fisher Date:    10/26/2023 Time:    09:15    Echo    Result Date: 10/25/2023    Left Ventricle: The left ventricle is normal in size. Normal wall thickness. Normal wall motion. There is normal systolic function with a visually estimated ejection fraction of 65 - 70%. There is normal diastolic function.   Right Ventricle: Normal right ventricular cavity size. Wall thickness is normal. Right ventricle wall motion  is normal. Systolic function is normal.   Mitral Valve: There is mild regurgitation.   Tricuspid Valve: There is mild regurgitation.   IVC/SVC: Elevated venous pressure at 15 mmHg.     X-Ray Chest 1 View    Result Date: 10/23/2023  EXAMINATION: XR CHEST 1 VIEW CLINICAL HISTORY: pleural effusion; TECHNIQUE: Single frontal view of the chest was performed. COMPARISON: 10/20/2023 FINDINGS: There are bilateral pleural effusions.  Findings are similar to the prior examination.  Some pulmonary edema seen.  The heart is normal appearance.  Aorta is unremarkable.  Bones and joints show no acute abnormality.     No significant change in the bilateral pleural effusions Electronically signed by: Dariana Linares Date:    10/23/2023 Time:    18:37    CT Chest Without Contrast    Result Date: 10/20/2023  EXAMINATION: CT CHEST WITHOUT CONTRAST CLINICAL HISTORY: Pneumonia, unresolved; TECHNIQUE: Helically acquired axial images, sagittal and coronal reformations were obtained from the thoracic inlet to the lung bases without the IV administration of contrast. Automated tube current modulation, weight-based exposure dosing, and/or iterative reconstruction technique utilized to reach lowest reasonably achievable exposure rate. DLP: 322 mGy*cm COMPARISON: Chest radiograph 10/20/2023, CT abdomen pelvis 10/12/2023 FINDINGS: BASE OF NECK: No  significant abnormality. AORTA: Left-sided aortic arch.  No aneurysm and no significant atherosclerosis HEART: Normal size. No effusion. JAZMINE/MEDIASTINUM: No enlarged lymph nodes by size criteria.  Evaluation of hilar lymphadenopathy is limited without intravenous contrast. AIRWAYS: Patent. LUNGS: Bibasilar opacities suggestive of atelectasis in the setting of pleural effusions but poorly characterized without contrast. PLEURA: Moderate dependently layering right pleural effusion.  Small dependently layering left pleural effusion. UPPER ABDOMEN: Free intraperitoneal fluid.  Venous collaterals in the left upper quadrant. THORACIC SOFT TISSUES: Body wall edema.  Bilateral breast implants. BONES: No acute fracture. No suspicious lytic or sclerotic lesions.     Dependently layering pleural effusions, right larger than left with adjacent airspace opacities, most commonly atelectasis but incompletely characterized without contrast.  On a prior CT exam 10/12/2023 there was enhancing atelectatic lung at both lung bases. Electronically signed by: Lorna Maxwell Date:    10/20/2023 Time:    18:25    X-Ray Chest 1 View    Result Date: 10/20/2023  EXAMINATION: XR CHEST 1 VIEW CLINICAL HISTORY: pneumonia; TECHNIQUE: Frontal view(s) of the chest. COMPARISON: Radiography 10/12/2023 FINDINGS: Small bilateral pleural effusions with adjacent hazy lung opacities.  Pleural fluid volume has increased since prior study.  No pneumothorax identified.  Stable cardiac silhouette.     Small bilateral pleural effusions are larger since 10/12/2023.  Adjacent opacities may be infiltrates or atelectasis. Electronically signed by: Naun Soler Date:    10/20/2023 Time:    11:15    US Abdomen Limited    Result Date: 10/20/2023  EXAMINATION: US ABDOMEN LIMITED CLINICAL HISTORY: cholecyctitis; COMPARISON: CT 12 October 2023. FINDINGS: Grayscale, color and spectral doppler evaluation of the right upper quadrant. The pancreas is obscured.  Imaged  portion of the IVC normal in caliber. Liver is not significantly enlarged.  There is heterogeneous hepatic parenchymal echogenicity.  No focal liver lesion.  The portal vein is patent but there is reversal of flow. No gallstones are seen.  Gallbladder wall is not significantly thickened.  Common bile duct is not well visualized, not grossly dilated. Right kidney measures 10 cm in length. No hydronephrosis. There is moderate ascites.     1. Moderate ascites. 2. Patent portal vein but there is reversal of flow with prominent portosystemic collaterals noted on CT. 3. No gallstones. Electronically signed by: Gildardo Tracy Date:    10/20/2023 Time:    11:13    US Paracentesis inc Imaging    Result Date: 10/13/2023  EXAMINATION: US GUIDED PARACENTESIS INC IMAGING CLINICAL HISTORY: new ascites; Attending: Carlos Enrique Irwin M.D. Anesthesia: Lidocaine utilized for local anesthesia. TECHNIQUE: After the risks, benefits and alternatives were discussed, consent was obtained. A time out was performed to verify the patient's identity and procedure. The patient was placed supine. Limited ultrasound the abdomen demonstrated ascitic fluid in the right lower quadrant. Static image was obtained. The lower quadrant was cleaned prepped in normal sterile fashion. Subcutaneous tissues were anesthetized with lidocaine solution. The Yueh needle was advanced into the abdominal cavity. Placement was confirmed by return of ascitic fluid. The catheter was advanced and a total of 0.75 liters of clear yellow fluid was aspirated. The patient tolerated the procedure well. There were no immediate complications. Estimated blood loss: None     Successful paracentesis. Electronically signed by: Carlos Enrique Irwin Date:    10/13/2023 Time:    12:19    US Liver with Doppler (xpd)    Result Date: 10/13/2023  EXAMINATION: US LIVER WITH DOPPLER CLINICAL HISTORY: Cirrhosis evaluation, Evaluation for hepatic, portal and splenic veins patency; TECHNIQUE: Right upper  quadrant abdominal ultrasound (including pancreas, aorta, liver, gallbladder, common bile duct, IVC, right kidney, and spleen) was performed.  Spectral Doppler evaluation performed. COMPARISON: CT abdomen pelvis 10/12/2023 FINDINGS: LIMITATIONS: Exam limited due to poor acoustic window related to shadowing bowel gas and/or body habitus. LIVER: Liver measures 15 cm cranial caudal at the midclavicular line. The liver is echogenic.  Nodular surface contour.  No discrete mass appreciable by sonographic evaluation. PANCREAS: Obscured by overlying bowel gas. GALLBLADDER: Not imaged BILE DUCTS: Common bile duct not imaged. RIGHT KIDNEY: Not imaged SPLEEN: 8 cm.  Normal in size with homogeneous echotexture. OTHER: There is ascites and bilateral pleural effusions. DOPPLER EVALUATION: AORTA: Not imaged HEPATIC ARTERY: Patent. Peak systolic velocity 173 cm/s.  Normal waveform. INFERIOR VENA CAVA: Not imaged.  IVC is patent on preceding CT exam. PORTAL VEINS: Portal vein is patent.  Hepatofugal flow.  Note on CT exam there were recanalized periumbilical veins, gastric varices and a large spleno renal shunt. SPLENIC VEIN: Patent with appropriate directional flow. HEPATIC VEINS: Unable to visualize.  Note on CT exam the right and left hepatic veins appeared compressed but patent.  Left hepatic vein was not well visualized.  This can be seen related to fibrotic changes of cirrhosis.     Cirrhotic morphology of the liver Changes of portal hypertension with reversed flow at the portal vein.  There were portosystemic collaterals on preceding CT exam Small pleural effusions and abdominal ascites Electronically signed by: Lorna Maxwell Date:    10/13/2023 Time:    10:26    CT Abdomen Pelvis With Contrast    Result Date: 10/12/2023  EXAMINATION: CT ABDOMEN PELVIS WITH CONTRAST CLINICAL HISTORY: Sepsis;Jaundiced; TECHNIQUE: Multidetector axial images were obtained of the abdomen and pelvis following the administration of IV contrast.  Oral contrast was not administered. Dose length product of 322 mGycm. Automated exposure control was utilized to minimize radiation dose. COMPARISON: None available FINDINGS: There is moderate volume right pleural effusion and right lower lung lobe dense consolidation.  There is also small left pleural and left basilar atelectasis.  Bilateral mammary implants. There is hepatic cirrhotic morphology with low attenuation and surface nodularity.  No focal hepatic space-occupying lesion.  There is intra-abdominopelvic small to moderate free fluid consistent with ascites.  Gallbladder is distended without intraluminal calcified calculus.  No significant dilatation of the intrahepatic biliary radicles and the extrahepatic duct is also not distended without calcified choledocholithiasis.  No no acute findings of the pancreas or the spleen. The adrenal glands appear within normal limits. The kidneys are unremarkable in size and contour. No solid or cystic renal lesion identified. There is no hydronephrosis. No perinephric fluid strandings or collections identified. Stomach is mostly decompressed and difficult to assess.  There is suspected mild mural thickening of the loops of small bowel suspect for enteritis.  No transition or bowel obstruction.  Colon is nondistended.  Noninflamed diverticulosis coli.  No pneumoperitoneum. Urinary bladder appears within normal limits.  Small volume uterus.  Endometrium is not well delineated.  There is no pelvic free fluid. Lower lumbar degenerative changes.  No acute or aggressive skeletal abnormality no acute or otherwise osseous abnormality identified.     1. Right pleural effusion and right lower lung lobe consolidation. 2. Hepatic cirrhotic morphology and ascites. 3. Mural thickening of small bowel loops suggest nonspecific enteritis. Electronically signed by: Kevyn Abebe Date:    10/12/2023 Time:    19:33    CT Head Without Contrast    Result Date: 10/12/2023  EXAMINATION: CT HEAD  WITHOUT CONTRAST CLINICAL HISTORY: Infusion; TECHNIQUE: Sequential axial images were performed of the brain without contrast. Dose product length of 884 mGycm. Automated exposure control was utilized to minimize radiation dose. COMPARISON: February 8, 2022. FINDINGS: There is no intracranial mass effect, midline shift, hydrocephalus or hemorrhage. There is no sulcal effacement or low attenuation changes to suggest recent large vessel territory infarction. Chronic appearing periventricular and subcortical white matter low attenuation changes are present and are consistent with chronic microangiopathic ischemia.  Beneath left craniotomy is similar appearing dural thickening.  No acute extra-axial fluid collection identified.  The ventricular system and sulcal markings prominence is consistent with atrophy more advanced for the age.  There is no acute extra axial fluid collection.  Similar mucoperiosteal thickening of the right maxillary sinus.  Paranasal sinuses are clear without mucosal thickening, polypoidal abnormality or air-fluid levels. Mastoid air cells aeration is optimal.     No acute intracranial findings identified. Electronically signed by: Kevyn Abebe Date:    10/12/2023 Time:    19:26    X-Ray Chest AP Portable    Result Date: 10/12/2023  EXAMINATION: XR CHEST AP PORTABLE CLINICAL HISTORY: Hypotension; TECHNIQUE: Single frontal view of the chest was performed. COMPARISON: 12/12/2022 FINDINGS: There is a infiltrate developing in the right lower lobe.  There is a small right-sided pleural effusion.  There is a patchy infiltrate in the left lower lobe.  The heart is normal in appearance.  Bones and joints show no acute abnormality.     Infiltrate developing in the right lower lobe with a small right-sided pleural effusion Patchy appearing infiltrate developing in the left lower lobe Electronically signed by: Dariana Linares Date:    10/12/2023 Time:    17:53         Assessment/Plan:    She is  established with hepatology at [facility]- seen inpatient by Dr. 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 Patient states this is an improvement         Noble Pennington MD    11/6/23  Patient was seen earlier today. Son was at the bedside. Clinically feeling better. No overt GI bleed. Needs caution with Lovenox. On Lactulose, Midodrine and Lasix. Patient would need f/u with [facility] GI clinic on discharge. Monitor hg/hct, electrolytes, renal function periodically. Endoscopic procedures on hold for now.

## 2023-11-07 NOTE — PROGRESS NOTES
[facility]  Hospital Medicine Progress Note        Chief Complaint: Inpatient Follow-up for volume overload/ alcoholic cirrhosis     HPI: Lauren Ewing is a 63 y.o. female with a past medical history of cirrhosis, alcohol abuse, hypothyroidism, anxiety, and depression presented to [facility] on 11/1/2023 for lower extremity swelling.  Patient reported worsening lower extremity swelling in addition to shortness of breath and orthopnea since discharge from [facility] in [place] on 10/28/2023. Patient initially presented to [facility] ED on 10/12/2023 with chief complaint of progressive weakness x 1 week with  yellowing of her skin and decreased appetite.  Workup revealed newly found cirrhosis with ascites, significant hyponatremia,  and DIANA. Patient was transferred to [facility] for hepatology consultation.  Patient was not deemed a liver transplant candidate at this time secondary to alcohol abuse. Patient was discharged and recommended close follow-up with PCP, hepatology, and nephrology.  On exam patient also endorsed dry cough, chills, and low-grade fever with T-max of 100.2° F. Patient denied chest pain,  rectal bleeding, dark stools, dysuria, hematuria, abdominal pain, diarrhea, and vomiting.    Initial vital signs in ED were /65, pulse 104, respirations 20, temperature 37.2° C, and SpO2 99% on room air.  Labs revealed WBC 11.87, RBC 3.08, hemoglobin 9.4, hematocrit 28, sodium 134, calcium 7.9, protein total 5.1, bilirubin 9.1, direct bilirubin 5, AST 64, ALT 29, .7, and lactic acid 1.8.  Blood cultures were obtained.  Chest x-ray revealed small layering pleural effusions. Patient was admitted to hospital medicine service for further medical management.     Interval Hx:   Sitting in bed, reports feels good.  No complaints  Discussed hemoglobin stable and case was discussed with GI and they are recommending outpatient endoscopy at [facility] once more strong post rehab  No  family at bedside  Case was discussed with patient's nurse and  on the floor    Objective/physical exam:  General: In no acute distress, afebrile, +Jaundice, scleral icterus   Chest: Bibasilar crackles   Heart: RRR, +S1, S2, no appreciable murmur  Abdomen: Soft, nontender, BS +  MSK: Bilateral lower extremity 3+ soft pitting edema  Neurologic: Alert and oriented x4, Cranial nerve II-XII intact, able to move all 4 extremities without difficulty    VITAL SIGNS: 24 HRS MIN & MAX LAST   Temp  Min: 98 °F (36.7 °C)  Max: 98.6 °F (37 °C) 98 °F (36.7 °C)   BP  Min: 102/65  Max: 111/69 111/69   Pulse  Min: 89  Max: 98  89   Resp  Min: 18  Max: 20 18   SpO2  Min: 93 %  Max: 95 % 95 %     I reviewed the labs below:  Recent Labs   Lab 11/04/23 0328 11/06/23  0502 11/06/23  0932 11/07/23  0548   WBC 11.38 11.91*  --  10.37   RBC 2.90* 2.59*  --  2.72*   HGB 9.0* 7.9* 8.8* 8.1*   HCT 26.1* 23.2* 26.4* 25.3*   MCV 90.0 89.6  --  93.0   MCH 31.0 30.5  --  29.8   MCHC 34.5 34.1  --  32.0*   RDW 21.4* 21.1*  --  20.9*   * 158  --  173   MPV 9.9 9.9  --  10.2       Recent Labs   Lab 11/01/23 2015 11/03/23  0539 11/04/23  0328 11/05/23  0608 11/06/23  0502 11/07/23  0548   * 134* 132* 133* 135* 132*   K 4.1 3.7 3.1* 3.6 3.3* 3.6   CO2 24 24 23 25 26 27   BUN 8.1* 9.0* 7.7* 7.5* 7.1* 6.7*   CREATININE 0.83 0.84 0.77 0.79 0.77 0.75   CALCIUM 7.9* 7.3* 7.5* 7.2* 7.8* 7.5*   MG 1.90 2.10  --   --  1.90  --    ALBUMIN 2.8* 2.6* 3.1* 2.7* 2.6*  --    ALKPHOS 101 98 99 90 88  --    ALT 29 24 22 19 18  --    AST 64* 54* 54* 47* 49*  --    BILITOT 9.1* 7.9* 9.0* 8.2* 8.4*  --        Microbiology Results (last 7 days)       Procedure Component Value Units Date/Time    Blood culture #1 **CANNOT BE ORDERED STAT** [[id number]]  (Normal) Collected: 11/01/23 2016    Order Status: Completed Specimen: Blood from Antecubital, Right Updated: 11/06/23 2300     CULTURE, BLOOD ([facility]) No Growth at 5 days    Blood culture #2 **CANNOT  BE ORDERED STAT** [[id number]]  (Normal) Collected: 11/01/23 2016    Order Status: Completed Specimen: Blood from Arm, Right Updated: 11/06/23 2300     CULTURE, BLOOD ([facility]) No Growth at 5 days    Blood Culture [[id number]]     Order Status: Canceled Specimen: Blood from Arm, Left              See below for Radiology    Scheduled Med:   enoxparin  40 mg Subcutaneous Q24H (prophylaxis, 1700)    furosemide (LASIX) injection  40 mg Intravenous Daily    lactulose 10 gram/15 ml  10 g Oral TID    levothyroxine  100 mcg Oral Before breakfast    midodrine  10 mg Oral Q8H    multivitamin  1 tablet Oral Daily    pantoprazole  40 mg Intravenous Daily    thiamine  100 mg Oral Daily      Continuous Infusions:     PRN Meds:  dextrose 10%, dextrose 10%, glucagon (human recombinant), glucose, glucose, ondansetron, traMADoL     Assessment/Plan:  Volume overload- slowly improving  EtOH ESLD - quit EtOH about 5 weeks prior to admission  Hyperbilirubinemia, elevated AST due to above  Normocytic anemia  Hyponatremia, mild- stable  History of end stage liver disease, hypothyroidism, depression and anxiety  Tachycardia - resolved  Mild hypokalemia       Chest x-ray-small layering pleural effusions.    10/25/2023- Echo- Normal systolic function with a visually estimated ejection fraction of 65-70%, normal diastolic function, and mild mitral/tricuspid valve regurgitation  B.L LE Venous US shows no DVT  Nephrology has now signed off, appreciate recs, completed albumin 12.5 g IV BID for 2 days (on 11/3), to continue lasix 40 IV daily (switched to 40 mg p.o. daily on discharge), midodrine 10 mg TID, recommended careful diuresing with her recent h/o volume depletion DIANA.   Advised patient to avoid high salt to prevent worsening of her swelling  Continue lactulose t.i.d.  Daily weights   Strict intake/output   Noted hemoglobin trickle down, repeat H&H.  Low but stable  GI also on board, quit alcohol 5 weeks ago, MELD 25; Child Mcgee Class C- she  is established with [facility]- not a candidate for transplant at this time  Per GI note, patient will eventually need EGD for Esophageal varices surveillance once more stable, reached out to GI, recommended outpatient EGD at [facility] once more stable and strong, GI aware of hemoglobin trend  AFP 2.1, within normal range  T bili 7.9--> 9.0--> 8.2--> 8.4  AST/ALT trending down 54/22--> 47/19--> 49/18  Abdominal u/s done to quantify ascites- limited assessment, small volume ascites with hepatofugal flow in the main portal vein  Leukocytosis now resolved- 11 K--> 12K--> 11k (normalized)--> 11k  Blood cultures x2--> no growth at day 5  Covid/flu - negative  Potassium 3.6 with replacement  Mag 1.9, within normal range  Continue appropriate home medications for chronic med conditions   Morning CBC, BMP ordered    VTE Prophylaxis: Lovenox     Patient condition:  Guarded    Anticipated discharge and Disposition:   SNF, awaiting acceptance, medically cleared for transfer    Critical care note:  Critical care diagnosis:  Fluid overload on IV Lasix pushes  Critical care interventions: Hands-on evaluation, review of labs/radiographs/records and discussion with patient and family if present  Critical care time spent: 35 minutes        All diagnosis and differential diagnosis have been reviewed; assessment and plan has been documented; I have personally reviewed the labs and test results that are presently available; I have reviewed the patients medication list; I have reviewed the consulting providers response and recommendations. I have reviewed or attempted to review medical records based upon their availability    All of the patient's questions have been  addressed and answered. Patient's is agreeable to the above stated plan. I will continue to monitor closely and make adjustments to medical management as needed.  _____________________________________________________________________    Nutrition  Status:    Radiology:   I have personally reviewed the images and agree with radiologist report  US Abdomen Limited  Narrative: EXAMINATION:  US ABDOMEN LIMITED    CLINICAL HISTORY:  cirrhosis, quantify ascites, evaluate for portal htn;    TECHNIQUE:  Gray-scale and color Doppler ultrasound images of the abdomen.    COMPARISON:  Ultrasound 10/26/2023    FINDINGS:  Challenging exam due to bowel gas.  Obscured pancreatic body and tail.  Abdominal aorta largely obscured.  Normal caliber of the superior IVC.    Liver has normal size with increased overall hepatic echogenicity.  Limited assessment of the hepatic vasculature, but main portal vein has hepatofugal flow.  Obscured gallbladder.  No biliary dilatation appreciated.  No right-sided hydronephrosis.  Right pleural effusion.  Small volume ascites.    Measurements:    - Liver: 15.7 cm    - CBD diameter: 3 mm    - Right kidney: 10.6 cm in length  Impression: Limited assessment.  Small volume ascites with hepatofugal flow in the main portal vein.    Electronically signed by: Naun Soler  Date:    11/03/2023  Time:    16:09      Jhon Mata MD  Department of Hospital Medicine   [facility]   11/07/2023

## 2023-11-08 LAB
ANION GAP SERPL CALC-SCNC: 8 MEQ/L
BUN SERPL-MCNC: 5.9 MG/DL (ref 9.8–20.1)
CALCIUM SERPL-MCNC: 7.6 MG/DL (ref 8.4–10.2)
CHLORIDE SERPL-SCNC: 98 MMOL/L (ref 98–107)
CO2 SERPL-SCNC: 28 MMOL/L (ref 23–31)
CREAT SERPL-MCNC: 0.74 MG/DL (ref 0.55–1.02)
CREAT/UREA NIT SERPL: 8
ERYTHROCYTE [DISTWIDTH] IN BLOOD BY AUTOMATED COUNT: 19.8 % (ref 11.5–17)
GFR SERPLBLD CREATININE-BSD FMLA CKD-EPI: >60 MLS/MIN/1.73/M2
GLUCOSE SERPL-MCNC: 87 MG/DL (ref 82–115)
HCT VFR BLD AUTO: 24.2 % (ref 37–47)
HGB BLD-MCNC: 8.2 G/DL (ref 12–16)
MCH RBC QN AUTO: 30.7 PG (ref 27–31)
MCHC RBC AUTO-ENTMCNC: 33.9 G/DL (ref 33–36)
MCV RBC AUTO: 90.6 FL (ref 80–94)
NRBC BLD AUTO-RTO: 0 %
PLATELET # BLD AUTO: 199 X10(3)/MCL (ref 130–400)
PMV BLD AUTO: 9.8 FL (ref 7.4–10.4)
POCT GLUCOSE: 112 MG/DL (ref 70–110)
POCT GLUCOSE: 98 MG/DL (ref 70–110)
POTASSIUM SERPL-SCNC: 3.4 MMOL/L (ref 3.5–5.1)
RBC # BLD AUTO: 2.67 X10(6)/MCL (ref 4.2–5.4)
SODIUM SERPL-SCNC: 134 MMOL/L (ref 136–145)
WBC # SPEC AUTO: 9.5 X10(3)/MCL (ref 4.5–11.5)

## 2023-11-08 PROCEDURE — 25000003 PHARM REV CODE 250: Performed by: INTERNAL MEDICINE

## 2023-11-08 PROCEDURE — C9113 INJ PANTOPRAZOLE SODIUM, VIA: HCPCS | Performed by: INTERNAL MEDICINE

## 2023-11-08 PROCEDURE — 63600175 PHARM REV CODE 636 W HCPCS: Performed by: PHYSICIAN ASSISTANT

## 2023-11-08 PROCEDURE — 21400001 HC TELEMETRY ROOM

## 2023-11-08 PROCEDURE — 85027 COMPLETE CBC AUTOMATED: CPT | Performed by: INTERNAL MEDICINE

## 2023-11-08 PROCEDURE — 80048 BASIC METABOLIC PNL TOTAL CA: CPT | Performed by: INTERNAL MEDICINE

## 2023-11-08 PROCEDURE — 63600175 PHARM REV CODE 636 W HCPCS: Performed by: INTERNAL MEDICINE

## 2023-11-08 RX ORDER — FUROSEMIDE 10 MG/ML
40 INJECTION INTRAMUSCULAR; INTRAVENOUS
Status: DISCONTINUED | OUTPATIENT
Start: 2023-11-08 | End: 2023-11-09

## 2023-11-08 RX ORDER — PANTOPRAZOLE SODIUM 40 MG/1
40 TABLET, DELAYED RELEASE ORAL DAILY
Status: DISCONTINUED | OUTPATIENT
Start: 2023-11-08 | End: 2023-11-13 | Stop reason: HOSPADM

## 2023-11-08 RX ADMIN — FUROSEMIDE 40 MG: 10 INJECTION, SOLUTION INTRAMUSCULAR; INTRAVENOUS at 09:11

## 2023-11-08 RX ADMIN — LEVOTHYROXINE SODIUM 100 MCG: 100 TABLET ORAL at 05:11

## 2023-11-08 RX ADMIN — MIDODRINE HYDROCHLORIDE 10 MG: 5 TABLET ORAL at 09:11

## 2023-11-08 RX ADMIN — THIAMINE HCL TAB 100 MG 100 MG: 100 TAB at 09:11

## 2023-11-08 RX ADMIN — THERA TABS 1 TABLET: TAB at 09:11

## 2023-11-08 RX ADMIN — PANTOPRAZOLE SODIUM 40 MG: 40 INJECTION, POWDER, FOR SOLUTION INTRAVENOUS at 09:11

## 2023-11-08 RX ADMIN — LACTULOSE 10 G: 10 SOLUTION ORAL at 09:11

## 2023-11-08 RX ADMIN — MIDODRINE HYDROCHLORIDE 10 MG: 5 TABLET ORAL at 05:11

## 2023-11-08 RX ADMIN — POTASSIUM BICARBONATE 50 MEQ: 977.5 TABLET, EFFERVESCENT ORAL at 04:11

## 2023-11-08 RX ADMIN — MIDODRINE HYDROCHLORIDE 10 MG: 5 TABLET ORAL at 04:11

## 2023-11-08 RX ADMIN — LACTULOSE 10 G: 10 SOLUTION ORAL at 04:11

## 2023-11-08 RX ADMIN — ENOXAPARIN SODIUM 40 MG: 40 INJECTION SUBCUTANEOUS at 04:11

## 2023-11-08 NOTE — PROGRESS NOTES
"Inpatient Nutrition Evaluation    Admit Date: 11/1/2023   Total duration of encounter: 7 days    Nutrition Recommendation/Prescription     -Add low sodium diet restriction to diet order.   -Encourage Boost Plus TID for additional nourishment; provides an additional 360 kcal and 14 gm protein per container.   -Monitor wt, labs, and intake.     Nutrition Assessment     Chart Review    Reason Seen: continuous nutrition monitoring and follow-up    Malnutrition Screening Tool Results   Have you recently lost weight without trying?: No  Have you been eating poorly because of a decreased appetite?: No   MST Score: 0     Diagnosis:  Edema  HypoNa,  EtOH ESLD - quit EtOH about 5 weeks ago  Hypothyroid  Pleural effusions   SOB  Fever    Relevant Medical History: none other noted    Nutrition-Related Medications: furosemide, lactulose TID, MVI, Kcl, Thiamine    Nutrition-Related Labs:  11/3/23: Na 134, phos 2.2, Glu 143, GFR>60  11/8/23: Na 134, K 3.4, Glu 87, GFR>60    Diet Order: Diet Adult Regular  Oral Supplement Order: Boost Plus  Appetite/Oral Intake: fair/50-75% of meals  Factors Affecting Nutritional Intake:  does not care for hospital food  Food/Alevism/Cultural Preferences: unable to obtain  Food Allergies: no known food allergies    Skin Integrity: intact  Wound(s):   altered skin integrity; right lateral lower quadrant puncture    Comments    11/3/23: Pt asleep during rounds and not responding to RD questions; noted ~ half of breakfast consumed; noted weight gain per EMR weights; most likely 2/2 fluid shifts as pt is on lasix; will continue to monitor and f/u early with pt.     11/8/23: Pt reports fair intake; tired of hospital food; denies n/v; states that she will try to start drinking some of the Boost.     Anthropometrics    Height: 5' 4" (162.6 cm) Height Method: Stated  Last Weight: 69.4 kg (153 lb) (11/08/23 0558) Weight Method: Bed Scale  BMI (Calculated): 26.2  BMI Classification: overweight (BMI " 25-29.9)     Ideal Body Weight (IBW), Female: 120 lb     % Ideal Body Weight, Female (lb): 108.33 %                             Usual Weight Provided By: EMR weight history    Wt Readings from Last 5 Encounters:   11/08/23 69.4 kg (153 lb)   10/13/23 66 kg (145 lb 8.1 oz)   12/12/22 65.8 kg (145 lb)     Weight Change(s) Since Admission:  Admit Weight: 59 kg (130 lb) (11/01/23 1953)  11/8/23: 69.4 kg    Patient Education    Not applicable.    Monitoring & Evaluation     Dietitian will monitor energy intake and weight change.  Nutrition Risk/Follow-Up: low (follow-up in 5-7 days)  Patients assigned 'low nutrition risk' status do not qualify for a full nutritional assessment but will be monitored and re-evaluated in a 5-7 day time period. Please consult if re-evaluation needed sooner.

## 2023-11-08 NOTE — PROGRESS NOTES
"Gastroenterology Progress Note    Subjective/Interval History:  She is feeling good today. In good spirits. Lower ext swelling is improved but still present. Improvement in VAZ/SOB    Abdomen is soft without any notable increase in ascites    Abdominal u/s report still pending    ROS:  Review of Systems   Constitutional:  Negative for chills and fever.   HENT:  Negative for hearing loss.    Eyes:  Negative for blurred vision.   Respiratory:  Positive for cough and shortness of breath. Negative for hemoptysis, sputum production and wheezing.    Cardiovascular:  Positive for leg swelling. Negative for chest pain and palpitations.   Gastrointestinal:  Negative for abdominal pain, blood in stool, heartburn, nausea and vomiting.   Skin:  Negative for rash.   Neurological:  Positive for weakness. Negative for dizziness and headaches.   Psychiatric/Behavioral:  Negative for depression and memory loss. The patient is not nervous/anxious.        Vital Signs:  /72   Pulse 99   Temp 98.6 °F (37 °C) (Oral)   Resp 18   Ht 5' 4" (1.626 m)   Wt 71.3 kg (157 lb 3 oz)   SpO2 95%   BMI 26.98 kg/m²   Body mass index is 26.98 kg/m².    Physical Exam:  Physical Exam  Constitutional:       General: She is not in acute distress.     Appearance: She is ill-appearing.   HENT:      Head: Normocephalic.      Mouth/Throat:      Mouth: Mucous membranes are moist.      Pharynx: Oropharynx is clear.   Eyes:      General: Scleral icterus present.      Extraocular Movements: Extraocular movements intact.      Pupils: Pupils are equal, round, and reactive to light.   Cardiovascular:      Rate and Rhythm: Normal rate and regular rhythm.      Pulses: Normal pulses.      Heart sounds: Normal heart sounds. No murmur heard.  Pulmonary:      Breath sounds: Normal breath sounds. No wheezing or rhonchi.      Comments: VAZ, Increased work of breathing while lying down in bed and talking  Abdominal:      General: Bowel sounds are normal. There is " no distension.      Palpations: Abdomen is soft.      Tenderness: There is no abdominal tenderness. There is no guarding.   Musculoskeletal:      Right lower leg: Edema present.      Left lower leg: Edema present.   Skin:     General: Skin is warm and dry.      Coloration: Skin is jaundiced.   Neurological:      Mental Status: She is alert and oriented to person, place, and time.      Motor: Weakness present.   Psychiatric:         Mood and Affect: Mood normal.         Behavior: Behavior normal.         Labs:  Recent Results (from the past 48 hour(s))   CBC Without Differential    Collection Time: 11/06/23  5:02 AM   Result Value Ref Range    WBC 11.91 (H) 4.50 - 11.50 x10(3)/mcL    RBC 2.59 (L) 4.20 - 5.40 x10(6)/mcL    Hgb 7.9 (L) 12.0 - 16.0 g/dL    Hct 23.2 (L) 37.0 - 47.0 %    MCV 89.6 80.0 - 94.0 fL    MCH 30.5 27.0 - 31.0 pg    MCHC 34.1 33.0 - 36.0 g/dL    RDW 21.1 (H) 11.5 - 17.0 %    Platelet 158 130 - 400 x10(3)/mcL    MPV 9.9 7.4 - 10.4 fL    NRBC% 0.0 %   Comprehensive Metabolic Panel    Collection Time: 11/06/23  5:02 AM   Result Value Ref Range    Sodium Level 135 (L) 136 - 145 mmol/L    Potassium Level 3.3 (L) 3.5 - 5.1 mmol/L    Chloride 101 98 - 107 mmol/L    Carbon Dioxide 26 23 - 31 mmol/L    Glucose Level 76 (L) 82 - 115 mg/dL    Blood Urea Nitrogen 7.1 (L) 9.8 - 20.1 mg/dL    Creatinine 0.77 0.55 - 1.02 mg/dL    Calcium Level Total 7.8 (L) 8.4 - 10.2 mg/dL    Protein Total 4.3 (L) 5.8 - 7.6 gm/dL    Albumin Level 2.6 (L) 3.4 - 4.8 g/dL    Globulin 1.7 (L) 2.4 - 3.5 gm/dL    Albumin/Globulin Ratio 1.5 1.1 - 2.0 ratio    Bilirubin Total 8.4 (H) <=1.5 mg/dL    Alkaline Phosphatase 88 40 - 150 unit/L    Alanine Aminotransferase 18 0 - 55 unit/L    Aspartate Aminotransferase 49 (H) 5 - 34 unit/L    eGFR >60 mls/min/1.73/m2   Magnesium    Collection Time: 11/06/23  5:02 AM   Result Value Ref Range    Magnesium Level 1.90 1.60 - 2.60 mg/dL   Hemoglobin and Hematocrit    Collection Time: 11/06/23  9:32  AM   Result Value Ref Range    Hgb 8.8 (L) 12.0 - 16.0 g/dL    Hct 26.4 (L) 37.0 - 47.0 %   CBC Without Differential    Collection Time: 11/07/23  5:48 AM   Result Value Ref Range    WBC 10.37 4.50 - 11.50 x10(3)/mcL    RBC 2.72 (L) 4.20 - 5.40 x10(6)/mcL    Hgb 8.1 (L) 12.0 - 16.0 g/dL    Hct 25.3 (L) 37.0 - 47.0 %    MCV 93.0 80.0 - 94.0 fL    MCH 29.8 27.0 - 31.0 pg    MCHC 32.0 (L) 33.0 - 36.0 g/dL    RDW 20.9 (H) 11.5 - 17.0 %    Platelet 173 130 - 400 x10(3)/mcL    MPV 10.2 7.4 - 10.4 fL    NRBC% 0.0 %   Basic Metabolic Panel    Collection Time: 11/07/23  5:48 AM   Result Value Ref Range    Sodium Level 132 (L) 136 - 145 mmol/L    Potassium Level 3.6 3.5 - 5.1 mmol/L    Chloride 99 98 - 107 mmol/L    Carbon Dioxide 27 23 - 31 mmol/L    Glucose Level 75 (L) 82 - 115 mg/dL    Blood Urea Nitrogen 6.7 (L) 9.8 - 20.1 mg/dL    Creatinine 0.75 0.55 - 1.02 mg/dL    BUN/Creatinine Ratio 9     Calcium Level Total 7.5 (L) 8.4 - 10.2 mg/dL    Anion Gap 6.0 mEq/L    eGFR >60 mls/min/1.73/m2       Imaging:  X-Ray Chest 1 View    Result Date: 11/2/2023  EXAMINATION: XR CHEST 1 VIEW CLINICAL HISTORY: shortness of breath; TECHNIQUE: Single frontal view of the chest was performed. COMPARISON: 10/23/2023 FINDINGS: LINES AND TUBES: EKG/telemetry leads overlie the chest. MEDIASTINUM AND JAZMINE: The cardiac silhouette is normal. LUNGS: Probable basilar atelectasis in the setting of effusions. PLEURA:Small pleural effusions layering dependently.No pneumothorax. OTHER: No acute osseous abnormality.     Small layering pleural effusions. Electronically signed by: Lorna Maxwell Date:    11/02/2023 Time:    06:19    US Liver with Doppler (xpd)    Result Date: 10/26/2023  EXAMINATION: US LIVER WITH DOPPLER CLINICAL HISTORY: HRS; TECHNIQUE: Complete abdominal ultrasound with doppler.  Color and spectral Doppler were performed. COMPARISON: Ultrasound 10/13/2023 FINDINGS: The visualized portion of the pancreas is unremarkable. The liver is  normal size measuring 15.3 cm.  The liver demonstrates diffusely hyperechoic parenchyma consistent with steatosis.  HRI measures 1.9. Nodular hepatic contour suggestive for cirrhosis.  No focal hepatic lesion. The gallbladder contains prominent biliary sludge.  No wall thickening or hypervascularity.  No sonographic Castrejon sign reported.  The common bile duct is mildly dilated measuring 8-10 mm.  Mild intrahepatic biliary ductal dilatation. The spleen is normal in size measuring 7.8 x 2.7 cm with a homogeneous echotexture. Small volume ascites. Reversed flow throughout the main, right, and left portal veins.  Middle hepatic vein, right hepatic vein, left hepatic vein, SMV, and IVC are patent with proper directional flow.  The main hepatic artery is patent. The umbilical vein is not patent.  Left upper quadrant collaterals vessels noted. Bilateral pleural effusions.     1. Sequela of portal venous hypertension including reversed flow throughout the portal venous system, left upper quadrant collateral vessels, and ascites. 2. Hepatic steatosis.  Nodular hepatic contour suggestive for cirrhosis. 3. Gallbladder sludge.  No evidence of acute cholecystitis. 4. Mild intra and extrahepatic biliary ductal dilatation. 5. Bilateral pleural effusions. Electronically signed by: Simba Fisher Date:    10/26/2023 Time:    09:24    US Retroperitoneal Complete    Result Date: 10/26/2023  EXAMINATION: US RETROPERITONEAL COMPLETE CLINICAL HISTORY: patricia; TECHNIQUE: Ultrasound of the kidneys and urinary bladder was performed including color flow and Doppler evaluation of the kidneys. COMPARISON: CT 10/12/2023 FINDINGS: Right kidney: The right kidney measures 10.9 cm. No cortical thinning. No loss of corticomedullary distinction. Resistive index measures 0.76.  No mass. No renal stone. No hydronephrosis. Left kidney: The left kidney measures 10.8 cm. No cortical thinning. No loss of corticomedullary distinction. Resistive index measures  0.71.  No mass. No renal stone. No hydronephrosis. Bladder is decompressed by Tran catheter and not well visualized. Abdominopelvic ascites.  Right pleural effusion.     No significant renal abnormality. Abdominopelvic ascites.  Right pleural effusion. Electronically signed by: Simba Fisher Date:    10/26/2023 Time:    09:15    Echo    Result Date: 10/25/2023    Left Ventricle: The left ventricle is normal in size. Normal wall thickness. Normal wall motion. There is normal systolic function with a visually estimated ejection fraction of 65 - 70%. There is normal diastolic function.   Right Ventricle: Normal right ventricular cavity size. Wall thickness is normal. Right ventricle wall motion  is normal. Systolic function is normal.   Mitral Valve: There is mild regurgitation.   Tricuspid Valve: There is mild regurgitation.   IVC/SVC: Elevated venous pressure at 15 mmHg.     X-Ray Chest 1 View    Result Date: 10/23/2023  EXAMINATION: XR CHEST 1 VIEW CLINICAL HISTORY: pleural effusion; TECHNIQUE: Single frontal view of the chest was performed. COMPARISON: 10/20/2023 FINDINGS: There are bilateral pleural effusions.  Findings are similar to the prior examination.  Some pulmonary edema seen.  The heart is normal appearance.  Aorta is unremarkable.  Bones and joints show no acute abnormality.     No significant change in the bilateral pleural effusions Electronically signed by: Dariana Linares Date:    10/23/2023 Time:    18:37    CT Chest Without Contrast    Result Date: 10/20/2023  EXAMINATION: CT CHEST WITHOUT CONTRAST CLINICAL HISTORY: Pneumonia, unresolved; TECHNIQUE: Helically acquired axial images, sagittal and coronal reformations were obtained from the thoracic inlet to the lung bases without the IV administration of contrast. Automated tube current modulation, weight-based exposure dosing, and/or iterative reconstruction technique utilized to reach lowest reasonably achievable exposure rate. DLP: 322 mGy*cm  COMPARISON: Chest radiograph 10/20/2023, CT abdomen pelvis 10/12/2023 FINDINGS: BASE OF NECK: No significant abnormality. AORTA: Left-sided aortic arch.  No aneurysm and no significant atherosclerosis HEART: Normal size. No effusion. JAZMINE/MEDIASTINUM: No enlarged lymph nodes by size criteria.  Evaluation of hilar lymphadenopathy is limited without intravenous contrast. AIRWAYS: Patent. LUNGS: Bibasilar opacities suggestive of atelectasis in the setting of pleural effusions but poorly characterized without contrast. PLEURA: Moderate dependently layering right pleural effusion.  Small dependently layering left pleural effusion. UPPER ABDOMEN: Free intraperitoneal fluid.  Venous collaterals in the left upper quadrant. THORACIC SOFT TISSUES: Body wall edema.  Bilateral breast implants. BONES: No acute fracture. No suspicious lytic or sclerotic lesions.     Dependently layering pleural effusions, right larger than left with adjacent airspace opacities, most commonly atelectasis but incompletely characterized without contrast.  On a prior CT exam 10/12/2023 there was enhancing atelectatic lung at both lung bases. Electronically signed by: Lorna Maxwell Date:    10/20/2023 Time:    18:25    X-Ray Chest 1 View    Result Date: 10/20/2023  EXAMINATION: XR CHEST 1 VIEW CLINICAL HISTORY: pneumonia; TECHNIQUE: Frontal view(s) of the chest. COMPARISON: Radiography 10/12/2023 FINDINGS: Small bilateral pleural effusions with adjacent hazy lung opacities.  Pleural fluid volume has increased since prior study.  No pneumothorax identified.  Stable cardiac silhouette.     Small bilateral pleural effusions are larger since 10/12/2023.  Adjacent opacities may be infiltrates or atelectasis. Electronically signed by: Naun Soler Date:    10/20/2023 Time:    11:15    US Abdomen Limited    Result Date: 10/20/2023  EXAMINATION: US ABDOMEN LIMITED CLINICAL HISTORY: cholecyctitis; COMPARISON: CT 12 October 2023. FINDINGS: Grayscale, color  and spectral doppler evaluation of the right upper quadrant. The pancreas is obscured.  Imaged portion of the IVC normal in caliber. Liver is not significantly enlarged.  There is heterogeneous hepatic parenchymal echogenicity.  No focal liver lesion.  The portal vein is patent but there is reversal of flow. No gallstones are seen.  Gallbladder wall is not significantly thickened.  Common bile duct is not well visualized, not grossly dilated. Right kidney measures 10 cm in length. No hydronephrosis. There is moderate ascites.     1. Moderate ascites. 2. Patent portal vein but there is reversal of flow with prominent portosystemic collaterals noted on CT. 3. No gallstones. Electronically signed by: Gildardo Tracy Date:    10/20/2023 Time:    11:13    US Paracentesis inc Imaging    Result Date: 10/13/2023  EXAMINATION: US GUIDED PARACENTESIS INC IMAGING CLINICAL HISTORY: new ascites; Attending: Carlos Enrique Irwin M.D. Anesthesia: Lidocaine utilized for local anesthesia. TECHNIQUE: After the risks, benefits and alternatives were discussed, consent was obtained. A time out was performed to verify the patient's identity and procedure. The patient was placed supine. Limited ultrasound the abdomen demonstrated ascitic fluid in the right lower quadrant. Static image was obtained. The lower quadrant was cleaned prepped in normal sterile fashion. Subcutaneous tissues were anesthetized with lidocaine solution. The Yueh needle was advanced into the abdominal cavity. Placement was confirmed by return of ascitic fluid. The catheter was advanced and a total of 0.75 liters of clear yellow fluid was aspirated. The patient tolerated the procedure well. There were no immediate complications. Estimated blood loss: None     Successful paracentesis. Electronically signed by: Carlos Enrique Irwin Date:    10/13/2023 Time:    12:19    US Liver with Doppler (xpd)    Result Date: 10/13/2023  EXAMINATION: US LIVER WITH DOPPLER CLINICAL HISTORY: Cirrhosis  evaluation, Evaluation for hepatic, portal and splenic veins patency; TECHNIQUE: Right upper quadrant abdominal ultrasound (including pancreas, aorta, liver, gallbladder, common bile duct, IVC, right kidney, and spleen) was performed.  Spectral Doppler evaluation performed. COMPARISON: CT abdomen pelvis 10/12/2023 FINDINGS: LIMITATIONS: Exam limited due to poor acoustic window related to shadowing bowel gas and/or body habitus. LIVER: Liver measures 15 cm cranial caudal at the midclavicular line. The liver is echogenic.  Nodular surface contour.  No discrete mass appreciable by sonographic evaluation. PANCREAS: Obscured by overlying bowel gas. GALLBLADDER: Not imaged BILE DUCTS: Common bile duct not imaged. RIGHT KIDNEY: Not imaged SPLEEN: 8 cm.  Normal in size with homogeneous echotexture. OTHER: There is ascites and bilateral pleural effusions. DOPPLER EVALUATION: AORTA: Not imaged HEPATIC ARTERY: Patent. Peak systolic velocity 173 cm/s.  Normal waveform. INFERIOR VENA CAVA: Not imaged.  IVC is patent on preceding CT exam. PORTAL VEINS: Portal vein is patent.  Hepatofugal flow.  Note on CT exam there were recanalized periumbilical veins, gastric varices and a large spleno renal shunt. SPLENIC VEIN: Patent with appropriate directional flow. HEPATIC VEINS: Unable to visualize.  Note on CT exam the right and left hepatic veins appeared compressed but patent.  Left hepatic vein was not well visualized.  This can be seen related to fibrotic changes of cirrhosis.     Cirrhotic morphology of the liver Changes of portal hypertension with reversed flow at the portal vein.  There were portosystemic collaterals on preceding CT exam Small pleural effusions and abdominal ascites Electronically signed by: Lorna Maxwell Date:    10/13/2023 Time:    10:26    CT Abdomen Pelvis With Contrast    Result Date: 10/12/2023  EXAMINATION: CT ABDOMEN PELVIS WITH CONTRAST CLINICAL HISTORY: Sepsis;Jaundiced; TECHNIQUE: Multidetector axial  images were obtained of the abdomen and pelvis following the administration of IV contrast. Oral contrast was not administered. Dose length product of 322 mGycm. Automated exposure control was utilized to minimize radiation dose. COMPARISON: None available FINDINGS: There is moderate volume right pleural effusion and right lower lung lobe dense consolidation.  There is also small left pleural and left basilar atelectasis.  Bilateral mammary implants. There is hepatic cirrhotic morphology with low attenuation and surface nodularity.  No focal hepatic space-occupying lesion.  There is intra-abdominopelvic small to moderate free fluid consistent with ascites.  Gallbladder is distended without intraluminal calcified calculus.  No significant dilatation of the intrahepatic biliary radicles and the extrahepatic duct is also not distended without calcified choledocholithiasis.  No no acute findings of the pancreas or the spleen. The adrenal glands appear within normal limits. The kidneys are unremarkable in size and contour. No solid or cystic renal lesion identified. There is no hydronephrosis. No perinephric fluid strandings or collections identified. Stomach is mostly decompressed and difficult to assess.  There is suspected mild mural thickening of the loops of small bowel suspect for enteritis.  No transition or bowel obstruction.  Colon is nondistended.  Noninflamed diverticulosis coli.  No pneumoperitoneum. Urinary bladder appears within normal limits.  Small volume uterus.  Endometrium is not well delineated.  There is no pelvic free fluid. Lower lumbar degenerative changes.  No acute or aggressive skeletal abnormality no acute or otherwise osseous abnormality identified.     1. Right pleural effusion and right lower lung lobe consolidation. 2. Hepatic cirrhotic morphology and ascites. 3. Mural thickening of small bowel loops suggest nonspecific enteritis. Electronically signed by: Kevyn Abebe Date:    10/12/2023  Time:    19:33    CT Head Without Contrast    Result Date: 10/12/2023  EXAMINATION: CT HEAD WITHOUT CONTRAST CLINICAL HISTORY: Infusion; TECHNIQUE: Sequential axial images were performed of the brain without contrast. Dose product length of 884 mGycm. Automated exposure control was utilized to minimize radiation dose. COMPARISON: February 8, 2022. FINDINGS: There is no intracranial mass effect, midline shift, hydrocephalus or hemorrhage. There is no sulcal effacement or low attenuation changes to suggest recent large vessel territory infarction. Chronic appearing periventricular and subcortical white matter low attenuation changes are present and are consistent with chronic microangiopathic ischemia.  Beneath left craniotomy is similar appearing dural thickening.  No acute extra-axial fluid collection identified.  The ventricular system and sulcal markings prominence is consistent with atrophy more advanced for the age.  There is no acute extra axial fluid collection.  Similar mucoperiosteal thickening of the right maxillary sinus.  Paranasal sinuses are clear without mucosal thickening, polypoidal abnormality or air-fluid levels. Mastoid air cells aeration is optimal.     No acute intracranial findings identified. Electronically signed by: Kevyn Abebe Date:    10/12/2023 Time:    19:26    X-Ray Chest AP Portable    Result Date: 10/12/2023  EXAMINATION: XR CHEST AP PORTABLE CLINICAL HISTORY: Hypotension; TECHNIQUE: Single frontal view of the chest was performed. COMPARISON: 12/12/2022 FINDINGS: There is a infiltrate developing in the right lower lobe.  There is a small right-sided pleural effusion.  There is a patchy infiltrate in the left lower lobe.  The heart is normal in appearance.  Bones and joints show no acute abnormality.     Infiltrate developing in the right lower lobe with a small right-sided pleural effusion Patchy appearing infiltrate developing in the left lower lobe Electronically signed  by: Dariana Linares Date:    10/12/2023 Time:    17:53         Assessment/Plan:    She is established with hepatology at [facility]- seen inpatient by Dr. 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 Patient states this is an improvement    Noble Pennington MD    11/6/23  Patient was seen earlier today. Son was at the bedside. Clinically feeling better. No overt GI bleed. Needs caution with Lovenox. On Lactulose, Midodrine and Lasix. Patient would need f/u with [facility] on discharge. Monitor hg/hct, electrolytes, renal function periodically. Endoscopic procedures on hold for now.    11/7/23  Patient was seen earlier today.  Overall feeling better from GI standpoint.  On Lasix along with midodrine and lactulose.  She will need follow up at [facility] on discharge.  Also was evaluated by hepatology Clinic in [place].  Follow up with them as scheduled.  No additional GI recommendations at this time.

## 2023-11-08 NOTE — PROGRESS NOTES
[facility]  Hospital Medicine Progress Note        Chief Complaint: Inpatient Follow-up    HPI:     63-year-old female with significant history of alcoholic cirrhosis, hypothyroidism, anxiety, alcohol abuse, depression was admitted hospitalist medicine service with bilateral lower extremity swelling, dyspnea.  Patient had a recent hospitalization mid October for jaundice and workup revealed new diagnosis cirrhosis, decompensated with associated electrolyte derangement, renal insufficiency and the patient was transferred to [facility] for hepatology from where she was discharged home, deemed not a candidate for liver transplant at this time secondary to alcohol abuse . after discharge patient presented back to the ED with above symptoms.  Hemodynamics stable in the ED . physical exam significant for lower extremity edema.  Chest x-ray with small layering pleural effusion .  patient admitted hospitalist medicine service and was initiated on IV diuretics.  Also on midodrine, lactulose.  GI consulted, closely following.  Echocardiogram this hospitalization with normal ejection fraction, normal diastolic function.    Interval Hx:   Patient seen at bedside.  awake, alert and seems fully oriented.  Hemodynamics are stable, persistent lower extremity edema, no worsening abdominal distention.    Objective/physical exam:  General: In no acute distress, afebrile  Chest: Clear to auscultation bilaterally  Heart: S1, S2, no appreciable murmur  Abdomen: Soft, nontender, BS +  MSK: Warm, 2+ + bilateral pitting pedal edema  Neurologic: Alert and oriented x4, moving all extremities with good strength     VITAL SIGNS: 24 HRS MIN & MAX LAST   Temp  Min: 98.1 °F (36.7 °C)  Max: 98.6 °F (37 °C) 98.1 °F (36.7 °C)   BP  Min: 106/64  Max: 123/72 107/63   Pulse  Min: 86  Max: 106  86   Resp  Min: 18  Max: 18 18   SpO2  Min: 93 %  Max: 97 % (!) 93 %       Recent Labs   Lab 11/08/23  0534   WBC 9.50    RBC 2.67*   HGB 8.2*   HCT 24.2*   MCV 90.6   MCH 30.7   MCHC 33.9   RDW 19.8*      MPV 9.8         Recent Labs   Lab 11/06/23  0502 11/07/23  0548 11/08/23  0534   *   < > 134*   K 3.3*   < > 3.4*   CO2 26   < > 28   BUN 7.1*   < > 5.9*   CREATININE 0.77   < > 0.74   CALCIUM 7.8*   < > 7.6*   MG 1.90  --   --    ALBUMIN 2.6*  --   --    ALKPHOS 88  --   --    ALT 18  --   --    AST 49*  --   --    BILITOT 8.4*  --   --     < > = values in this interval not displayed.          Microbiology Results (last 7 days)       Procedure Component Value Units Date/Time    Blood culture #1 **CANNOT BE ORDERED STAT** [[id number]]  (Normal) Collected: 11/01/23 2016    Order Status: Completed Specimen: Blood from Antecubital, Right Updated: 11/06/23 2300     CULTURE, BLOOD ([facility]) No Growth at 5 days    Blood culture #2 **CANNOT BE ORDERED STAT** [[id number]]  (Normal) Collected: 11/01/23 2016    Order Status: Completed Specimen: Blood from Arm, Right Updated: 11/06/23 2300     CULTURE, BLOOD ([facility]) No Growth at 5 days    Blood Culture [[id number]]     Order Status: Canceled Specimen: Blood from Arm, Left              Scheduled Med:   enoxparin  40 mg Subcutaneous Q24H (prophylaxis, 1700)    furosemide (LASIX) injection  40 mg Intravenous Daily    lactulose 10 gram/15 ml  10 g Oral TID    levothyroxine  100 mcg Oral Before breakfast    midodrine  10 mg Oral Q8H    multivitamin  1 tablet Oral Daily    pantoprazole  40 mg Intravenous Daily    potassium bicarbonate  50 mEq Oral Once    thiamine  100 mg Oral Daily          Assessment/Plan:    Decompensated alcoholic cirrhosis  Volume overload with bilateral lower extremity edema-persistent  Mild hypokalemia  Anemia of chronic disease   History of hypothyroidism   Physical deconditioning   Depression/anxiety   History of heavy alcohol abuse  Prophylaxis    GI on board, recently evaluated by hepatology, not a candidate for transplant at this time given alcohol abuse    Recommended outpatient follow-up   Continue lactulose to prevent hepatic encephalopathy   Volume overload persist  EF and diastolic function is normal   I have increased IV Lasix to 40 mg b.i.d.  Monitor strict Is&Os, initiated 1.2 L fluid restriction   Potassium appropriately replaced today  BP stable on midodrine   Renal function stable   Therapy Services on board   Continue home meds-levothyroxine, multivitamin, thiamine  Switch PPI to oral   Hemoglobin is more than 8, no overt bleeding  DVT prophylaxis-Lovenox    Case management currently working on rehab placement      Amanda Trejo MD   11/08/2023

## 2023-11-08 NOTE — PLAN OF CARE
[facility] sent referral to [facility] ,awaiting for response.

## 2023-11-08 NOTE — PLAN OF CARE
Denial rec'd from facility in [place] per Ms. Villalta SSC.  I went to speak to pt about alternate facilities.  We discussed and she asked that I call her son Jasiel.  I spoke with Jasiel to inform of denial and he wanted referrals sent to [facility] and [facility] .  Ms. Villalta sent referrals thru [facility].    Ms. Villalta rec'd communication from [facility] thaty referral denied as hey do not have availability for a Mediciad bed at this time.      Shahana liaison from [facility] here to assess pt- she states they will accept pt, but it pending Medicaid approval- subitted for auth.  I communicated with pt and son Jasiel to inform or update with facilities.  They are both agreeable for admit to [facility] and understand it's pending insurance approval.  Nurse Nadya updated and Ms. Villalta SSC also aware.    Communicated with Dr. Trejo of acceptance to [facility], but awaiting auth from Medicaid.  Dr Trejo states pt not ready for d/c today.  Notified Marian Knox NP with [facility] and she will have liaison Shahana follow up in am.  Ms. Villalta made aware.

## 2023-11-09 LAB
ALBUMIN SERPL-MCNC: 2.6 G/DL (ref 3.4–4.8)
ALBUMIN/GLOB SERPL: 1.3 RATIO (ref 1.1–2)
ALP SERPL-CCNC: 119 UNIT/L (ref 40–150)
ALT SERPL-CCNC: 19 UNIT/L (ref 0–55)
AST SERPL-CCNC: 52 UNIT/L (ref 5–34)
BASOPHILS # BLD AUTO: 0.1 X10(3)/MCL
BASOPHILS NFR BLD AUTO: 0.9 %
BILIRUB SERPL-MCNC: 9.1 MG/DL
BUN SERPL-MCNC: 5.7 MG/DL (ref 9.8–20.1)
CALCIUM SERPL-MCNC: 7.4 MG/DL (ref 8.4–10.2)
CHLORIDE SERPL-SCNC: 96 MMOL/L (ref 98–107)
CO2 SERPL-SCNC: 31 MMOL/L (ref 23–31)
CREAT SERPL-MCNC: 0.81 MG/DL (ref 0.55–1.02)
EOSINOPHIL # BLD AUTO: 0.49 X10(3)/MCL (ref 0–0.9)
EOSINOPHIL NFR BLD AUTO: 4.6 %
ERYTHROCYTE [DISTWIDTH] IN BLOOD BY AUTOMATED COUNT: 19.9 % (ref 11.5–17)
GFR SERPLBLD CREATININE-BSD FMLA CKD-EPI: >60 MLS/MIN/1.73/M2
GLOBULIN SER-MCNC: 2 GM/DL (ref 2.4–3.5)
GLUCOSE SERPL-MCNC: 89 MG/DL (ref 82–115)
HCT VFR BLD AUTO: 24.9 % (ref 37–47)
HGB BLD-MCNC: 8.5 G/DL (ref 12–16)
IMM GRANULOCYTES # BLD AUTO: 0.06 X10(3)/MCL (ref 0–0.04)
IMM GRANULOCYTES NFR BLD AUTO: 0.6 %
LYMPHOCYTES # BLD AUTO: 3.53 X10(3)/MCL (ref 0.6–4.6)
LYMPHOCYTES NFR BLD AUTO: 33.2 %
MAGNESIUM SERPL-MCNC: 1.9 MG/DL (ref 1.6–2.6)
MCH RBC QN AUTO: 30.7 PG (ref 27–31)
MCHC RBC AUTO-ENTMCNC: 34.1 G/DL (ref 33–36)
MCV RBC AUTO: 89.9 FL (ref 80–94)
MONOCYTES # BLD AUTO: 1.57 X10(3)/MCL (ref 0.1–1.3)
MONOCYTES NFR BLD AUTO: 14.8 %
NEUTROPHILS # BLD AUTO: 4.89 X10(3)/MCL (ref 2.1–9.2)
NEUTROPHILS NFR BLD AUTO: 45.9 %
NRBC BLD AUTO-RTO: 0 %
PLATELET # BLD AUTO: 206 X10(3)/MCL (ref 130–400)
PMV BLD AUTO: 9.9 FL (ref 7.4–10.4)
POTASSIUM SERPL-SCNC: 3.1 MMOL/L (ref 3.5–5.1)
PROT SERPL-MCNC: 4.6 GM/DL (ref 5.8–7.6)
RBC # BLD AUTO: 2.77 X10(6)/MCL (ref 4.2–5.4)
SODIUM SERPL-SCNC: 131 MMOL/L (ref 136–145)
WBC # SPEC AUTO: 10.64 X10(3)/MCL (ref 4.5–11.5)

## 2023-11-09 PROCEDURE — 25000003 PHARM REV CODE 250: Performed by: NURSE PRACTITIONER

## 2023-11-09 PROCEDURE — 63600175 PHARM REV CODE 636 W HCPCS: Performed by: PHYSICIAN ASSISTANT

## 2023-11-09 PROCEDURE — 83735 ASSAY OF MAGNESIUM: CPT | Performed by: INTERNAL MEDICINE

## 2023-11-09 PROCEDURE — 25000003 PHARM REV CODE 250: Performed by: INTERNAL MEDICINE

## 2023-11-09 PROCEDURE — 80053 COMPREHEN METABOLIC PANEL: CPT | Performed by: INTERNAL MEDICINE

## 2023-11-09 PROCEDURE — 97110 THERAPEUTIC EXERCISES: CPT | Mod: CQ

## 2023-11-09 PROCEDURE — 97530 THERAPEUTIC ACTIVITIES: CPT | Mod: CQ

## 2023-11-09 PROCEDURE — 63600175 PHARM REV CODE 636 W HCPCS: Performed by: INTERNAL MEDICINE

## 2023-11-09 PROCEDURE — 85025 COMPLETE CBC W/AUTO DIFF WBC: CPT | Performed by: INTERNAL MEDICINE

## 2023-11-09 PROCEDURE — 21400001 HC TELEMETRY ROOM

## 2023-11-09 RX ORDER — ALPRAZOLAM 0.25 MG/1
0.25 TABLET ORAL 2 TIMES DAILY PRN
Status: DISCONTINUED | OUTPATIENT
Start: 2023-11-09 | End: 2023-11-13 | Stop reason: HOSPADM

## 2023-11-09 RX ORDER — BUMETANIDE 0.25 MG/ML
1 INJECTION INTRAMUSCULAR; INTRAVENOUS EVERY 12 HOURS
Status: DISCONTINUED | OUTPATIENT
Start: 2023-11-09 | End: 2023-11-12

## 2023-11-09 RX ADMIN — LACTULOSE 10 G: 10 SOLUTION ORAL at 09:11

## 2023-11-09 RX ADMIN — FUROSEMIDE 40 MG: 10 INJECTION, SOLUTION INTRAMUSCULAR; INTRAVENOUS at 05:11

## 2023-11-09 RX ADMIN — MIDODRINE HYDROCHLORIDE 10 MG: 5 TABLET ORAL at 05:11

## 2023-11-09 RX ADMIN — LACTULOSE 10 G: 10 SOLUTION ORAL at 02:11

## 2023-11-09 RX ADMIN — THIAMINE HCL TAB 100 MG 100 MG: 100 TAB at 09:11

## 2023-11-09 RX ADMIN — TRAMADOL HYDROCHLORIDE 50 MG: 50 TABLET, COATED ORAL at 07:11

## 2023-11-09 RX ADMIN — LACTULOSE 10 G: 10 SOLUTION ORAL at 08:11

## 2023-11-09 RX ADMIN — MIDODRINE HYDROCHLORIDE 10 MG: 5 TABLET ORAL at 02:11

## 2023-11-09 RX ADMIN — TRAMADOL HYDROCHLORIDE 50 MG: 50 TABLET, COATED ORAL at 09:11

## 2023-11-09 RX ADMIN — POTASSIUM BICARBONATE 50 MEQ: 977.5 TABLET, EFFERVESCENT ORAL at 05:11

## 2023-11-09 RX ADMIN — THERA TABS 1 TABLET: TAB at 09:11

## 2023-11-09 RX ADMIN — LEVOTHYROXINE SODIUM 100 MCG: 100 TABLET ORAL at 05:11

## 2023-11-09 RX ADMIN — PANTOPRAZOLE SODIUM 40 MG: 40 TABLET, DELAYED RELEASE ORAL at 09:11

## 2023-11-09 RX ADMIN — ALPRAZOLAM 0.25 MG: 0.25 TABLET ORAL at 05:11

## 2023-11-09 RX ADMIN — ENOXAPARIN SODIUM 40 MG: 40 INJECTION SUBCUTANEOUS at 04:11

## 2023-11-09 RX ADMIN — BUMETANIDE 1 MG: 0.25 INJECTION INTRAMUSCULAR; INTRAVENOUS at 11:11

## 2023-11-09 RX ADMIN — POTASSIUM BICARBONATE 50 MEQ: 977.5 TABLET, EFFERVESCENT ORAL at 09:11

## 2023-11-09 RX ADMIN — MIDODRINE HYDROCHLORIDE 10 MG: 5 TABLET ORAL at 10:11

## 2023-11-09 RX ADMIN — BUMETANIDE 1 MG: 0.25 INJECTION INTRAMUSCULAR; INTRAVENOUS at 08:11

## 2023-11-09 NOTE — PLAN OF CARE
Patient accepted at [facility], approved and can be transferred when medically stable. Spoke to patient son (Jasiel) and he is in agreement with [facility].

## 2023-11-09 NOTE — PROGRESS NOTES
[facility]  Hospital Medicine Progress Note        Chief Complaint: Inpatient Follow-up    HPI:     63-year-old female with significant history of alcoholic cirrhosis, hypothyroidism, anxiety, alcohol abuse, depression was admitted hospitalist medicine service with bilateral lower extremity swelling, dyspnea.  Patient had a recent hospitalization mid October for jaundice and workup revealed new diagnosis cirrhosis, decompensated with associated electrolyte derangement, renal insufficiency and the patient was transferred to [facility] for hepatology from where she was discharged home, deemed not a candidate for liver transplant at this time secondary to alcohol abuse . after discharge patient presented back to the ED with above symptoms.  Hemodynamics stable in the ED . physical exam significant for lower extremity edema.  Chest x-ray with small layering pleural effusion .  patient admitted hospitalist medicine service and was initiated on IV diuretics.  Also on midodrine, lactulose.  GI consulted, closely following.  Echocardiogram this hospitalization with normal ejection fraction, normal diastolic function.  Persistent volume overload with significant lower extremity edema on 11/08 and therefore Lasix increased to 40 mg b.i.d..    Interval Hx:   Patient seen at bedside.  Comfortably laying in bed.  Still having persistent lower extremity swelling with no much improvement . no other new complaints .  respiratory status stable.  No acute events overnight.  Hemodynamics are stable    Objective/physical exam:  General: In no acute distress, afebrile  Chest: Clear to auscultation bilaterally  Heart: S1, S2, no appreciable murmur  Abdomen: Soft, nontender, BS +  MSK: Warm, 2+ + bilateral pitting pedal edema  Neurologic: Alert and oriented x4, moving all extremities with good strength     VITAL SIGNS: 24 HRS MIN & MAX LAST   Temp  Min: 98.2 °F (36.8 °C)  Max: 98.7 °F (37.1 °C) 98.7  °F (37.1 °C)   BP  Min: 101/60  Max: 117/75 107/68   Pulse  Min: 90  Max: 102  91   Resp  Min: 18  Max: 22 (!) 22   SpO2  Min: 94 %  Max: 95 % (!) 94 %       Recent Labs   Lab 11/09/23  0543   WBC 10.64   RBC 2.77*   HGB 8.5*   HCT 24.9*   MCV 89.9   MCH 30.7   MCHC 34.1   RDW 19.9*      MPV 9.9         Recent Labs   Lab 11/09/23  0543   *   K 3.1*   CO2 31   BUN 5.7*   CREATININE 0.81   CALCIUM 7.4*   MG 1.90   ALBUMIN 2.6*   ALKPHOS 119   ALT 19   AST 52*   BILITOT 9.1*          Microbiology Results (last 7 days)       Procedure Component Value Units Date/Time    Blood culture #1 **CANNOT BE ORDERED STAT** [[id number]]  (Normal) Collected: 11/01/23 2016    Order Status: Completed Specimen: Blood from Antecubital, Right Updated: 11/06/23 2300     CULTURE, BLOOD ([facility]) No Growth at 5 days    Blood culture #2 **CANNOT BE ORDERED STAT** [[id number]]  (Normal) Collected: 11/01/23 2016    Order Status: Completed Specimen: Blood from Arm, Right Updated: 11/06/23 2300     CULTURE, BLOOD ([facility]) No Growth at 5 days    Blood Culture [[id number]]     Order Status: Canceled Specimen: Blood from Arm, Left              Scheduled Med:   enoxparin  40 mg Subcutaneous Q24H (prophylaxis, 1700)    furosemide (LASIX) injection  40 mg Intravenous Q12H    lactulose 10 gram/15 ml  10 g Oral TID    levothyroxine  100 mcg Oral Before breakfast    midodrine  10 mg Oral Q8H    multivitamin  1 tablet Oral Daily    pantoprazole  40 mg Oral Daily    potassium bicarbonate  50 mEq Oral Q6H    thiamine  100 mg Oral Daily          Assessment/Plan:    Decompensated alcoholic cirrhosis  Volume overload with bilateral lower extremity edema-persistent  Hypokalemia  Anemia of chronic disease   History of hypothyroidism   Physical deconditioning   Depression/anxiety   History of heavy alcohol abuse  Prophylaxis    GI on board, recently evaluated by hepatology, not a candidate for transplant at this time given alcohol abuse   Recommended  outpatient follow-up   Continue lactulose to prevent hepatic encephalopathy   Volume overload persist  EF and diastolic function is normal   Patient not responding to IV Lasix 40 mg b.i.d., have switched her to Bumex today  Also consult Nephrology   Continue 1.2 L fluid restriction   Monitor strict Is&Os  Potassium replaced-50 mEq x 2 doses  BP stable on midodrine   Renal function stable   Therapy Services on board   Continue home meds-levothyroxine, multivitamin, thiamine  Continue oral PPI  Hemoglobin is more than 8, no overt bleeding  DVT prophylaxis-Lovenox    Patient accepted to [facility]   Holding DC given persistent volume overload, await Nephrology recs      Amanda Trejo MD   11/09/2023

## 2023-11-09 NOTE — PT/OT/SLP PROGRESS
Physical Therapy Treatment    Patient Name:  Lauren Ewing   MRN:  [id number]    Recommendations:     Discharge therapy intensity: moderate intensity   Discharge Equipment Recommendations: walker, rolling  Barriers to discharge: Ongoing medical needs    Assessment:     Lauren Ewing is a 63 y.o. female admitted with a medical diagnosis of <principal problem not specified>.  She presents with the following impairments/functional limitations: weakness, impaired endurance, impaired functional mobility, gait instability, decreased lower extremity function, edema .    Rehab Prognosis: Good; patient would benefit from acute skilled PT services to address these deficits and reach maximum level of function.    Recent Surgery: * No surgery found *      Plan:     During this hospitalization, patient to be seen 5 x/week to address the identified rehab impairments via gait training, therapeutic activities, therapeutic exercises and progress toward the following goals:    Plan of Care Expires:  12/05/23    Subjective     Chief Complaint: swelling and soreness in lower legs/feet  Patient/Family Comments/goals: to return home  Pain/Comfort:  Pain Rating 1: 4/10  Location - Side 1: Bilateral  Location - Orientation 1: lower  Location 1: leg  Pain Addressed 1: Cessation of Activity      Objective:     Communicated with pts nurse prior to session.  Patient found HOB elevated with telemetry upon PT entry to room.     General Precautions: Standard, fall  Orthopedic Precautions: N/A  Braces: N/A  Respiratory Status: Room air  Blood Pressure: NT   Skin Integrity: Visible skin intact      Functional Mobility:  Bed Mobility:     Scooting: contact guard assistance and minimum assistance  Supine to Sit: contact guard assistance and minimum assistance  Sit to Supine: contact guard assistance and minimum assistance  Transfers:     Sit to Stand:  contact guard assistance with rolling walker  Balance: sitting balance good EOB and SPV standing w/  RW    Therapeutic Activities/Exercises:  Pt directed in B LE ROM and strengthening exercises, sitting and supine, 30 and 60 second intervals, all planes    Education:  Patient provided with verbal education education regarding increase time OOB and increase activity.  Understanding was verbalized, however additional teaching warranted.     Patient left with bed in chair position with all lines intact and call button in reach..    GOALS:   Multidisciplinary Problems       Physical Therapy Goals          Problem: Physical Therapy    Goal Priority Disciplines Outcome Goal Variances Interventions   Physical Therapy Goal     PT, PT/OT Ongoing, Progressing     Description: Goals to be met by: 23     Patient will increase functional independence with mobility by performin. Supine to sit with Modified [place]  2. Sit to stand transfer with Modified [place]  3. Bed to chair transfer with Modified [place] using Rolling Walker  4. Gait  x 300 feet with Modified [place] using Rolling Walker.                          Time Tracking:     PT Received On: 23  PT Start Time: 906     PT Stop Time: 934  PT Total Time (min): 28 min     Billable Minutes: Therapeutic Activity 10 and Therapeutic Exercise 18    Treatment Type: Treatment  PT/PTA: PTA     Number of PTA visits since last PT visit: 2     2023

## 2023-11-10 LAB
ALBUMIN SERPL-MCNC: 2.5 G/DL (ref 3.4–4.8)
ALBUMIN/GLOB SERPL: 1.3 RATIO (ref 1.1–2)
ALP SERPL-CCNC: 107 UNIT/L (ref 40–150)
ALT SERPL-CCNC: 17 UNIT/L (ref 0–55)
AST SERPL-CCNC: 50 UNIT/L (ref 5–34)
BASOPHILS # BLD AUTO: 0.09 X10(3)/MCL
BASOPHILS NFR BLD AUTO: 0.9 %
BILIRUB SERPL-MCNC: 8.5 MG/DL
BUN SERPL-MCNC: 5.6 MG/DL (ref 9.8–20.1)
CALCIUM SERPL-MCNC: 7.5 MG/DL (ref 8.4–10.2)
CHLORIDE SERPL-SCNC: 98 MMOL/L (ref 98–107)
CO2 SERPL-SCNC: 29 MMOL/L (ref 23–31)
CREAT SERPL-MCNC: 0.85 MG/DL (ref 0.55–1.02)
EOSINOPHIL # BLD AUTO: 0.48 X10(3)/MCL (ref 0–0.9)
EOSINOPHIL NFR BLD AUTO: 4.8 %
ERYTHROCYTE [DISTWIDTH] IN BLOOD BY AUTOMATED COUNT: 19.5 % (ref 11.5–17)
GFR SERPLBLD CREATININE-BSD FMLA CKD-EPI: >60 MLS/MIN/1.73/M2
GLOBULIN SER-MCNC: 1.9 GM/DL (ref 2.4–3.5)
GLUCOSE SERPL-MCNC: 106 MG/DL (ref 82–115)
HCT VFR BLD AUTO: 23.5 % (ref 37–47)
HGB BLD-MCNC: 8.1 G/DL (ref 12–16)
IMM GRANULOCYTES # BLD AUTO: 0.05 X10(3)/MCL (ref 0–0.04)
IMM GRANULOCYTES NFR BLD AUTO: 0.5 %
LYMPHOCYTES # BLD AUTO: 3.33 X10(3)/MCL (ref 0.6–4.6)
LYMPHOCYTES NFR BLD AUTO: 33.1 %
MCH RBC QN AUTO: 30.9 PG (ref 27–31)
MCHC RBC AUTO-ENTMCNC: 34.5 G/DL (ref 33–36)
MCV RBC AUTO: 89.7 FL (ref 80–94)
MONOCYTES # BLD AUTO: 1.54 X10(3)/MCL (ref 0.1–1.3)
MONOCYTES NFR BLD AUTO: 15.3 %
NEUTROPHILS # BLD AUTO: 4.58 X10(3)/MCL (ref 2.1–9.2)
NEUTROPHILS NFR BLD AUTO: 45.4 %
NRBC BLD AUTO-RTO: 0.2 %
PLATELET # BLD AUTO: 200 X10(3)/MCL (ref 130–400)
PMV BLD AUTO: 10.1 FL (ref 7.4–10.4)
POTASSIUM SERPL-SCNC: 3.5 MMOL/L (ref 3.5–5.1)
PROT SERPL-MCNC: 4.4 GM/DL (ref 5.8–7.6)
RBC # BLD AUTO: 2.62 X10(6)/MCL (ref 4.2–5.4)
SODIUM SERPL-SCNC: 134 MMOL/L (ref 136–145)
WBC # SPEC AUTO: 10.07 X10(3)/MCL (ref 4.5–11.5)

## 2023-11-10 PROCEDURE — 25000003 PHARM REV CODE 250: Performed by: INTERNAL MEDICINE

## 2023-11-10 PROCEDURE — 85025 COMPLETE CBC W/AUTO DIFF WBC: CPT | Performed by: INTERNAL MEDICINE

## 2023-11-10 PROCEDURE — 21400001 HC TELEMETRY ROOM

## 2023-11-10 PROCEDURE — 80053 COMPREHEN METABOLIC PANEL: CPT | Performed by: INTERNAL MEDICINE

## 2023-11-10 PROCEDURE — 63600175 PHARM REV CODE 636 W HCPCS: Performed by: PHYSICIAN ASSISTANT

## 2023-11-10 RX ORDER — SPIRONOLACTONE 25 MG/1
25 TABLET ORAL DAILY
Status: DISCONTINUED | OUTPATIENT
Start: 2023-11-11 | End: 2023-11-10

## 2023-11-10 RX ORDER — SPIRONOLACTONE 25 MG/1
50 TABLET ORAL DAILY
Status: DISCONTINUED | OUTPATIENT
Start: 2023-11-11 | End: 2023-11-11

## 2023-11-10 RX ADMIN — POTASSIUM BICARBONATE 50 MEQ: 977.5 TABLET, EFFERVESCENT ORAL at 04:11

## 2023-11-10 RX ADMIN — BUMETANIDE 1 MG: 0.25 INJECTION INTRAMUSCULAR; INTRAVENOUS at 09:11

## 2023-11-10 RX ADMIN — THIAMINE HCL TAB 100 MG 100 MG: 100 TAB at 09:11

## 2023-11-10 RX ADMIN — THERA TABS 1 TABLET: TAB at 09:11

## 2023-11-10 RX ADMIN — LACTULOSE 10 G: 10 SOLUTION ORAL at 09:11

## 2023-11-10 RX ADMIN — PANTOPRAZOLE SODIUM 40 MG: 40 TABLET, DELAYED RELEASE ORAL at 09:11

## 2023-11-10 RX ADMIN — LEVOTHYROXINE SODIUM 100 MCG: 100 TABLET ORAL at 06:11

## 2023-11-10 RX ADMIN — ENOXAPARIN SODIUM 40 MG: 40 INJECTION SUBCUTANEOUS at 04:11

## 2023-11-10 RX ADMIN — MIDODRINE HYDROCHLORIDE 10 MG: 5 TABLET ORAL at 04:11

## 2023-11-10 RX ADMIN — MIDODRINE HYDROCHLORIDE 10 MG: 5 TABLET ORAL at 06:11

## 2023-11-10 RX ADMIN — MIDODRINE HYDROCHLORIDE 10 MG: 5 TABLET ORAL at 09:11

## 2023-11-10 RX ADMIN — LACTULOSE 10 G: 10 SOLUTION ORAL at 04:11

## 2023-11-10 NOTE — PROGRESS NOTES
[facility]  Hospital Medicine Progress Note        Chief Complaint: Inpatient Follow-up    HPI:     63-year-old female with significant history of alcoholic cirrhosis, hypothyroidism, anxiety, alcohol abuse, depression was admitted hospitalist medicine service with bilateral lower extremity swelling, dyspnea.  Patient had a recent hospitalization mid October for jaundice and workup revealed new diagnosis cirrhosis, decompensated with associated electrolyte derangement, renal insufficiency and the patient was transferred to [facility] for hepatology from where she was discharged home, deemed not a candidate for liver transplant at this time secondary to alcohol abuse . after discharge patient presented back to the ED with above symptoms.  Hemodynamics stable in the ED . physical exam significant for lower extremity edema.  Chest x-ray with small layering pleural effusion .  patient admitted hospitalist medicine service and was initiated on IV diuretics.  Also on midodrine, lactulose.  GI consulted, closely following.  Echocardiogram this hospitalization with normal ejection fraction, normal diastolic function.  Persistent volume overload with significant lower extremity edema on 11/08 and therefore Lasix increased to 40 mg b.i.d..  Still with persistent swelling and no much improvement on 11/09 and therefore switched to Bumex    Interval Hx:     Patient seen at bedside.  Comfortably laying in bed.  lower extremities are still swollen, Is&Os are not accurately reported . no other new complaints .  hemodynamics stable and respiratory status is stable    Objective/physical exam:  General: In no acute distress, afebrile  Chest: Clear to auscultation bilaterally  Heart: S1, S2, no appreciable murmur  Abdomen: Soft, nontender, BS +  MSK: Warm, 2+ + bilateral pitting pedal edema  Neurologic: Alert and oriented x4, moving all extremities with good strength     VITAL SIGNS: 24 HRS MIN &  MAX LAST   Temp  Min: 98 °F (36.7 °C)  Max: 98.7 °F (37.1 °C) 98 °F (36.7 °C)   BP  Min: 98/61  Max: 108/63 98/61   Pulse  Min: 90  Max: 98  93   Resp  Min: 18  Max: 22 18   SpO2  Min: 92 %  Max: 97 % (!) 94 %       Recent Labs   Lab 11/10/23  0324   WBC 10.07   RBC 2.62*   HGB 8.1*   HCT 23.5*   MCV 89.7   MCH 30.9   MCHC 34.5   RDW 19.5*      MPV 10.1         Recent Labs   Lab 11/09/23  0543 11/10/23  0324   * 134*   K 3.1* 3.5   CO2 31 29   BUN 5.7* 5.6*   CREATININE 0.81 0.85   CALCIUM 7.4* 7.5*   MG 1.90  --    ALBUMIN 2.6* 2.5*   ALKPHOS 119 107   ALT 19 17   AST 52* 50*   BILITOT 9.1* 8.5*          Microbiology Results (last 7 days)       Procedure Component Value Units Date/Time    Blood culture #1 **CANNOT BE ORDERED STAT** [[id number]]  (Normal) Collected: 11/01/23 2016    Order Status: Completed Specimen: Blood from Antecubital, Right Updated: 11/06/23 2300     CULTURE, BLOOD ([facility]) No Growth at 5 days    Blood culture #2 **CANNOT BE ORDERED STAT** [[id number]]  (Normal) Collected: 11/01/23 2016    Order Status: Completed Specimen: Blood from Arm, Right Updated: 11/06/23 2300     CULTURE, BLOOD ([facility]) No Growth at 5 days             Scheduled Med:   bumetanide  1 mg Intravenous Q12H    enoxparin  40 mg Subcutaneous Q24H (prophylaxis, 1700)    lactulose 10 gram/15 ml  10 g Oral TID    levothyroxine  100 mcg Oral Before breakfast    midodrine  10 mg Oral Q8H    multivitamin  1 tablet Oral Daily    pantoprazole  40 mg Oral Daily    thiamine  100 mg Oral Daily          Assessment/Plan:    Decompensated alcoholic cirrhosis  Volume overload with bilateral lower extremity edema-persistent  Hypokalemia  Anemia of chronic disease   History of hypothyroidism   Physical deconditioning   Depression/anxiety   History of heavy alcohol abuse  Prophylaxis    GI on board, recently evaluated by hepatology, not a candidate for transplant at this time given alcohol abuse   Recommended outpatient follow-up    Continue lactulose to prevent hepatic encephalopathy   Volume overload persist  EF and diastolic function is normal   Lasix was switched to Bumex 1 mg b.i.d. on 11/09  Nephrology consulted 11/9, awaiting recommendations   Is&Os not accurately reported, requested nursing staff to do so  No DVT  1 L fluid restriction  Potassium replaced-50 mEq x 1 dose today  BP stable on midodrine   Renal function stable   Therapy Services on board   Continue home meds-levothyroxine, multivitamin, thiamine  Continue oral PPI  Hemoglobin is more than 8, no overt bleeding  DVT prophylaxis-Lovenox    Patient accepted to [facility]   Holding DC given persistent volume overload, await Nephrology liyah Trejo MD   11/10/2023

## 2023-11-10 NOTE — PT/OT/SLP PROGRESS
Physical Therapy      Patient Name:  Lauren Ewing   MRN:  [id number]    Patient not seen today secondary to Patient unwilling to participate. Attempted in AM pt declined expecting visitor and PM, stated she was aggravated by a staff member and does not feel up to having therapy today. Will follow-up 11/11.

## 2023-11-10 NOTE — PROGRESS NOTES
Chief complaint: LE swelling improving    Interval History:  Called back to [facility] pt for edema.  GFR has remained normal throughout hospital stay with serum Na+ stable 131--134.  She was on lasix 40 IV daily, received some IV albumin though has had persistent LE edema, though SOB improved.  She was switched to bumex 1 mg IV BID and pt reports LE edema now improving.  She has no acute c/o at this time.    Review of Systems   Constitutional: Negative.    HENT: Negative.     Respiratory: Negative.     Cardiovascular:  Positive for leg swelling (improving).   Genitourinary: Negative.    Neurological: Negative.    Psychiatric/Behavioral: Negative.        all else Neg     bumetanide  1 mg Intravenous Q12H    enoxparin  40 mg Subcutaneous Q24H (prophylaxis, 1700)    lactulose 10 gram/15 ml  10 g Oral TID    levothyroxine  100 mcg Oral Before breakfast    midodrine  10 mg Oral Q8H    multivitamin  1 tablet Oral Daily    pantoprazole  40 mg Oral Daily    potassium bicarbonate  50 mEq Oral Once    [START ON 11/11/2023] spironolactone  50 mg Oral Daily    thiamine  100 mg Oral Daily       Objective     VITAL SIGNS: 24 HR MIN & MAX LAST    Temp  Min: 98 °F (36.7 °C)  Max: 98.7 °F (37.1 °C)  98.4 °F (36.9 °C)    BP  Min: 98/61  Max: 108/66  101/63     Pulse  Min: 91  Max: 98  97     Resp  Min: 18  Max: 18  18    SpO2  Min: 19 %  Max: 97 %  (!) 94 %      Wt Readings from Last 3 Encounters:   11/10/23 68.7 kg (151 lb 8 oz)   10/13/23 66 kg (145 lb 8.1 oz)   12/12/22 65.8 kg (145 lb)       Intake/Output Summary (Last 24 hours) at 11/10/2023 1620  Last data filed at 11/9/2023 2300  Gross per 24 hour   Intake --   Output 350 ml   Net -350 ml       Physical Exam  Constitutional:       General: She is not in acute distress.  HENT:      Mouth/Throat:      Pharynx: Oropharynx is clear.   Cardiovascular:      Rate and Rhythm: Normal rate.   Pulmonary:      Effort: Pulmonary effort is normal.      Breath sounds: Normal breath sounds.    Abdominal:      General: Bowel sounds are normal. There is no distension.      Palpations: Abdomen is soft.      Tenderness: There is no abdominal tenderness.   Musculoskeletal:         General: Swelling present.      Cervical back: Normal range of motion.   Neurological:      General: No focal deficit present.      Mental Status: She is alert. She is disoriented.   Psychiatric:         Mood and Affect: Mood normal.          Recent Labs     11/08/23 0534 11/08/23 0534 11/09/23 0543 11/10/23  0324   *  --  131* 134*   K 3.4*  --  3.1* 3.5   CHLORIDE 98  --  96* 98   CO2 28  --  31 29   BUN 5.9*  --  5.7* 5.6*   CREATININE 0.74  --  0.81 0.85   GLUCOSE 87  --  89 106   CALCIUM 7.6*  --  7.4* 7.5*   MG  --   --  1.90  --    ALBUMIN  --    < > 2.6* 2.5*    < > = values in this interval not displayed.      Recent Labs     11/08/23 0534 11/09/23 0543 11/10/23  0324   WBC 9.50 10.64 10.07   HGB 8.2* 8.5* 8.1*   HCT 24.2* 24.9* 23.5*    206 200         Assessment & Plan     Edema, cirrhosis, hyponatremia - Recommend keeping legs elevated with compression stockings.  GFR remains normal on bumex 1 mg IV BID which she seems to be improving with, will add spironolactone 50 mg daily, and would change bumex to lasix 40 mg daily once edema improved or she starts to become azotemic.  Continue K+ replacement as needed.  If she tolerates the spironolactone would consider increasing dose to 100 mg/day after a week.  Low Na+ diet, continued fluid restriction counseled    Will sign off, please call for questions / concerns

## 2023-11-11 LAB
ALBUMIN SERPL-MCNC: 2.3 G/DL (ref 3.4–4.8)
ALBUMIN/GLOB SERPL: 1.2 RATIO (ref 1.1–2)
ALP SERPL-CCNC: 100 UNIT/L (ref 40–150)
ALT SERPL-CCNC: 21 UNIT/L (ref 0–55)
AST SERPL-CCNC: 53 UNIT/L (ref 5–34)
BASOPHILS # BLD AUTO: 0.1 X10(3)/MCL
BASOPHILS NFR BLD AUTO: 1 %
BILIRUB SERPL-MCNC: 7.6 MG/DL
BUN SERPL-MCNC: 5.7 MG/DL (ref 9.8–20.1)
CALCIUM SERPL-MCNC: 7.5 MG/DL (ref 8.4–10.2)
CHLORIDE SERPL-SCNC: 100 MMOL/L (ref 98–107)
CO2 SERPL-SCNC: 28 MMOL/L (ref 23–31)
CREAT SERPL-MCNC: 0.77 MG/DL (ref 0.55–1.02)
EOSINOPHIL # BLD AUTO: 0.47 X10(3)/MCL (ref 0–0.9)
EOSINOPHIL NFR BLD AUTO: 4.6 %
ERYTHROCYTE [DISTWIDTH] IN BLOOD BY AUTOMATED COUNT: 19.4 % (ref 11.5–17)
GFR SERPLBLD CREATININE-BSD FMLA CKD-EPI: >60 MLS/MIN/1.73/M2
GLOBULIN SER-MCNC: 2 GM/DL (ref 2.4–3.5)
GLUCOSE SERPL-MCNC: 101 MG/DL (ref 82–115)
HCT VFR BLD AUTO: 24.1 % (ref 37–47)
HGB BLD-MCNC: 8 G/DL (ref 12–16)
IMM GRANULOCYTES # BLD AUTO: 0.04 X10(3)/MCL (ref 0–0.04)
IMM GRANULOCYTES NFR BLD AUTO: 0.4 %
LYMPHOCYTES # BLD AUTO: 3.57 X10(3)/MCL (ref 0.6–4.6)
LYMPHOCYTES NFR BLD AUTO: 34.6 %
MCH RBC QN AUTO: 30.8 PG (ref 27–31)
MCHC RBC AUTO-ENTMCNC: 33.2 G/DL (ref 33–36)
MCV RBC AUTO: 92.7 FL (ref 80–94)
MONOCYTES # BLD AUTO: 1.69 X10(3)/MCL (ref 0.1–1.3)
MONOCYTES NFR BLD AUTO: 16.4 %
NEUTROPHILS # BLD AUTO: 4.45 X10(3)/MCL (ref 2.1–9.2)
NEUTROPHILS NFR BLD AUTO: 43 %
NRBC BLD AUTO-RTO: 0 %
PLATELET # BLD AUTO: 199 X10(3)/MCL (ref 130–400)
PMV BLD AUTO: 10.3 FL (ref 7.4–10.4)
POTASSIUM SERPL-SCNC: 3.5 MMOL/L (ref 3.5–5.1)
PROT SERPL-MCNC: 4.3 GM/DL (ref 5.8–7.6)
RBC # BLD AUTO: 2.6 X10(6)/MCL (ref 4.2–5.4)
SODIUM SERPL-SCNC: 133 MMOL/L (ref 136–145)
WBC # SPEC AUTO: 10.32 X10(3)/MCL (ref 4.5–11.5)

## 2023-11-11 PROCEDURE — 63600175 PHARM REV CODE 636 W HCPCS: Performed by: PHYSICIAN ASSISTANT

## 2023-11-11 PROCEDURE — 25000003 PHARM REV CODE 250: Performed by: INTERNAL MEDICINE

## 2023-11-11 PROCEDURE — 80053 COMPREHEN METABOLIC PANEL: CPT | Performed by: INTERNAL MEDICINE

## 2023-11-11 PROCEDURE — 25000003 PHARM REV CODE 250: Performed by: NURSE PRACTITIONER

## 2023-11-11 PROCEDURE — 85025 COMPLETE CBC W/AUTO DIFF WBC: CPT | Performed by: INTERNAL MEDICINE

## 2023-11-11 PROCEDURE — 21400001 HC TELEMETRY ROOM

## 2023-11-11 RX ORDER — SPIRONOLACTONE 25 MG/1
100 TABLET ORAL DAILY
Status: DISCONTINUED | OUTPATIENT
Start: 2023-11-12 | End: 2023-11-13 | Stop reason: HOSPADM

## 2023-11-11 RX ADMIN — MIDODRINE HYDROCHLORIDE 10 MG: 5 TABLET ORAL at 08:11

## 2023-11-11 RX ADMIN — LACTULOSE 10 G: 10 SOLUTION ORAL at 03:11

## 2023-11-11 RX ADMIN — LEVOTHYROXINE SODIUM 100 MCG: 100 TABLET ORAL at 08:11

## 2023-11-11 RX ADMIN — LACTULOSE 10 G: 10 SOLUTION ORAL at 09:11

## 2023-11-11 RX ADMIN — ENOXAPARIN SODIUM 40 MG: 40 INJECTION SUBCUTANEOUS at 05:11

## 2023-11-11 RX ADMIN — BUMETANIDE 1 MG: 0.25 INJECTION INTRAMUSCULAR; INTRAVENOUS at 11:11

## 2023-11-11 RX ADMIN — MIDODRINE HYDROCHLORIDE 10 MG: 5 TABLET ORAL at 02:11

## 2023-11-11 RX ADMIN — THERA TABS 1 TABLET: TAB at 09:11

## 2023-11-11 RX ADMIN — TRAMADOL HYDROCHLORIDE 50 MG: 50 TABLET, COATED ORAL at 09:11

## 2023-11-11 RX ADMIN — SPIRONOLACTONE 50 MG: 25 TABLET ORAL at 09:11

## 2023-11-11 RX ADMIN — MIDODRINE HYDROCHLORIDE 10 MG: 5 TABLET ORAL at 09:11

## 2023-11-11 RX ADMIN — PANTOPRAZOLE SODIUM 40 MG: 40 TABLET, DELAYED RELEASE ORAL at 09:11

## 2023-11-11 RX ADMIN — POTASSIUM BICARBONATE 50 MEQ: 977.5 TABLET, EFFERVESCENT ORAL at 02:11

## 2023-11-11 RX ADMIN — THIAMINE HCL TAB 100 MG 100 MG: 100 TAB at 09:11

## 2023-11-11 RX ADMIN — BUMETANIDE 1 MG: 0.25 INJECTION INTRAMUSCULAR; INTRAVENOUS at 09:11

## 2023-11-11 NOTE — PLAN OF CARE
Problem: Fluid and Electrolyte Imbalance (Acute Kidney Injury/Impairment)  Goal: Fluid and Electrolyte Balance  Outcome: Ongoing, Progressing     Problem: Impaired Wound Healing  Goal: Optimal Wound Healing  Outcome: Ongoing, Progressing     Problem: Adult Inpatient Plan of Care  Goal: Plan of Care Review  Outcome: Ongoing, Progressing  Goal: Patient-Specific Goal (Individualized)  Outcome: Ongoing, Progressing  Goal: Absence of Hospital-Acquired Illness or Injury  Outcome: Ongoing, Progressing  Goal: Optimal Comfort and Wellbeing  Outcome: Ongoing, Progressing  Goal: Readiness for Transition of Care  Outcome: Ongoing, Progressing     Problem: Oral Intake Inadequate (Acute Kidney Injury/Impairment)  Goal: Optimal Nutrition Intake  Outcome: Ongoing, Progressing     Problem: Renal Function Impairment (Acute Kidney Injury/Impairment)  Goal: Effective Renal Function  Outcome: Ongoing, Progressing     Problem: Skin Injury Risk Increased  Goal: Skin Health and Integrity  Outcome: Ongoing, Progressing

## 2023-11-11 NOTE — PROGRESS NOTES
[facility]  Hospital Medicine Progress Note        Chief Complaint: Inpatient Follow-up    HPI:     63-year-old female with significant history of alcoholic cirrhosis, hypothyroidism, anxiety, alcohol abuse, depression was admitted hospitalist medicine service with bilateral lower extremity swelling, dyspnea.  Patient had a recent hospitalization mid October for jaundice and workup revealed new diagnosis cirrhosis, decompensated with associated electrolyte derangement, renal insufficiency and the patient was transferred to [facility] for hepatology from where she was discharged home, deemed not a candidate for liver transplant at this time secondary to alcohol abuse . after discharge patient presented back to the ED with above symptoms.  Hemodynamics stable in the ED . physical exam significant for lower extremity edema.  Chest x-ray with small layering pleural effusion .  patient admitted hospitalist medicine service and was initiated on IV diuretics.  Also on midodrine, lactulose.  GI consulted, closely following.  Echocardiogram this hospitalization with normal ejection fraction, normal diastolic function.  Persistent volume overload with significant lower extremity edema on 11/08 and therefore Lasix increased to 40 mg b.i.d..  Still with persistent swelling and no much improvement on 11/09 and therefore switched to Bumex, slowly improving on Bumex, nephrology consulted for further recommendations.    Interval Hx:     Patient seen at bedside.  Lower extremity swelling is slightly better today compared to yesterday .  patient reports she mistakenly thought oz and mL is the same and has been drinking too much water.  she is now compliant with 1 L fluid restriction since yesterday . no other new complaints .  Respiratory status stable.       Objective/physical exam:  General: In no acute distress, afebrile  Chest: Clear to auscultation bilaterally  Heart: S1, S2, no appreciable  murmur  Abdomen: Soft, nontender, BS +  MSK: Warm, 2+  bilateral pitting pedal edema  Neurologic: Alert and oriented x4, moving all extremities with good strength     VITAL SIGNS: 24 HRS MIN & MAX LAST   Temp  Min: 97.9 °F (36.6 °C)  Max: 99.1 °F (37.3 °C) 97.9 °F (36.6 °C)   BP  Min: 101/63  Max: 112/71 106/66   Pulse  Min: 90  Max: 102  94   Resp  Min: 18  Max: 18 18   SpO2  Min: 19 %  Max: 97 % 95 %       Recent Labs   Lab 11/11/23  0545   WBC 10.32   RBC 2.60*   HGB 8.0*   HCT 24.1*   MCV 92.7   MCH 30.8   MCHC 33.2   RDW 19.4*      MPV 10.3         Recent Labs   Lab 11/09/23  0543 11/10/23  0324 11/11/23  0545   *   < > 133*   K 3.1*   < > 3.5   CO2 31   < > 28   BUN 5.7*   < > 5.7*   CREATININE 0.81   < > 0.77   CALCIUM 7.4*   < > 7.5*   MG 1.90  --   --    ALBUMIN 2.6*   < > 2.3*   ALKPHOS 119   < > 100   ALT 19   < > 21   AST 52*   < > 53*   BILITOT 9.1*   < > 7.6*    < > = values in this interval not displayed.          Microbiology Results (last 7 days)       Procedure Component Value Units Date/Time    Blood culture #1 **CANNOT BE ORDERED STAT** [[id number]]  (Normal) Collected: 11/01/23 2016    Order Status: Completed Specimen: Blood from Antecubital, Right Updated: 11/06/23 2300     CULTURE, BLOOD ([facility]) No Growth at 5 days    Blood culture #2 **CANNOT BE ORDERED STAT** [[id number]]  (Normal) Collected: 11/01/23 2016    Order Status: Completed Specimen: Blood from Arm, Right Updated: 11/06/23 2300     CULTURE, BLOOD ([facility]) No Growth at 5 days             Scheduled Med:   bumetanide  1 mg Intravenous Q12H    enoxparin  40 mg Subcutaneous Q24H (prophylaxis, 1700)    lactulose 10 gram/15 ml  10 g Oral TID    levothyroxine  100 mcg Oral Before breakfast    midodrine  10 mg Oral Q8H    multivitamin  1 tablet Oral Daily    pantoprazole  40 mg Oral Daily    spironolactone  50 mg Oral Daily    thiamine  100 mg Oral Daily          Assessment/Plan:    Decompensated alcoholic cirrhosis  Volume overload  with bilateral lower extremity edema-persistent  Hypokalemia  Anemia of chronic disease   History of hypothyroidism   Physical deconditioning   Depression/anxiety   History of heavy alcohol abuse  Prophylaxis    GI evaluated this hospitalization, also recently evaluated by hepatology, not a candidate for transplant at this time given alcohol abuse   Recommended outpatient follow-up   Continue lactulose to prevent hepatic encephalopathy   Volume overload persist  EF and diastolic function is normal   Lasix was switched to Bumex 1 mg b.i.d. on 11/09  Nephrology evaluated 11/10 and added Aldactone   I will increase Aldactone to 100 mg daily  Nephrology recommends to switch to Lasix 40 mg daily upon DC  No DVT   Recommended compression stockings and elevate leg  Strict Is&Os   Fluid restriction-1 L  Potassium replaced-50 mEq x 1 dose again today  BP stable on midodrine   Renal function stable   Therapy Services on board   Continue home meds-levothyroxine, multivitamin, thiamine  Continue oral PPI  Hemoglobin is  8, no overt bleeding  DVT prophylaxis-Lovenox    Patient accepted to [facility]   If she continues to improve will DC to rehab on Monday      Amanda Trejo MD   11/11/2023

## 2023-11-12 LAB
ALBUMIN SERPL-MCNC: 2.3 G/DL (ref 3.4–4.8)
ALBUMIN/GLOB SERPL: 1.2 RATIO (ref 1.1–2)
ALP SERPL-CCNC: 116 UNIT/L (ref 40–150)
ALT SERPL-CCNC: 16 UNIT/L (ref 0–55)
AST SERPL-CCNC: 50 UNIT/L (ref 5–34)
BASOPHILS # BLD AUTO: 0.1 X10(3)/MCL
BASOPHILS NFR BLD AUTO: 0.9 %
BILIRUB SERPL-MCNC: 8.3 MG/DL
BUN SERPL-MCNC: 5.8 MG/DL (ref 9.8–20.1)
CALCIUM SERPL-MCNC: 7.5 MG/DL (ref 8.4–10.2)
CHLORIDE SERPL-SCNC: 98 MMOL/L (ref 98–107)
CO2 SERPL-SCNC: 32 MMOL/L (ref 23–31)
CREAT SERPL-MCNC: 0.81 MG/DL (ref 0.55–1.02)
EOSINOPHIL # BLD AUTO: 0.41 X10(3)/MCL (ref 0–0.9)
EOSINOPHIL NFR BLD AUTO: 3.6 %
ERYTHROCYTE [DISTWIDTH] IN BLOOD BY AUTOMATED COUNT: 18.7 % (ref 11.5–17)
GFR SERPLBLD CREATININE-BSD FMLA CKD-EPI: >60 MLS/MIN/1.73/M2
GLOBULIN SER-MCNC: 2 GM/DL (ref 2.4–3.5)
GLUCOSE SERPL-MCNC: 88 MG/DL (ref 82–115)
HCT VFR BLD AUTO: 24.8 % (ref 37–47)
HGB BLD-MCNC: 8.5 G/DL (ref 12–16)
IMM GRANULOCYTES # BLD AUTO: 0.05 X10(3)/MCL (ref 0–0.04)
IMM GRANULOCYTES NFR BLD AUTO: 0.4 %
LYMPHOCYTES # BLD AUTO: 3.75 X10(3)/MCL (ref 0.6–4.6)
LYMPHOCYTES NFR BLD AUTO: 33.1 %
MCH RBC QN AUTO: 30.9 PG (ref 27–31)
MCHC RBC AUTO-ENTMCNC: 34.3 G/DL (ref 33–36)
MCV RBC AUTO: 90.2 FL (ref 80–94)
MONOCYTES # BLD AUTO: 1.86 X10(3)/MCL (ref 0.1–1.3)
MONOCYTES NFR BLD AUTO: 16.4 %
NEUTROPHILS # BLD AUTO: 5.15 X10(3)/MCL (ref 2.1–9.2)
NEUTROPHILS NFR BLD AUTO: 45.6 %
NRBC BLD AUTO-RTO: 0 %
PLATELET # BLD AUTO: 195 X10(3)/MCL (ref 130–400)
PMV BLD AUTO: 8.7 FL (ref 7.4–10.4)
POTASSIUM SERPL-SCNC: 3.3 MMOL/L (ref 3.5–5.1)
PROT SERPL-MCNC: 4.3 GM/DL (ref 5.8–7.6)
RBC # BLD AUTO: 2.75 X10(6)/MCL (ref 4.2–5.4)
SODIUM SERPL-SCNC: 135 MMOL/L (ref 136–145)
WBC # SPEC AUTO: 11.32 X10(3)/MCL (ref 4.5–11.5)

## 2023-11-12 PROCEDURE — 25000003 PHARM REV CODE 250: Performed by: INTERNAL MEDICINE

## 2023-11-12 PROCEDURE — 25000003 PHARM REV CODE 250: Performed by: NURSE PRACTITIONER

## 2023-11-12 PROCEDURE — 21400001 HC TELEMETRY ROOM

## 2023-11-12 PROCEDURE — 97116 GAIT TRAINING THERAPY: CPT | Mod: CQ

## 2023-11-12 PROCEDURE — 63600175 PHARM REV CODE 636 W HCPCS: Performed by: PHYSICIAN ASSISTANT

## 2023-11-12 PROCEDURE — 85025 COMPLETE CBC W/AUTO DIFF WBC: CPT | Performed by: INTERNAL MEDICINE

## 2023-11-12 PROCEDURE — 97530 THERAPEUTIC ACTIVITIES: CPT | Mod: CQ

## 2023-11-12 PROCEDURE — 80053 COMPREHEN METABOLIC PANEL: CPT | Performed by: INTERNAL MEDICINE

## 2023-11-12 RX ORDER — FUROSEMIDE 40 MG/1
40 TABLET ORAL DAILY
Status: DISCONTINUED | OUTPATIENT
Start: 2023-11-12 | End: 2023-11-13 | Stop reason: HOSPADM

## 2023-11-12 RX ADMIN — MIDODRINE HYDROCHLORIDE 10 MG: 5 TABLET ORAL at 03:11

## 2023-11-12 RX ADMIN — TRAMADOL HYDROCHLORIDE 50 MG: 50 TABLET, COATED ORAL at 03:11

## 2023-11-12 RX ADMIN — BUMETANIDE 1 MG: 0.25 INJECTION INTRAMUSCULAR; INTRAVENOUS at 08:11

## 2023-11-12 RX ADMIN — PANTOPRAZOLE SODIUM 40 MG: 40 TABLET, DELAYED RELEASE ORAL at 08:11

## 2023-11-12 RX ADMIN — LEVOTHYROXINE SODIUM 100 MCG: 100 TABLET ORAL at 05:11

## 2023-11-12 RX ADMIN — ENOXAPARIN SODIUM 40 MG: 40 INJECTION SUBCUTANEOUS at 04:11

## 2023-11-12 RX ADMIN — LACTULOSE 10 G: 10 SOLUTION ORAL at 08:11

## 2023-11-12 RX ADMIN — MIDODRINE HYDROCHLORIDE 10 MG: 5 TABLET ORAL at 09:11

## 2023-11-12 RX ADMIN — MIDODRINE HYDROCHLORIDE 10 MG: 5 TABLET ORAL at 05:11

## 2023-11-12 RX ADMIN — FUROSEMIDE 40 MG: 40 TABLET ORAL at 03:11

## 2023-11-12 RX ADMIN — LACTULOSE 10 G: 10 SOLUTION ORAL at 03:11

## 2023-11-12 RX ADMIN — THIAMINE HCL TAB 100 MG 100 MG: 100 TAB at 08:11

## 2023-11-12 RX ADMIN — THERA TABS 1 TABLET: TAB at 08:11

## 2023-11-12 RX ADMIN — POTASSIUM BICARBONATE 50 MEQ: 977.5 TABLET, EFFERVESCENT ORAL at 11:11

## 2023-11-12 RX ADMIN — ALPRAZOLAM 0.25 MG: 0.25 TABLET ORAL at 08:11

## 2023-11-12 RX ADMIN — TRAMADOL HYDROCHLORIDE 50 MG: 50 TABLET, COATED ORAL at 08:11

## 2023-11-12 RX ADMIN — POTASSIUM BICARBONATE 50 MEQ: 977.5 TABLET, EFFERVESCENT ORAL at 05:11

## 2023-11-12 RX ADMIN — SPIRONOLACTONE 100 MG: 25 TABLET ORAL at 08:11

## 2023-11-12 NOTE — PROGRESS NOTES
[facility]  Hospital Medicine Progress Note        Chief Complaint: Inpatient Follow-up    HPI:     63-year-old female with significant history of alcoholic cirrhosis, hypothyroidism, anxiety, alcohol abuse, depression was admitted hospitalist medicine service with bilateral lower extremity swelling, dyspnea.  Patient had a recent hospitalization mid October for jaundice and workup revealed new diagnosis cirrhosis, decompensated with associated electrolyte derangement, renal insufficiency and the patient was transferred to [facility] for hepatology from where she was discharged home, deemed not a candidate for liver transplant at this time secondary to alcohol abuse . after discharge patient presented back to the ED with above symptoms.  Hemodynamics stable in the ED . physical exam significant for lower extremity edema.  Chest x-ray with small layering pleural effusion .  patient admitted hospitalist medicine service and was initiated on IV diuretics.  Also on midodrine, lactulose.  GI consulted, closely following.  Echocardiogram this hospitalization with normal ejection fraction, normal diastolic function.  Persistent volume overload with significant lower extremity edema on 11/08 and therefore Lasix increased to 40 mg b.i.d..  Still with persistent swelling and no much improvement on 11/09 and therefore switched to Bumex, slowly improving on Bumex, nephrology consulted for further recommendations.  Nephrology evaluated, recommended to continue Bumex while in-house and switch to Lasix by mouth upon DC.  Also added Aldactone, increased dose further to 100 mg daily on 11/11    Interval Hx:     Patient seen at bedside.  Comfortably laying in bed . lower extremity swelling is much better today, only has minimal swelling . patient is in better spirits .  hemodynamics stable.  no new complaints.     Objective/physical exam:  General: In no acute distress, afebrile  Chest: Clear to  auscultation bilaterally  Heart: S1, S2, no appreciable murmur  Abdomen: Soft, nontender, BS +  MSK: Warm, minimal bilateral lower extremity edema   Neurologic: Alert and oriented x4, moving all extremities with good strength     VITAL SIGNS: 24 HRS MIN & MAX LAST   Temp  Min: 97.9 °F (36.6 °C)  Max: 98.7 °F (37.1 °C) 97.9 °F (36.6 °C)   BP  Min: 99/58  Max: 125/70 125/70   Pulse  Min: 88  Max: 98  88   Resp  Min: 18  Max: 18 18   SpO2  Min: 92 %  Max: 95 % (!) 92 %       Recent Labs   Lab 11/12/23  0700   WBC 11.32   RBC 2.75*   HGB 8.5*   HCT 24.8*   MCV 90.2   MCH 30.9   MCHC 34.3   RDW 18.7*      MPV 8.7         Recent Labs   Lab 11/09/23  0543 11/10/23  0324 11/12/23  0700   *   < > 135*   K 3.1*   < > 3.3*   CO2 31   < > 32*   BUN 5.7*   < > 5.8*   CREATININE 0.81   < > 0.81   CALCIUM 7.4*   < > 7.5*   MG 1.90  --   --    ALBUMIN 2.6*   < > 2.3*   ALKPHOS 119   < > 116   ALT 19   < > 16   AST 52*   < > 50*   BILITOT 9.1*   < > 8.3*    < > = values in this interval not displayed.          Microbiology Results (last 7 days)       Procedure Component Value Units Date/Time    Blood culture #1 **CANNOT BE ORDERED STAT** [[id number]]  (Normal) Collected: 11/01/23 2016    Order Status: Completed Specimen: Blood from Antecubital, Right Updated: 11/06/23 2300     CULTURE, BLOOD ([facility]) No Growth at 5 days    Blood culture #2 **CANNOT BE ORDERED STAT** [[id number]]  (Normal) Collected: 11/01/23 2016    Order Status: Completed Specimen: Blood from Arm, Right Updated: 11/06/23 2300     CULTURE, BLOOD ([facility]) No Growth at 5 days             Scheduled Med:   bumetanide  1 mg Intravenous Q12H    enoxparin  40 mg Subcutaneous Q24H (prophylaxis, 1700)    lactulose 10 gram/15 ml  10 g Oral TID    levothyroxine  100 mcg Oral Before breakfast    midodrine  10 mg Oral Q8H    multivitamin  1 tablet Oral Daily    pantoprazole  40 mg Oral Daily    potassium bicarbonate  50 mEq Oral Q6H    spironolactone  100 mg Oral Daily     thiamine  100 mg Oral Daily          Assessment/Plan:    Decompensated alcoholic cirrhosis  Volume overload with bilateral lower extremity edema-significantly better today   Hypokalemia  Anemia of chronic disease   History of hypothyroidism   Physical deconditioning   Depression/anxiety   History of heavy alcohol abuse  Prophylaxis    GI evaluated this hospitalization, also recently evaluated by hepatology, not a candidate for transplant at this time given alcohol abuse   Recommended outpatient follow-up   Continue lactulose to prevent hepatic encephalopathy   Lower extremity swelling much better today  EF and diastolic function is normal   Patient now compliant with fluid restriction-1 L  I will DC Bumex and switch to p.o. Lasix 40 mg daily from tomorrow   Continue Aldactone 100 mg daily  No DVT   Recommended compression stockings and elevate leg  Strict Is&Os   Potassium replaced-50 mEq x 2 doses again today  BP stable on midodrine   Renal function stable   Therapy Services on board   Continue home meds-levothyroxine, multivitamin, thiamine  Continue oral PPI  Hemoglobin is > 8, no overt bleeding  DVT prophylaxis-Lovenox    Patient accepted to [place] [facility]   She can go tomorrow      Amanda Trejo MD   11/12/2023

## 2023-11-12 NOTE — PT/OT/SLP PROGRESS
Physical Therapy Treatment    Patient Name:  Lauren Ewing   MRN:  [id number]    Recommendations:     Discharge therapy intensity: moderate intensity   Discharge Equipment Recommendations: walker, rolling  Barriers to discharge: Ongoing medical needs    Assessment:     Lauren Ewing is a 63 y.o. female admitted with a medical diagnosis of <principal problem not specified>.  She presents with the following impairments/functional limitations: weakness, impaired endurance, impaired functional mobility, gait instability, decreased lower extremity function, edema .    Rehab Prognosis: Good; patient would benefit from acute skilled PT services to address these deficits and reach maximum level of function.    Recent Surgery: * No surgery found *      Plan:     During this hospitalization, patient to be seen 5 x/week to address the identified rehab impairments via gait training, therapeutic activities, therapeutic exercises and progress toward the following goals:    Plan of Care Expires:  12/05/23    Subjective     Chief Complaint: sensitivity in [place] feet  Patient/Family Comments/goals: to get home  Pain/Comfort:  Pain Rating 1: 0/10      Objective:     Communicated with RN prior to session.  Patient found HOB elevated with telemetry upon PT entry to room.     General Precautions: Standard, fall  Orthopedic Precautions: N/A  Braces: N/A  Respiratory Status: Room air  Blood Pressure: NT   Skin Integrity: Visible skin intact      Functional Mobility:  Bed Mobility:     Scooting: contact guard assistance  Supine to Sit: contact guard assistance and minimum assistance  Sit to Supine: contact guard assistance and minimum assistance  Transfers:     Sit to Stand:  minimum assistance with rolling walker  Bed > BSC: contact guard assistance with  rolling walker  using  Step Transfer  (+) BM; pt able to performed pericare while sitting on commode  BSC > recliner: contact guard assistance with RW using step t/f   Gait: Pt amb 50ft,  40ft with RW, CGA. Demo'd slow but steady gait speed.   Encouraged pt to sit up in chair however she was adamant on going back to bed to finish eat lunch.         Education:  Patient provided with verbal education education regarding increase time OOB, PT poc, safety.  Understanding was verbalized, however additional teaching warranted.     Patient left HOB elevated with all lines intact, call button in reach, and RN notified..    GOALS:   Multidisciplinary Problems       Physical Therapy Goals          Problem: Physical Therapy    Goal Priority Disciplines Outcome Goal Variances Interventions   Physical Therapy Goal     PT, PT/OT Ongoing, Progressing     Description: Goals to be met by: 23     Patient will increase functional independence with mobility by performin. Supine to sit with Modified [place]  2. Sit to stand transfer with Modified [place]  3. Bed to chair transfer with Modified [place] using Rolling Walker  4. Gait  x 300 feet with Modified [place] using Rolling Walker.                          Time Tracking:     PT Received On:    PT Start Time: 1210     PT Stop Time: 1233  PT Total Time (min): 23 min     Billable Minutes: Gait Training 10 and Therapeutic Activity 13    Treatment Type: Treatment  PT/PTA: PTA     Number of PTA visits since last PT visit: 3     2023

## 2023-11-13 VITALS
HEIGHT: 64 IN | BODY MASS INDEX: 25.86 KG/M2 | TEMPERATURE: 98 F | OXYGEN SATURATION: 91 % | DIASTOLIC BLOOD PRESSURE: 69 MMHG | HEART RATE: 97 BPM | RESPIRATION RATE: 20 BRPM | WEIGHT: 151.5 LBS | SYSTOLIC BLOOD PRESSURE: 107 MMHG

## 2023-11-13 PROBLEM — E87.70 VOLUME OVERLOAD: Status: ACTIVE | Noted: 2023-11-13

## 2023-11-13 LAB
ALBUMIN SERPL-MCNC: 2.2 G/DL (ref 3.4–4.8)
ALBUMIN/GLOB SERPL: 1 RATIO (ref 1.1–2)
ALP SERPL-CCNC: 109 UNIT/L (ref 40–150)
ALT SERPL-CCNC: 15 UNIT/L (ref 0–55)
AST SERPL-CCNC: 49 UNIT/L (ref 5–34)
BASOPHILS # BLD AUTO: 0.08 X10(3)/MCL
BASOPHILS NFR BLD AUTO: 0.7 %
BILIRUB SERPL-MCNC: 8.2 MG/DL
BUN SERPL-MCNC: 5.6 MG/DL (ref 9.8–20.1)
CALCIUM SERPL-MCNC: 7.5 MG/DL (ref 8.4–10.2)
CHLORIDE SERPL-SCNC: 97 MMOL/L (ref 98–107)
CO2 SERPL-SCNC: 29 MMOL/L (ref 23–31)
CREAT SERPL-MCNC: 0.83 MG/DL (ref 0.55–1.02)
EOSINOPHIL # BLD AUTO: 0.43 X10(3)/MCL (ref 0–0.9)
EOSINOPHIL NFR BLD AUTO: 3.7 %
ERYTHROCYTE [DISTWIDTH] IN BLOOD BY AUTOMATED COUNT: 18.2 % (ref 11.5–17)
GFR SERPLBLD CREATININE-BSD FMLA CKD-EPI: >60 MLS/MIN/1.73/M2
GLOBULIN SER-MCNC: 2.1 GM/DL (ref 2.4–3.5)
GLUCOSE SERPL-MCNC: 85 MG/DL (ref 82–115)
HCT VFR BLD AUTO: 24 % (ref 37–47)
HGB BLD-MCNC: 8.2 G/DL (ref 12–16)
IMM GRANULOCYTES # BLD AUTO: 0.05 X10(3)/MCL (ref 0–0.04)
IMM GRANULOCYTES NFR BLD AUTO: 0.4 %
LYMPHOCYTES # BLD AUTO: 4.13 X10(3)/MCL (ref 0.6–4.6)
LYMPHOCYTES NFR BLD AUTO: 35.8 %
MCH RBC QN AUTO: 30.9 PG (ref 27–31)
MCHC RBC AUTO-ENTMCNC: 34.2 G/DL (ref 33–36)
MCV RBC AUTO: 90.6 FL (ref 80–94)
MONOCYTES # BLD AUTO: 1.86 X10(3)/MCL (ref 0.1–1.3)
MONOCYTES NFR BLD AUTO: 16.1 %
NEUTROPHILS # BLD AUTO: 4.99 X10(3)/MCL (ref 2.1–9.2)
NEUTROPHILS NFR BLD AUTO: 43.3 %
NRBC BLD AUTO-RTO: 0 %
PLATELET # BLD AUTO: 192 X10(3)/MCL (ref 130–400)
PMV BLD AUTO: 9.6 FL (ref 7.4–10.4)
POTASSIUM SERPL-SCNC: 3.3 MMOL/L (ref 3.5–5.1)
PROT SERPL-MCNC: 4.3 GM/DL (ref 5.8–7.6)
RBC # BLD AUTO: 2.65 X10(6)/MCL (ref 4.2–5.4)
SODIUM SERPL-SCNC: 133 MMOL/L (ref 136–145)
WBC # SPEC AUTO: 11.54 X10(3)/MCL (ref 4.5–11.5)

## 2023-11-13 PROCEDURE — 25000003 PHARM REV CODE 250: Performed by: INTERNAL MEDICINE

## 2023-11-13 PROCEDURE — 85025 COMPLETE CBC W/AUTO DIFF WBC: CPT | Performed by: INTERNAL MEDICINE

## 2023-11-13 PROCEDURE — 80053 COMPREHEN METABOLIC PANEL: CPT | Performed by: INTERNAL MEDICINE

## 2023-11-13 RX ORDER — TRAMADOL HYDROCHLORIDE 50 MG/1
50 TABLET ORAL EVERY 6 HOURS PRN
Start: 2023-11-13 | End: 2023-12-28

## 2023-11-13 RX ORDER — FUROSEMIDE 40 MG/1
40 TABLET ORAL DAILY
Qty: 30 TABLET | Refills: 0
Start: 2023-11-13 | End: 2023-12-28 | Stop reason: SDUPTHER

## 2023-11-13 RX ORDER — SPIRONOLACTONE 100 MG/1
100 TABLET, FILM COATED ORAL DAILY
Qty: 30 TABLET | Refills: 0
Start: 2023-11-13 | End: 2023-12-07 | Stop reason: SDUPTHER

## 2023-11-13 RX ADMIN — THIAMINE HCL TAB 100 MG 100 MG: 100 TAB at 09:11

## 2023-11-13 RX ADMIN — MIDODRINE HYDROCHLORIDE 10 MG: 5 TABLET ORAL at 05:11

## 2023-11-13 RX ADMIN — LACTULOSE 10 G: 10 SOLUTION ORAL at 09:11

## 2023-11-13 RX ADMIN — SPIRONOLACTONE 100 MG: 25 TABLET ORAL at 09:11

## 2023-11-13 RX ADMIN — LEVOTHYROXINE SODIUM 100 MCG: 100 TABLET ORAL at 05:11

## 2023-11-13 RX ADMIN — FUROSEMIDE 40 MG: 40 TABLET ORAL at 09:11

## 2023-11-13 RX ADMIN — THERA TABS 1 TABLET: TAB at 09:11

## 2023-11-13 RX ADMIN — POTASSIUM BICARBONATE 50 MEQ: 977.5 TABLET, EFFERVESCENT ORAL at 09:11

## 2023-11-13 RX ADMIN — PANTOPRAZOLE SODIUM 40 MG: 40 TABLET, DELAYED RELEASE ORAL at 09:11

## 2023-11-13 NOTE — PLAN OF CARE
Patient accepted at [facility], will be admitted today. [facility] will provide transportation.

## 2023-11-13 NOTE — DISCHARGE SUMMARY
[facility]  Hospital Medicine Discharge Summary    Admit Date: 11/1/2023  Discharge Date and Time: 11/13/20238:29 AM  Admitting Physician: MADELIN Team  Discharging Physician: mAanda Trejo MD.  Primary Care Physician: Pennie Primary Doctor      Discharge Diagnoses:  Decompensated alcoholic cirrhosis  Volume overload with bilateral lower extremity edema-significantly better today   Hypokalemia  Anemia of chronic disease   History of hypothyroidism   Physical deconditioning   Depression/anxiety   History of heavy alcohol abuse    Hospital Course:   63-year-old female with significant history of alcoholic cirrhosis, hypothyroidism, anxiety, alcohol abuse, depression was admitted hospitalist medicine service with bilateral lower extremity swelling, dyspnea.  Patient had a recent hospitalization mid October for jaundice and workup revealed new diagnosis cirrhosis, decompensated with associated electrolyte derangement, renal insufficiency and the patient was transferred to [facility] for hepatology from where she was discharged home, deemed not a candidate for liver transplant at this time secondary to alcohol abuse . after discharge patient presented back to the ED with above symptoms.  Hemodynamics stable in the ED . physical exam significant for lower extremity edema.  Chest x-ray with small layering pleural effusion .  patient admitted hospitalist medicine service and was initiated on IV diuretics.  Also on midodrine, lactulose.  GI consulted, closely following.  Echocardiogram this hospitalization with normal ejection fraction, normal diastolic function.  Persistent volume overload with significant lower extremity edema on 11/08 and therefore Lasix increased to 40 mg b.i.d..  Still with persistent swelling and no much improvement on 11/09 and therefore switched to Bumex, slowly improving on Bumex, nephrology consulted for further recommendations.  Nephrology evaluated, recommended to  continue Bumex while in-house and switch to Lasix by mouth upon DC.  Also added Aldactone, increased dose further to 100 mg daily on 11/11 lower extremity much better on 11/12.  Bumex discontinued and switched to p.o. Lasix 40 mg daily to start from the next day morning . she had received the morning dose . electrolytes continued to be replaced appropriately home medications continued as appropriate . patient re-evaluated 11/13. volume status continued to improve . lower extremity swelling almost resolved and patient was already cleared by Nephrology for DC . GI also cleared the patient.  Therefore it was decided to discharge the patient to rehab to continue therapy services . discharge medications per med rec .follow-up with Nephrology, GI/hepatology, PCP . recommended alcohol cessation . patient voiced understanding . discharged in stable condition.    Vitals:  VITAL SIGNS: 24 HRS MIN & MAX LAST   Temp  Min: 98.3 °F (36.8 °C)  Max: 98.5 °F (36.9 °C) 98.3 °F (36.8 °C)   BP  Min: 101/64  Max: 111/71 101/64   Pulse  Min: 89  Max: 98  89   Resp  Min: 16  Max: 20 20   SpO2  Min: 91 %  Max: 96 % (!) 91 %       Physical Exam:  General appearance:  No acute distress  HENT: Atraumatic   Lungs: Clear to auscultation bilaterally.   Heart: RRR, very minimal lower extremity edema   Abdomen: Soft, non tender   Extremities: warm  Neuro:  Awake, alert, oriented x4  Psych/mental status: Appropriate mood and affect.      Procedures Performed: No admission procedures for hospital encounter.     Significant Diagnostic Studies: See Full reports for all details    Recent Labs   Lab 11/11/23  0545 11/12/23  0700 11/13/23  0344   WBC 10.32 11.32 11.54*   RBC 2.60* 2.75* 2.65*   HGB 8.0* 8.5* 8.2*   HCT 24.1* 24.8* 24.0*   MCV 92.7 90.2 90.6   MCH 30.8 30.9 30.9   MCHC 33.2 34.3 34.2   RDW 19.4* 18.7* 18.2*    195 192   MPV 10.3 8.7 9.6       Recent Labs   Lab 11/09/23  0543 11/10/23  0324 11/11/23  0545 11/12/23  0700  11/13/23  0344   *   < > 133* 135* 133*   K 3.1*   < > 3.5 3.3* 3.3*   CO2 31   < > 28 32* 29   BUN 5.7*   < > 5.7* 5.8* 5.6*   CREATININE 0.81   < > 0.77 0.81 0.83   CALCIUM 7.4*   < > 7.5* 7.5* 7.5*   MG 1.90  --   --   --   --    ALBUMIN 2.6*   < > 2.3* 2.3* 2.2*   ALKPHOS 119   < > 100 116 109   ALT 19   < > 21 16 15   AST 52*   < > 53* 50* 49*   BILITOT 9.1*   < > 7.6* 8.3* 8.2*    < > = values in this interval not displayed.        Microbiology Results (last 7 days)       Procedure Component Value Units Date/Time    Blood culture #1 **CANNOT BE ORDERED STAT** [[id number]]  (Normal) Collected: 11/01/23 2016    Order Status: Completed Specimen: Blood from Antecubital, Right Updated: 11/06/23 2300     CULTURE, BLOOD ([facility]) No Growth at 5 days    Blood culture #2 **CANNOT BE ORDERED STAT** [[id number]]  (Normal) Collected: 11/01/23 2016    Order Status: Completed Specimen: Blood from Arm, Right Updated: 11/06/23 2300     CULTURE, BLOOD ([facility]) No Growth at 5 days             CV Ultrasound doppler venous legs bilat    There is no evidence of a right lower extremity DVT.    There is no evidence of a left lower extremity DVT.    Bilateral lower extremities negative for deep vein thrombus at time of   exam.          Medication List        START taking these medications      furosemide 40 MG tablet  Commonly known as: LASIX  Take 1 tablet (40 mg total) by mouth once daily.     spironolactone 100 MG tablet  Commonly known as: ALDACTONE  Take 1 tablet (100 mg total) by mouth once daily.     traMADoL 50 mg tablet  Commonly known as: ULTRAM  Take 1 tablet (50 mg total) by mouth every 6 (six) hours as needed for Pain.            CHANGE how you take these medications      folic acid 1 MG tablet  Commonly known as: FOLVITE  Take 1 tablet (1 mg total) by mouth once daily.  What changed: Another medication with the same name was removed. Continue taking this medication, and follow the directions you see here.     midodrine  10 MG tablet  Commonly known as: PROAMATINE  Take 1 tablet (10 mg total) by mouth every 8 (eight) hours.  What changed: Another medication with the same name was removed. Continue taking this medication, and follow the directions you see here.            CONTINUE taking these medications      acamprosate 333 mg tablet  Commonly known as: CAMPRAL  Take 2 tablets (666 mg total) by mouth 3 (three) times daily.     CONSTULOSE 10 gram/15 mL solution  Generic drug: lactulose  Take 15 mLs (10 g total) by mouth 2 (two) times daily. Take an extra dose if needed to achieve 2-3 bowel movements per day to prevent confusion     levothyroxine 100 MCG tablet  Commonly known as: SYNTHROID  Take 1 tablet (100 mcg total) by mouth before breakfast.     melatonin 3 mg tablet  Commonly known as: MELATIN  Take 2 tablets (6 mg total) by mouth nightly as needed for Insomnia.     multivitamin Tab  Take 1 tablet by mouth once daily.     pantoprazole 40 MG tablet  Commonly known as: PROTONIX  Take 1 tablet (40 mg total) by mouth once daily.     thiamine 100 MG tablet  Take 1 tablet (100 mg total) by mouth once daily.               Where to Get Your Medications        Information about where to get these medications is not yet available    Ask your nurse or doctor about these medications  furosemide 40 MG tablet  spironolactone 100 MG tablet  traMADoL 50 mg tablet          Explained in detail to the patient about the discharge plan, medications, and follow-up visits. Pt understands and agrees with the treatment plan  Discharge Disposition: Home or Self Care   Discharged Condition: stable  Diet-   Dietary Orders (From admission, onward)       Start     Ordered    11/08/23 1320  Diet Adult Regular Low Sodium  Diet effective now        Question:  Diet Modifier:  Answer:  Low Sodium    11/08/23 1320    11/03/23 1035  Dietary nutrition supplements Boost Plus Vanilla; TID  Continuous        Question Answer Comment   Select PO Supplement: Boost Plus  Vanilla    Frequency: TID        11/03/23 1034                   Medications Per DC med rec  Activities as tolerated   Follow-up Information       No, Primary Doctor Follow up in 1 week(s).               Gildardo Callahan MD Follow up in 1 week(s).    Specialty: Gastroenterology  Contact information:  [address]  [address]  [place] [address]  [phone]               Luke Agrawal MD Follow up in 1 week(s).    Specialty: Nephrology  Contact information:  [address] [place] [address]  [phone]               [facility] at rehab on 11/14 to monitor potassium Follow up.                           For further questions contact hospitalist office    Discharge time 33 minutes    For worsening symptoms, chest pain, shortness of breath, increased abdominal pain, high grade fever, stroke or stroke like symptoms, immediately go to the nearest Emergency Room or call 911 as soon as possible.      Amanda Stevens M.D, on 11/13/2023. at 8:29 AM.

## 2023-11-13 NOTE — PLAN OF CARE
[facility] sent [facility] .

## 2023-11-14 NOTE — DISCHARGE SUMMARY
[facility] Hospital Medicine Discharge Summary    Admit Date: 10/12/2023  Discharge Date and Time: 10/25/2023  Discharging Physician: Emre Kerr MD.  Consults: Gastroenterology and Nephrology    Discharge Diagnoses:  Acute hyponatremia   Pneumonia with pleural effusions   End-stage liver disease-MELD score of 30  Chronic anemia  Acute hypothyroidism-diagnoses on this hospitalization    Hospital Course:        [facility] Hospital Medicine Progress Note       Chief Complaint:  Weakness and poor appetite     Subjective  A 63-year-old female with significant history of depression/anxiety and history of heavy alcohol abuse since age 38. Presented to the ED with complaints of severe generalized weakness x1 week with associated poor appetite.  Patient was hemodynamically stable except for borderline hypotension mild tachycardia in the ED.  Lab significant for leukocytosis, hyperbilirubinemia, low albumin and severe hyponatremia/hypokalemia.  Lactic acid was elevated.  Imaging revealed right pleural effusion, right lower lobe consolidation, cirrhotic liver with ascites and enteritis.  Patient received IV fluids in the ED.  Initiated on empiric antibiotics paid pulmonology, Gastroenterology consulted.  Admitted to hospitalist medicine service.   Patient is overall worsening.  Creatinine increased to 1.6 from 0.8.    Concern for hepatorenal syndrome.    Meld score is 30.  Mentating well for now.     Objective/physical exam:  Vital signs have been personally reviewed by me   Neuro:  Awake, alert, oriented  Resting comfortably in bed.  Hand tremor present with extension  General: Appears comfortable, no acute distress.  Integumentary: Warm, dry, intact.  Musculoskeletal: Purposeful movement noted.   Respiratory: No accessory muscle use. Breath sounds are equal.  Cardiovascular: Regular rate. No peripheral edema.        - We will continue midodrine 10 octreotide 200  t.i.d. and also albumin.    - patient is not fitting the criteria for the terlipressin, discussed with pharmacy.   I spoke to transplant team from [facility] they accepted the patient and will transfer possible transplant and hepatology Services.                Vitals:  VITAL SIGNS: 24 HRS MIN & MAX LAST   No data recorded 97.6 °F (36.4 °C)   No data recorded 120/76   No data recorded  95   No data recorded 18   No data recorded 95 %         Procedures Performed: No admission procedures for hospital encounter.     Significant Diagnostic Studies: See Full reports for all details    Recent Labs   Lab 11/11/23  0545 11/12/23  0700 11/13/23  0344   WBC 10.32 11.32 11.54*   RBC 2.60* 2.75* 2.65*   HGB 8.0* 8.5* 8.2*   HCT 24.1* 24.8* 24.0*   MCV 92.7 90.2 90.6   MCH 30.8 30.9 30.9   MCHC 33.2 34.3 34.2   RDW 19.4* 18.7* 18.2*    195 192   MPV 10.3 8.7 9.6       Recent Labs   Lab 11/09/23  0543 11/10/23  0324 11/11/23  0545 11/12/23  0700 11/13/23  0344   *   < > 133* 135* 133*   K 3.1*   < > 3.5 3.3* 3.3*   CO2 31   < > 28 32* 29   BUN 5.7*   < > 5.7* 5.8* 5.6*   CREATININE 0.81   < > 0.77 0.81 0.83   CALCIUM 7.4*   < > 7.5* 7.5* 7.5*   MG 1.90  --   --   --   --    ALBUMIN 2.6*   < > 2.3* 2.3* 2.2*   ALKPHOS 119   < > 100 116 109   ALT 19   < > 21 16 15   AST 52*   < > 53* 50* 49*   BILITOT 9.1*   < > 7.6* 8.3* 8.2*    < > = values in this interval not displayed.        Microbiology Results (last 7 days)       ** No results found for the last 168 hours. **             CV Ultrasound doppler venous legs bilat    There is no evidence of a right lower extremity DVT.    There is no evidence of a left lower extremity DVT.    Bilateral lower extremities negative for deep vein thrombus at time of   exam.          Medication List        START taking these medications      melatonin 3 mg tablet  Commonly known as: MELATIN  Take 2 tablets (6 mg total) by mouth nightly as needed for Insomnia.     multivitamin  Tab  Take 1 tablet by mouth once daily.     pantoprazole 40 MG tablet  Commonly known as: PROTONIX  Take 1 tablet (40 mg total) by mouth once daily.               Where to Get Your Medications        These medications were sent to [id number] - [address] [facility] [address] [facility] [address]      Hours: 24-hours Phone: [phone]   pantoprazole 40 MG tablet       Information about where to get these medications is not yet available    Ask your nurse or doctor about these medications  melatonin 3 mg tablet  multivitamin Tab          Explained in detail to the patient about the discharge plan, medications, and follow-up visits. Pt understands and agrees with the treatment plan  Discharge Disposition: Rehab Facility   Discharged Condition: stable  Diet-    Medications Per DC med rec  Activities as tolerated    For further questions contact hospitalist office    Discharge time 33 minutes    For worsening symptoms, chest pain, shortness of breath, increased abdominal pain, high grade fever, stroke or stroke like symptoms, immediately go to the nearest Emergency Room or call 911 as soon as possible.      Emre Pineda M.D on 11/14/2023. at 5:08 PM.

## 2023-11-22 ENCOUNTER — TELEPHONE (OUTPATIENT)
Dept: HEPATOLOGY | Facility: CLINIC | Age: 63
End: 2023-11-22
Payer: MEDICAID

## 2023-11-22 NOTE — TELEPHONE ENCOUNTER
----- Message from Jayden Knox sent at 11/22/2023  9:33 AM CST -----  Type:  Sooner Apoointment Request    Caller is requesting a sooner appointment.   Name of Caller:pt son  When is the first available appointment?02/19  Symptoms:hospital follow up, liver failure   Would the patient rather a call back or a response via [facility]? call  Best Call Back Number: [phone]   Additional Information: pt son would like to know if there is any way to have an virtual visit, the pt is in an skilled nursing facility an the pt isn't able to make it to an in person visit due to the facility restriction. Please give the pt son an call back        Patient : Memo Swanson Age: 88 year old Sex: male   MRN: [id number] Encounter Date: 7/5/2022      History     Chief Complaint   Patient presents with   • Abdominal Pain     Constipated x 1-week     HPI    The patient is a 88-year-old male who presented to the emergency department with a chief complaint of constipation times 4-5 days in the setting of recent diarrhea for 3 days prior to that at the end of June.  The patient reports that he has been taking an anti diarrheal and Zofran from urgent care after his diarrheal episode.  He then reports that he has had body aches headaches chills and a cough in the setting of his family in grand kids testing for COVID over the weekend.  He denies any overt shortness of breath fevers nausea vomiting or any ongoing diarrhea.  He denies any changes in his urination.  He reports that he has had decreased bowel movements over the last 5 days but is passing gas.  He has a vague generalized abdominal discomfort but no overt pain he says.    Allergies   Allergen Reactions   • Tetanus Toxoid Other (See Comments)     fever   • Pneumococcal Vaccine SWELLING     arm   • Seasonal Other (See Comments)     Dust etc       Current Discharge Medication List      Prior to Admission Medications    Details   ondansetron (Zofran ODT) 4 MG disintegrating tablet Place 1 tablet onto the tongue every 8 hours as needed for Nausea.  Qty: 12 tablet, Refills: 0      5-fluorouracil-salicylic acid 5-20 % topical liquid Apply to warts daily at bedtime and then cover with a band-aid.   In the morning remove the dead skin with a nail file.  Qty: 30 mL, Refills: 3      amLODIPine (NORVASC) 2.5 MG tablet TAKE 1 TABLET BY MOUTH  DAILY  Qty: 90 tablet, Refills: 0      ipratropium (ATROVENT) 0.06 % nasal spray USE 2 SPRAYS IN BOTH  NOSTRILS TWICE DAILY AS  NEEDED FOR RHINITIS  Qty: 75 mL, Refills: 3      gabapentin (NEURONTIN) 100 MG capsule Take 1 capsule by mouth nightly.  Qty: 30 capsule, Refills: 2       omeprazole (PriLOSEC) 40 MG capsule Take 1 capsule by mouth daily.  Qty: 90 capsule, Refills: 3      simvastatin (ZOCOR) 40 MG tablet Take 1 tablet by mouth nightly.  Qty: 90 tablet, Refills: 3      acetaminophen (TYLENOL) 500 MG tablet Take 500 mg by mouth every 6 hours as needed for Pain.      loratadine (CLARITIN) 10 MG tablet Take 10 mg by mouth daily.      aspirin 81 MG tablet Take 81 mg by mouth daily.      CALCIUM CARBONATE-VITAMIN D PO Take 600 mg by mouth daily.       DAILY MULTIPLE VITAMINS PO              Past Medical History:   Diagnosis Date   • Allergy    • Back pain    • Cataract    • Dupuytren's contracture of left hand    • DVT (deep venous thrombosis) (CMS/HCC)    • Essential (primary) hypertension    • GERD (gastroesophageal reflux disease)    • [place] (hard of hearing)    • Hyperlipidemia    • Hyperphoria    • Malignant neoplasm (CMS/HCC)     prostate CA   • OA (osteoarthritis)    • PE (pulmonary thromboembolism) (CMS/HCC)    • Shoulder pain, left        Past Surgical History:   Procedure Laterality Date   • CATARACT EXTRACTION W/ INTRAOCULAR LENS IMPLANT Left 02/25/2020    Dr. Morales   • CATARACT EXTRACTION W/ INTRAOCULAR LENS IMPLANT Right 02/11/2020    Dr. Morales   • DUPUYTREN CONTRACTURE RELEASE Left     5th finger   • PROSTATECTOMY         Family History   Problem Relation Age of Onset   • Cancer, Breast Mother    • Cancer, Lung Father    • Cancer Father        Social History     Tobacco Use   • Smoking status: Never Smoker   • Smokeless tobacco: Never Used   Substance Use Topics   • Alcohol use: Yes     Alcohol/week: 4.0 standard drinks     Types: 4 Cans of beer per week   • Drug use: No       E-cigarette/Vaping     E-Cigarette/Vaping Substances & Devices       Review of Systems   Constitutional: Positive for appetite change. Negative for activity change, chills, fatigue and fever.   HENT: Negative.    Respiratory: Positive for cough.    Cardiovascular: Negative.    Gastrointestinal: Positive  for constipation. Negative for abdominal distention, abdominal pain, anal bleeding, blood in stool, diarrhea, nausea, rectal pain and vomiting.   Genitourinary: Negative.    Musculoskeletal: Positive for myalgias. Negative for arthralgias and back pain.   Skin: Negative.    Neurological: Positive for weakness.   Hematological: Negative.        Physical Exam     ED Triage Vitals [07/05/22 0939]   ED Triage Vitals Group      Temp 98.3 °F (36.8 °C)      Pulse       Resp       BP       SpO2       EtCO2 mmHg       Height       Weight       Weight Scale Used       BMI (Calculated)       IBW/kg (Calculated)        Physical Exam  Vitals and nursing note reviewed.   Constitutional:       General: He is not in acute distress.     Appearance: Normal appearance. He is well-developed. He is not ill-appearing or toxic-appearing.   HENT:      Head: Normocephalic and atraumatic.   Cardiovascular:      Rate and Rhythm: Normal rate.      Pulses: Normal pulses.      Heart sounds: Normal heart sounds.   Pulmonary:      Effort: Pulmonary effort is normal. No respiratory distress.      Breath sounds: Normal breath sounds.   Abdominal:      General: Abdomen is flat. Bowel sounds are normal.      Palpations: Abdomen is soft. There is no shifting dullness, hepatomegaly or splenomegaly.      Tenderness: There is generalized abdominal tenderness. There is no right CVA tenderness, left CVA tenderness, guarding or rebound. Negative signs include Mcdonnell's sign and McBurney's sign.      Hernia: No hernia is present.   Musculoskeletal:         General: Normal range of motion.      Cervical back: Normal range of motion and neck supple.   Skin:     General: Skin is warm.      Capillary Refill: Capillary refill takes less than 2 seconds.   Neurological:      General: No focal deficit present.      Mental Status: He is alert and oriented to person, place, and time.   Psychiatric:         Mood and Affect: Mood normal.         ED Course      Procedures    Lab Results     Results for orders placed or performed during the hospital encounter of 07/05/22   COVID/Flu/RSV panel   Result Value Ref Range    Rapid SARS-COV-2 by PCR Detected (A) Not Detected / Detected / Presumptive Positive / Inhibitors present    Influenza A by PCR Not Detected Not Detected    Influenza B by PCR Not Detected Not Detected    RSV BY PCR Not Detected Not Detected    Procedural Comment      SARS-CoV-2 Ct Value 18.1    Comprehensive Metabolic Panel   Result Value Ref Range    Fasting Status      Sodium 139 135 - 145 mmol/L    Potassium 3.7 3.4 - 5.1 mmol/L    Chloride 104 97 - 110 mmol/L    Carbon Dioxide 25 21 - 32 mmol/L    Anion Gap 14 7 - 19 mmol/L    Glucose 102 (H) 70 - 99 mg/dL    BUN 16 6 - 20 mg/dL    Creatinine 1.11 0.67 - 1.17 mg/dL    Glomerular Filtration Rate 64 >=60    BUN/ Creatinine Ratio 14 7 - 25    Calcium 8.4 8.4 - 10.2 mg/dL    Bilirubin, Total 0.8 0.2 - 1.0 mg/dL    GOT/AST 15 <=37 Units/L    GPT/ALT 23 <64 Units/L    Alkaline Phosphatase 95 45 - 117 Units/L    Albumin 3.4 (L) 3.6 - 5.1 g/dL    Protein, Total 6.8 6.4 - 8.2 g/dL    Globulin 3.4 2.0 - 4.0 g/dL    A/G Ratio 1.0 1.0 - 2.4   Lipase   Result Value Ref Range    Lipase 51 (L) 73 - 393 Units/L   Magnesium   Result Value Ref Range    Magnesium 2.1 1.7 - 2.4 mg/dL   CBC with Automated Differential (performable only)   Result Value Ref Range    WBC 7.5 4.2 - 11.0 K/mcL    RBC 4.87 4.50 - 5.90 mil/mcL    HGB 15.0 13.0 - 17.0 g/dL    HCT 44.5 39.0 - 51.0 %    MCV 91.4 78.0 - 100.0 fl    MCH 30.8 26.0 - 34.0 pg    MCHC 33.7 32.0 - 36.5 g/dL    RDW-CV 13.3 11.0 - 15.0 %    RDW-SD 45.1 39.0 - 50.0 fL     140 - 450 K/mcL    NRBC 0 <=0 /100 WBC    Neutrophil, Percent 68 %    Lymphocytes, Percent 20 %    Mono, Percent 11 %    Eosinophils, Percent 0 %    Basophils, Percent 1 %    Immature Granulocytes 0 %    Absolute Neutrophils 5.1 1.8 - 7.7 K/mcL    Absolute Lymphocytes 1.5 1.0 - 4.0 K/mcL    Absolute  Monocytes 0.8 0.3 - 0.9 K/mcL    Absolute Eosinophils  0.0 0.0 - 0.5 K/mcL    Absolute Basophils 0.1 0.0 - 0.3 K/mcL    Absolute Immmature Granulocytes 0.0 0.0 - 0.2 K/mcL         Radiology Results     Imaging Results          XR Abdomen Series (Final result)  Result time 07/05/22 10:21:05    Final result                 Impression:    IMPRESSION: Findings suggest right renal calculus.    Moderate stool throughout the colon    No free air or bowel obstruction             Narrative:    EXAM: XR ABDOMEN SERIES W SINGLE VIEW CHEST    DATE OF EXAM: 7/5/2022 10:15 AM.    COMPARISON: None.     CLINICAL INFORMATION: constipation; sob.    FINDINGS: Upright chest as well as supine and upright abdominal radiographs  demonstrate  heart and pulmonary vessels to be normal. The lungs are clear.    There are no effusions  No free intraperitoneal air is evident. Moderate stool evident  throughout  the colon. No small bowel obstructive pattern evident. Surgical clips  evident within the pelvis.   2.5 mm calcification evident overlying the right renal silhouette                                ED Medication Orders (From admission, onward)    Ordered Start     Status Ordering Provider    07/05/22 0940 07/05/22 0941  sodium chloride (NORMAL SALINE) 0.9 % bolus 1,000 mL  ONCE         Last MAR action: Completed ALEXEY CORREA    The patient presents with two complaints: 1) constipation with no bm x 4-5 days and 2) >7 days of cough, body aches, headache that have lingered in mildly over the last week with known covid exposure.    He is notably covid positive today and is outside of the paxlovid [place] given onset of his symptoms. Equally he has no real covid complaints at this time and is not hypoxic. He is a candidate for outpatient treatment in this regard.      For these complaints of constipation an xray showed moderate stool burden and no signs of obstruction, his abdominal exam is supportive of this and remained soft  not significantly distended and without pain.  the patient was given an enema with good effect (stable vital signs, patient comfortable, and significant stool output).    The results and care plan were discussed with the patient and all questions were answered.     At this time, the patient is appropriate for discharge to home with close follow up with PCP in 3-5 days. The patient was given verbal and written discharge instructions. Signs and symptoms that should prompt the patient to call 911 or to visit the nearest Emergency Room were discussed with the patient. The patient expressed understanding and agreement with this information and instructions.      Denies wanting us to contact any of his kids; he reports he already contacted and updated his wife and does not need us to call her.   Clinical Impression     ED Diagnosis   1. COVID-19 virus infection     2. Constipation, acute         Disposition        Discharge 7/5/2022 12:44 PM  Memo Swanson discharge to home/self care.                         Tejinder Sanchez MD  07/06/22 7105

## 2023-11-22 NOTE — TELEPHONE ENCOUNTER
Returned call to the patient.  Spoke with Mr. Jasiel Cheney (pt's son)  he stated that he wants to schedule a virtual visit for hepatology consultation for her mom.  Schedule appt with Dr. Houser 12/5/2023.  Mr. Cheney confirmed and agreed with the schedule.  Reminder letter mailed.

## 2023-12-05 ENCOUNTER — OFFICE VISIT (OUTPATIENT)
Dept: HEPATOLOGY | Facility: CLINIC | Age: 63
End: 2023-12-05
Payer: MEDICAID

## 2023-12-05 ENCOUNTER — TELEPHONE (OUTPATIENT)
Dept: HEPATOLOGY | Facility: CLINIC | Age: 63
End: 2023-12-05

## 2023-12-05 DIAGNOSIS — K70.9 ALCOHOLIC LIVER DISEASE: ICD-10-CM

## 2023-12-05 DIAGNOSIS — F10.10 ALCOHOL ABUSE: Primary | ICD-10-CM

## 2023-12-05 PROCEDURE — 99214 OFFICE O/P EST MOD 30 MIN: CPT | Mod: 95,,, | Performed by: INTERNAL MEDICINE

## 2023-12-05 PROCEDURE — 3044F PR MOST RECENT HEMOGLOBIN A1C LEVEL <7.0%: ICD-10-PCS | Mod: CPTII,95,, | Performed by: INTERNAL MEDICINE

## 2023-12-05 PROCEDURE — 99214 PR OFFICE/OUTPT VISIT, EST, LEVL IV, 30-39 MIN: ICD-10-PCS | Mod: 95,,, | Performed by: INTERNAL MEDICINE

## 2023-12-05 PROCEDURE — 3044F HG A1C LEVEL LT 7.0%: CPT | Mod: CPTII,95,, | Performed by: INTERNAL MEDICINE

## 2023-12-05 PROCEDURE — 1111F DSCHRG MED/CURRENT MED MERGE: CPT | Mod: CPTII,95,, | Performed by: INTERNAL MEDICINE

## 2023-12-05 PROCEDURE — 1111F PR DISCHARGE MEDS RECONCILED W/ CURRENT OUTPATIENT MED LIST: ICD-10-PCS | Mod: CPTII,95,, | Performed by: INTERNAL MEDICINE

## 2023-12-05 NOTE — TELEPHONE ENCOUNTER
Spoke with patient sissy Weathers regarding scheduled labs this week and follow up appointment with Dr. Houser. Patient sissy Weathers verbalized understanding.

## 2023-12-05 NOTE — Clinical Note
Please schedule her labs this week and then next available virtual appointment- 6 weeks to 3 months (not sooner)

## 2023-12-05 NOTE — PROGRESS NOTES
Subjective:       Patient ID: Lauren Ewing is a 63 y.o. female.    Chief Complaint: No chief complaint on file.  The patient location is: Home  The chief complaint leading to consultation is: Jaundice    Visit type: audiovisual    Face to Face time with patient: 30 minutes of total time spent on the encounter, which includes face to face time and non-face to face time preparing to see the patient (eg, review of tests), Obtaining and/or reviewing separately obtained history, Documenting clinical information in the electronic or other health record, Independently interpreting results (not separately reported) and communicating results to the patient/family/caregiver, or Care coordination (not separately reported).       Each patient to whom he or she provides medical services by telemedicine is:  (1) informed of the relationship between the physician and patient and the respective role of any other health care provider with respect to management of the patient; and (2) notified that he or she may decline to receive medical services by telemedicine and may withdraw from such care at any time.    Notes:     HPI  I saw this 63 y.o. lady in the liver clinic. Her son Jasiel was present during the consultation.    First seen at [facility] in Oct 2023  - decompensation with jaundice, ascites/pleural effusions + HE  - hypontremia  - MELD 30    AFP on 11/1/23= 2.1    Seen by psychiatry at [facility]- modifiably high risk for liver transplant    No SBP  Claims that this was the first time she heard about liver disease.    admission to [facility] a few days after discharge from [facility] with edema/ascites- Nov 13 2023  - because of fever and edema  -mdischarged to rehab- physical therapy  - home yesterday  - can walk unaided     Abdo US: 11/3/2023  - Liver: 15.7 cm  - CBD diameter: 3 mm  - Right kidney: 10.6 cm in length  Impression:  Limited assessment.  Small volume ascites with hepatofugal flow in the main portal vein.     Managed to  lose all the fluid  Much better now  No jaundice  No HE      No alcohol for 11 weeks  - doesn't feel that she needs help  - has previously done rehab    Meds:  Folic acid  Campral  Thiamine  Pantoprazole  Multivits  Midodrine 10 mg TID  Lactulose BID  [place] 100 mg daily  Furosemide 40 mg daily  Mg  Zofran  Levothyroxine 100 mcg      MELD 3.0: 25 at 10/28/2023  6:31 AM  MELD-Na: 23 at 10/28/2023  6:31 AM  Calculated from:  Serum Creatinine: 1.0 mg/dL at 10/28/2023  6:31 AM  Serum Sodium: 137 mmol/L at 10/28/2023  6:31 AM  Total Bilirubin: 7.7 mg/dL at 10/28/2023  6:31 AM  Serum Albumin: 2.9 g/dL at 10/28/2023  6:31 AM  INR(ratio): 2.2 at 10/28/2023  6:31 AM  Age at listing (hypothetical): 63 years  Sex: Female at 10/28/2023  6:31 AM      PMH:  Decompensated alcohol related cirrhosis  Hypothyroidism  Subdural hematoma and craniotomy in Feb 2021    No abdo surgery  No DM  No heart disease      SH:  Lives with son  Non smoker      Review of Systems   Constitutional:  Negative for activity change, appetite change, chills, fatigue, fever and unexpected weight change.   HENT:  Negative for ear pain, hearing loss, nosebleeds, sore throat and trouble swallowing.    Eyes:  Negative for redness and visual disturbance.   Respiratory:  Negative for cough, chest tightness, shortness of breath and wheezing.    Cardiovascular:  Negative for chest pain and palpitations.   Gastrointestinal:  Negative for abdominal distention, abdominal pain, blood in stool, constipation, diarrhea, nausea and vomiting.   Genitourinary:  Negative for difficulty urinating, dysuria, frequency, hematuria and urgency.   Musculoskeletal:  Negative for arthralgias, back pain, gait problem, joint swelling and myalgias.   Skin:  Negative for rash.   Neurological:  Negative for tremors, seizures, speech difficulty, weakness and headaches.   Hematological:  Negative for adenopathy.   Psychiatric/Behavioral:  Negative for confusion, decreased concentration and  sleep disturbance. The patient is not nervous/anxious.            Lab Results   Component Value Date    ALT 15 11/13/2023    AST 49 (H) 11/13/2023    ALKPHOS 109 11/13/2023    BILITOT 8.2 (H) 11/13/2023     No past medical history on file.  No past surgical history on file.  Current Outpatient Medications   Medication Sig    acamprosate (CAMPRAL) 333 mg tablet Take 2 tablets (666 mg total) by mouth 3 (three) times daily.    folic acid (FOLVITE) 1 MG tablet Take 1 tablet (1 mg total) by mouth once daily.    furosemide (LASIX) 40 MG tablet Take 1 tablet (40 mg total) by mouth once daily.    lactulose (CHRONULAC) 10 gram/15 mL solution Take 15 mLs (10 g total) by mouth 2 (two) times daily. Take an extra dose if needed to achieve 2-3 bowel movements per day to prevent confusion    levothyroxine (SYNTHROID) 100 MCG tablet Take 1 tablet (100 mcg total) by mouth before breakfast.    melatonin (MELATIN) 3 mg tablet Take 2 tablets (6 mg total) by mouth nightly as needed for Insomnia.    midodrine (PROAMATINE) 10 MG tablet Take 1 tablet (10 mg total) by mouth every 8 (eight) hours.    multivitamin Tab Take 1 tablet by mouth once daily.    pantoprazole (PROTONIX) 40 MG tablet Take 1 tablet (40 mg total) by mouth once daily.    spironolactone (ALDACTONE) 100 MG tablet Take 1 tablet (100 mg total) by mouth once daily.    thiamine 100 MG tablet Take 1 tablet (100 mg total) by mouth once daily.    traMADoL (ULTRAM) 50 mg tablet Take 1 tablet (50 mg total) by mouth every 6 (six) hours as needed for Pain.     No current facility-administered medications for this visit.       Objective:      Physical Exam    NOT DONE- VIDEO VISIT  Assessment:       1. Alcohol abuse    2. Alcoholic liver disease        Plan:   She certainly appears much better since her discharge from [facility] and more recently rehab.  Her fluid retention is controlled with low dose diuretics and she does not have any obvious jaundice or HE.  - I  explained to her and her son that we need to get some updated labs and that if she remains abstinent from alcohol he may not need transplantation.    1) Labs in [place]  2) clinic in 3 months

## 2023-12-05 NOTE — TELEPHONE ENCOUNTER
----- Message from Lionel Houser MD sent at 12/5/2023  2:19 PM CST -----  Please schedule her labs this week and then next available virtual appointment- 6 weeks to 3 months (not sooner)

## 2023-12-07 ENCOUNTER — LAB VISIT (OUTPATIENT)
Dept: LAB | Facility: HOSPITAL | Age: 63
End: 2023-12-07
Attending: INTERNAL MEDICINE
Payer: MEDICAID

## 2023-12-07 ENCOUNTER — OFFICE VISIT (OUTPATIENT)
Dept: NEPHROLOGY | Facility: CLINIC | Age: 63
End: 2023-12-07
Payer: MEDICAID

## 2023-12-07 VITALS
HEART RATE: 114 BPM | WEIGHT: 126 LBS | SYSTOLIC BLOOD PRESSURE: 138 MMHG | DIASTOLIC BLOOD PRESSURE: 66 MMHG | TEMPERATURE: 98 F | RESPIRATION RATE: 18 BRPM | OXYGEN SATURATION: 98 % | HEIGHT: 64 IN | BODY MASS INDEX: 21.51 KG/M2

## 2023-12-07 DIAGNOSIS — K74.60 HEPATIC CIRRHOSIS, UNSPECIFIED HEPATIC CIRRHOSIS TYPE, UNSPECIFIED WHETHER ASCITES PRESENT: Primary | ICD-10-CM

## 2023-12-07 DIAGNOSIS — K70.9 ALCOHOLIC LIVER DISEASE: ICD-10-CM

## 2023-12-07 DIAGNOSIS — N17.9 AKI (ACUTE KIDNEY INJURY): ICD-10-CM

## 2023-12-07 LAB
ALBUMIN SERPL-MCNC: 2.8 G/DL (ref 3.4–4.8)
ALBUMIN/GLOB SERPL: 0.8 RATIO (ref 1.1–2)
ALP SERPL-CCNC: 157 UNIT/L (ref 40–150)
ALT SERPL-CCNC: 24 UNIT/L (ref 0–55)
AST SERPL-CCNC: 44 UNIT/L (ref 5–34)
BASOPHILS # BLD AUTO: 0.1 X10(3)/MCL
BASOPHILS NFR BLD AUTO: 1.1 %
BILIRUB SERPL-MCNC: 7.9 MG/DL
BUN SERPL-MCNC: 6.4 MG/DL (ref 9.8–20.1)
CALCIUM SERPL-MCNC: 8.9 MG/DL (ref 8.4–10.2)
CHLORIDE SERPL-SCNC: 110 MMOL/L (ref 98–107)
CO2 SERPL-SCNC: 21 MMOL/L (ref 23–31)
CREAT SERPL-MCNC: 0.79 MG/DL (ref 0.55–1.02)
EOSINOPHIL # BLD AUTO: 0.32 X10(3)/MCL (ref 0–0.9)
EOSINOPHIL NFR BLD AUTO: 3.6 %
ERYTHROCYTE [DISTWIDTH] IN BLOOD BY AUTOMATED COUNT: 17.1 % (ref 11.5–17)
GFR SERPLBLD CREATININE-BSD FMLA CKD-EPI: >60 MLS/MIN/1.73/M2
GLOBULIN SER-MCNC: 3.6 GM/DL (ref 2.4–3.5)
GLUCOSE SERPL-MCNC: 72 MG/DL (ref 82–115)
HCT VFR BLD AUTO: 32.2 % (ref 37–47)
HGB BLD-MCNC: 10.4 G/DL (ref 12–16)
IMM GRANULOCYTES # BLD AUTO: 0.04 X10(3)/MCL (ref 0–0.04)
IMM GRANULOCYTES NFR BLD AUTO: 0.4 %
INR PPP: 2.1
LYMPHOCYTES # BLD AUTO: 3.05 X10(3)/MCL (ref 0.6–4.6)
LYMPHOCYTES NFR BLD AUTO: 34 %
MCH RBC QN AUTO: 30.2 PG (ref 27–31)
MCHC RBC AUTO-ENTMCNC: 32.3 G/DL (ref 33–36)
MCV RBC AUTO: 93.6 FL (ref 80–94)
MONOCYTES # BLD AUTO: 1.81 X10(3)/MCL (ref 0.1–1.3)
MONOCYTES NFR BLD AUTO: 20.2 %
NEUTROPHILS # BLD AUTO: 3.66 X10(3)/MCL (ref 2.1–9.2)
NEUTROPHILS NFR BLD AUTO: 40.7 %
NRBC BLD AUTO-RTO: 0 %
PLATELET # BLD AUTO: 209 X10(3)/MCL (ref 130–400)
PMV BLD AUTO: 9.2 FL (ref 7.4–10.4)
POTASSIUM SERPL-SCNC: 4.9 MMOL/L (ref 3.5–5.1)
PROT SERPL-MCNC: 6.4 GM/DL (ref 5.8–7.6)
PROTHROMBIN TIME: 23.6 SECONDS (ref 12.5–14.5)
RBC # BLD AUTO: 3.44 X10(6)/MCL (ref 4.2–5.4)
SODIUM SERPL-SCNC: 136 MMOL/L (ref 136–145)
WBC # SPEC AUTO: 8.98 X10(3)/MCL (ref 4.5–11.5)

## 2023-12-07 PROCEDURE — 99999 PR PBB SHADOW E&M-EST. PATIENT-LVL IV: ICD-10-PCS | Mod: PBBFAC,,, | Performed by: INTERNAL MEDICINE

## 2023-12-07 PROCEDURE — 3075F SYST BP GE 130 - 139MM HG: CPT | Mod: CPTII,,, | Performed by: INTERNAL MEDICINE

## 2023-12-07 PROCEDURE — 99213 PR OFFICE/OUTPT VISIT, EST, LEVL III, 20-29 MIN: ICD-10-PCS | Mod: S$PBB,,, | Performed by: INTERNAL MEDICINE

## 2023-12-07 PROCEDURE — 1111F PR DISCHARGE MEDS RECONCILED W/ CURRENT OUTPATIENT MED LIST: ICD-10-PCS | Mod: CPTII,,, | Performed by: INTERNAL MEDICINE

## 2023-12-07 PROCEDURE — 80053 COMPREHEN METABOLIC PANEL: CPT

## 2023-12-07 PROCEDURE — 3078F PR MOST RECENT DIASTOLIC BLOOD PRESSURE < 80 MM HG: ICD-10-PCS | Mod: CPTII,,, | Performed by: INTERNAL MEDICINE

## 2023-12-07 PROCEDURE — 1159F MED LIST DOCD IN RCRD: CPT | Mod: CPTII,,, | Performed by: INTERNAL MEDICINE

## 2023-12-07 PROCEDURE — 85025 COMPLETE CBC W/AUTO DIFF WBC: CPT

## 2023-12-07 PROCEDURE — 99214 OFFICE O/P EST MOD 30 MIN: CPT | Mod: PBBFAC | Performed by: INTERNAL MEDICINE

## 2023-12-07 PROCEDURE — 3066F PR DOCUMENTATION OF TREATMENT FOR NEPHROPATHY: ICD-10-PCS | Mod: CPTII,,, | Performed by: INTERNAL MEDICINE

## 2023-12-07 PROCEDURE — 3044F HG A1C LEVEL LT 7.0%: CPT | Mod: CPTII,,, | Performed by: INTERNAL MEDICINE

## 2023-12-07 PROCEDURE — 99999 PR PBB SHADOW E&M-EST. PATIENT-LVL IV: CPT | Mod: PBBFAC,,, | Performed by: INTERNAL MEDICINE

## 2023-12-07 PROCEDURE — 3066F NEPHROPATHY DOC TX: CPT | Mod: CPTII,,, | Performed by: INTERNAL MEDICINE

## 2023-12-07 PROCEDURE — 1111F DSCHRG MED/CURRENT MED MERGE: CPT | Mod: CPTII,,, | Performed by: INTERNAL MEDICINE

## 2023-12-07 PROCEDURE — 1159F PR MEDICATION LIST DOCUMENTED IN MEDICAL RECORD: ICD-10-PCS | Mod: CPTII,,, | Performed by: INTERNAL MEDICINE

## 2023-12-07 PROCEDURE — 3008F PR BODY MASS INDEX (BMI) DOCUMENTED: ICD-10-PCS | Mod: CPTII,,, | Performed by: INTERNAL MEDICINE

## 2023-12-07 PROCEDURE — 3075F PR MOST RECENT SYSTOLIC BLOOD PRESS GE 130-139MM HG: ICD-10-PCS | Mod: CPTII,,, | Performed by: INTERNAL MEDICINE

## 2023-12-07 PROCEDURE — 85610 PROTHROMBIN TIME: CPT

## 2023-12-07 PROCEDURE — 3008F BODY MASS INDEX DOCD: CPT | Mod: CPTII,,, | Performed by: INTERNAL MEDICINE

## 2023-12-07 PROCEDURE — 3078F DIAST BP <80 MM HG: CPT | Mod: CPTII,,, | Performed by: INTERNAL MEDICINE

## 2023-12-07 PROCEDURE — 99213 OFFICE O/P EST LOW 20 MIN: CPT | Mod: S$PBB,,, | Performed by: INTERNAL MEDICINE

## 2023-12-07 PROCEDURE — 3044F PR MOST RECENT HEMOGLOBIN A1C LEVEL <7.0%: ICD-10-PCS | Mod: CPTII,,, | Performed by: INTERNAL MEDICINE

## 2023-12-07 PROCEDURE — 36415 COLL VENOUS BLD VENIPUNCTURE: CPT

## 2023-12-07 RX ORDER — SPIRONOLACTONE 100 MG/1
50 TABLET, FILM COATED ORAL DAILY
Qty: 30 TABLET | Refills: 3
Start: 2023-12-07 | End: 2024-02-05 | Stop reason: SDUPTHER

## 2023-12-07 NOTE — PROGRESS NOTES
[facility] [place] [facility] [place]    HPI  Lauren Ewing, 63 y.o. female, presents to office as a new patient.  Recently diagnosed with cirrhosis presumably alcoholic cirrhosis.  She had significant problem with ascites volume overload and she had also to be transferred to [place] she is followed up by hepatology team she is referred here for renal management due to high-risk of hepatorenal disease.  Repeat renal function studies are stable patient feels fine denies any major complaints.          Medical Diagnoses:   Past Medical History:   Diagnosis Date    DIANA (acute kidney injury)     Hyponatremia     Major depressive disorder, single episode, unspecified      Patient Active Problem List   Diagnosis    Hyponatremia    Liver failure    Major depressive disorder    Alcohol abuse    Palliative care encounter    Hypothyroidism    DIANA (acute kidney injury)    Volume overload    Alcoholic liver disease       Surgical History:   History reviewed. No pertinent surgical history.    Family History:   Family History   Family history unknown: Yes       Social History:   Social History     Tobacco Use    Smoking status: Never    Smokeless tobacco: Never   Substance Use Topics    Alcohol use: Not Currently       Allergies:  Review of patient's allergies indicates:   Allergen Reactions    Hydrocodone      Nausea..       Medications:    Current Outpatient Medications:     acamprosate (CAMPRAL) 333 mg tablet, Take 2 tablets (666 mg total) by mouth 3 (three) times daily., Disp: 180 tablet, Rfl: 11    folic acid (FOLVITE) 1 MG tablet, Take 1 tablet (1 mg total) by mouth once daily., Disp: 90 tablet, Rfl: 3    furosemide (LASIX) 40 MG tablet, Take 1 tablet (40 mg total) by mouth once daily., Disp: 30 tablet, Rfl: 0    lactulose (CHRONULAC) 10 gram/15 mL solution, Take 15 mLs (10 g total) by mouth 2 (two) times daily. Take an extra dose if needed to achieve 2-3 bowel movements per day to prevent confusion, Disp: 900 mL,  "Rfl: 11    levothyroxine (SYNTHROID) 100 MCG tablet, Take 1 tablet (100 mcg total) by mouth before breakfast., Disp: 30 tablet, Rfl: 11    melatonin (MELATIN) 3 mg tablet, Take 2 tablets (6 mg total) by mouth nightly as needed for Insomnia., Disp: , Rfl: 0    midodrine (PROAMATINE) 10 MG tablet, Take 1 tablet (10 mg total) by mouth every 8 (eight) hours., Disp: 90 tablet, Rfl: 11    multivitamin Tab, Take 1 tablet by mouth once daily., Disp: , Rfl:     pantoprazole (PROTONIX) 40 MG tablet, Take 1 tablet (40 mg total) by mouth once daily., Disp: 30 tablet, Rfl: 11    thiamine 100 MG tablet, Take 1 tablet (100 mg total) by mouth once daily., Disp: 30 tablet, Rfl: 11    traMADoL (ULTRAM) 50 mg tablet, Take 1 tablet (50 mg total) by mouth every 6 (six) hours as needed for Pain., Disp: , Rfl:     spironolactone (ALDACTONE) 100 MG tablet, Take 0.5 tablets (50 mg total) by mouth once daily. for 240 doses, Disp: 30 tablet, Rfl: 3       Review of Systems:    Constitutional: Denies fever, fatigue, generalized weakness  Skin: Denies wounds, no rashes, no itching, no new skin lesions  Respiratory:  Denies cough, shortness of breath, or wheezing  Cardiovascular: Denies chest pain, palpitations, edema decreasing  Gastrointestional: Denies abdominal pain, nausea, vomiting, diarrhea, or constipation  Genitourinary: Denies dysuria, hematuria, foamy urine, or incontinence; reports able to empty bladder  Musculoskeletal: Denies back or flank pain  Neurological: Denies headaches, dizziness, paresthesias, tremors or focal weakness      Vital Signs:  /66 (BP Location: Left arm, Patient Position: Sitting, BP Method: Medium (Manual))   Pulse (!) 114   Temp 97.7 °F (36.5 °C) (Temporal)   Resp 18   Ht 5' 4" (1.626 m)   Wt 57.2 kg (126 lb)   SpO2 98%   BMI 21.63 kg/m²   Body mass index is 21.63 kg/m².      Physical Exam:    General: no acute distress, awake, alert  Eyes: PERRLA, conjunctiva clear, eyelids without swelling  HENT: " atraumatic, oropharynx and nasal mucosa patent  Neck: supple, trache midline, full ROM, no JVD, no thyromegaly or lymphadenopathy  Respiratory: equal, unlabored, clear to auscultation A/P  Cardiovascular: RRR without  rub; radial and pedal pulses +2 BL  Edema:  Trace  Gastrointestinal: soft, non-tender, non-distended; positive bowel sounds; no masses to palpation; no ascites  Genitourinary: no CVA tenderness upon palpation  Musculoskeletal: ROM without new limitation or discomfort  Integumentary: warm, dry; no rashes, wounds, or skin lesions  Neurological: oriented x4, appropriate, no acute deficits; no asterixis      Labs:        Component Value Date/Time     12/07/2023 0909     (L) 11/13/2023 0344     10/28/2023 0631     (L) 10/27/2023 0243    K 4.9 12/07/2023 0909    K 3.3 (L) 11/13/2023 0344    K 4.2 10/28/2023 0631    K 4.2 10/27/2023 0243     10/28/2023 0631     10/27/2023 0243    CO2 21 (L) 12/07/2023 0909    CO2 29 11/13/2023 0344    CO2 23 10/28/2023 0631    CO2 21 (L) 10/27/2023 0243    BUN 6.4 (L) 12/07/2023 0909    BUN 5.6 (L) 11/13/2023 0344    BUN 12 10/28/2023 0631    BUN 18 10/27/2023 0243    CREATININE 0.79 12/07/2023 0909    CREATININE 0.83 11/13/2023 0344    CREATININE 0.81 11/12/2023 0700    CREATININE 0.77 11/11/2023 0545    CREATININE 1.0 10/28/2023 0631    CREATININE 1.2 10/27/2023 0243    CREATININE 1.3 10/26/2023 0444    CALCIUM 8.9 12/07/2023 0909    CALCIUM 7.5 (L) 11/13/2023 0344    CALCIUM 8.2 (L) 10/28/2023 0631    CALCIUM 8.6 (L) 10/27/2023 0243    PHOS 2.2 (L) 11/03/2023 0539    PHOS 3.2 10/28/2023 0631    PHOS 3.0 10/27/2023 0243           Component Value Date/Time    WBC 8.98 12/07/2023 0909    WBC 11.54 (H) 11/13/2023 0344    WBC 9.94 10/28/2023 0631    WBC 12.83 (H) 10/27/2023 0243    HGB 10.4 (L) 12/07/2023 0909    HGB 8.2 (L) 11/13/2023 0344    HGB 8.8 (L) 10/28/2023 0631    HGB 8.3 (L) 10/27/2023 0243    HCT 32.2 (L) 12/07/2023 0909    HCT  24.0 (L) 11/13/2023 0344    HCT 27.1 (L) 10/28/2023 0631    HCT 24.7 (L) 10/27/2023 0243    HCT 44.0 10/09/2021 2238     12/07/2023 0909     11/13/2023 0344     (L) 10/28/2023 0631     (L) 10/27/2023 0243         Imaging:  Retroperitoneal US:      Impression:    Cirrhosis  Recent volume overload better  High normal potassium  Patient at risk for hepatorenal disease        Plan:    Renal wise stable  I will decrease Aldactone to 50 mg p.o. daily  Repeat labs and follow-up visit in 3 weeks    Quiana Lyle      This note was created with the assistance of [employer] voice recognition software or phone dictation. There may be transcription errors as a result of using this technology however minimal. Effort has been made to assure accuracy of transcription but any obvious errors or omissions should be clarified with the author of the document.

## 2023-12-28 ENCOUNTER — OFFICE VISIT (OUTPATIENT)
Dept: NEPHROLOGY | Facility: CLINIC | Age: 63
End: 2023-12-28
Payer: MEDICAID

## 2023-12-28 ENCOUNTER — LAB VISIT (OUTPATIENT)
Dept: LAB | Facility: HOSPITAL | Age: 63
End: 2023-12-28
Attending: INTERNAL MEDICINE
Payer: MEDICAID

## 2023-12-28 ENCOUNTER — OFFICE VISIT (OUTPATIENT)
Dept: INTERNAL MEDICINE | Facility: CLINIC | Age: 63
End: 2023-12-28
Payer: MEDICAID

## 2023-12-28 VITALS
RESPIRATION RATE: 18 BRPM | DIASTOLIC BLOOD PRESSURE: 77 MMHG | HEIGHT: 64 IN | TEMPERATURE: 98 F | BODY MASS INDEX: 21.75 KG/M2 | SYSTOLIC BLOOD PRESSURE: 117 MMHG | WEIGHT: 127.38 LBS | HEART RATE: 103 BPM

## 2023-12-28 VITALS
SYSTOLIC BLOOD PRESSURE: 124 MMHG | BODY MASS INDEX: 21.79 KG/M2 | DIASTOLIC BLOOD PRESSURE: 72 MMHG | HEIGHT: 64 IN | HEART RATE: 103 BPM | TEMPERATURE: 97 F | OXYGEN SATURATION: 99 % | RESPIRATION RATE: 20 BRPM | WEIGHT: 127.63 LBS

## 2023-12-28 DIAGNOSIS — K70.30 ALCOHOLIC CIRRHOSIS OF LIVER WITHOUT ASCITES: ICD-10-CM

## 2023-12-28 DIAGNOSIS — Z00.00 WELLNESS EXAMINATION: ICD-10-CM

## 2023-12-28 DIAGNOSIS — N17.9 AKI (ACUTE KIDNEY INJURY): ICD-10-CM

## 2023-12-28 DIAGNOSIS — K72.00 ACUTE LIVER FAILURE WITHOUT HEPATIC COMA: Primary | Chronic | ICD-10-CM

## 2023-12-28 DIAGNOSIS — Z12.11 ENCOUNTER FOR COLORECTAL CANCER SCREENING: ICD-10-CM

## 2023-12-28 DIAGNOSIS — Z12.31 ENCOUNTER FOR SCREENING MAMMOGRAM FOR MALIGNANT NEOPLASM OF BREAST: ICD-10-CM

## 2023-12-28 DIAGNOSIS — K70.9 ALCOHOLIC LIVER DISEASE: ICD-10-CM

## 2023-12-28 DIAGNOSIS — Z12.4 CERVICAL CANCER SCREENING: ICD-10-CM

## 2023-12-28 DIAGNOSIS — Z12.12 ENCOUNTER FOR COLORECTAL CANCER SCREENING: ICD-10-CM

## 2023-12-28 DIAGNOSIS — F41.9 ANXIETY: ICD-10-CM

## 2023-12-28 DIAGNOSIS — K74.60 HEPATIC CIRRHOSIS, UNSPECIFIED HEPATIC CIRRHOSIS TYPE, UNSPECIFIED WHETHER ASCITES PRESENT: ICD-10-CM

## 2023-12-28 DIAGNOSIS — E87.1 HYPONATREMIA: Primary | ICD-10-CM

## 2023-12-28 PROBLEM — K72.90 LIVER FAILURE: Chronic | Status: ACTIVE | Noted: 2023-10-26

## 2023-12-28 LAB
ALBUMIN SERPL-MCNC: 2.6 G/DL (ref 3.4–4.8)
ALBUMIN/GLOB SERPL: 0.8 RATIO (ref 1.1–2)
ALP SERPL-CCNC: 145 UNIT/L (ref 40–150)
ALT SERPL-CCNC: 27 UNIT/L (ref 0–55)
APPEARANCE UR: ABNORMAL
AST SERPL-CCNC: 47 UNIT/L (ref 5–34)
BACTERIA #/AREA URNS AUTO: ABNORMAL /HPF
BASOPHILS # BLD AUTO: 0.05 X10(3)/MCL
BASOPHILS NFR BLD AUTO: 0.8 %
BILIRUB SERPL-MCNC: 7.3 MG/DL
BILIRUB UR QL STRIP.AUTO: NEGATIVE
BUN SERPL-MCNC: 6.4 MG/DL (ref 9.8–20.1)
CALCIUM SERPL-MCNC: 8.1 MG/DL (ref 8.4–10.2)
CHLORIDE SERPL-SCNC: 100 MMOL/L (ref 98–107)
CHOLEST SERPL-MCNC: 108 MG/DL
CHOLEST/HDLC SERPL: 7 {RATIO} (ref 0–5)
CO2 SERPL-SCNC: 23 MMOL/L (ref 23–31)
COLOR UR AUTO: YELLOW
CREAT SERPL-MCNC: 0.87 MG/DL (ref 0.55–1.02)
CREAT UR-MCNC: 66.3 MG/DL (ref 45–106)
EOSINOPHIL # BLD AUTO: 0.1 X10(3)/MCL (ref 0–0.9)
EOSINOPHIL NFR BLD AUTO: 1.6 %
ERYTHROCYTE [DISTWIDTH] IN BLOOD BY AUTOMATED COUNT: 15.7 % (ref 11.5–17)
GFR SERPLBLD CREATININE-BSD FMLA CKD-EPI: >60 MLS/MIN/1.73/M2
GLOBULIN SER-MCNC: 3.3 GM/DL (ref 2.4–3.5)
GLUCOSE SERPL-MCNC: 122 MG/DL (ref 82–115)
GLUCOSE UR QL STRIP.AUTO: NORMAL
HCT VFR BLD AUTO: 28.9 % (ref 37–47)
HDLC SERPL-MCNC: 16 MG/DL (ref 35–60)
HGB BLD-MCNC: 10 G/DL (ref 12–16)
HYALINE CASTS #/AREA URNS LPF: ABNORMAL /LPF
IMM GRANULOCYTES # BLD AUTO: 0.03 X10(3)/MCL (ref 0–0.04)
IMM GRANULOCYTES NFR BLD AUTO: 0.5 %
KETONES UR QL STRIP.AUTO: NEGATIVE
LDLC SERPL CALC-MCNC: 78 MG/DL (ref 50–140)
LEUKOCYTE ESTERASE UR QL STRIP.AUTO: 75
LYMPHOCYTES # BLD AUTO: 1.49 X10(3)/MCL (ref 0.6–4.6)
LYMPHOCYTES NFR BLD AUTO: 24.3 %
MCH RBC QN AUTO: 29.4 PG (ref 27–31)
MCHC RBC AUTO-ENTMCNC: 34.6 G/DL (ref 33–36)
MCV RBC AUTO: 85 FL (ref 80–94)
MONOCYTES # BLD AUTO: 1.46 X10(3)/MCL (ref 0.1–1.3)
MONOCYTES NFR BLD AUTO: 23.8 %
MUCOUS THREADS URNS QL MICRO: ABNORMAL /LPF
NEUTROPHILS # BLD AUTO: 3.01 X10(3)/MCL (ref 2.1–9.2)
NEUTROPHILS NFR BLD AUTO: 49 %
NITRITE UR QL STRIP.AUTO: NEGATIVE
NRBC BLD AUTO-RTO: 0 %
PH UR STRIP.AUTO: 5.5 [PH]
PHOSPHATE SERPL-MCNC: 2.8 MG/DL (ref 2.3–4.7)
PLATELET # BLD AUTO: 192 X10(3)/MCL (ref 130–400)
PMV BLD AUTO: 8.8 FL (ref 7.4–10.4)
POTASSIUM SERPL-SCNC: 4 MMOL/L (ref 3.5–5.1)
PROT SERPL-MCNC: 5.9 GM/DL (ref 5.8–7.6)
PROT UR QL STRIP.AUTO: NEGATIVE
PROT UR STRIP-MCNC: <6.8 MG/DL
RBC # BLD AUTO: 3.4 X10(6)/MCL (ref 4.2–5.4)
RBC #/AREA URNS AUTO: ABNORMAL /HPF
RBC UR QL AUTO: NEGATIVE
SODIUM SERPL-SCNC: 129 MMOL/L (ref 136–145)
SP GR UR STRIP.AUTO: 1.01 (ref 1–1.03)
SQUAMOUS #/AREA URNS LPF: ABNORMAL /HPF
TRIGL SERPL-MCNC: 69 MG/DL (ref 37–140)
TSH SERPL-ACNC: 0.01 UIU/ML (ref 0.35–4.94)
UROBILINOGEN UR STRIP-ACNC: NORMAL
VLDLC SERPL CALC-MCNC: 14 MG/DL
WBC # SPEC AUTO: 6.14 X10(3)/MCL (ref 4.5–11.5)
WBC #/AREA URNS AUTO: ABNORMAL /HPF

## 2023-12-28 PROCEDURE — 3078F DIAST BP <80 MM HG: CPT | Mod: CPTII,,, | Performed by: NURSE PRACTITIONER

## 2023-12-28 PROCEDURE — 99214 OFFICE O/P EST MOD 30 MIN: CPT | Mod: PBBFAC,27 | Performed by: NURSE PRACTITIONER

## 2023-12-28 PROCEDURE — 3044F PR MOST RECENT HEMOGLOBIN A1C LEVEL <7.0%: ICD-10-PCS | Mod: CPTII,,, | Performed by: NURSE PRACTITIONER

## 2023-12-28 PROCEDURE — 3074F PR MOST RECENT SYSTOLIC BLOOD PRESSURE < 130 MM HG: ICD-10-PCS | Mod: CPTII,,, | Performed by: NURSE PRACTITIONER

## 2023-12-28 PROCEDURE — 80061 LIPID PANEL: CPT

## 2023-12-28 PROCEDURE — 85025 COMPLETE CBC W/AUTO DIFF WBC: CPT

## 2023-12-28 PROCEDURE — 3074F SYST BP LT 130 MM HG: CPT | Mod: CPTII,,, | Performed by: NURSE PRACTITIONER

## 2023-12-28 PROCEDURE — 81001 URINALYSIS AUTO W/SCOPE: CPT

## 2023-12-28 PROCEDURE — 3008F BODY MASS INDEX DOCD: CPT | Mod: CPTII,,, | Performed by: NURSE PRACTITIONER

## 2023-12-28 PROCEDURE — 99213 PR OFFICE/OUTPT VISIT, EST, LEVL III, 20-29 MIN: ICD-10-PCS | Mod: S$PBB,,, | Performed by: NURSE PRACTITIONER

## 2023-12-28 PROCEDURE — 1159F PR MEDICATION LIST DOCUMENTED IN MEDICAL RECORD: ICD-10-PCS | Mod: CPTII,,, | Performed by: NURSE PRACTITIONER

## 2023-12-28 PROCEDURE — 1160F RVW MEDS BY RX/DR IN RCRD: CPT | Mod: CPTII,,, | Performed by: NURSE PRACTITIONER

## 2023-12-28 PROCEDURE — 3078F PR MOST RECENT DIASTOLIC BLOOD PRESSURE < 80 MM HG: ICD-10-PCS | Mod: CPTII,,, | Performed by: NURSE PRACTITIONER

## 2023-12-28 PROCEDURE — 1159F MED LIST DOCD IN RCRD: CPT | Mod: CPTII,,, | Performed by: NURSE PRACTITIONER

## 2023-12-28 PROCEDURE — 36415 COLL VENOUS BLD VENIPUNCTURE: CPT

## 2023-12-28 PROCEDURE — 80053 COMPREHEN METABOLIC PANEL: CPT

## 2023-12-28 PROCEDURE — 3066F PR DOCUMENTATION OF TREATMENT FOR NEPHROPATHY: ICD-10-PCS | Mod: CPTII,,, | Performed by: NURSE PRACTITIONER

## 2023-12-28 PROCEDURE — 99215 OFFICE O/P EST HI 40 MIN: CPT | Mod: PBBFAC | Performed by: NURSE PRACTITIONER

## 2023-12-28 PROCEDURE — 3008F PR BODY MASS INDEX (BMI) DOCUMENTED: ICD-10-PCS | Mod: CPTII,,, | Performed by: NURSE PRACTITIONER

## 2023-12-28 PROCEDURE — 3044F HG A1C LEVEL LT 7.0%: CPT | Mod: CPTII,,, | Performed by: NURSE PRACTITIONER

## 2023-12-28 PROCEDURE — 82570 ASSAY OF URINE CREATININE: CPT

## 2023-12-28 PROCEDURE — 84100 ASSAY OF PHOSPHORUS: CPT

## 2023-12-28 PROCEDURE — 99999 PR PBB SHADOW E&M-EST. PATIENT-LVL IV: CPT | Mod: PBBFAC,,, | Performed by: NURSE PRACTITIONER

## 2023-12-28 PROCEDURE — 99213 OFFICE O/P EST LOW 20 MIN: CPT | Mod: S$PBB,,, | Performed by: NURSE PRACTITIONER

## 2023-12-28 PROCEDURE — 84443 ASSAY THYROID STIM HORMONE: CPT

## 2023-12-28 PROCEDURE — 1160F PR REVIEW ALL MEDS BY PRESCRIBER/CLIN PHARMACIST DOCUMENTED: ICD-10-PCS | Mod: CPTII,,, | Performed by: NURSE PRACTITIONER

## 2023-12-28 PROCEDURE — 87086 URINE CULTURE/COLONY COUNT: CPT

## 2023-12-28 PROCEDURE — 3066F NEPHROPATHY DOC TX: CPT | Mod: CPTII,,, | Performed by: NURSE PRACTITIONER

## 2023-12-28 PROCEDURE — 99204 PR OFFICE/OUTPT VISIT, NEW, LEVL IV, 45-59 MIN: ICD-10-PCS | Mod: S$PBB,,, | Performed by: NURSE PRACTITIONER

## 2023-12-28 PROCEDURE — 99999 PR PBB SHADOW E&M-EST. PATIENT-LVL IV: ICD-10-PCS | Mod: PBBFAC,,, | Performed by: NURSE PRACTITIONER

## 2023-12-28 PROCEDURE — 99204 OFFICE O/P NEW MOD 45 MIN: CPT | Mod: S$PBB,,, | Performed by: NURSE PRACTITIONER

## 2023-12-28 RX ORDER — ONDANSETRON 4 MG/1
8 TABLET, FILM COATED ORAL EVERY 4 HOURS PRN
COMMUNITY

## 2023-12-28 RX ORDER — FUROSEMIDE 40 MG/1
40 TABLET ORAL DAILY
Qty: 30 TABLET | Refills: 2
Start: 2023-12-28 | End: 2024-01-04 | Stop reason: SDUPTHER

## 2023-12-28 RX ORDER — LANOLIN ALCOHOL/MO/W.PET/CERES
400 CREAM (GRAM) TOPICAL 2 TIMES DAILY
COMMUNITY
End: 2024-01-08 | Stop reason: SDUPTHER

## 2023-12-28 NOTE — PROGRESS NOTES
[facility] Ambulatory Progress Note    HPI  Lauren Ewing, 63 y.o. female, presents to office as a new patient.  Recently diagnosed with alcoholic cirrhosis followed by [facility] Hepatologist. Pt is uncertain if she has been taking lasix and if she has been taking daily or BID or if at all. Sodium noted to be 129 today but WNL @ last visit. She is asymptomatic and only c/o is of generalized weakness since hospitalization.           Medical Diagnoses:   Past Medical History:   Diagnosis Date    DIANA (acute kidney injury)     Hyponatremia     Major depressive disorder, single episode, unspecified      Patient Active Problem List   Diagnosis    Hyponatremia    Liver failure    Major depressive disorder    Alcohol abuse    Palliative care encounter    Hypothyroidism    DIANA (acute kidney injury)    Volume overload    Alcoholic liver disease       Surgical History:   Past Surgical History:   Procedure Laterality Date    BRAIN SURGERY       SECTION         Family History:   Family History   Problem Relation Age of Onset    Hypertension Mother     Thyroid disease Mother     Liver disease Father     Liver disease Sister     Alcohol abuse Brother     No Known Problems Daughter     No Known Problems Son        Social History:   Social History     Tobacco Use    Smoking status: Never     Passive exposure: Never    Smokeless tobacco: Never   Substance Use Topics    Alcohol use: Not Currently     Comment: last drink 3 -4 months       Allergies:  Review of patient's allergies indicates:   Allergen Reactions    Hydrocodone      Nausea..       Medications:    Current Outpatient Medications:     acamprosate (CAMPRAL) 333 mg tablet, Take 2 tablets (666 mg total) by mouth 3 (three) times daily., Disp: 180 tablet, Rfl: 11    folic acid (FOLVITE) 1 MG tablet, Take 1 tablet (1 mg total) by mouth once daily., Disp: 90 tablet, Rfl: 3    lactulose (CHRONULAC) 10 gram/15 mL solution, Take 15 mLs (10 g total) by mouth  "2 (two) times daily. Take an extra dose if needed to achieve 2-3 bowel movements per day to prevent confusion, Disp: 900 mL, Rfl: 11    levothyroxine (SYNTHROID) 100 MCG tablet, Take 1 tablet (100 mcg total) by mouth before breakfast., Disp: 30 tablet, Rfl: 11    magnesium oxide (MAG-OX) 400 mg (241.3 mg magnesium) tablet, Take 400 mg by mouth 2 (two) times daily., Disp: , Rfl:     midodrine (PROAMATINE) 10 MG tablet, Take 1 tablet (10 mg total) by mouth every 8 (eight) hours., Disp: 90 tablet, Rfl: 11    multivitamin Tab, Take 1 tablet by mouth once daily., Disp: , Rfl:     ondansetron (ZOFRAN) 4 MG tablet, Take 8 mg by mouth every 4 (four) hours as needed for Nausea., Disp: , Rfl:     pantoprazole (PROTONIX) 40 MG tablet, Take 1 tablet (40 mg total) by mouth once daily., Disp: 30 tablet, Rfl: 11    spironolactone (ALDACTONE) 100 MG tablet, Take 0.5 tablets (50 mg total) by mouth once daily. for 240 doses, Disp: 30 tablet, Rfl: 3    thiamine 100 MG tablet, Take 1 tablet (100 mg total) by mouth once daily., Disp: 30 tablet, Rfl: 11    furosemide (LASIX) 40 MG tablet, Take 1 tablet (40 mg total) by mouth once daily., Disp: 30 tablet, Rfl: 2       Review of Systems:    Constitutional: Denies fever, fatigue, ++generalized weakness  Skin: Denies wounds, no rashes, no itching, no new skin lesions  Respiratory:  Denies cough, shortness of breath, or wheezing  Cardiovascular: Denies chest pain, palpitations, edema decreasing  Gastrointestional: Denies abdominal pain, nausea, vomiting, diarrhea, or constipation  Genitourinary: Denies dysuria, hematuria, foamy urine, or incontinence; reports able to empty bladder  Musculoskeletal: Denies back or flank pain  Neurological: Denies headaches, dizziness, paresthesias, tremors or focal weakness      Vital Signs:  /72 (BP Location: Left arm, Patient Position: Sitting)   Pulse 103   Temp 97.4 °F (36.3 °C) (Temporal)   Resp 20   Ht 5' 4" (1.626 m)   Wt 57.9 kg (127 lb 9.6 " oz)   SpO2 99%   BMI 21.90 kg/m²   Body mass index is 21.9 kg/m².      Physical Exam:    General: no acute distress, awake, alert  Eyes: PERRLA, conjunctiva clear, eyelids without swelling  HENT: atraumatic, oropharynx and nasal mucosa patent  Neck: supple, trache midline, full ROM, no JVD, no thyromegaly or lymphadenopathy  Respiratory: equal, unlabored, clear to auscultation A/P  Cardiovascular: RRR without  rub; radial and pedal pulses +2 BL  Edema:  Trace-1+ BLE  Gastrointestinal: soft, non-tender, non-distended; positive bowel sounds; no masses to palpation; no ascites  Genitourinary: no CVA tenderness upon palpation  Musculoskeletal: ROM without new limitation or discomfort  Integumentary: warm, dry; no rashes, wounds, or skin lesions  Neurological: oriented x4, appropriate, no acute deficits; no asterixis      Labs:        Component Value Date/Time     (L) 12/28/2023 1141     12/07/2023 0909     10/28/2023 0631     (L) 10/27/2023 0243    K 4.0 12/28/2023 1141    K 4.9 12/07/2023 0909    K 4.2 10/28/2023 0631    K 4.2 10/27/2023 0243     10/28/2023 0631     10/27/2023 0243    CO2 23 12/28/2023 1141    CO2 21 (L) 12/07/2023 0909    CO2 23 10/28/2023 0631    CO2 21 (L) 10/27/2023 0243    BUN 6.4 (L) 12/28/2023 1141    BUN 6.4 (L) 12/07/2023 0909    BUN 12 10/28/2023 0631    BUN 18 10/27/2023 0243    CREATININE 0.87 12/28/2023 1141    CREATININE 0.79 12/07/2023 0909    CREATININE 0.83 11/13/2023 0344    CREATININE 0.81 11/12/2023 0700    CREATININE 1.0 10/28/2023 0631    CREATININE 1.2 10/27/2023 0243    CREATININE 1.3 10/26/2023 0444    CALCIUM 8.1 (L) 12/28/2023 1141    CALCIUM 8.9 12/07/2023 0909    CALCIUM 8.2 (L) 10/28/2023 0631    CALCIUM 8.6 (L) 10/27/2023 0243    PHOS 2.8 12/28/2023 1141    PHOS 2.2 (L) 11/03/2023 0539    PHOS 3.2 10/28/2023 0631    PHOS 3.0 10/27/2023 0243           Component Value Date/Time    WBC 6.14 12/28/2023 1141    WBC 8.98 12/07/2023 0909     WBC 9.94 10/28/2023 0631    WBC 12.83 (H) 10/27/2023 0243    HGB 10.0 (L) 2023 1141    HGB 10.4 (L) 2023 0909    HGB 8.8 (L) 10/28/2023 0631    HGB 8.3 (L) 10/27/2023 0243    HCT 28.9 (L) 2023 1141    HCT 32.2 (L) 2023 0909    HCT 27.1 (L) 10/28/2023 06    HCT 24.7 (L) 10/27/2023 0243    HCT 44.0 10/09/2021 2238     2023 1141     2023 0909     (L) 10/28/2023 06     (L) 10/27/2023 0243         Imaging:  Retroperitoneal US:  Impression:     No significant renal abnormality.     Abdominopelvic ascites.  Right pleural effusion.       Impression:  1. Hyponatremia    2. Alcoholic cirrhosis of liver without ascites              Plan:  Renal wise stable. Pt is at risk of hepatorenal disease.   Increase lasix to 40 BID x 2  days then continue at 40 mg daily dose  Prescription  and will send refill to pharmacy.   Aldactone was decreased at last visit so not likely the culprit.   Pt denies excessive water intake drinking only 1-2 bottles of water daily along with juices and other liquid.   Repeat labs next week   F/u visit with labs in 2 months with labs in 6 weeks if labs ok next week.     Shara Calix [facility]        This note was created with the assistance of [employer] voice recognition software or phone dictation. There may be transcription errors as a result of using this technology however minimal. Effort has been made to assure accuracy of transcription but any obvious errors or omissions should be clarified with the author of the document.

## 2023-12-28 NOTE — PROGRESS NOTES
Patient ID: [id number]     Chief Complaint: Establish Care (Liver disease.)    HPI:     Lauren Ewing is a 63 y.o. female with diagnosis of Alcoholic Cirrhosis, Depression, Anxiety. Patient seen in clinic today to establish care.  Patient recently admitted to [facility] in October 2023 with new diagnosis of cirrhosis. Patient admitted to [facility] on 11/01/2023 for Alcoholic Cirrhosis, BLE Edema, Dyspnea. CXR indicated small layering pleural effusion. Patient received IV diuretics. Echo indicated normal EF, normal diastolic function. Lasix increased, no improvement. Lasix changed to Bumex. Nephrology consulted, recommended to change Bumex to Lasix upon discharge, Aldactone started. Patient discharged to [facility], was discharged hon 12/04/2023. Patient's daughter-in-law present during visit. States patient currently admitted to home health and has in-home PT due to weakness.   Patient states stopped drinking alcohol about 3 months ago.   Patient denies any other acute complaints.     Patient followed by Nephrology, Dr. Lyle. Last appointment on 12/07/2023. Cirrhosis. Recent volume overload better. High normal potassium. Patient at risk for hepatorenal disease. Renal wise stable. I will decrease Aldactone to 50 mg p.o. daily. Repeat labs and follow-up visit in 3 weeks. Patient has follow up appointment scheduled for 12/28/2023.     Patient followed by Hepatology, Dr. Lionel Houser. Last appointment on 12/05/2023. Alcohol Abuse, Alcoholic liver disease. She certainly appears much better since her [place] [facility] and more recently rehab. Her fluid retention is controlled with low dose diuretics and she does not have any obvious jaundice or HE. I explained to her and her son that we need to get some updated labs and that if she remains abstinent from alcohol he may not need transplantation. Patient has follow up appointment scheduled for 02/19/2023.     I spent 45  minutes reviewing patient's chart, assessing patient, obtain history and discussing plan of care with patient.     Review of patient's allergies indicates:   Allergen Reactions    Hydrocodone      Nausea..     Breast Cancer Screening: MMG ordered 2023  Cervical Cancer Screening: GYN referral ordered 2023  Colorectal Cancer Screening: Cologuard ordered 2023  Diabetic Eye Exam: N/A  Diabetic Foot Exam: N/A  Lung Cancer Screening: N/A  Prostate Cancer Screening: N/A  AAA Screening: N/A  Osteoporosis Screening: deferred due to age  Medicare Wellness: N/A  Immunizations:   There is no immunization history on file for this patient.    Past Surgical History:   Procedure Laterality Date    BRAIN SURGERY       SECTION       family history includes Alcohol abuse in her brother; Hypertension in her mother; Liver disease in her father and sister; No Known Problems in her daughter and son; Thyroid disease in her mother.    Social History     Socioeconomic History    Marital status:     Number of children: 3   Tobacco Use    Smoking status: Never    Smokeless tobacco: Never   Substance and Sexual Activity    Alcohol use: Not Currently     Comment: last drink 3 -4 months    Drug use: Never    Sexual activity: Not Currently     Partners: Male     Social Determinants of Health     Financial Resource Strain: Low Risk  (2023)    Overall Financial Resource Strain (CARDIA)     Difficulty of Paying Living Expenses: Not hard at all   Food Insecurity: No Food Insecurity (2023)    Hunger Vital Sign     Worried About Running Out of Food in the Last Year: Never true     Ran Out of Food in the Last Year: Never true   Transportation Needs: No Transportation Needs (2023)    PRAPARE - Transportation     Lack of Transportation (Medical): No     Lack of Transportation (Non-Medical): No   Physical Activity: Insufficiently Active (2023)    Exercise Vital Sign     Days of Exercise per Week: 1 day      Minutes of Exercise per Session: 90 min   Stress: No Stress Concern Present (12/5/2023)    [employer] - Occupational Stress Questionnaire     Feeling of Stress : Only a little   Social Connections: Unknown (12/5/2023)    Social Connection and Isolation Panel [NHANES]     Frequency of Communication with Friends and Family: More than three times a week     Frequency of Social Gatherings with Friends and Family: Three times a week     Active Member of Clubs or Organizations: No     Attends Club or Organization Meetings: Never     Marital Status: Patient declined   Housing Stability: Unknown (12/5/2023)    Housing Stability Vital Sign     Unable to Pay for Housing in the Last Year: No     Unstable Housing in the Last Year: No     Current Outpatient Medications   Medication Instructions    acamprosate (CAMPRAL) 666 mg, Oral, 3 times daily    CONSTULOSE 10 g, Oral, 2 times daily, Take an extra dose if needed to achieve 2-3 bowel movements per day to prevent confusion    folic acid (FOLVITE) 1 mg, Oral, Daily    furosemide (LASIX) 40 mg, Oral, Daily    levothyroxine (SYNTHROID) 100 mcg, Oral, Before breakfast    magnesium oxide (MAG-OX) 400 mg, Oral, 2 times daily    melatonin (MELATIN) 6 mg, Oral, Nightly PRN    midodrine (PROAMATINE) 10 mg, Oral, Every 8 hours    multivitamin Tab 1 tablet, Oral, Daily    ondansetron (ZOFRAN) 8 mg, Oral, Every 4 hours PRN    pantoprazole (PROTONIX) 40 mg, Oral, Daily    spironolactone (ALDACTONE) 50 mg, Oral, Daily    thiamine 100 mg, Oral, Daily       Subjective:     Review of Systems   Constitutional: Negative.    HENT: Negative.     Eyes: Negative.    Respiratory: Negative.     Cardiovascular: Negative.    Gastrointestinal: Negative.    Endocrine: Negative.    Genitourinary: Negative.    Musculoskeletal: Negative.    Skin: Negative.    Allergic/Immunologic: Negative.    Neurological: Negative.    Hematological: Negative.    Psychiatric/Behavioral:  "Negative.         Objective:     Visit Vitals  /77 (BP Location: Left arm, Patient Position: Sitting, BP Method: Medium (Automatic))   Pulse 103   Temp 98.2 °F (36.8 °C) (Oral)   Resp 18   Ht 5' 4.02" (1.626 m)   Wt 57.8 kg (127 lb 6.4 oz)   BMI 21.86 kg/m²       Physical Exam  Vitals reviewed.   Constitutional:       Appearance: Normal appearance.   HENT:      Head: Normocephalic and atraumatic.      Mouth/Throat:      Mouth: Mucous membranes are moist.      Pharynx: Oropharynx is clear.   Eyes:      Extraocular Movements: Extraocular movements intact.      Conjunctiva/sclera: Conjunctivae normal.      Pupils: Pupils are equal, round, and reactive to light.   Cardiovascular:      Rate and Rhythm: Normal rate and regular rhythm.      Heart sounds: Normal heart sounds.   Pulmonary:      Effort: Pulmonary effort is normal.      Breath sounds: Normal breath sounds.   Abdominal:      General: Bowel sounds are normal.   Musculoskeletal:         General: Normal range of motion.      Cervical back: Normal range of motion.   Skin:     General: Skin is warm and dry.   Neurological:      Mental Status: She is alert and oriented to person, place, and time.      Motor: Weakness present.      Comments: Ambulates with a cane   Psychiatric:         Mood and Affect: Mood normal.         Behavior: Behavior normal.       Labs Reviewed:     Hematology:  Lab Results   Component Value Date    WBC 6.14 12/28/2023    HGB 10.0 (L) 12/28/2023    HCT 28.9 (L) 12/28/2023     12/28/2023     Chemistry:  Lab Results   Component Value Date     (L) 12/28/2023     10/28/2023    K 4.0 12/28/2023    K 4.2 10/28/2023    CHLORIDE 100 12/28/2023    BUN 6.4 (L) 12/28/2023    BUN 12 10/28/2023    CREATININE 0.87 12/28/2023    CREATININE 1.0 10/28/2023    EGFRNORACEVR >60 12/28/2023    EGFRNORACEVR >60.0 10/28/2023    GLUCOSE 122 (H) 12/28/2023    CALCIUM 8.1 (L) 12/28/2023    CALCIUM 8.2 (L) 10/28/2023    ALKPHOS 145 12/28/2023    " ALKPHOS 101 10/28/2023    LABPROT 5.9 12/28/2023    LABPROT 22.4 (H) 10/28/2023    ALBUMIN 2.6 (L) 12/28/2023    ALBUMIN 2.9 (L) 10/28/2023    BILIDIR 5.0 (H) 11/01/2023    BILIDIR 3.1 (H) 10/26/2023    IBILI 0.20 02/08/2022    AST 47 (H) 12/28/2023    AST 75 (H) 10/28/2023    ALT 27 12/28/2023    ALT 32 10/28/2023    MG 1.90 11/09/2023    MG 2.0 10/28/2023    PHOS 2.8 12/28/2023    PHOS 3.2 10/28/2023     Lab Results   Component Value Date    HGBA1C 4.2 10/26/2023     Lipid Panel:  Lab Results   Component Value Date    CHOL 108 12/28/2023    HDL 16 (L) 12/28/2023    LDL 78.00 12/28/2023    TRIG 69 12/28/2023    TOTALCHOLEST 7 (H) 12/28/2023     Thyroid:  Lab Results   Component Value Date    TSH 7.882 (H) 10/18/2023    T3FREE <1.50 (L) 10/19/2023     Urine:  Lab Results   Component Value Date    COLORUA Yellow 11/01/2023    APPEARANCEUA Clear 11/01/2023    SGUA 1.009 11/01/2023    PHUA 5.5 11/01/2023    PROTEINUA Negative 11/01/2023    GLUCOSEUA Normal 11/01/2023    KETONESUA Negative 11/01/2023    BLOODUA Negative 11/01/2023    NITRITESUA Negative 11/01/2023    LEUKOCYTESUR Negative 11/01/2023    RBCUA 0-5 11/01/2023    WBCUA 0-5 11/01/2023    BACTERIA Trace 11/01/2023    SQEPUA None Seen 11/01/2023    HYALINECASTS 0-2 (A) 11/01/2023    CREATRANDUR 32.0 10/26/2023    PROTEINURINE <7 10/26/2023        Assessment:       ICD-10-CM ICD-9-CM   1. Acute liver failure without hepatic coma  K72.00 570   2. Alcoholic liver disease  K70.9 571.3   3. Anxiety  F41.9 300.00   4. Encounter for screening mammogram for malignant neoplasm of breast  Z12.31 V76.12   5. Cervical cancer screening  Z12.4 V76.2   6. Encounter for colorectal cancer screening  Z12.11 V76.51    Z12.12 V76.41   7. Wellness examination  Z00.00 V70.0        Plan:     1. Acute liver failure without hepatic coma  Followed by Hepatology  Hepatologist recommended liver transplant unless patient stops drinking. Patient states she has stopped drinking, has follow  up appointment with Hepatologist on 02/19/2024.     2. Alcoholic liver disease  Followed by Hepatology  Alk Phos: 157  AST: 44  ALT: 24    3. Anxiety  Patient states taking Zofran due to anxiety makes her nauseous   States she has never seen a Mental Health provider but is open to a referral due to suffering with anxiety for many years.   - Ambulatory referral/consult to Behavioral Health; Future    4. Encounter for screening mammogram for malignant neoplasm of breast  - Mammo Digital Screening Bilat w/ Alpesh; Future    5. Cervical cancer screening  - Ambulatory referral/consult to Gynecology; Future    6. Encounter for colorectal cancer screening  - Cologuard Screening (Multitarget Stool DNA); Future  - Cologuard Screening (Multitarget Stool DNA)    7. Wellness examination  - Lipid Panel; Future  - TSH; Future      Follow up in about 4 weeks (around 1/25/2024) for Labs, Virtual Visit. In addition to their scheduled follow up, the patient has also been instructed to follow up on as needed basis.     Candi Suero, PAVAN

## 2023-12-30 LAB — BACTERIA UR CULT: ABNORMAL

## 2024-01-02 ENCOUNTER — TELEPHONE (OUTPATIENT)
Dept: NEPHROLOGY | Facility: CLINIC | Age: 64
End: 2024-01-02
Payer: MEDICAID

## 2024-01-02 ENCOUNTER — LAB VISIT (OUTPATIENT)
Dept: LAB | Facility: HOSPITAL | Age: 64
End: 2024-01-02
Attending: NURSE PRACTITIONER
Payer: MEDICAID

## 2024-01-02 DIAGNOSIS — K70.30 ALCOHOLIC CIRRHOSIS OF LIVER WITHOUT ASCITES: ICD-10-CM

## 2024-01-02 DIAGNOSIS — E87.1 HYPONATREMIA: ICD-10-CM

## 2024-01-02 DIAGNOSIS — N39.0 UTI (URINARY TRACT INFECTION), UNCOMPLICATED: Primary | ICD-10-CM

## 2024-01-02 LAB
ANION GAP SERPL CALC-SCNC: 9 MEQ/L
BUN SERPL-MCNC: 7.2 MG/DL (ref 9.8–20.1)
CALCIUM SERPL-MCNC: 8.3 MG/DL (ref 8.4–10.2)
CHLORIDE SERPL-SCNC: 101 MMOL/L (ref 98–107)
CO2 SERPL-SCNC: 23 MMOL/L (ref 23–31)
CREAT SERPL-MCNC: 1.03 MG/DL (ref 0.55–1.02)
CREAT/UREA NIT SERPL: 7
GFR SERPLBLD CREATININE-BSD FMLA CKD-EPI: >60 MLS/MIN/1.73/M2
GLUCOSE SERPL-MCNC: 98 MG/DL (ref 82–115)
MAGNESIUM SERPL-MCNC: 1.8 MG/DL (ref 1.6–2.6)
OSMOLALITY SERPL: 284 MOSM/KG (ref 280–300)
OSMOLALITY UR: 348 MOSM/KG (ref 300–1300)
POTASSIUM SERPL-SCNC: 3.9 MMOL/L (ref 3.5–5.1)
PROT UR STRIP-MCNC: 28.4 MG/DL
SODIUM SERPL-SCNC: 133 MMOL/L (ref 136–145)
SODIUM UR-SCNC: <20 MMOL/L

## 2024-01-02 PROCEDURE — 83735 ASSAY OF MAGNESIUM: CPT

## 2024-01-02 PROCEDURE — 36415 COLL VENOUS BLD VENIPUNCTURE: CPT

## 2024-01-02 PROCEDURE — 83935 ASSAY OF URINE OSMOLALITY: CPT

## 2024-01-02 PROCEDURE — 84300 ASSAY OF URINE SODIUM: CPT

## 2024-01-02 PROCEDURE — 80048 BASIC METABOLIC PNL TOTAL CA: CPT

## 2024-01-02 PROCEDURE — 83930 ASSAY OF BLOOD OSMOLALITY: CPT

## 2024-01-02 PROCEDURE — 84156 ASSAY OF PROTEIN URINE: CPT

## 2024-01-02 RX ORDER — NITROFURANTOIN 25; 75 MG/1; MG/1
100 CAPSULE ORAL DAILY
Qty: 5 CAPSULE | Refills: 0 | Status: SHIPPED | OUTPATIENT
Start: 2024-01-02 | End: 2024-01-07

## 2024-01-02 NOTE — TELEPHONE ENCOUNTER
----- Message from Quiana Lyle MD sent at 1/2/2024 12:01 PM CST -----  Macrodantin 100mg po daily for 5 days    Called patients sissy Weathers with urine culture results, Dr Lyle ordered Macrobid 100 mg daily times 5 days, sent to pharmacy on file. Verbalized understanding.

## 2024-01-03 ENCOUNTER — TELEPHONE (OUTPATIENT)
Dept: NEPHROLOGY | Facility: CLINIC | Age: 64
End: 2024-01-03
Payer: MEDICAID

## 2024-01-03 NOTE — TELEPHONE ENCOUNTER
----- Message from GABRIELLE Espitia sent at 1/3/2024  2:33 PM CST -----  Please let pt know labs reviewed. Sodium improved to 133. Continue current regimen with lasix. Keep f/u appointment with Dr. Lyle and labs in approximately 3 weeks. Go to ED for any s/s as discussed at visit.     Called patients sissy Weathers with lab results and to keep follow up appt with labs. Verbalized understanding.

## 2024-01-04 ENCOUNTER — TELEPHONE (OUTPATIENT)
Dept: NEPHROLOGY | Facility: CLINIC | Age: 64
End: 2024-01-04

## 2024-01-04 DIAGNOSIS — N17.9 AKI (ACUTE KIDNEY INJURY): Primary | ICD-10-CM

## 2024-01-04 RX ORDER — FUROSEMIDE 40 MG/1
40 TABLET ORAL DAILY
Qty: 30 TABLET | Refills: 2
Start: 2024-01-04 | End: 2024-01-18 | Stop reason: SDUPTHER

## 2024-01-08 NOTE — TELEPHONE ENCOUNTER
----- Message from Marge Calvillo sent at 1/4/2024  3:18 PM CST -----  Regarding: refill  Type:  RX Refill Request    Who Called: Lauren Singer or New Rx:refill    RX Name and Strength:    thiamine 100 MG tablet    magnesium oxide (MAG-OX) 400 mg (241.3 mg magnesium) tablet      How is the patient currently taking it? (ex. 1XDay):    Is this a 30 day or 90 day RX:    Preferred Pharmacy with phone number:[facility] or Mail Order:    Ordering Provider:    Would the patient rather a call back or a response via [facility]?     Best Call Back Number:    Additional Information: Thiamine was prescribed in [place] and patient stated they could get in contact with them.  Was requesting the PCP to submit the medication.

## 2024-01-12 RX ORDER — LANOLIN ALCOHOL/MO/W.PET/CERES
100 CREAM (GRAM) TOPICAL DAILY
Qty: 90 TABLET | Refills: 1 | Status: SHIPPED | OUTPATIENT
Start: 2024-01-12

## 2024-01-12 RX ORDER — LANOLIN ALCOHOL/MO/W.PET/CERES
400 CREAM (GRAM) TOPICAL 2 TIMES DAILY
Qty: 180 TABLET | Refills: 0 | Status: SHIPPED | OUTPATIENT
Start: 2024-01-12

## 2024-01-17 ENCOUNTER — TELEPHONE (OUTPATIENT)
Dept: INTERNAL MEDICINE | Facility: CLINIC | Age: 64
End: 2024-01-17
Payer: MEDICAID

## 2024-01-17 NOTE — TELEPHONE ENCOUNTER
Contacted informed that the medications requested has frills available at [facility] [phone]. Instructed to call [facility] to have prescriptions transferred. She voiced understanding.

## 2024-01-17 NOTE — TELEPHONE ENCOUNTER
----- Message from Carolyn Alicia sent at 1/17/2024 10:08 AM CST -----  .Type:  RX Refill Request    Who Called: pt  Refill or New Rx:refill  RX Name and Strength:furosemide (LASIX) 40 MG tablet  How is the patient currently taking it? (ex. 1XDay):1x day  Is this a 30 day or 90 day RX:30  [facility] or Mail Order:local  Ordering Provider:Candi  Would the patient rather a call back or a response via [facility]?   Best Call Back Number:[phone]  Additional Information: need refill at [facility]   .Type:  RX Refill Request    Who Called: pt  Refill or New Rx:refill  RX Name and Strength:acamprosate (CAMPRAL) 333 mg  How is the patient currently taking it? (ex. 1XDay):2x day  Is this a 30 day or 90 day RX:180  [facility] or Mail Order:local  Ordering Provider:Candi  Would the patient rather a call back or a response via [facility]?   Best Call Back Number:  Additional Information: need refill   .Type:  RX Refill Request    Who Called: pt  Refill or New Rx:refill  RX Name and Strength:levothyroxine (SYNTHROID) 100  How is the patient currently taking it? (ex. 1XDay):1x day  Is this a 30 day or 90 day RX:30  [facility] or Mail Order:local  Ordering Provider:Candi  Would the patient rather a call back or a response via [facility]?   Best Call Back Number:  Additional Information:

## 2024-01-18 ENCOUNTER — TELEPHONE (OUTPATIENT)
Dept: NEPHROLOGY | Facility: CLINIC | Age: 64
End: 2024-01-18

## 2024-01-18 DIAGNOSIS — N17.9 AKI (ACUTE KIDNEY INJURY): ICD-10-CM

## 2024-01-18 RX ORDER — FUROSEMIDE 40 MG/1
40 TABLET ORAL DAILY
Qty: 30 TABLET | Refills: 2
Start: 2024-01-18 | End: 2024-03-26 | Stop reason: SDUPTHER

## 2024-01-25 ENCOUNTER — OFFICE VISIT (OUTPATIENT)
Dept: INTERNAL MEDICINE | Facility: CLINIC | Age: 64
End: 2024-01-25
Payer: MEDICAID

## 2024-01-25 VITALS
TEMPERATURE: 98 F | WEIGHT: 130.19 LBS | HEIGHT: 64 IN | HEART RATE: 109 BPM | RESPIRATION RATE: 18 BRPM | DIASTOLIC BLOOD PRESSURE: 83 MMHG | BODY MASS INDEX: 22.23 KG/M2 | OXYGEN SATURATION: 100 % | SYSTOLIC BLOOD PRESSURE: 130 MMHG

## 2024-01-25 DIAGNOSIS — R00.0 TACHYCARDIA: ICD-10-CM

## 2024-01-25 DIAGNOSIS — E03.9 HYPOTHYROIDISM, UNSPECIFIED TYPE: Primary | Chronic | ICD-10-CM

## 2024-01-25 DIAGNOSIS — K72.00 ACUTE LIVER FAILURE WITHOUT HEPATIC COMA: Chronic | ICD-10-CM

## 2024-01-25 PROCEDURE — 99214 OFFICE O/P EST MOD 30 MIN: CPT | Mod: S$PBB,,, | Performed by: NURSE PRACTITIONER

## 2024-01-25 PROCEDURE — 99215 OFFICE O/P EST HI 40 MIN: CPT | Mod: PBBFAC | Performed by: NURSE PRACTITIONER

## 2024-01-25 PROCEDURE — 3008F BODY MASS INDEX DOCD: CPT | Mod: CPTII,,, | Performed by: NURSE PRACTITIONER

## 2024-01-25 PROCEDURE — 3079F DIAST BP 80-89 MM HG: CPT | Mod: CPTII,,, | Performed by: NURSE PRACTITIONER

## 2024-01-25 PROCEDURE — 1159F MED LIST DOCD IN RCRD: CPT | Mod: CPTII,,, | Performed by: NURSE PRACTITIONER

## 2024-01-25 PROCEDURE — 3075F SYST BP GE 130 - 139MM HG: CPT | Mod: CPTII,,, | Performed by: NURSE PRACTITIONER

## 2024-01-25 PROCEDURE — 1160F RVW MEDS BY RX/DR IN RCRD: CPT | Mod: CPTII,,, | Performed by: NURSE PRACTITIONER

## 2024-01-25 RX ORDER — LEVOTHYROXINE SODIUM 88 UG/1
88 TABLET ORAL
Qty: 30 TABLET | Refills: 6 | Status: SHIPPED | OUTPATIENT
Start: 2024-01-25 | End: 2024-03-01 | Stop reason: SDUPTHER

## 2024-01-25 RX ORDER — ACAMPROSATE CALCIUM 333 MG/1
666 TABLET, DELAYED RELEASE ORAL 3 TIMES DAILY
Qty: 90 TABLET | Refills: 3 | Status: SHIPPED | OUTPATIENT
Start: 2024-01-25 | End: 2024-04-29 | Stop reason: SDUPTHER

## 2024-01-25 NOTE — LETTER
I certify that (Name) Lauren Ewing meets the requirements as outlined in #  (shown on reverse side) and qualifies for a mobility impaired license plate/hang-tag. I further understand that willful and false certification shall subject me to fines/imprisonment as outlined in R.S. 47:463.4 (G) (4). The applicant's information is as follows:    YOB: 1960            Race:White        Gender:Female    Address:  [address]    City:[place]                               State:[place]     Zip Code:[address]     []Permanently Impaired - Applicant has a total or lifelong condition of mobility impairment from which little or no improvement or recovery can reasonably be expected. A medical examiners certification is required on initial application only.      [] Temporarily Impaired - Applicant has a temporary condition of mobility impairment of which improvement or recovery can reasonably be expected. Applicant is entitled to a hangtag, which will be valid for one (1) year. A medical examiners certification is required for the renewal of the hangtag      [] Unable to appear in person at the Office of Motor Vehicles - Applicant must bring facial photo        Medical Examiner's Signature________________________________________ Date:1/25/24_________________________    Printed Name:___________________________________________________    State License #_____________________________    Address: [address]                                     Phone Number: [phone]                                                                                                                                                                                                                  City: [place]__________________________________ State: [place] __Zip Code: [address] _______________    TO BE COMPLETED BY MOTOR VEHICLE ANALYST ONLY    [place]   Lic. Plate #      Hangtag Control #   Hangtag ID #      Date  Issued:    #:   Office #:

## 2024-01-25 NOTE — PROGRESS NOTES
Patient ID: [id number]     Chief Complaint: Follow-up, Nausea (1. Nausea this morning  2. Spasm to feet), and Medication Refill    HPI:     Lauren Ewing is a 63 y.o. female with diagnosis of Alcoholic Cirrhosis, Hypothyroidism, Depression, Anxiety. Patient seen in clinic today to establish care.  Patient recently admitted to [facility] in [facility] in 2023 with new diagnosis of cirrhosis. Patient admitted to [facility] on 2023 for Alcoholic Cirrhosis, BLE Edema, Dyspnea. CXR indicated small layering pleural effusion. Patient received IV diuretics. Echo indicated normal EF, normal diastolic function. Lasix increased, no improvement. Lasix changed to Bumex. Nephrology consulted, recommended to change Bumex to Lasix upon discharge, Aldactone started. Patient [facility], was discharged hon 2023.   At previous appt, patient referred to Behavioral Health for anxiety. Behavioral Health clinic unable to contact patient to schedule appointment.   Patient currently prescribed Levothyroxine 100 mcg daily for hypothyroidism. Previous TSH level: 0.010. Patient asymptomatic except for anxiety which patient states is chronic.   Patient's son present during visit.   States patient currently admitted to home health and has in-home PT due to weakness.   Patient states stopped drinking alcohol.   Patient denies any other acute complaints.     Patient followed by Nephrology, Dr. Lyle. Last appointment on 2023. Hyponatremia, Alcoholic cirrhosis of liver without ascites: Renal wise stable. Pt is at risk of hepatorenal disease. Increase lasix to 40 BID x2 days then continue at 40 mg daily dose. Prescription  and will send refill to pharmacy. Aldactone was decreased at last visit so not likely the culprit. Pt denies excessive water intake drinking only 1-2 bottles of water daily along with juices and other liquid. Repeat labs next week. F/u visit with labs in 2 months with labs in 6  weeks if labs ok next week. Patient has follow up appointment scheduled for 2024.     Patient followed by Hepatology, Dr. Lionel Houser. Last appointment on 2023. Alcohol Abuse, Alcoholic liver disease. She certainly appears much better since her discharge from [facility] and more recently rehab. Her fluid retention is controlled with low dose diuretics and she does not have any obvious jaundice or HE. I explained to her and her son that we need to get some updated labs and that if she remains abstinent from alcohol he may not need transplantation. Patient has follow up appointment scheduled for 2023.       Review of patient's allergies indicates:   Allergen Reactions    Hydrocodone      Nausea..     Breast Cancer Screening: MMG ordered 2023  Cervical Cancer Screening: GYN referral ordered 2023  Colorectal Cancer Screening: Cologuard ordered 2023  Diabetic Eye Exam: N/A  Diabetic Foot Exam: N/A  Lung Cancer Screening: N/A  Prostate Cancer Screening: N/A  AAA Screening: N/A  Osteoporosis Screening: deferred due to age  Medicare Wellness: N/A  Immunizations:   There is no immunization history on file for this patient.    Past Surgical History:   Procedure Laterality Date    BRAIN SURGERY       SECTION       family history includes Alcohol abuse in her brother; Hypertension in her mother; Liver disease in her father and sister; No Known Problems in her daughter and son; Thyroid disease in her mother.    Social History     Socioeconomic History    Marital status:     Number of children: 3   Tobacco Use    Smoking status: Never     Passive exposure: Never    Smokeless tobacco: Never   Substance and Sexual Activity    Alcohol use: Not Currently     Comment: last drink 3 -4 months    Drug use: Never    Sexual activity: Not Currently     Partners: Male     Social Determinants of Health     Financial Resource Strain: Low Risk  (2023)    Overall  Financial Resource Strain (CARDIA)     Difficulty of Paying Living Expenses: Not hard at all   Food Insecurity: No Food Insecurity (12/5/2023)    Hunger Vital Sign     Worried About Running Out of Food in the Last Year: Never true     Ran Out of Food in the Last Year: Never true   Transportation Needs: No Transportation Needs (12/5/2023)    PRAPARE - Transportation     Lack of Transportation (Medical): No     Lack of Transportation (Non-Medical): No   Physical Activity: Insufficiently Active (12/5/2023)    Exercise Vital Sign     Days of Exercise per Week: 1 day     Minutes of Exercise per Session: 90 min   Stress: No Stress Concern Present (12/5/2023)    [employer] [place] [employer] - Occupational Stress Questionnaire     Feeling of Stress : Only a little   Social Connections: Unknown (12/5/2023)    Social Connection and Isolation Panel [NHANES]     Frequency of Communication with Friends and Family: More than three times a week     Frequency of Social Gatherings with Friends and Family: Three times a week     Active Member of Clubs or Organizations: No     Attends Club or Organization Meetings: Never     Marital Status: Patient declined   Housing Stability: Unknown (12/5/2023)    Housing Stability Vital Sign     Unable to Pay for Housing in the Last Year: No     Unstable Housing in the Last Year: No     Current Outpatient Medications   Medication Instructions    acamprosate (CAMPRAL) 666 mg, Oral, 3 times daily    CONSTULOSE 10 g, Oral, 2 times daily, Take an extra dose if needed to achieve 2-3 bowel movements per day to prevent confusion    folic acid (FOLVITE) 1 mg, Oral, Daily    furosemide (LASIX) 40 mg, Oral, Daily    levothyroxine (SYNTHROID) 88 mcg, Oral, Before breakfast    magnesium oxide (MAG-OX) 400 mg, Oral, 2 times daily    midodrine (PROAMATINE) 10 mg, Oral, Every 8 hours    multivitamin Tab 1 tablet, Oral, Daily    ondansetron (ZOFRAN) 8 mg, Oral, Every 4 hours PRN    pantoprazole  "(PROTONIX) 40 mg, Oral, Daily    spironolactone (ALDACTONE) 50 mg, Oral, Daily    thiamine 100 mg, Oral, Daily       Subjective:     Review of Systems   Constitutional: Negative.    HENT: Negative.     Eyes: Negative.    Respiratory: Negative.     Cardiovascular: Negative.    Gastrointestinal: Negative.    Endocrine: Negative.    Genitourinary: Negative.    Musculoskeletal: Negative.    Skin: Negative.    Allergic/Immunologic: Negative.    Neurological: Negative.    Hematological: Negative.    Psychiatric/Behavioral: Negative.         Objective:     Visit Vitals  /83 (BP Location: Left arm, Patient Position: Sitting, BP Method: Medium (Automatic))   Pulse 109   Temp 98.4 °F (36.9 °C) (Oral)   Resp 18   Ht 5' 4.02" (1.626 m)   Wt 59.1 kg (130 lb 3.2 oz)   SpO2 100%   BMI 22.34 kg/m²       Physical Exam  Vitals reviewed.   Constitutional:       Appearance: Normal appearance.   HENT:      Head: Normocephalic and atraumatic.      Mouth/Throat:      Mouth: Mucous membranes are moist.      Pharynx: Oropharynx is clear.   Eyes:      Extraocular Movements: Extraocular movements intact.      Conjunctiva/sclera: Conjunctivae normal.      Pupils: Pupils are equal, round, and reactive to light.   Cardiovascular:      Rate and Rhythm: Normal rate and regular rhythm.      Heart sounds: Normal heart sounds.   Pulmonary:      Effort: Pulmonary effort is normal.      Breath sounds: Normal breath sounds.   Abdominal:      General: Bowel sounds are normal.   Musculoskeletal:         General: Normal range of motion.      Cervical back: Normal range of motion.   Skin:     General: Skin is warm and dry.   Neurological:      Mental Status: She is alert and oriented to person, place, and time.      Motor: Weakness present.      Comments: Ambulates with a cane   Psychiatric:         Mood and Affect: Mood normal.         Behavior: Behavior normal.       Labs Reviewed:     Hematology:  Lab Results   Component Value Date    WBC 9.05 " 01/25/2024    HGB 11.1 (L) 01/25/2024    HCT 32.8 (L) 01/25/2024     01/25/2024     Chemistry:  Lab Results   Component Value Date     (L) 01/25/2024     10/28/2023    K 3.6 01/25/2024    K 4.2 10/28/2023    CHLORIDE 104 01/25/2024    BUN 10.0 01/25/2024    BUN 12 10/28/2023    CREATININE 0.80 01/25/2024    CREATININE 1.0 10/28/2023    EGFRNORACEVR >60 01/25/2024    EGFRNORACEVR >60.0 10/28/2023    GLUCOSE 93 01/25/2024    CALCIUM 8.4 01/25/2024    CALCIUM 8.2 (L) 10/28/2023    ALKPHOS 179 (H) 01/25/2024    ALKPHOS 101 10/28/2023    LABPROT 7.2 01/25/2024    LABPROT 22.4 (H) 10/28/2023    ALBUMIN 2.7 (L) 01/25/2024    ALBUMIN 2.9 (L) 10/28/2023    BILIDIR 5.0 (H) 11/01/2023    BILIDIR 3.1 (H) 10/26/2023    IBILI 0.20 02/08/2022    AST 56 (H) 01/25/2024    AST 75 (H) 10/28/2023    ALT 27 01/25/2024    ALT 32 10/28/2023    MG 1.90 01/25/2024    MG 2.0 10/28/2023    PHOS 2.8 12/28/2023    PHOS 3.2 10/28/2023     Lab Results   Component Value Date    HGBA1C 4.2 10/26/2023     Lipid Panel:  Lab Results   Component Value Date    CHOL 108 12/28/2023    HDL 16 (L) 12/28/2023    LDL 78.00 12/28/2023    TRIG 69 12/28/2023    TOTALCHOLEST 7 (H) 12/28/2023     Thyroid:  Lab Results   Component Value Date    TSH <0.008 (L) 01/25/2024    T3FREE <1.50 (L) 10/19/2023     Urine:  Lab Results   Component Value Date    COLORUA Yellow 12/28/2023    APPEARANCEUA Turbid (A) 12/28/2023    SGUA 1.009 12/28/2023    PHUA 5.5 12/28/2023    PROTEINUA Negative 12/28/2023    GLUCOSEUA Normal 12/28/2023    KETONESUA Negative 12/28/2023    BLOODUA Negative 12/28/2023    NITRITESUA Negative 12/28/2023    LEUKOCYTESUR 75 (A) 12/28/2023    RBCUA 0-5 12/28/2023    WBCUA 21-50 (A) 12/28/2023    BACTERIA Trace 12/28/2023    SQEPUA Trace 12/28/2023    HYALINECASTS 11-20 (A) 12/28/2023    CREATRANDUR 66.3 12/28/2023    PROTEINURINE 28.4 01/02/2024        Assessment:       ICD-10-CM ICD-9-CM   1. Hypothyroidism, unspecified type  E03.9  244.9   2. Tachycardia  R00.0 785.0   3. Acute liver failure without hepatic coma  K72.00 570       Plan:     1. Hypothyroidism, unspecified type  TSH level: <0.008  HR: 109  Patient denies chest pain, palpitations, SOB  Decrease TSH to 88 mcg daily  - TSH; Future    2. Tachycardia  HR: 109  Patient denies chest pain, palpitations, SOB  - SCHEDULED EKG 12-LEAD (to Muse); Future    3. Acute liver failure without hepatic coma  Followed by Hepatology  Hepatologist recommended liver transplant unless patient stops drinking. Patient states she has stopped drinking, has follow up appointment with Hepatologist on 02/19/2024.       Follow up in about 6 weeks (around 3/7/2024) for Labs, Virtual Visit. In addition to their scheduled follow up, the patient has also been instructed to follow up on as needed basis.     Candi Suero, PAVAN

## 2024-01-26 LAB — PATH REV: NORMAL

## 2024-01-29 PROBLEM — N17.9 AKI (ACUTE KIDNEY INJURY): Status: RESOLVED | Noted: 2023-10-26 | Resolved: 2024-01-29

## 2024-02-05 DIAGNOSIS — N17.9 AKI (ACUTE KIDNEY INJURY): Primary | ICD-10-CM

## 2024-02-05 RX ORDER — SPIRONOLACTONE 50 MG/1
50 TABLET, FILM COATED ORAL DAILY
Qty: 30 TABLET | Refills: 11
Start: 2024-02-05 | End: 2025-02-04

## 2024-02-09 ENCOUNTER — OFFICE VISIT (OUTPATIENT)
Dept: NEPHROLOGY | Facility: CLINIC | Age: 64
End: 2024-02-09
Payer: MEDICAID

## 2024-02-09 VITALS
TEMPERATURE: 98 F | OXYGEN SATURATION: 99 % | HEART RATE: 110 BPM | SYSTOLIC BLOOD PRESSURE: 143 MMHG | HEIGHT: 64 IN | BODY MASS INDEX: 23.7 KG/M2 | RESPIRATION RATE: 20 BRPM | DIASTOLIC BLOOD PRESSURE: 84 MMHG | WEIGHT: 138.81 LBS

## 2024-02-09 DIAGNOSIS — E87.1 HYPONATREMIA: Primary | ICD-10-CM

## 2024-02-09 DIAGNOSIS — K70.31 ALCOHOLIC CIRRHOSIS OF LIVER WITH ASCITES: ICD-10-CM

## 2024-02-09 PROCEDURE — 99214 OFFICE O/P EST MOD 30 MIN: CPT | Mod: PBBFAC | Performed by: NURSE PRACTITIONER

## 2024-02-09 PROCEDURE — 1159F MED LIST DOCD IN RCRD: CPT | Mod: CPTII,,, | Performed by: NURSE PRACTITIONER

## 2024-02-09 PROCEDURE — 3077F SYST BP >= 140 MM HG: CPT | Mod: CPTII,,, | Performed by: NURSE PRACTITIONER

## 2024-02-09 PROCEDURE — 3008F BODY MASS INDEX DOCD: CPT | Mod: CPTII,,, | Performed by: NURSE PRACTITIONER

## 2024-02-09 PROCEDURE — 3079F DIAST BP 80-89 MM HG: CPT | Mod: CPTII,,, | Performed by: NURSE PRACTITIONER

## 2024-02-09 PROCEDURE — 99213 OFFICE O/P EST LOW 20 MIN: CPT | Mod: S$PBB,,, | Performed by: NURSE PRACTITIONER

## 2024-02-09 PROCEDURE — 3066F NEPHROPATHY DOC TX: CPT | Mod: CPTII,,, | Performed by: NURSE PRACTITIONER

## 2024-02-09 PROCEDURE — 99999 PR PBB SHADOW E&M-EST. PATIENT-LVL IV: CPT | Mod: PBBFAC,,, | Performed by: NURSE PRACTITIONER

## 2024-02-09 RX ORDER — SODIUM BICARBONATE 650 MG/1
650 TABLET ORAL DAILY
Qty: 30 TABLET | Refills: 11 | Status: SHIPPED | OUTPATIENT
Start: 2024-02-09 | End: 2024-04-17

## 2024-02-09 NOTE — PROGRESS NOTES
[facility] Ambulatory Progress Note    HPI  Lauren Ewing, 63 y.o. female, presents to office for follow up. Pt has  alcoholic cirrhosis followed by [facility] Hepatologist. Sodium improved with increased lasix dosing to 133 now 132 today. Renal function is stable.  She has no c/o today and says she has been doing well overall. She completed physical therapy and is no longer requiring home health.     Patient denies taking NSAIDs or new antibiotics. Also denies recent episode of dehydration, diarrhea, vomiting, acute illness, hospitalization, recent angiograms or exposure to IV radiocontrast.         Medical Diagnoses:   Past Medical History:   Diagnosis Date    DIANA (acute kidney injury)     Hyponatremia     Major depressive disorder, single episode, unspecified      Patient Active Problem List   Diagnosis    Hyponatremia    Liver failure    Major depressive disorder    Alcohol abuse    Palliative care encounter    Hypothyroidism    Volume overload    Alcoholic liver disease       Surgical History:   Past Surgical History:   Procedure Laterality Date    BRAIN SURGERY       SECTION         Family History:   Family History   Problem Relation Age of Onset    Hypertension Mother     Thyroid disease Mother     Liver disease Father     Liver disease Sister     Alcohol abuse Brother     No Known Problems Daughter     No Known Problems Son        Social History:   Social History     Tobacco Use    Smoking status: Never     Passive exposure: Never    Smokeless tobacco: Never   Substance Use Topics    Alcohol use: Not Currently     Comment: last drink 3 -4 months       Allergies:  Review of patient's allergies indicates:   Allergen Reactions    Hydrocodone      Nausea..       Medications:    Current Outpatient Medications:     acamprosate (CAMPRAL) 333 mg tablet, Take 2 tablets (666 mg total) by mouth 3 (three) times daily., Disp: 90 tablet, Rfl: 3    folic acid (FOLVITE) 1 MG tablet, Take 1 tablet  (1 mg total) by mouth once daily., Disp: 90 tablet, Rfl: 3    furosemide (LASIX) 40 MG tablet, Take 1 tablet (40 mg total) by mouth once daily., Disp: 30 tablet, Rfl: 2    lactulose (CHRONULAC) 10 gram/15 mL solution, Take 15 mLs (10 g total) by mouth 2 (two) times daily. Take an extra dose if needed to achieve 2-3 bowel movements per day to prevent confusion, Disp: 900 mL, Rfl: 11    levothyroxine (SYNTHROID) 88 MCG tablet, Take 1 tablet (88 mcg total) by mouth before breakfast., Disp: 30 tablet, Rfl: 6    magnesium oxide (MAG-OX) 400 mg (241.3 mg magnesium) tablet, Take 1 tablet (400 mg total) by mouth 2 (two) times daily., Disp: 180 tablet, Rfl: 0    midodrine (PROAMATINE) 10 MG tablet, Take 1 tablet (10 mg total) by mouth every 8 (eight) hours., Disp: 90 tablet, Rfl: 11    multivitamin Tab, Take 1 tablet by mouth once daily., Disp: , Rfl:     ondansetron (ZOFRAN) 4 MG tablet, Take 8 mg by mouth every 4 (four) hours as needed for Nausea., Disp: , Rfl:     pantoprazole (PROTONIX) 40 MG tablet, Take 1 tablet (40 mg total) by mouth once daily., Disp: 30 tablet, Rfl: 11    spironolactone (ALDACTONE) 50 MG tablet, Take 1 tablet (50 mg total) by mouth once daily., Disp: 30 tablet, Rfl: 11    thiamine 100 MG tablet, Take 1 tablet (100 mg total) by mouth once daily., Disp: 90 tablet, Rfl: 1       Review of Systems:    Constitutional: Denies fever, fatigue, ++generalized weakness  Skin: Denies wounds, no rashes, no itching, no new skin lesions  Respiratory:  Denies cough, shortness of breath, or wheezing  Cardiovascular: Denies chest pain, palpitations, edema decreasing  Gastrointestional: Denies abdominal pain, nausea, vomiting, diarrhea, or constipation  Genitourinary: Denies dysuria, hematuria, foamy urine, or incontinence; reports able to empty bladder  Musculoskeletal: Denies back or flank pain  Neurological: Denies headaches, dizziness, paresthesias, tremors or focal weakness      Vital Signs:  BP (!) 143/84 (BP  "Location: Left arm, Patient Position: Sitting)   Pulse 110   Temp 98 °F (36.7 °C) (Oral)   Resp 20   Ht 5' 4" (1.626 m)   Wt 63 kg (138 lb 12.8 oz)   SpO2 99%   BMI 23.82 kg/m²   Body mass index is 23.82 kg/m².      Physical Exam:    General: no acute distress, awake, alert  Eyes: PERRLA, conjunctiva clear, eyelids without swelling  HENT: atraumatic, oropharynx and nasal mucosa patent  Neck: supple, trache midline, full ROM, no JVD, no thyromegaly or lymphadenopathy  Respiratory: equal, unlabored, clear to auscultation A/P  Cardiovascular: RRR without  rub; radial and pedal pulses +2 BL  Edema:  Trace BLE  Gastrointestinal: soft, non-tender, non-distended; positive bowel sounds; no masses to palpation; no ascites  Genitourinary: no CVA tenderness upon palpation  Musculoskeletal: ROM without new limitation or discomfort  Integumentary: warm, dry; no rashes, wounds, or skin lesions  Neurological: oriented x4, appropriate, no acute deficits; no asterixis      Labs:        Component Value Date/Time     (L) 01/25/2024 1439     (L) 01/02/2024 1547     10/28/2023 0631     (L) 10/27/2023 0243    K 3.6 01/25/2024 1439    K 3.9 01/02/2024 1547    K 4.2 10/28/2023 0631    K 4.2 10/27/2023 0243     10/28/2023 0631     10/27/2023 0243    CO2 21 (L) 01/25/2024 1439    CO2 23 01/02/2024 1547    CO2 23 10/28/2023 0631    CO2 21 (L) 10/27/2023 0243    BUN 10.0 01/25/2024 1439    BUN 7.2 (L) 01/02/2024 1547    BUN 12 10/28/2023 0631    BUN 18 10/27/2023 0243    CREATININE 0.80 01/25/2024 1439    CREATININE 1.03 (H) 01/02/2024 1547    CREATININE 0.87 12/28/2023 1141    CREATININE 0.79 12/07/2023 0909    CREATININE 1.0 10/28/2023 0631    CREATININE 1.2 10/27/2023 0243    CREATININE 1.3 10/26/2023 0444    CALCIUM 8.4 01/25/2024 1439    CALCIUM 8.3 (L) 01/02/2024 1547    CALCIUM 8.2 (L) 10/28/2023 0631    CALCIUM 8.6 (L) 10/27/2023 0243    PHOS 2.8 12/28/2023 1141    PHOS 2.2 (L) 11/03/2023 0539 "    PHOS 3.2 10/28/2023 0631    PHOS 3.0 10/27/2023 0243           Component Value Date/Time    WBC 9.05 01/25/2024 1439    WBC 6.14 12/28/2023 1141    WBC 9.94 10/28/2023 0631    WBC 12.83 (H) 10/27/2023 0243    HGB 11.1 (L) 01/25/2024 1439    HGB 10.0 (L) 12/28/2023 1141    HGB 8.8 (L) 10/28/2023 0631    HGB 8.3 (L) 10/27/2023 0243    HCT 32.8 (L) 01/25/2024 1439    HCT 28.9 (L) 12/28/2023 1141    HCT 27.1 (L) 10/28/2023 0631    HCT 24.7 (L) 10/27/2023 0243    HCT 44.0 10/09/2021 2238     01/25/2024 1439     12/28/2023 1141     (L) 10/28/2023 0631     (L) 10/27/2023 0243         Imaging:  Retroperitoneal US:  Impression:     No significant renal abnormality.     Abdominopelvic ascites.  Right pleural effusion.       Impression:  1. Hyponatremia    2. Alcoholic cirrhosis of liver without ascites              Plan:  Renal wise stable. Pt is at risk of hepatorenal disease.   Continue lasix 40 mg daily and advised to take additional dose PRN for weight gain >2-3 lbs overnight or worsening SOB/VAZ/abdominal distention.   Aldactone was decreased at last visit so not likely the culprit.   Forward Thyroid panel to PCP.   Repeat labs in 1 month and again with follow up in 3 months if labs stable.       Shara Calix [facility]        This note was created with the assistance of [employer] voice recognition software or phone dictation. There may be transcription errors as a result of using this technology however minimal. Effort has been made to assure accuracy of transcription but any obvious errors or omissions should be clarified with the author of the document.

## 2024-02-28 ENCOUNTER — LAB VISIT (OUTPATIENT)
Dept: LAB | Facility: HOSPITAL | Age: 64
End: 2024-02-28
Attending: NURSE PRACTITIONER
Payer: MEDICAID

## 2024-02-28 DIAGNOSIS — E87.1 HYPONATREMIA: ICD-10-CM

## 2024-02-28 DIAGNOSIS — E03.9 HYPOTHYROIDISM, UNSPECIFIED TYPE: ICD-10-CM

## 2024-02-28 DIAGNOSIS — K70.30 ALCOHOLIC CIRRHOSIS OF LIVER WITHOUT ASCITES: ICD-10-CM

## 2024-02-28 LAB
ANION GAP SERPL CALC-SCNC: 5 MEQ/L
BUN SERPL-MCNC: 7.6 MG/DL (ref 9.8–20.1)
CALCIUM SERPL-MCNC: 8.2 MG/DL (ref 8.4–10.2)
CHLORIDE SERPL-SCNC: 105 MMOL/L (ref 98–107)
CO2 SERPL-SCNC: 24 MMOL/L (ref 23–31)
CREAT SERPL-MCNC: 0.77 MG/DL (ref 0.55–1.02)
CREAT UR-MCNC: 81.9 MG/DL (ref 45–106)
CREAT/UREA NIT SERPL: 10
GFR SERPLBLD CREATININE-BSD FMLA CKD-EPI: >60 MLS/MIN/1.73/M2
GLUCOSE SERPL-MCNC: 92 MG/DL (ref 82–115)
POTASSIUM SERPL-SCNC: 4.4 MMOL/L (ref 3.5–5.1)
PROT UR STRIP-MCNC: <6.8 MG/DL
SODIUM SERPL-SCNC: 134 MMOL/L (ref 136–145)
T4 FREE SERPL-MCNC: 1.47 NG/DL (ref 0.7–1.48)
TSH SERPL-ACNC: <0.008 UIU/ML (ref 0.35–4.94)

## 2024-02-28 PROCEDURE — 84439 ASSAY OF FREE THYROXINE: CPT

## 2024-02-28 PROCEDURE — 84443 ASSAY THYROID STIM HORMONE: CPT

## 2024-02-28 PROCEDURE — 80048 BASIC METABOLIC PNL TOTAL CA: CPT

## 2024-02-28 PROCEDURE — 36415 COLL VENOUS BLD VENIPUNCTURE: CPT

## 2024-02-28 PROCEDURE — 82570 ASSAY OF URINE CREATININE: CPT

## 2024-03-01 ENCOUNTER — OFFICE VISIT (OUTPATIENT)
Dept: INTERNAL MEDICINE | Facility: CLINIC | Age: 64
End: 2024-03-01
Payer: MEDICAID

## 2024-03-01 DIAGNOSIS — E03.9 HYPOTHYROIDISM, UNSPECIFIED TYPE: Primary | Chronic | ICD-10-CM

## 2024-03-01 DIAGNOSIS — K70.9 ALCOHOLIC LIVER DISEASE: Chronic | ICD-10-CM

## 2024-03-01 PROCEDURE — 1160F RVW MEDS BY RX/DR IN RCRD: CPT | Mod: CPTII,95,, | Performed by: NURSE PRACTITIONER

## 2024-03-01 PROCEDURE — 99214 OFFICE O/P EST MOD 30 MIN: CPT | Mod: 95,,, | Performed by: NURSE PRACTITIONER

## 2024-03-01 PROCEDURE — 3066F NEPHROPATHY DOC TX: CPT | Mod: CPTII,95,, | Performed by: NURSE PRACTITIONER

## 2024-03-01 PROCEDURE — 1159F MED LIST DOCD IN RCRD: CPT | Mod: CPTII,95,, | Performed by: NURSE PRACTITIONER

## 2024-03-01 RX ORDER — LEVOTHYROXINE SODIUM 25 UG/1
88 TABLET ORAL
Qty: 90 TABLET | Refills: 1 | Status: SHIPPED | OUTPATIENT
Start: 2024-03-01 | End: 2024-04-29 | Stop reason: SDUPTHER

## 2024-03-01 RX ORDER — MIDODRINE HYDROCHLORIDE 10 MG/1
10 TABLET ORAL EVERY 8 HOURS
Qty: 90 TABLET | Refills: 1 | Status: SHIPPED | OUTPATIENT
Start: 2024-03-01

## 2024-03-01 NOTE — PROGRESS NOTES
Patient ID: [id number]     Chief Complaint: Follow-up and Results (1.C/o insomnia 2. Ankle swelling at the end of the day)    HPI:     Audio Only Telehealth Visit     The patient location is: home  The chief complaint leading to consultation is: follow up  Visit type: Virtual visit with audio only (telephone)  Total time spent with patient: 35 minutes     The reason for the audio only service rather than synchronous audio and video virtual visit was related to technical difficulties or patient preference/necessity.     Each patient to whom I provide medical services by telemedicine is:  (1) informed of the relationship between the physician and patient and the respective role of any other health care provider with respect to management of the patient; and (2) notified that they may decline to receive medical services by telemedicine and may withdraw from such care at any time. Patient verbally consented to receive this service via voice-only telephone call.    This service was not originating from a related E/M service provided within the previous 7 days nor will  to an E/M service or procedure within the next 24 hours or my soonest available appointment.  Prevailing standard of care was able to be met in this audio-only visit.         Lauren Ewing is a 63 y.o. female with diagnosis of Alcoholic Cirrhosis, Hypothyroidism, Depression, Anxiety. Patient seen today for follow up. Patient last seen 01/25/2024.  At previous appt, Levothyroxine decreased to 88 mcg daily for decreased TSH level with Dx of Hypothyroidism. Previous TSH level: 0.008. Patient asymptomatic except for anxiety which patient states is chronic. Patient referred to Behavioral Health for anxiety, awaiting appt.   EKG ordered at last appt due to tachycardia but not complete. Patient denies chest pain, SOB.   Patient states she completed physical therapy and was discharged from home health. Patient states she is doing well, starting to get  her strength back. Patient states she does have swelling to her ankles in the evening, denies SOB/chest pain. Patient currently prescribed lasix 40 mg daily and Spironolactone 50 mg daily. Patient states she awakens often to urinate at night, she isn't sure if she is taking her diuretics in the morning or at night.   Patient admitted to [facility] in [facility] in October 2023 with new diagnosis of cirrhosis. Patient admitted to [facility] on 11/01/2023 for Alcoholic Cirrhosis, BLE Edema, Dyspnea. CXR indicated small layering pleural effusion. Patient received IV diuretics. Echo indicated normal EF, normal diastolic function. Lasix increased, no improvement. Lasix changed to Bumex. Nephrology consulted, recommended to change Bumex to Lasix upon discharge, Aldactone started. Patient discharged to [facility], was discharged home 12/04/2023.   Patient states stopped drinking alcohol.   Patient denies any other acute complaints.     Patient followed by Nephrology, Dr. Lyle. Last appointment on 02/09/2024. Hyponatremia, Alcoholic cirrhosis of liver without ascites: Renal wise stable. Pt is at risk of hepatorenal disease. Continue lasix 40 mg daily and advised to take additional dose PRN for weight gain >2-3 lbs overnight or worsening SOB/VAZ/abdominal distention. Aldactone was decreased at last visit so not likely the culprit. Forward Thyroid panel to PCP. Repeat labs in 1 month and again with follow up in 3 months if labs stable. F/u visit with labs in 2 months with labs in 6 weeks if labs ok next week. Patient has follow up appointment scheduled for 05/10/2024.     Patient followed by Hepatology, Dr. Lionel Houser. Last appointment on 12/05/2023. Alcohol Abuse, Alcoholic liver disease. She certainly appears much better since her discharge from [facility] and more recently rehab. Her fluid retention is controlled with low dose diuretics and she does not have any obvious jaundice or HE. I explained  to her and her son that we need to get some updated labs and that if she remains abstinent from alcohol he may not need transplantation. Patient has follow up appointment scheduled for 2023.       Review of patient's allergies indicates:   Allergen Reactions    Hydrocodone      Nausea..     Breast Cancer Screening: MMG ordered 2023  Cervical Cancer Screening: GYN referral ordered 2023  Colorectal Cancer Screening: Cologuard ordered 2023  Diabetic Eye Exam: N/A  Diabetic Foot Exam: N/A  Lung Cancer Screening: N/A  Prostate Cancer Screening: N/A  AAA Screening: N/A  Osteoporosis Screening: deferred due to age  Medicare Wellness: N/A  Immunizations:   There is no immunization history on file for this patient.    Past Surgical History:   Procedure Laterality Date    BRAIN SURGERY       SECTION       family history includes Alcohol abuse in her brother; Hypertension in her mother; Liver disease in her father and sister; No Known Problems in her daughter and son; Thyroid disease in her mother.    Social History     Socioeconomic History    Marital status:     Number of children: 3   Tobacco Use    Smoking status: Never     Passive exposure: Never    Smokeless tobacco: Never   Substance and Sexual Activity    Alcohol use: Not Currently     Comment: last drink 3 -4 months    Drug use: Never    Sexual activity: Not Currently     Partners: Male     Social Determinants of Health     Financial Resource Strain: Low Risk  (2023)    Overall Financial Resource Strain (CARDIA)     Difficulty of Paying Living Expenses: Not hard at all   Food Insecurity: No Food Insecurity (2023)    Hunger Vital Sign     Worried About Running Out of Food in the Last Year: Never true     Ran Out of Food in the Last Year: Never true   Transportation Needs: No Transportation Needs (2023)    PRAPARE - Transportation     Lack of Transportation (Medical): No     Lack of Transportation (Non-Medical): No    Physical Activity: Insufficiently Active (12/5/2023)    Exercise Vital Sign     Days of Exercise per Week: 1 day     Minutes of Exercise per Session: 90 min   Stress: No Stress Concern Present (12/5/2023)    [employer] [place] [employer] - Occupational Stress Questionnaire     Feeling of Stress : Only a little   Social Connections: Unknown (12/5/2023)    Social Connection and Isolation Panel [NHANES]     Frequency of Communication with Friends and Family: More than three times a week     Frequency of Social Gatherings with Friends and Family: Three times a week     Active Member of Clubs or Organizations: No     Attends Club or Organization Meetings: Never     Marital Status: Patient declined   Housing Stability: Unknown (12/5/2023)    Housing Stability Vital Sign     Unable to Pay for Housing in the Last Year: No     Unstable Housing in the Last Year: No     Current Outpatient Medications   Medication Instructions    acamprosate (CAMPRAL) 666 mg, Oral, 3 times daily    folic acid (FOLVITE) 1 mg, Oral, Daily    furosemide (LASIX) 40 mg, Oral, Daily    lactulose (CHRONULAC) 10 g, Oral, 2 times daily, Take an extra dose if needed to achieve 2-3 bowel movements per day to prevent confusion    levothyroxine (SYNTHROID) 87.5 mcg, Oral, Before breakfast    magnesium oxide (MAG-OX) 400 mg, Oral, 2 times daily    midodrine (PROAMATINE) 10 mg, Oral, Every 8 hours    multivitamin Tab 1 tablet, Oral, Daily    ondansetron (ZOFRAN) 8 mg, Oral, Every 4 hours PRN    pantoprazole (PROTONIX) 40 mg, Oral, Daily    sodium bicarbonate 650 mg, Oral, Daily    spironolactone (ALDACTONE) 50 mg, Oral, Daily    thiamine 100 mg, Oral, Daily       Subjective:     Review of Systems   Constitutional: Negative.    HENT: Negative.     Eyes: Negative.    Respiratory: Negative.     Cardiovascular: Negative.    Gastrointestinal: Negative.    Endocrine: Negative.    Genitourinary: Negative.    Musculoskeletal: Negative.    Skin: Negative.     Allergic/Immunologic: Negative.    Neurological: Negative.    Hematological: Negative.    Psychiatric/Behavioral: Negative.         Objective:     There were no vitals taken for this visit.      Physical Exam  Neurological:      Mental Status: She is alert and oriented to person, place, and time.   Psychiatric:         Mood and Affect: Mood normal.       Labs Reviewed:     Hematology:  Lab Results   Component Value Date    WBC 9.05 01/25/2024    HGB 11.1 (L) 01/25/2024    HCT 32.8 (L) 01/25/2024     01/25/2024     Chemistry:  Lab Results   Component Value Date     (L) 02/28/2024     10/28/2023    K 4.4 02/28/2024    K 4.2 10/28/2023    CHLORIDE 105 02/28/2024    BUN 7.6 (L) 02/28/2024    BUN 12 10/28/2023    CREATININE 0.77 02/28/2024    CREATININE 1.0 10/28/2023    EGFRNORACEVR >60 02/28/2024    EGFRNORACEVR >60.0 10/28/2023    GLUCOSE 92 02/28/2024    CALCIUM 8.2 (L) 02/28/2024    CALCIUM 8.2 (L) 10/28/2023    ALKPHOS 179 (H) 01/25/2024    ALKPHOS 101 10/28/2023    LABPROT 7.2 01/25/2024    LABPROT 22.4 (H) 10/28/2023    ALBUMIN 2.7 (L) 01/25/2024    ALBUMIN 2.9 (L) 10/28/2023    BILIDIR 5.0 (H) 11/01/2023    BILIDIR 3.1 (H) 10/26/2023    IBILI 0.20 02/08/2022    AST 56 (H) 01/25/2024    AST 75 (H) 10/28/2023    ALT 27 01/25/2024    ALT 32 10/28/2023    MG 1.90 01/25/2024    MG 2.0 10/28/2023    PHOS 2.8 12/28/2023    PHOS 3.2 10/28/2023     Lab Results   Component Value Date    HGBA1C 4.2 10/26/2023     Lipid Panel:  Lab Results   Component Value Date    CHOL 108 12/28/2023    HDL 16 (L) 12/28/2023    LDL 78.00 12/28/2023    TRIG 69 12/28/2023    TOTALCHOLEST 7 (H) 12/28/2023     Thyroid:  Lab Results   Component Value Date    TSH <0.008 (L) 02/28/2024    T3FREE <1.50 (L) 10/19/2023     Urine:  Lab Results   Component Value Date    COLORUA Yellow 12/28/2023    APPEARANCEUA Turbid (A) 12/28/2023    SGUA 1.009 12/28/2023    PHUA 5.5 12/28/2023    PROTEINUA Negative 12/28/2023    GLUCOSEUA  Normal 12/28/2023    KETONESUA Negative 12/28/2023    BLOODUA Negative 12/28/2023    NITRITESUA Negative 12/28/2023    LEUKOCYTESUR 75 (A) 12/28/2023    RBCUA 0-5 12/28/2023    WBCUA 21-50 (A) 12/28/2023    BACTERIA Trace 12/28/2023    SQEPUA Trace 12/28/2023    HYALINECASTS 11-20 (A) 12/28/2023    CREATRANDUR 81.9 02/28/2024    PROTEINURINE <6.8 02/28/2024        Assessment:       ICD-10-CM ICD-9-CM   1. Hypothyroidism, unspecified type  E03.9 244.9   2. Alcoholic liver disease  K70.9 571.3       Plan:     1. Hypothyroidism, unspecified type  TSH level: <0.008  Patient denies chest pain, palpitations, SOB  Decrease Levothyroxine to 25 mcg daily  - US Thyroid; Future    2. Alcoholic liver disease  Followed by Hepatology  Hepatologist recommended liver transplant unless patient stops drinking. Patient states she has stopped drinking, has follow up appointment with Hepatologist on 04/17/2024.       Follow up in about 8 weeks (around 4/26/2024) for Labs. In addition to their scheduled follow up, the patient has also been instructed to follow up on as needed basis.     PAVAN Bhat

## 2024-03-05 ENCOUNTER — TELEPHONE (OUTPATIENT)
Dept: NEPHROLOGY | Facility: CLINIC | Age: 64
End: 2024-03-05
Payer: MEDICAID

## 2024-03-05 NOTE — TELEPHONE ENCOUNTER
----- Message from GABRIELLE Espitia sent at 3/5/2024  9:22 AM CST -----  Please let patient know that sodium is better and to forward any labs that she in the next couple of months before her next visit to us for review.       Called and talked with son to let patient know that sodium is better and to forward any labs in the next couple of months. Verbalized understanding.

## 2024-03-22 ENCOUNTER — HOSPITAL ENCOUNTER (OUTPATIENT)
Dept: RADIOLOGY | Facility: HOSPITAL | Age: 64
Discharge: HOME | End: 2024-03-22
Attending: NURSE PRACTITIONER
Payer: MEDICAID

## 2024-03-22 DIAGNOSIS — E03.9 HYPOTHYROIDISM, UNSPECIFIED TYPE: Chronic | ICD-10-CM

## 2024-03-22 PROCEDURE — 76536 US EXAM OF HEAD AND NECK: CPT | Mod: TC

## 2024-03-26 DIAGNOSIS — N17.9 AKI (ACUTE KIDNEY INJURY): ICD-10-CM

## 2024-03-26 RX ORDER — FUROSEMIDE 40 MG/1
40 TABLET ORAL DAILY
Qty: 30 TABLET | Refills: 11
Start: 2024-03-26 | End: 2025-03-26

## 2024-03-27 ENCOUNTER — PATIENT MESSAGE (OUTPATIENT)
Dept: INTERNAL MEDICINE | Facility: CLINIC | Age: 64
End: 2024-03-27
Payer: MEDICAID

## 2024-04-17 ENCOUNTER — OFFICE VISIT (OUTPATIENT)
Dept: HEPATOLOGY | Facility: CLINIC | Age: 64
End: 2024-04-17
Payer: MEDICAID

## 2024-04-17 ENCOUNTER — TELEPHONE (OUTPATIENT)
Dept: HEPATOLOGY | Facility: CLINIC | Age: 64
End: 2024-04-17

## 2024-04-17 DIAGNOSIS — E03.9 HYPOTHYROIDISM, UNSPECIFIED TYPE: Chronic | ICD-10-CM

## 2024-04-17 DIAGNOSIS — K70.9 ALCOHOLIC LIVER DISEASE: Primary | Chronic | ICD-10-CM

## 2024-04-17 PROCEDURE — 99215 OFFICE O/P EST HI 40 MIN: CPT | Mod: 95,,, | Performed by: INTERNAL MEDICINE

## 2024-04-17 PROCEDURE — G2211 COMPLEX E/M VISIT ADD ON: HCPCS | Mod: 95,,, | Performed by: INTERNAL MEDICINE

## 2024-04-17 PROCEDURE — 3066F NEPHROPATHY DOC TX: CPT | Mod: CPTII,95,, | Performed by: INTERNAL MEDICINE

## 2024-04-17 NOTE — PROGRESS NOTES
Subjective:       Patient ID: Lauren Ewing is a 63 y.o. female.    Chief Complaint: No chief complaint on file.  The patient location is: Home  The chief complaint leading to consultation is: Jaundice    Visit type: audiovisual    Face to Face time with patient: 30 minutes of total time spent on the encounter, which includes face to face time and non-face to face time preparing to see the patient (eg, review of tests), Obtaining and/or reviewing separately obtained history, Documenting clinical information in the electronic or other health record, Independently interpreting results (not separately reported) and communicating results to the patient/family/caregiver, or Care coordination (not separately reported).       Each patient to whom he or she provides medical services by telemedicine is:  (1) informed of the relationship between the physician and patient and the respective role of any other health care provider with respect to management of the patient; and (2) notified that he or she may decline to receive medical services by telemedicine and may withdraw from such care at any time.    Notes:     HPI  I saw this 63 y.o. lady in the liver clinic for follow up.    First seen at [facility] in Oct 2023 and subsequently in liver clinic in Dec 2023.  - decompensation with jaundice, ascites/pleural effusions + HE  - hypontremia  - MELD 30    AFP on 11/1/23= 2.1    Seen by psychiatry at [facility]- modifiably high risk for liver transplant    No SBP  Claims that this was the first time she heard about liver disease.    admission to [facility] a few days after discharge from [facility] with edema/ascites- Nov 13 2023  - because of fever and edema  -mdischarged to rehab- physical therapy  - home yesterday  - can walk unaided     Abdo US: 11/3/2023  - Liver: 15.7 cm  - CBD diameter: 3 mm  - Right kidney: 10.6 cm in length  Impression:  Limited assessment.  Small volume ascites with hepatofugal flow in the main portal vein.      Managed to lose all the fluid  Much better now  No jaundice  No HE      No alcohol since hospital admission in Nov 2023.  - doesn't feel that she needs help  - has previously done rehab  - on acamprosate    Only issue right now is occasional ankle edema  No ascites  No HE    Meds:  Folic acid  Campral  Thiamine  Pantoprazole  Multivits  Midodrine 10 mg TID  Lactulose BID  [place] 100 mg daily  Furosemide 40 mg daily  Mg  Zofran  Levothyroxine 100 mcg      MELD 3.0: 25 at 12/7/2023  9:09 AM  MELD-Na: 24 at 12/7/2023  9:09 AM  Calculated from:  Serum Creatinine: 0.79 mg/dL (Using min of 1 mg/dL) at 12/7/2023  9:09 AM  Serum Sodium: 136 mmol/L at 12/7/2023  9:09 AM  Total Bilirubin: 7.9 mg/dL at 12/7/2023  9:09 AM  Serum Albumin: 2.8 g/dL at 12/7/2023  9:09 AM  INR(ratio): 2.1 at 12/7/2023  9:09 AM  Age at listing (hypothetical): 63 years  Sex: Female at 12/7/2023  9:09 AM      PMH:  Decompensated alcohol related cirrhosis  Hypothyroidism  Subdural hematoma and craniotomy in Feb 2021    No abdo surgery  No DM  No heart disease      SH:  Lives with son  Non smoker      Review of Systems   Constitutional:  Negative for activity change, appetite change, chills, fatigue, fever and unexpected weight change.   HENT:  Negative for ear pain, hearing loss, nosebleeds, sore throat and trouble swallowing.    Eyes:  Negative for redness and visual disturbance.   Respiratory:  Negative for cough, chest tightness, shortness of breath and wheezing.    Cardiovascular:  Negative for chest pain and palpitations.   Gastrointestinal:  Negative for abdominal distention, abdominal pain, blood in stool, constipation, diarrhea, nausea and vomiting.   Genitourinary:  Negative for difficulty urinating, dysuria, frequency, hematuria and urgency.   Musculoskeletal:  Negative for arthralgias, back pain, gait problem, joint swelling and myalgias.   Skin:  Negative for rash.   Neurological:  Negative for tremors, seizures, speech difficulty,  weakness and headaches.   Hematological:  Negative for adenopathy.   Psychiatric/Behavioral:  Negative for confusion, decreased concentration and sleep disturbance. The patient is not nervous/anxious.            Lab Results   Component Value Date    ALT 27 2024    AST 56 (H) 2024    ALKPHOS 179 (H) 2024    BILITOT 5.5 (H) 2024     Past Medical History:   Diagnosis Date    DIANA (acute kidney injury)     Hyponatremia     Major depressive disorder, single episode, unspecified      Past Surgical History:   Procedure Laterality Date    BRAIN SURGERY       SECTION       Current Outpatient Medications   Medication Sig Dispense Refill    acamprosate (CAMPRAL) 333 mg tablet Take 2 tablets (666 mg total) by mouth 3 (three) times daily. 90 tablet 3    folic acid (FOLVITE) 1 MG tablet Take 1 tablet (1 mg total) by mouth once daily. 90 tablet 3    furosemide (LASIX) 40 MG tablet Take 1 tablet (40 mg total) by mouth once daily. 30 tablet 11    lactulose (CHRONULAC) 10 gram/15 mL solution Take 15 mLs (10 g total) by mouth 2 (two) times daily. Take an extra dose if needed to achieve 2-3 bowel movements per day to prevent confusion 900 mL 11    levothyroxine (SYNTHROID) 25 MCG tablet Take 3.5 tablets (87.5 mcg total) by mouth before breakfast. 90 tablet 1    magnesium oxide (MAG-OX) 400 mg (241.3 mg magnesium) tablet Take 1 tablet (400 mg total) by mouth 2 (two) times daily. 180 tablet 0    midodrine (PROAMATINE) 10 MG tablet Take 1 tablet (10 mg total) by mouth every 8 (eight) hours. 90 tablet 1    multivitamin Tab Take 1 tablet by mouth once daily.      ondansetron (ZOFRAN) 4 MG tablet Take 8 mg by mouth every 4 (four) hours as needed for Nausea.      pantoprazole (PROTONIX) 40 MG tablet Take 1 tablet (40 mg total) by mouth once daily. 30 tablet 11    spironolactone (ALDACTONE) 50 MG tablet Take 1 tablet (50 mg total) by mouth once daily. 30 tablet 11    thiamine 100 MG tablet Take 1 tablet (100 mg  total) by mouth once daily. 90 tablet 1     No current facility-administered medications for this visit.       Objective:      Physical Exam    NOT DONE- VIDEO VISIT  Assessment:       1. Alcoholic liver disease    2. Hypothyroidism, unspecified type          Plan:   She certainly appears much better since her discharge from [facility]. She has no real complaints although it is difficult to make a proper assessment with a video visit.    Her fluid retention appears to be mostly controlled with low dose diuretics and she does not have any obvious jaundice or HE. Unfortunately she did not have any labs that were ordered at he time of her last visit.    - remains on low dose diuretic  - encouraged her to remain on acamprosate    1) Labs in [place] +  for HCC screening  2) clinic in [place] with Dr Appiah in 3 months    - no alcohol/ no raw oysters  - some ankle edema  - some possible peripheral neuropathy  - no HE

## 2024-04-17 NOTE — TELEPHONE ENCOUNTER
Appts scheduled   ----- Message from Long Appiah MD sent at 4/17/2024 10:49 AM CDT -----  Ok to schedule appt with me in [place] July 1  ----- Message -----  From: Lionel Houser MD  Sent: 4/17/2024  10:38 AM CDT  To: Long pApiah MD; Corrina Flowers Staff    Labs and US in [place] soon- can she see Dr Appiah in his clinic in [place] in about 3 months?

## 2024-04-17 NOTE — Clinical Note
Labs and US in [place] soon- can she see Dr Appiah in his clinic in [place] in about 3 months?

## 2024-04-23 NOTE — PROGRESS NOTES
Renal    HPI: 63-year-old female, consulted for hyponatremia, initially presented to the ER on 10/12/23  for further evaluation of severe weakness that started within the last week.  Patient reports she felt as though she was so weak she can not get out of bed, had a very poor appetite.  She denied fever chills, chest pain, diarrhea or vomiting.  She reports she was a prior heavy alcohol user since age 38 to age 63 and reports she is been in rehab for alcohol before, and her last drink was about 4 weeks ago.  She states a 5th would last her 2-3 days but she would drink daily.  Over the last couple of days she noticed her skin was yellow as well. She was admitted, underwent paracentesis.  Evaluated by pulmonology, effusion is small, recommended to continue antibiotics for pneumonia.  GI also following, ascitic fluid studies negative for SBP.  Initiated Lasix, Aldactone. Sodium on admit was 122 and has not really changed. Today it is 123.      busPIRone  5 mg Oral BID    enoxparin  40 mg Subcutaneous Daily    folic acid  1 mg Oral Daily    lactulose 10 gram/15 ml  10 g Oral BID    levothyroxine  100 mcg Oral Before breakfast    midodrine  2.5 mg Oral TID WM    multivitamin  1 tablet Oral Daily    pantoprazole  40 mg Oral Daily    rifAXIMin  550 mg Oral BID    thiamine  100 mg Oral Daily       VITAL SIGNS: 24 HR MIN & MAX LAST    Temp  Min: 97.7 °F (36.5 °C)  Max: 98.8 °F (37.1 °C)  97.8 °F (36.6 °C)        BP  Min: 100/61  Max: 109/62  108/68     Pulse  Min: 90  Max: 102  102     Resp  Min: 16  Max: 20  16    SpO2  Min: 93 %  Max: 97 %  (!) 93 %      Wt Readings from Last 3 Encounters:   10/13/23 66 kg (145 lb 8.1 oz)   12/12/22 65.8 kg (145 lb)       Intake/Output Summary (Last 24 hours) at 10/20/2023 1645  Last data filed at 10/20/2023 1543  Gross per 24 hour   Intake 700 ml   Output --   Net 700 ml     Recent Labs     10/19/23  0449 10/19/23  0900 10/20/23  0445   * 124* 123*   K 3.9  --  3.8   CHLORIDE 95*   Spoke to Pt, relayed Dr message   Pt chose the 1 PM appt      , please apply Appt- unable to Add on rd slots    --  96*   CO2 25  --  24   BUN 8.9*  --  8.7*   CREATININE 0.76  --  0.73   GLUCOSE 86  --  90   CALCIUM 7.1*  --  7.1*   MG 1.70  --  1.70   PHOS 2.9  --  3.2   ALBUMIN 1.4*  --   --       Recent Labs     10/18/23  0438 10/20/23  1011   WBC 15.04* 13.51*   HGB 10.3* 10.0*   HCT 29.2* 29.0*    160       ASSESSMENT / PLAN    New EtOH cirrhosis     HypoNa - from cirrhosis, stable, asymptomatic.  PNA with effusion  ESLD  Anemia  New hypothyroid - Start synthroid 100 mcg on 10/19/23     Need to treat cirrhosis and sodium will get better    Stable from renal  *Will sign off      Omer Caballero M.D.  Nephrology

## 2024-04-29 ENCOUNTER — OFFICE VISIT (OUTPATIENT)
Dept: INTERNAL MEDICINE | Facility: CLINIC | Age: 64
End: 2024-04-29
Payer: MEDICAID

## 2024-04-29 ENCOUNTER — HOSPITAL ENCOUNTER (OUTPATIENT)
Dept: RADIOLOGY | Facility: HOSPITAL | Age: 64
Discharge: HOME | End: 2024-04-29
Attending: INTERNAL MEDICINE
Payer: MEDICAID

## 2024-04-29 VITALS
HEIGHT: 64 IN | DIASTOLIC BLOOD PRESSURE: 86 MMHG | WEIGHT: 182.63 LBS | RESPIRATION RATE: 18 BRPM | HEART RATE: 96 BPM | BODY MASS INDEX: 31.18 KG/M2 | TEMPERATURE: 98 F | SYSTOLIC BLOOD PRESSURE: 137 MMHG

## 2024-04-29 DIAGNOSIS — E03.9 HYPOTHYROIDISM, UNSPECIFIED TYPE: Chronic | ICD-10-CM

## 2024-04-29 DIAGNOSIS — D64.9 ANEMIA, UNSPECIFIED TYPE: ICD-10-CM

## 2024-04-29 DIAGNOSIS — G25.81 RLS (RESTLESS LEGS SYNDROME): ICD-10-CM

## 2024-04-29 DIAGNOSIS — K70.9 ALCOHOLIC LIVER DISEASE: Chronic | ICD-10-CM

## 2024-04-29 DIAGNOSIS — F10.10 ALCOHOL ABUSE: ICD-10-CM

## 2024-04-29 DIAGNOSIS — R53.1 WEAKNESS: Primary | ICD-10-CM

## 2024-04-29 PROCEDURE — 3079F DIAST BP 80-89 MM HG: CPT | Mod: CPTII,,, | Performed by: NURSE PRACTITIONER

## 2024-04-29 PROCEDURE — 1160F RVW MEDS BY RX/DR IN RCRD: CPT | Mod: CPTII,,, | Performed by: NURSE PRACTITIONER

## 2024-04-29 PROCEDURE — 99214 OFFICE O/P EST MOD 30 MIN: CPT | Mod: S$PBB,,, | Performed by: NURSE PRACTITIONER

## 2024-04-29 PROCEDURE — 1159F MED LIST DOCD IN RCRD: CPT | Mod: CPTII,,, | Performed by: NURSE PRACTITIONER

## 2024-04-29 PROCEDURE — 3008F BODY MASS INDEX DOCD: CPT | Mod: CPTII,,, | Performed by: NURSE PRACTITIONER

## 2024-04-29 PROCEDURE — 3066F NEPHROPATHY DOC TX: CPT | Mod: CPTII,,, | Performed by: NURSE PRACTITIONER

## 2024-04-29 PROCEDURE — 99214 OFFICE O/P EST MOD 30 MIN: CPT | Mod: PBBFAC,25 | Performed by: NURSE PRACTITIONER

## 2024-04-29 PROCEDURE — 76705 ECHO EXAM OF ABDOMEN: CPT | Mod: TC

## 2024-04-29 PROCEDURE — 3075F SYST BP GE 130 - 139MM HG: CPT | Mod: CPTII,,, | Performed by: NURSE PRACTITIONER

## 2024-04-29 RX ORDER — ROPINIROLE 0.25 MG/1
0.25 TABLET, FILM COATED ORAL NIGHTLY
Qty: 30 TABLET | Refills: 6 | Status: SHIPPED | OUTPATIENT
Start: 2024-04-29

## 2024-04-29 RX ORDER — LANOLIN ALCOHOL/MO/W.PET/CERES
400 CREAM (GRAM) TOPICAL 2 TIMES DAILY
Qty: 180 TABLET | Refills: 2 | Status: SHIPPED | OUTPATIENT
Start: 2024-04-29

## 2024-04-29 RX ORDER — ACAMPROSATE CALCIUM 333 MG/1
666 TABLET, DELAYED RELEASE ORAL 3 TIMES DAILY
Qty: 90 TABLET | Refills: 6 | Status: SHIPPED | OUTPATIENT
Start: 2024-04-29

## 2024-04-29 RX ORDER — LEVOTHYROXINE SODIUM 25 UG/1
25 TABLET ORAL
Qty: 90 TABLET | Refills: 2 | Status: SHIPPED | OUTPATIENT
Start: 2024-04-29

## 2024-05-10 ENCOUNTER — OFFICE VISIT (OUTPATIENT)
Dept: NEPHROLOGY | Facility: CLINIC | Age: 64
End: 2024-05-10
Payer: MEDICAID

## 2024-05-10 VITALS
HEART RATE: 86 BPM | RESPIRATION RATE: 20 BRPM | WEIGHT: 141 LBS | OXYGEN SATURATION: 97 % | TEMPERATURE: 98 F | SYSTOLIC BLOOD PRESSURE: 134 MMHG | HEIGHT: 64 IN | DIASTOLIC BLOOD PRESSURE: 68 MMHG | BODY MASS INDEX: 24.07 KG/M2

## 2024-05-10 DIAGNOSIS — E87.20 METABOLIC ACIDOSIS: ICD-10-CM

## 2024-05-10 DIAGNOSIS — K70.30 ALCOHOLIC CIRRHOSIS OF LIVER WITHOUT ASCITES: ICD-10-CM

## 2024-05-10 DIAGNOSIS — E87.1 HYPONATREMIA: Primary | ICD-10-CM

## 2024-05-10 PROCEDURE — 3078F DIAST BP <80 MM HG: CPT | Mod: CPTII,,,

## 2024-05-10 PROCEDURE — 99214 OFFICE O/P EST MOD 30 MIN: CPT | Mod: PBBFAC

## 2024-05-10 PROCEDURE — 3075F SYST BP GE 130 - 139MM HG: CPT | Mod: CPTII,,,

## 2024-05-10 PROCEDURE — 99213 OFFICE O/P EST LOW 20 MIN: CPT | Mod: S$PBB,,,

## 2024-05-10 PROCEDURE — 3066F NEPHROPATHY DOC TX: CPT | Mod: CPTII,,,

## 2024-05-10 PROCEDURE — 1159F MED LIST DOCD IN RCRD: CPT | Mod: CPTII,,,

## 2024-05-10 PROCEDURE — 99999 PR PBB SHADOW E&M-EST. PATIENT-LVL IV: CPT | Mod: PBBFAC,,,

## 2024-05-10 PROCEDURE — 3008F BODY MASS INDEX DOCD: CPT | Mod: CPTII,,,

## 2024-05-10 RX ORDER — SODIUM BICARBONATE 650 MG/1
650 TABLET ORAL 2 TIMES DAILY
Qty: 180 TABLET | Refills: 3 | Status: SHIPPED | OUTPATIENT
Start: 2024-05-10 | End: 2025-05-10

## 2024-05-10 NOTE — PROGRESS NOTES
[facility] Ambulatory Progress Note    HPI  Lauren Ewing, 63 y.o. female, presents to office for follow up. Pt has  alcoholic cirrhosis followed by [facility]atologist. Sodium has stabilized on Lasix 40 mg daily.  She states she has been very compliant with all of her medications.  She is about to make 1 year sober.  She is complaining of weakness by the end of the day.  Denies overt blood loss.  Blood pressure well controlled.  Maintained on midodrine.           Medical Diagnoses:   Past Medical History:   Diagnosis Date    DIANA (acute kidney injury)     Hyponatremia     Major depressive disorder, single episode, unspecified      Patient Active Problem List   Diagnosis    Hyponatremia    Liver failure    Major depressive disorder    Alcohol abuse    Palliative care encounter    Hypothyroidism    Volume overload    Alcoholic liver disease    Alcoholic cirrhosis of liver without ascites    Metabolic acidosis       Surgical History:   Past Surgical History:   Procedure Laterality Date    BRAIN SURGERY       SECTION         Family History:   Family History   Problem Relation Name Age of Onset    Hypertension Mother      Thyroid disease Mother      Liver disease Father      Liver disease Sister      Alcohol abuse Brother      No Known Problems Daughter      No Known Problems Son         Social History:   Social History     Tobacco Use    Smoking status: Never     Passive exposure: Never    Smokeless tobacco: Never   Substance Use Topics    Alcohol use: Not Currently     Comment: last drink 3 -4 months       Allergies:  Review of patient's allergies indicates:   Allergen Reactions    Hydrocodone      Nausea..       Medications:    Current Outpatient Medications:     acamprosate (CAMPRAL) 333 mg tablet, Take 2 tablets (666 mg total) by mouth 3 (three) times daily., Disp: 90 tablet, Rfl: 6    folic acid (FOLVITE) 1 MG tablet, Take 1 tablet (1 mg total) by mouth once daily., Disp: 90 tablet,  Rfl: 3    furosemide (LASIX) 40 MG tablet, Take 1 tablet (40 mg total) by mouth once daily., Disp: 30 tablet, Rfl: 11    lactulose (CHRONULAC) 10 gram/15 mL solution, Take 15 mLs (10 g total) by mouth 2 (two) times daily. Take an extra dose if needed to achieve 2-3 bowel movements per day to prevent confusion, Disp: 900 mL, Rfl: 11    levothyroxine (SYNTHROID) 25 MCG tablet, Take 1 tablet (25 mcg total) by mouth before breakfast., Disp: 90 tablet, Rfl: 2    magnesium oxide (MAG-OX) 400 mg (241.3 mg magnesium) tablet, Take 1 tablet (400 mg total) by mouth 2 (two) times daily., Disp: 180 tablet, Rfl: 2    midodrine (PROAMATINE) 10 MG tablet, Take 1 tablet (10 mg total) by mouth every 8 (eight) hours., Disp: 90 tablet, Rfl: 1    multivitamin Tab, Take 1 tablet by mouth once daily., Disp: , Rfl:     pantoprazole (PROTONIX) 40 MG tablet, Take 1 tablet (40 mg total) by mouth once daily., Disp: 30 tablet, Rfl: 11    rOPINIRole (REQUIP) 0.25 MG tablet, Take 1 tablet (0.25 mg total) by mouth every evening., Disp: 30 tablet, Rfl: 6    spironolactone (ALDACTONE) 50 MG tablet, Take 1 tablet (50 mg total) by mouth once daily., Disp: 30 tablet, Rfl: 11    thiamine 100 MG tablet, Take 1 tablet (100 mg total) by mouth once daily., Disp: 90 tablet, Rfl: 1    ondansetron (ZOFRAN) 4 MG tablet, Take 8 mg by mouth every 4 (four) hours as needed for Nausea. (Patient not taking: Reported on 4/29/2024), Disp: , Rfl:     sodium bicarbonate 650 MG tablet, Take 1 tablet (650 mg total) by mouth 2 (two) times daily., Disp: 180 tablet, Rfl: 3       Review of Systems:    Constitutional: Denies fever, fatigue, ++generalized weakness  Skin: Denies wounds, no rashes, no itching, no new skin lesions  Respiratory:  Denies cough, shortness of breath, or wheezing  Cardiovascular: Denies chest pain, palpitations, or swelling  Gastrointestional: Denies abdominal pain, nausea, vomiting, diarrhea, or constipation  Genitourinary: Denies dysuria, hematuria,  "foamy urine, or incontinence; reports able to empty bladder  Musculoskeletal: Denies back or flank pain  Neurological: Denies headaches, dizziness, paresthesias, tremors or focal weakness      Vital Signs:  /68 (BP Location: Left arm, Patient Position: Sitting, BP Method: Medium (Manual))   Pulse 86   Temp 98.1 °F (36.7 °C) (Temporal)   Resp 20   Ht 5' 4.02" (1.626 m)   Wt 64 kg (141 lb)   SpO2 97%   BMI 24.19 kg/m²   Body mass index is 24.19 kg/m².      Physical Exam:    General: no acute distress, awake, alert  Eyes:  conjunctiva clear, eyelids without swelling  HENT: atraumatic, oropharynx and nasal mucosa patent  Neck: supple, trache midline, full ROM, no JVD  Respiratory: equal, unlabored, clear to auscultation A/P  Cardiovascular: RRR without rub; radial and pedal pulses +2 BL  Edema:  Trace BLE  Gastrointestinal: soft, non-tender, non-distended  Musculoskeletal: ROM without new limitation or discomfort  Integumentary: warm, dry; no rashes, wounds, or skin lesions  Neurological: oriented x4, appropriate, no acute deficits; + tremors      Labs:        Component Value Date/Time     04/29/2024 0905     (L) 02/28/2024 1341     10/28/2023 0631     (L) 10/27/2023 0243    K 3.8 04/29/2024 0905    K 4.4 02/28/2024 1341    K 4.2 10/28/2023 0631    K 4.2 10/27/2023 0243     10/28/2023 0631     10/27/2023 0243    CO2 21 (L) 04/29/2024 0905    CO2 24 02/28/2024 1341    CO2 23 10/28/2023 0631    CO2 21 (L) 10/27/2023 0243    BUN 9.1 (L) 04/29/2024 0905    BUN 7.6 (L) 02/28/2024 1341    BUN 12 10/28/2023 0631    BUN 18 10/27/2023 0243    CREATININE 0.86 04/29/2024 0905    CREATININE 0.77 02/28/2024 1341    CREATININE 0.80 01/25/2024 1439    CREATININE 1.03 (H) 01/02/2024 1547    CREATININE 1.0 10/28/2023 0631    CREATININE 1.2 10/27/2023 0243    CREATININE 1.3 10/26/2023 0444    CALCIUM 8.5 04/29/2024 0905    CALCIUM 8.2 (L) 02/28/2024 1341    CALCIUM 8.2 (L) 10/28/2023 0631 "    CALCIUM 8.6 (L) 10/27/2023 0243    PHOS 2.8 12/28/2023 1141    PHOS 2.2 (L) 11/03/2023 0539    PHOS 3.2 10/28/2023 0631    PHOS 3.0 10/27/2023 0243           Component Value Date/Time    WBC 5.53 04/29/2024 0905    WBC 9.05 01/25/2024 1439    WBC 9.94 10/28/2023 0631    WBC 12.83 (H) 10/27/2023 0243    HGB 10.5 (L) 04/29/2024 0905    HGB 11.1 (L) 01/25/2024 1439    HGB 8.8 (L) 10/28/2023 0631    HGB 8.3 (L) 10/27/2023 0243    HCT 31.8 (L) 04/29/2024 0905    HCT 32.8 (L) 01/25/2024 1439    HCT 27.1 (L) 10/28/2023 0631    HCT 24.7 (L) 10/27/2023 0243    HCT 44.0 10/09/2021 2238     04/29/2024 0905     01/25/2024 1439     (L) 10/28/2023 0631     (L) 10/27/2023 0243         Imaging:  Retroperitoneal US:  Impression:     No significant renal abnormality.     Abdominopelvic ascites.  Right pleural effusion.       Impression/plan  1. Hyponatremia    2. Alcoholic cirrhosis of liver without ascites    3. Metabolic acidosis          Renal wise stable. Pt is at risk of hepatorenal disease.   Sodium stable now within normal limits at 1:36 a.m.  Refrain from alcohol    Rx:  Sodium  bicarbonate 650 mg b.i.d. metabolic acidosis    We will check iron panel due to continued weakness    Otherwise follow up in 4 months    Joel BROWNE        This note was created with the assistance of [employer] voice recognition software or phone dictation. There may be transcription errors as a result of using this technology however minimal. Effort has been made to assure accuracy of transcription but any obvious errors or omissions should be clarified with the author of the document.

## 2024-05-10 NOTE — PATIENT INSTRUCTIONS
We will start you on sodium bicarbonate 650 mg twice a day for increased acidity of the blood related to fluid pills    Continue with abstaining from alcohol    Continue current medications      Labs to check your iron studies we will already be in the system.  You can go to [facility] as a walk-in to complete these labs any time between now in the next few weeks.  Please tell them to only check the iron studies at this time. We will call if you need any iron supplementation.    Otherwise follow up in 4 months

## 2024-05-13 ENCOUNTER — TELEPHONE (OUTPATIENT)
Dept: NEPHROLOGY | Facility: CLINIC | Age: 64
End: 2024-05-13
Payer: MEDICAID

## 2024-05-21 ENCOUNTER — LAB VISIT (OUTPATIENT)
Dept: LAB | Facility: HOSPITAL | Age: 64
End: 2024-05-21
Payer: MEDICAID

## 2024-05-21 ENCOUNTER — TELEPHONE (OUTPATIENT)
Dept: NEPHROLOGY | Facility: CLINIC | Age: 64
End: 2024-05-21
Payer: MEDICAID

## 2024-05-21 DIAGNOSIS — K70.30 ALCOHOLIC CIRRHOSIS OF LIVER WITHOUT ASCITES: ICD-10-CM

## 2024-05-21 DIAGNOSIS — E87.1 HYPONATREMIA: ICD-10-CM

## 2024-05-21 DIAGNOSIS — E87.20 METABOLIC ACIDOSIS: ICD-10-CM

## 2024-05-21 LAB
FERRITIN SERPL-MCNC: 18.62 NG/ML (ref 4.63–204)
IRON SATN MFR SERPL: 21 % (ref 20–50)
IRON SERPL-MCNC: 58 UG/DL (ref 50–170)
TIBC SERPL-MCNC: 224 UG/DL (ref 70–310)
TIBC SERPL-MCNC: 282 UG/DL (ref 250–450)
TRANSFERRIN SERPL-MCNC: 275 MG/DL (ref 173–360)

## 2024-05-21 PROCEDURE — 82728 ASSAY OF FERRITIN: CPT

## 2024-05-21 PROCEDURE — 36415 COLL VENOUS BLD VENIPUNCTURE: CPT

## 2024-05-21 PROCEDURE — 83540 ASSAY OF IRON: CPT

## 2024-05-21 NOTE — TELEPHONE ENCOUNTER
----- Message from PAVAN Nicolas sent at 5/21/2024  1:04 PM CDT -----  Attempted to call pt already, phone busy.    She needs to start taking iron tablet once daily. Iron is borderline low. Also she needs to speak with PCP about potential GI work up to make sure she is not losing blood from GI tract.    Thank you    Informed patient of above. Told patient she can get an OTC iron and take once daily. Also informed her to check with her PCP about getting a GI work up to make sure she isn't losing blood from GI Tract. Verbalized understanding.

## 2024-05-23 ENCOUNTER — TELEPHONE (OUTPATIENT)
Dept: INTERNAL MEDICINE | Facility: CLINIC | Age: 64
End: 2024-05-23
Payer: MEDICAID

## 2024-05-23 NOTE — TELEPHONE ENCOUNTER
I spoke with patient and informed her that she does have refills on medications and she can have her new pharmacy to contact old pharmacy to have prescriptions transferred, noted. Patient verbalized understanding.

## 2024-05-23 NOTE — TELEPHONE ENCOUNTER
----- Message from Maryse Lynch sent at 5/23/2024  9:26 AM CDT -----  Regarding: refill  Who Called: Lauren Ewing    Refill or New Rx:Refill  RX Name and Strength:rOPINIRole (REQUIP) 0.25 MG tablet 30 tablet 6 4/29/2024 - No  Sig - Route: Take 1 tablet (0.25 mg total) by mouth every evening.     folic acid (FOLVITE) 1 MG tablet 90 tablet 3 10/28/2023 10/27/2024 No  Sig - Route: Take 1 tablet (1 mg total) by mouth once daily.    pantoprazole (PROTONIX) 40 MG tablet 30 tablet 11 10/26/2023 10/25/2024 No  Sig - Route: Take 1 tablet (40 mg total) by mouth once daily.       How is the patient currently taking it? (ex. 1XDay):    Is this a 30 day or 90 day RX:    Local or Mail Order:local    [facility][id number] at [place] [address] AT    Phone: [phone]  Fax: [phone]        Ordering Provider:.      Preferred Method of Contact: Phone Call  Patient's Preferred Phone Number on File: [phone]   Best Call Back Number, if different:  Additional Information: refill requests; please advise. Thanks.

## 2024-07-01 ENCOUNTER — LAB VISIT (OUTPATIENT)
Dept: LAB | Facility: HOSPITAL | Age: 64
End: 2024-07-01
Attending: STUDENT IN AN ORGANIZED HEALTH CARE EDUCATION/TRAINING PROGRAM
Payer: MEDICAID

## 2024-07-01 ENCOUNTER — OFFICE VISIT (OUTPATIENT)
Dept: HEPATOLOGY | Facility: CLINIC | Age: 64
End: 2024-07-01
Payer: MEDICAID

## 2024-07-01 VITALS
OXYGEN SATURATION: 98 % | HEART RATE: 109 BPM | BODY MASS INDEX: 23.05 KG/M2 | SYSTOLIC BLOOD PRESSURE: 149 MMHG | RESPIRATION RATE: 20 BRPM | DIASTOLIC BLOOD PRESSURE: 87 MMHG | TEMPERATURE: 99 F | WEIGHT: 135 LBS | HEIGHT: 64 IN

## 2024-07-01 DIAGNOSIS — K70.31 ALCOHOLIC CIRRHOSIS OF LIVER WITH ASCITES: ICD-10-CM

## 2024-07-01 DIAGNOSIS — K70.31 ALCOHOLIC CIRRHOSIS OF LIVER WITH ASCITES: Primary | ICD-10-CM

## 2024-07-01 DIAGNOSIS — K21.9 GASTROESOPHAGEAL REFLUX DISEASE, UNSPECIFIED WHETHER ESOPHAGITIS PRESENT: ICD-10-CM

## 2024-07-01 LAB
AFP-TM SERPL-MCNC: 4.1 NG/ML
ALBUMIN SERPL-MCNC: 3.1 G/DL (ref 3.4–4.8)
ALBUMIN/GLOB SERPL: 0.7 RATIO (ref 1.1–2)
ALP SERPL-CCNC: 130 UNIT/L (ref 40–150)
ALT SERPL-CCNC: 25 UNIT/L (ref 0–55)
ANION GAP SERPL CALC-SCNC: 8 MEQ/L
AST SERPL-CCNC: 46 UNIT/L (ref 5–34)
BASOPHILS # BLD AUTO: 0.06 X10(3)/MCL
BASOPHILS NFR BLD AUTO: 1 %
BILIRUB SERPL-MCNC: 3.1 MG/DL
BUN SERPL-MCNC: 7.3 MG/DL (ref 9.8–20.1)
CALCIUM SERPL-MCNC: 9.3 MG/DL (ref 8.4–10.2)
CHLORIDE SERPL-SCNC: 109 MMOL/L (ref 98–107)
CO2 SERPL-SCNC: 22 MMOL/L (ref 23–31)
CREAT SERPL-MCNC: 1 MG/DL (ref 0.55–1.02)
CREAT/UREA NIT SERPL: 7
EOSINOPHIL # BLD AUTO: 0.2 X10(3)/MCL (ref 0–0.9)
EOSINOPHIL NFR BLD AUTO: 3.5 %
ERYTHROCYTE [DISTWIDTH] IN BLOOD BY AUTOMATED COUNT: 19.8 % (ref 11.5–17)
GFR SERPLBLD CREATININE-BSD FMLA CKD-EPI: >60 ML/MIN/1.73/M2
GLOBULIN SER-MCNC: 4.7 GM/DL (ref 2.4–3.5)
GLUCOSE SERPL-MCNC: 121 MG/DL (ref 82–115)
HAV AB SER QL IA: NONREACTIVE
HBV CORE AB SERPL QL IA: NONREACTIVE
HBV SURFACE AG SERPL QL IA: NONREACTIVE
HCT VFR BLD AUTO: 40.1 % (ref 37–47)
HGB BLD-MCNC: 13 G/DL (ref 12–16)
IGG SERPL-MCNC: 2274 MG/DL (ref 522–1631)
IMM GRANULOCYTES # BLD AUTO: 0.02 X10(3)/MCL (ref 0–0.04)
IMM GRANULOCYTES NFR BLD AUTO: 0.3 %
INR PPP: 1.3 (ref 2–3)
LYMPHOCYTES # BLD AUTO: 1.77 X10(3)/MCL (ref 0.6–4.6)
LYMPHOCYTES NFR BLD AUTO: 30.9 %
MCH RBC QN AUTO: 27 PG (ref 27–31)
MCHC RBC AUTO-ENTMCNC: 32.4 G/DL (ref 33–36)
MCV RBC AUTO: 83.4 FL (ref 80–94)
MONOCYTES # BLD AUTO: 0.9 X10(3)/MCL (ref 0.1–1.3)
MONOCYTES NFR BLD AUTO: 15.7 %
NEUTROPHILS # BLD AUTO: 2.78 X10(3)/MCL (ref 2.1–9.2)
NEUTROPHILS NFR BLD AUTO: 48.6 %
NRBC BLD AUTO-RTO: 0 %
PLATELET # BLD AUTO: 206 X10(3)/MCL (ref 130–400)
PMV BLD AUTO: 8.6 FL (ref 7.4–10.4)
POTASSIUM SERPL-SCNC: 3.5 MMOL/L (ref 3.5–5.1)
PROT SERPL-MCNC: 7.8 GM/DL (ref 5.8–7.6)
PROTHROMBIN TIME: 16.7 SECONDS (ref 11.7–14.5)
RBC # BLD AUTO: 4.81 X10(6)/MCL (ref 4.2–5.4)
SODIUM SERPL-SCNC: 139 MMOL/L (ref 136–145)
WBC # BLD AUTO: 5.73 X10(3)/MCL (ref 4.5–11.5)

## 2024-07-01 PROCEDURE — 86015 ACTIN ANTIBODY EACH: CPT

## 2024-07-01 PROCEDURE — 82390 ASSAY OF CERULOPLASMIN: CPT

## 2024-07-01 PROCEDURE — 86706 HEP B SURFACE ANTIBODY: CPT

## 2024-07-01 PROCEDURE — 87340 HEPATITIS B SURFACE AG IA: CPT

## 2024-07-01 PROCEDURE — 99215 OFFICE O/P EST HI 40 MIN: CPT | Mod: S$GLB,,, | Performed by: STUDENT IN AN ORGANIZED HEALTH CARE EDUCATION/TRAINING PROGRAM

## 2024-07-01 PROCEDURE — 82784 ASSAY IGA/IGD/IGG/IGM EACH: CPT

## 2024-07-01 PROCEDURE — 3066F NEPHROPATHY DOC TX: CPT | Mod: CPTII,S$GLB,, | Performed by: STUDENT IN AN ORGANIZED HEALTH CARE EDUCATION/TRAINING PROGRAM

## 2024-07-01 PROCEDURE — 80321 ALCOHOLS BIOMARKERS 1OR 2: CPT

## 2024-07-01 PROCEDURE — 3008F BODY MASS INDEX DOCD: CPT | Mod: CPTII,S$GLB,, | Performed by: STUDENT IN AN ORGANIZED HEALTH CARE EDUCATION/TRAINING PROGRAM

## 2024-07-01 PROCEDURE — 82105 ALPHA-FETOPROTEIN SERUM: CPT

## 2024-07-01 PROCEDURE — 86039 ANTINUCLEAR ANTIBODIES (ANA): CPT

## 2024-07-01 PROCEDURE — 3079F DIAST BP 80-89 MM HG: CPT | Mod: CPTII,S$GLB,, | Performed by: STUDENT IN AN ORGANIZED HEALTH CARE EDUCATION/TRAINING PROGRAM

## 2024-07-01 PROCEDURE — 36415 COLL VENOUS BLD VENIPUNCTURE: CPT

## 2024-07-01 PROCEDURE — 86704 HEP B CORE ANTIBODY TOTAL: CPT

## 2024-07-01 PROCEDURE — 85610 PROTHROMBIN TIME: CPT

## 2024-07-01 PROCEDURE — 80053 COMPREHEN METABOLIC PANEL: CPT

## 2024-07-01 PROCEDURE — 83516 IMMUNOASSAY NONANTIBODY: CPT

## 2024-07-01 PROCEDURE — 3077F SYST BP >= 140 MM HG: CPT | Mod: CPTII,S$GLB,, | Performed by: STUDENT IN AN ORGANIZED HEALTH CARE EDUCATION/TRAINING PROGRAM

## 2024-07-01 PROCEDURE — 86225 DNA ANTIBODY NATIVE: CPT

## 2024-07-01 PROCEDURE — 86708 HEPATITIS A ANTIBODY: CPT

## 2024-07-01 PROCEDURE — 1160F RVW MEDS BY RX/DR IN RCRD: CPT | Mod: CPTII,S$GLB,, | Performed by: STUDENT IN AN ORGANIZED HEALTH CARE EDUCATION/TRAINING PROGRAM

## 2024-07-01 PROCEDURE — 1159F MED LIST DOCD IN RCRD: CPT | Mod: CPTII,S$GLB,, | Performed by: STUDENT IN AN ORGANIZED HEALTH CARE EDUCATION/TRAINING PROGRAM

## 2024-07-01 PROCEDURE — 82103 ALPHA-1-ANTITRYPSIN TOTAL: CPT

## 2024-07-01 PROCEDURE — 85025 COMPLETE CBC W/AUTO DIFF WBC: CPT

## 2024-07-01 RX ORDER — SPIRONOLACTONE 50 MG/1
50 TABLET, FILM COATED ORAL DAILY
Qty: 30 TABLET | Refills: 11 | Status: SHIPPED | OUTPATIENT
Start: 2024-07-01 | End: 2025-07-01

## 2024-07-01 RX ORDER — FUROSEMIDE 20 MG/1
20 TABLET ORAL DAILY
Qty: 30 TABLET | Refills: 11 | Status: SHIPPED | OUTPATIENT
Start: 2024-07-01 | End: 2025-07-01

## 2024-07-01 RX ORDER — PANTOPRAZOLE SODIUM 40 MG/1
40 TABLET, DELAYED RELEASE ORAL DAILY
Qty: 30 TABLET | Refills: 1 | Status: SHIPPED | OUTPATIENT
Start: 2024-07-01 | End: 2024-08-30

## 2024-07-01 NOTE — PROGRESS NOTES
Hepatology Follow Up Note    Referring provider: No ref. provider found  PCP: Candi Suero FNP    Chief complaint: follow up etoh cirrhosis    HPI:  Lauren Ewing is a 64 y.o. female with history of alcohol related cirrhosis who presents for follow up.    She has been followed by Dr. Houser.  New to me today.  She is without complaints.  No recent hospitalizations or ED visits.  Remains abstinent from alcohol.  Compliant with diuretics without recent abdominal distention and with lactulose without recent encephalopathy.  She denies other signs of decompensated cirrhosis including no recent jaundice or GI bleeding.    Background - Dr. Houser  HPI  I saw this 63 y.o. lady in the liver clinic for follow up.     First seen at [facility] in Oct 2023 and subsequently in liver clinic in Dec 2023.  - decompensation with jaundice, ascites/pleural effusions + HE  - hypontremia  - MELD 30     AFP on 11/1/23= 2.1     Seen by psychiatry at [facility]- modifiably high risk for liver transplant     No SBP  Claims that this was the first time she heard about liver disease.     admission to [facility] a few days after discharge from [facility] with edema/ascites- Nov 13 2023  - because of fever and edema  -mdischarged to rehab- physical therapy  - home yesterday  - can walk unaided      Abdo US: 11/3/2023  - Liver: 15.7 cm  - CBD diameter: 3 mm  - Right kidney: 10.6 cm in length  Impression:  Limited assessment.  Small volume ascites with hepatofugal flow in the main portal vein.     Managed to lose all the fluid  Much better now  No jaundice  No HE        No alcohol since hospital admission in Nov 2023.  - doesn't feel that she needs help  - has previously done rehab  - on acamprosate     Only issue right now is occasional ankle edema  No ascites  No HE    Past Medical History:   Diagnosis Date    DIANA (acute kidney injury)     Hyponatremia     Major depressive disorder, single episode, unspecified        Past Surgical History:    Procedure Laterality Date    BRAIN SURGERY       SECTION         Family History   Problem Relation Name Age of Onset    Hypertension Mother      Thyroid disease Mother      Liver disease Father      Liver disease Sister      Alcohol abuse Brother      No Known Problems Daughter      No Known Problems Son         Social History     Tobacco Use    Smoking status: Never     Passive exposure: Never    Smokeless tobacco: Never   Substance Use Topics    Alcohol use: Not Currently     Comment: last drink 3 -4 months    Drug use: Never       Current Outpatient Medications   Medication Sig Dispense Refill    acamprosate (CAMPRAL) 333 mg tablet Take 2 tablets (666 mg total) by mouth 3 (three) times daily. 90 tablet 6    folic acid (FOLVITE) 1 MG tablet Take 1 tablet (1 mg total) by mouth once daily. 90 tablet 3    furosemide (LASIX) 40 MG tablet Take 1 tablet (40 mg total) by mouth once daily. 30 tablet 11    lactulose (CHRONULAC) 10 gram/15 mL solution Take 15 mLs (10 g total) by mouth 2 (two) times daily. Take an extra dose if needed to achieve 2-3 bowel movements per day to prevent confusion 900 mL 11    levothyroxine (SYNTHROID) 25 MCG tablet Take 1 tablet (25 mcg total) by mouth before breakfast. 90 tablet 2    magnesium oxide (MAG-OX) 400 mg (241.3 mg magnesium) tablet Take 1 tablet (400 mg total) by mouth 2 (two) times daily. 180 tablet 2    midodrine (PROAMATINE) 10 MG tablet Take 1 tablet (10 mg total) by mouth every 8 (eight) hours. 90 tablet 1    multivitamin Tab Take 1 tablet by mouth once daily.      pantoprazole (PROTONIX) 40 MG tablet Take 1 tablet (40 mg total) by mouth once daily. 30 tablet 11    rOPINIRole (REQUIP) 0.25 MG tablet Take 1 tablet (0.25 mg total) by mouth every evening. 30 tablet 6    sodium bicarbonate 650 MG tablet Take 1 tablet (650 mg total) by mouth 2 (two) times daily. 180 tablet 3    spironolactone (ALDACTONE) 50 MG tablet Take 1 tablet (50 mg total) by mouth once daily. 30  "tablet 11    thiamine 100 MG tablet Take 1 tablet (100 mg total) by mouth once daily. 90 tablet 1    ondansetron (ZOFRAN) 4 MG tablet Take 8 mg by mouth every 4 (four) hours as needed for Nausea. (Patient not taking: Reported on 4/29/2024)       No current facility-administered medications for this visit.       Review of patient's allergies indicates:   Allergen Reactions    Hydrocodone      Nausea..       Review of Systems   Constitutional:  Negative for fever and weight loss.   Cardiovascular:  Negative for leg swelling.   Gastrointestinal:  Positive for heartburn. Negative for abdominal pain, blood in stool, constipation, diarrhea, melena, nausea and vomiting.       Vitals:    07/01/24 0948   BP: (!) 149/87   Pulse: 109   Resp: 20   Temp: 98.6 °F (37 °C)   TempSrc: Temporal   SpO2: 98%   Weight: 61.2 kg (135 lb)   Height: 5' 4" (1.626 m)       Physical Exam  Constitutional:       General: She is not in acute distress.  Eyes:      General: No scleral icterus.  Cardiovascular:      Rate and Rhythm: Regular rhythm. Tachycardia present.   Pulmonary:      Effort: Pulmonary effort is normal. No respiratory distress.   Abdominal:      General: Bowel sounds are normal. There is no distension.      Palpations: Abdomen is soft.      Tenderness: There is no abdominal tenderness. There is no guarding or rebound.   Musculoskeletal:      Right lower leg: No edema.      Left lower leg: No edema.   Skin:     Coloration: Skin is not jaundiced.         LABS: I personally reviewed pertinent laboratory findings.    Lab Results   Component Value Date    WBC 5.53 04/29/2024    HGB 10.5 (L) 04/29/2024    HCT 31.8 (L) 04/29/2024    MCV 79.9 (L) 04/29/2024     04/29/2024       Lab Results   Component Value Date     04/29/2024    K 3.8 04/29/2024     (H) 04/29/2024    CO2 21 (L) 04/29/2024    BUN 9.1 (L) 04/29/2024    CREATININE 0.86 04/29/2024    CALCIUM 8.5 04/29/2024    ANIONGAP 10 10/28/2023    EGFRNONAA >60 " "02/08/2022       Lab Results   Component Value Date    ALT 27 04/29/2024    AST 54 (H) 04/29/2024    ALKPHOS 130 04/29/2024    BILITOT 3.4 (H) 04/29/2024       Lab Results   Component Value Date    HEPAIGM Nonreactive 10/12/2023    HEPBIGM Nonreactive 10/12/2023    HEPCAB Nonreactive 10/12/2023       No results found for: "DARIANA", "MITOAB", "SMOOTHMUSCAB", "IGG", "CERULOPLSM"     MELD 3.0: 25 at 12/7/2023  9:09 AM  MELD-Na: 24 at 12/7/2023  9:09 AM  Calculated from:  Serum Creatinine: 0.79 mg/dL (Using min of 1 mg/dL) at 12/7/2023  9:09 AM  Serum Sodium: 136 mmol/L at 12/7/2023  9:09 AM  Total Bilirubin: 7.9 mg/dL at 12/7/2023  9:09 AM  Serum Albumin: 2.8 g/dL at 12/7/2023  9:09 AM  INR(ratio): 2.1 at 12/7/2023  9:09 AM  Age at listing (hypothetical): 63 years  Sex: Female at 12/7/2023  9:09 AM       IMAGING: I personally reviewed imaging studies.      Assessment:  64 y.o. female with alcohol related cirrhosis. She is decompensated with ascites, HE, jaundice but appears to have re-compensated following alcohol cessation.    1. Alcoholic cirrhosis of liver with ascites    2. Gastroesophageal reflux disease, unspecified whether esophagitis present      Recommendations:  Cirrhosis due to alcohol: Congratulated on alcohol cessation and counseled on continued abstinence. Complete serologic work up for chronic liver disease.    Ascites/Edema: 2 gm Na diet. Decrease Lasix to 20mg daily. Continue spironolactone 50mg daily.    Encephalopathy: Continue lactulose. Titrate to maintain 3-4 bowel movements per day.    GERD: Restart pantoprazole    Transplant candidacy: Suspect MELD will be too to warrant transplant but will consider if MELD >15.    Cirrhosis HM  - HCC screening: US in 04/2024 without HCC. Check AFP. Repeat abdominal US and AFP every 6 months.    - Variceal screening: Will refer to Dr. Arteaga for screening EGD.    - Immunizations: Recommend HAV and HBV vaccinations if not immune.    Return to clinic in 3 months " with labs and US.    I spent a total of 40 minutes on the day of the visit. This includes face to face time and non-face to face time preparing to see the patient (eg, review of tests), obtaining and/or reviewing separately obtained history, documenting clinical information in the electronic or other health record, independently interpreting results, and communicating results to the patient/family/caregiver, or care coordination.    This note includes dictation done using [employer] speech recognition program. Word recognition mistakes are occasionally missed on review.    Long Appiah MD  Staff Physician  Hepatology and Liver Transplant  [facility]  [facility]

## 2024-07-02 LAB
A1AT SERPL NEPH-MCNC: 160 MG/DL (ref 100–190)
ANTINUCLEAR ANTIBODY SCREEN (OHS): NEGATIVE
CERULOPLASMIN SERPL-MCNC: 28.7 MG/DL (ref 20–51)
DSDNA AB QUANT (OHS): 5 IU/ML
HBV SURFACE AB SER QL IA: NEGATIVE
HBV SURFACE AB SERPL IA-ACNC: <3.5 MIU/ML
M2 QUANT (OHS): 1.7 U/ML
SMOOTH MUSCLE AB SER QL IF: NEGATIVE

## 2024-07-03 LAB
CLINICAL BIOCHEMIST REVIEW: NORMAL
PLPETH BLD-MCNC: <10 NG/ML
POPETH BLD-MCNC: <10 NG/ML

## 2024-07-08 ENCOUNTER — TELEPHONE (OUTPATIENT)
Dept: HEPATOLOGY | Facility: CLINIC | Age: 64
End: 2024-07-08
Payer: MEDICAID

## 2024-07-08 NOTE — TELEPHONE ENCOUNTER
Appts scheduled  ----- Message from Long Appiah MD sent at 7/7/2024 11:16 AM CDT -----  Return 3 months with labs and ultrasound in [place]

## 2024-07-09 ENCOUNTER — TELEPHONE (OUTPATIENT)
Dept: GASTROENTEROLOGY | Facility: CLINIC | Age: 64
End: 2024-07-09
Payer: MEDICAID

## 2024-07-09 DIAGNOSIS — K70.30 ALCOHOLIC CIRRHOSIS OF LIVER WITHOUT ASCITES: Primary | ICD-10-CM

## 2024-07-09 NOTE — TELEPHONE ENCOUNTER
----- Message from Deb Arteaga MD sent at 7/8/2024  7:08 PM CDT -----  Will send it to my staff to get scheduled.  Thanks.  ----- Message -----  From: Long Appiah MD  Sent: 7/7/2024  11:09 AM CDT  To: MD Gregory Hu,    She just established care with me for alcohol related cirrhosis. Would you be able to do an EGD for variceal screening?    Thanks,  Long

## 2024-07-09 NOTE — TELEPHONE ENCOUNTER
Sched 7/22/24 10:00 arrival; Discussed with pt. Advised that I will put EGD instructions in her portal. Verbalized understanding. Denies any ques

## 2024-07-17 ENCOUNTER — TELEPHONE (OUTPATIENT)
Dept: INTERNAL MEDICINE | Facility: CLINIC | Age: 64
End: 2024-07-17

## 2024-07-17 NOTE — TELEPHONE ENCOUNTER
----- Message from Maryse Sam sent at 7/17/2024  9:58 AM CDT -----  Regarding: request  Who Called: Lauren Ewing    Refill or New Rx:Refill  RX Name and Strength:lactulose (CHRONULAC) 10 gram/15 mL solution 900 mL 11 10/28/2023 10/22/2024 No  Sig - Route: Take 15 mLs (10 g total) by mouth 2 (two) times daily. Take an extra dose if needed to achieve 2-3 bowel movements per day to prevent confusion - Oral      How is the patient currently taking it? (ex. 1XDay):    Is this a 30 day or 90 day RX:    Local or Mail Order:local    List of preferred pharmacies on file (remove unneeded): [facility] #[id number] at [place] [address] AT    Phone: [phone]  Fax: [phone]        Ordering Provider:        Preferred Method of Contact: Phone Call  Patient's Preferred Phone Number on File: [phone]   Best Call Back Number, if different:  Additional Information: pt requesting a refill; I did inform her that she may need to call the prescribing doctor; pt stated that she would not know how to get in contact with the doctor and she stated that its just a medication that helps her use the bathroom; please advise.

## 2024-08-12 ENCOUNTER — PATIENT MESSAGE (OUTPATIENT)
Dept: GASTROENTEROLOGY | Facility: CLINIC | Age: 64
End: 2024-08-12
Payer: MEDICAID

## 2024-08-19 ENCOUNTER — TELEPHONE (OUTPATIENT)
Dept: HEPATOLOGY | Facility: CLINIC | Age: 64
End: 2024-08-19
Payer: MEDICAID

## 2024-08-19 DIAGNOSIS — K21.9 GASTROESOPHAGEAL REFLUX DISEASE, UNSPECIFIED WHETHER ESOPHAGITIS PRESENT: ICD-10-CM

## 2024-08-19 RX ORDER — PANTOPRAZOLE SODIUM 40 MG/1
40 TABLET, DELAYED RELEASE ORAL
Status: CANCELLED | OUTPATIENT
Start: 2024-08-19 | End: 2024-08-19

## 2024-08-19 RX ORDER — PANTOPRAZOLE SODIUM 40 MG/1
40 TABLET, DELAYED RELEASE ORAL DAILY
Qty: 90 TABLET | Refills: 1 | Status: SHIPPED | OUTPATIENT
Start: 2024-08-19

## 2024-08-19 NOTE — TELEPHONE ENCOUNTER
Pt requesting refill on pantoprazole (PROTONIX) 40 MG tablet. Pt stated prescribing provider told pt to reach out to her PCP for refills.      LOV:4/29/24    NOV: 12/30/24

## 2024-08-19 NOTE — TELEPHONE ENCOUNTER
Refill request routed to provider  ----- Message from Evon Kumar sent at 8/19/2024  1:28 PM CDT -----  Regarding: Refill  Contact: JENIFER ANTONIO [[id number]]  Type:  RX Refill Request        Who Called:  JENIFER ANTONIO [[id number]]        Refill or New Rx:  Refill        RX Name and Strength:  pantoprazole (PROTONIX) 40 MG tablet        Preferred Pharmacy with phone number:      [facility] #[id number] at [facility] - [place] - [address]  [address] [facility] [place] [address]  Phone: [phone] Fax: [phone]          Local or Mail Order:  Local        Would the patient rather a call back or a response via [facility]?        Best Call Back Number:  [phone] (home)           Additional Information:  Pt is out of refills.

## 2024-08-19 NOTE — TELEPHONE ENCOUNTER
Returned pts call. S/w pt in regards to medication refill request. Advised pt per Dr. Appiah to contact her PCP or gastro provider. Pt vu and no further questions

## 2024-08-30 ENCOUNTER — TELEPHONE (OUTPATIENT)
Dept: HEPATOLOGY | Facility: CLINIC | Age: 64
End: 2024-08-30
Payer: MEDICAID

## 2024-08-30 NOTE — TELEPHONE ENCOUNTER
Returned pts, discussed results again w/ pt. Pt vu and all questions answered   ----- Message from Evon Kumar sent at 8/30/2024  3:05 PM CDT -----  Regarding: Call Back  Contact: JENIFER ANTONIO [[id number]]  CONSULT/ADVISORY    Name of Caller:  JENIFER ANTONIO [[id number]]    Contact Preference:  [phone] (home)       Nature of Call:  Pt is requesting a call back from Petrona WARREN to repeat the message that was given to her.  She would like to hear again.

## 2024-08-30 NOTE — TELEPHONE ENCOUNTER
Reached out to pt to discuss results. Results given, pt vu. No further questions     ----- Message from Long Appiah MD sent at 8/30/2024 11:21 AM CDT -----  Ms. Ewing did not view portal message about results. Please let her know that her liver tests are improved. Her MELD score is 14 which is also improved. Testing for other causes of liver issues such as viruses, autoimmune diseases, and genetic disorders is negative.

## 2024-09-04 ENCOUNTER — TELEPHONE (OUTPATIENT)
Dept: INTERNAL MEDICINE | Facility: CLINIC | Age: 64
End: 2024-09-04
Payer: MEDICAID

## 2024-09-04 NOTE — TELEPHONE ENCOUNTER
----- Message from Evon Kumar sent at 9/4/2024 11:15 AM CDT -----  Regarding: Prior Auth  Contact: JENIFER ANTONIO [[id number]]  CONSULT/ADVISORY    Name of Caller:  JENIFER ANTONIO [[id number]]    Contact Preference:  [phone] (home)       Nature of Call:  Pt is stating pharmacy advised her she needs prior auth for pantoprazole (PROTONIX) 40 MG tablet.  Pt is out of medication.  Please call pt when auth is submitted.  .      [facility] #[id number] at [facility] - [place] - [address]  [address]  [place] [address]  Phone: [phone] Fax: [phone]

## 2024-09-04 NOTE — TELEPHONE ENCOUNTER
----- Message from Lilli Velazquez LPN sent at 9/4/2024 11:29 AM CDT -----  Regarding: FW: Prior Auth  Contact: JENIFER ANTONIO [[id number]]    ----- Message -----  From: Evon Kumar  Sent: 9/4/2024  11:19 AM CDT  To: Raymundo CHILEL Staff  Subject: Prior Auth                                       CONSULT/ADVISORY    Name of Caller:  JENIFER ANTONIO [[id number]]    Contact Preference:  [phone] (home)       Nature of Call:  Pt is stating pharmacy advised her she needs prior auth for pantoprazole (PROTONIX) 40 MG tablet.  Pt is out of medication.  Please call pt when auth is submitted.  .      [facility] #[id number] at [address] [facility] - [place] - [address]  [address]  [place] [address]  Phone: [phone] Fax: [phone]

## 2024-09-05 ENCOUNTER — TELEPHONE (OUTPATIENT)
Dept: INTERNAL MEDICINE | Facility: CLINIC | Age: 64
End: 2024-09-05
Payer: MEDICAID

## 2024-09-05 NOTE — TELEPHONE ENCOUNTER
"----- Message from Petrona Hernandez MA sent at 9/5/2024 12:02 PM CDT -----  Good Afternoon,    I believe this message is for you all.    Thanks,  Petrona  ----- Message -----  From: Bruce Lee  Sent: 9/5/2024  10:06 AM CDT  To: Td RILEY Staff    Consult/Advisory    Name Of Caller: Self    Contact Preference?: [phone]    Provider Name: Td    What is the nature of the call?: Calling to discuss PA issues receiving pantoprazole (PROTONIX) 40 MG table refill    [facility] #[id number] at [facility] - [place] - [address]  [address]  [place] [address]  Phone: [phone] Fax: [phone]      Additional Notes:  "Thank you for all that you do for our patients"  "

## 2024-09-05 NOTE — TELEPHONE ENCOUNTER
Returned call to pt and informed pt she'll need to contact her insurance company regarding the status of her PA. Pt verbalized understanding with no further questions or concerns at this time.

## 2024-09-10 ENCOUNTER — LAB VISIT (OUTPATIENT)
Dept: LAB | Facility: HOSPITAL | Age: 64
End: 2024-09-10
Payer: MEDICAID

## 2024-09-10 DIAGNOSIS — E87.20 METABOLIC ACIDOSIS: ICD-10-CM

## 2024-09-10 DIAGNOSIS — K70.30 ALCOHOLIC CIRRHOSIS OF LIVER WITHOUT ASCITES: ICD-10-CM

## 2024-09-10 DIAGNOSIS — E87.1 HYPONATREMIA: ICD-10-CM

## 2024-09-10 LAB
ALBUMIN SERPL-MCNC: 3 G/DL (ref 3.4–4.8)
ALBUMIN/GLOB SERPL: 0.8 RATIO (ref 1.1–2)
ALP SERPL-CCNC: 105 UNIT/L (ref 40–150)
ALT SERPL-CCNC: 16 UNIT/L (ref 0–55)
ANION GAP SERPL CALC-SCNC: 6 MEQ/L
AST SERPL-CCNC: 34 UNIT/L (ref 5–34)
BASOPHILS # BLD AUTO: 0.05 X10(3)/MCL
BASOPHILS NFR BLD AUTO: 0.8 %
BILIRUB SERPL-MCNC: 1.8 MG/DL
BUN SERPL-MCNC: 9.5 MG/DL (ref 9.8–20.1)
CALCIUM SERPL-MCNC: 9.2 MG/DL (ref 8.4–10.2)
CHLORIDE SERPL-SCNC: 106 MMOL/L (ref 98–107)
CO2 SERPL-SCNC: 25 MMOL/L (ref 23–31)
CREAT SERPL-MCNC: 0.83 MG/DL (ref 0.55–1.02)
CREAT UR-MCNC: 64.8 MG/DL (ref 45–106)
CREAT/UREA NIT SERPL: 11
EOSINOPHIL # BLD AUTO: 0.17 X10(3)/MCL (ref 0–0.9)
EOSINOPHIL NFR BLD AUTO: 2.6 %
ERYTHROCYTE [DISTWIDTH] IN BLOOD BY AUTOMATED COUNT: 15.4 % (ref 11.5–17)
GFR SERPLBLD CREATININE-BSD FMLA CKD-EPI: >60 ML/MIN/1.73/M2
GLOBULIN SER-MCNC: 4 GM/DL (ref 2.4–3.5)
GLUCOSE SERPL-MCNC: 117 MG/DL (ref 82–115)
HCT VFR BLD AUTO: 35.5 % (ref 37–47)
HGB BLD-MCNC: 11.7 G/DL (ref 12–16)
IMM GRANULOCYTES # BLD AUTO: 0.01 X10(3)/MCL (ref 0–0.04)
IMM GRANULOCYTES NFR BLD AUTO: 0.2 %
LYMPHOCYTES # BLD AUTO: 2.36 X10(3)/MCL (ref 0.6–4.6)
LYMPHOCYTES NFR BLD AUTO: 35.6 %
MAGNESIUM SERPL-MCNC: 2.1 MG/DL (ref 1.6–2.6)
MCH RBC QN AUTO: 27 PG (ref 27–31)
MCHC RBC AUTO-ENTMCNC: 33 G/DL (ref 33–36)
MCV RBC AUTO: 82 FL (ref 80–94)
MONOCYTES # BLD AUTO: 1.18 X10(3)/MCL (ref 0.1–1.3)
MONOCYTES NFR BLD AUTO: 17.8 %
NEUTROPHILS # BLD AUTO: 2.86 X10(3)/MCL (ref 2.1–9.2)
NEUTROPHILS NFR BLD AUTO: 43 %
NRBC BLD AUTO-RTO: 0 %
PLATELET # BLD AUTO: 205 X10(3)/MCL (ref 130–400)
PMV BLD AUTO: 8.6 FL (ref 7.4–10.4)
POTASSIUM SERPL-SCNC: 3.8 MMOL/L (ref 3.5–5.1)
PROT SERPL-MCNC: 7 GM/DL (ref 5.8–7.6)
PROT UR STRIP-MCNC: <6.8 MG/DL
RBC # BLD AUTO: 4.33 X10(6)/MCL (ref 4.2–5.4)
SODIUM SERPL-SCNC: 137 MMOL/L (ref 136–145)
WBC # BLD AUTO: 6.63 X10(3)/MCL (ref 4.5–11.5)

## 2024-09-10 PROCEDURE — 80053 COMPREHEN METABOLIC PANEL: CPT

## 2024-09-10 PROCEDURE — 85025 COMPLETE CBC W/AUTO DIFF WBC: CPT

## 2024-09-10 PROCEDURE — 82570 ASSAY OF URINE CREATININE: CPT

## 2024-09-10 PROCEDURE — 84156 ASSAY OF PROTEIN URINE: CPT

## 2024-09-10 PROCEDURE — 36415 COLL VENOUS BLD VENIPUNCTURE: CPT

## 2024-09-10 PROCEDURE — 83735 ASSAY OF MAGNESIUM: CPT

## 2024-09-13 ENCOUNTER — OFFICE VISIT (OUTPATIENT)
Dept: NEPHROLOGY | Facility: CLINIC | Age: 64
End: 2024-09-13
Payer: MEDICAID

## 2024-09-13 VITALS
TEMPERATURE: 99 F | DIASTOLIC BLOOD PRESSURE: 68 MMHG | OXYGEN SATURATION: 97 % | WEIGHT: 141.38 LBS | RESPIRATION RATE: 20 BRPM | HEIGHT: 64 IN | SYSTOLIC BLOOD PRESSURE: 124 MMHG | BODY MASS INDEX: 24.14 KG/M2 | HEART RATE: 109 BPM

## 2024-09-13 DIAGNOSIS — E87.20 METABOLIC ACIDOSIS: ICD-10-CM

## 2024-09-13 DIAGNOSIS — E87.1 HYPONATREMIA: Primary | ICD-10-CM

## 2024-09-13 DIAGNOSIS — K70.30 ALCOHOLIC CIRRHOSIS OF LIVER WITHOUT ASCITES: ICD-10-CM

## 2024-09-13 PROCEDURE — 99214 OFFICE O/P EST MOD 30 MIN: CPT | Mod: PBBFAC

## 2024-09-13 PROCEDURE — 99999 PR PBB SHADOW E&M-EST. PATIENT-LVL IV: CPT | Mod: PBBFAC,,,

## 2024-09-13 RX ORDER — FAMOTIDINE 20 MG/1
20 TABLET, FILM COATED ORAL 2 TIMES DAILY
Qty: 60 TABLET | Refills: 11 | Status: SHIPPED | OUTPATIENT
Start: 2024-09-13 | End: 2025-09-13

## 2024-09-13 NOTE — PROGRESS NOTES
[facility] Ambulatory Progress Note    HPI  Lauren Ewing, 64 y.o. female, presents to office for follow up for hyponatremia. Pt has  alcoholic cirrhosis followed by [facility]at[facility]. Sodium has stabilized on Lasix 40 mg daily.  She states she has been very compliant with all of her medications.  Last visit I check iron panel which was low normal. Advised to start oral iron and FU with GI. She did start iron and then stopped. She is now again complaining of weakness by the end of the day.  Denies overt blood loss.  Blood pressure well controlled.        Medical Diagnoses:   Past Medical History:   Diagnosis Date    DIANA (acute kidney injury)     Hyponatremia     Major depressive disorder, single episode, unspecified      Patient Active Problem List   Diagnosis    Hyponatremia    Liver failure    Major depressive disorder    Alcohol abuse    Palliative care encounter    Hypothyroidism    Volume overload    Alcoholic liver disease    Alcoholic cirrhosis of liver without ascites    Metabolic acidosis       Surgical History:   Past Surgical History:   Procedure Laterality Date    BRAIN SURGERY       SECTION         Family History:   Family History   Problem Relation Name Age of Onset    Hypertension Mother      Thyroid disease Mother      Liver disease Father      Liver disease Sister      Alcohol abuse Brother      No Known Problems Daughter      No Known Problems Son         Social History:   Social History     Tobacco Use    Smoking status: Never     Passive exposure: Never    Smokeless tobacco: Never   Substance Use Topics    Alcohol use: Not Currently     Comment: last drink 3 -4 months       Allergies:  Review of patient's allergies indicates:   Allergen Reactions    Hydrocodone      Nausea..       Medications:    Current Outpatient Medications:     folic acid (FOLVITE) 1 MG tablet, Take 1 tablet (1 mg total) by mouth once daily., Disp: 90 tablet, Rfl: 3    furosemide (LASIX) 20  MG tablet, Take 1 tablet (20 mg total) by mouth once daily., Disp: 30 tablet, Rfl: 11    lactulose (CHRONULAC) 10 gram/15 mL solution, Take 15 mLs (10 g total) by mouth 2 (two) times daily. Take an extra dose if needed to achieve 2-3 bowel movements per day to prevent confusion, Disp: 900 mL, Rfl: 11    levothyroxine (SYNTHROID) 25 MCG tablet, Take 1 tablet (25 mcg total) by mouth before breakfast., Disp: 90 tablet, Rfl: 2    magnesium oxide (MAG-OX) 400 mg (241.3 mg magnesium) tablet, Take 1 tablet (400 mg total) by mouth 2 (two) times daily., Disp: 180 tablet, Rfl: 2    multivitamin Tab, Take 1 tablet by mouth once daily., Disp: , Rfl:     pantoprazole (PROTONIX) 40 MG tablet, Take 1 tablet (40 mg total) by mouth once daily., Disp: 90 tablet, Rfl: 1    rOPINIRole (REQUIP) 0.25 MG tablet, Take 1 tablet (0.25 mg total) by mouth every evening., Disp: 30 tablet, Rfl: 6    sodium bicarbonate 650 MG tablet, Take 1 tablet (650 mg total) by mouth 2 (two) times daily., Disp: 180 tablet, Rfl: 3    spironolactone (ALDACTONE) 50 MG tablet, Take 1 tablet (50 mg total) by mouth once daily., Disp: 30 tablet, Rfl: 11    thiamine 100 MG tablet, Take 1 tablet (100 mg total) by mouth once daily., Disp: 90 tablet, Rfl: 1    acamprosate (CAMPRAL) 333 mg tablet, Take 2 tablets (666 mg total) by mouth 3 (three) times daily. (Patient not taking: Reported on 9/13/2024), Disp: 90 tablet, Rfl: 6    ondansetron (ZOFRAN) 4 MG tablet, Take 8 mg by mouth every 4 (four) hours as needed for Nausea. (Patient not taking: Reported on 4/29/2024), Disp: , Rfl:        Review of Systems:    Constitutional: Denies fever, fatigue, ++generalized weakness at end of the day  Skin: Denies wounds, no rashes, no itching, no new skin lesions  Respiratory:  Denies cough, shortness of breath, or wheezing  Cardiovascular: Denies chest pain, palpitations, or swelling  Gastrointestional: Denies abdominal pain, nausea, vomiting, diarrhea, or  "constipation  Genitourinary: Denies dysuria, hematuria, foamy urine, or incontinence; reports able to empty bladder  Musculoskeletal: Denies back or flank pain  Neurological: Denies headaches, dizziness, paresthesias, tremors or focal weakness      Vital Signs:  /68 (BP Location: Left arm, Patient Position: Sitting, BP Method: Medium (Manual))   Pulse 109   Temp 98.6 °F (37 °C) (Temporal)   Resp 20   Ht 5' 4.02" (1.626 m)   Wt 64.1 kg (141 lb 6.4 oz)   SpO2 97%   BMI 24.26 kg/m²   Body mass index is 24.26 kg/m².      Physical Exam:    General: no acute distress, awake, alert  Eyes:  conjunctiva clear, eyelids without swelling  HENT: atraumatic, oropharynx and nasal mucosa patent  Neck: supple, trache midline, full ROM, no JVD  Respiratory: equal, unlabored, clear to auscultation A/P  Cardiovascular: RRR without rub; radial and pedal pulses +2 BL  Edema:  Trace BLE  Gastrointestinal: soft, non-tender, non-distended  Musculoskeletal: ROM without new limitation or discomfort  Integumentary: warm, dry; no rashes, wounds, or skin lesions  Neurological: oriented x4, appropriate, no acute deficits      Labs:        Component Value Date/Time     09/10/2024 1502     07/01/2024 1037     10/28/2023 0631     (L) 10/27/2023 0243    K 3.8 09/10/2024 1502    K 3.5 07/01/2024 1037    K 4.2 10/28/2023 0631    K 4.2 10/27/2023 0243     09/10/2024 1502     (H) 07/01/2024 1037     10/28/2023 0631     10/27/2023 0243    CO2 25 09/10/2024 1502    CO2 22 (L) 07/01/2024 1037    CO2 23 10/28/2023 0631    CO2 21 (L) 10/27/2023 0243    BUN 9.5 (L) 09/10/2024 1502    BUN 7.3 (L) 07/01/2024 1037    BUN 12 10/28/2023 0631    BUN 18 10/27/2023 0243    CREATININE 0.83 09/10/2024 1502    CREATININE 1.00 07/01/2024 1037    CREATININE 0.86 04/29/2024 0905    CREATININE 0.77 02/28/2024 1341    CREATININE 1.0 10/28/2023 0631    CREATININE 1.2 10/27/2023 0243    CREATININE 1.3 10/26/2023 0444 "    CALCIUM 9.2 09/10/2024 1502    CALCIUM 9.3 07/01/2024 1037    CALCIUM 8.2 (L) 10/28/2023 0631    CALCIUM 8.6 (L) 10/27/2023 0243    PHOS 2.8 12/28/2023 1141    PHOS 2.2 (L) 11/03/2023 0539    PHOS 3.2 10/28/2023 0631    PHOS 3.0 10/27/2023 0243           Component Value Date/Time    WBC 6.63 09/10/2024 1502    WBC 5.73 07/01/2024 1037    WBC 9.94 10/28/2023 0631    WBC 12.83 (H) 10/27/2023 0243    HGB 11.7 (L) 09/10/2024 1502    HGB 13.0 07/01/2024 1037    HGB 8.8 (L) 10/28/2023 0631    HGB 8.3 (L) 10/27/2023 0243    HCT 35.5 (L) 09/10/2024 1502    HCT 40.1 07/01/2024 1037    HCT 27.1 (L) 10/28/2023 0631    HCT 24.7 (L) 10/27/2023 0243    HCT 44.0 10/09/2021 2238     09/10/2024 1502     07/01/2024 1037     (L) 10/28/2023 0631     (L) 10/27/2023 0243         Imaging:  Retroperitoneal US:  Impression:     No significant renal abnormality.     Abdominopelvic ascites.  Right pleural effusion.       Impression/plan  1. Hyponatremia    2. Alcoholic cirrhosis of liver without ascites    3. Metabolic acidosis      Renal wise stable. Pt is at risk of hepatorenal disease.   Sodium remains at goal  Refrain from alcohol (1 year sober next month)    Continue Sodium bicarbonate 650 mg b.i.d. metabolic acidosis    Advise to restart oral iron supplement  Also has EGD on Monday with GI to assess for bleeding    Asking for refill of protonix, advise to try and switch to pepcid and tums PRN; pt agrees    FU in 1 year (offered 6 months but pt would like to extend. She has other doc apmts over the next year with labs done)    Joel BROWNE        This note was created with the assistance of [employer] voice recognition software or phone dictation. There may be transcription errors as a result of using this technology however minimal. Effort has been made to assure accuracy of transcription but any obvious errors or omissions should be clarified with the author of the document.

## 2024-09-15 ENCOUNTER — ANESTHESIA EVENT (OUTPATIENT)
Dept: ENDOSCOPY | Facility: HOSPITAL | Age: 64
End: 2024-09-15
Payer: MEDICAID

## 2024-09-15 RX ORDER — LIDOCAINE HYDROCHLORIDE 10 MG/ML
1 INJECTION, SOLUTION EPIDURAL; INFILTRATION; INTRACAUDAL; PERINEURAL ONCE
OUTPATIENT
Start: 2024-09-15 | End: 2024-09-15

## 2024-09-16 ENCOUNTER — ANESTHESIA (OUTPATIENT)
Dept: ENDOSCOPY | Facility: HOSPITAL | Age: 64
End: 2024-09-16
Payer: MEDICAID

## 2024-09-16 ENCOUNTER — HOSPITAL ENCOUNTER (OUTPATIENT)
Facility: HOSPITAL | Age: 64
Discharge: HOME | End: 2024-09-16
Attending: INTERNAL MEDICINE | Admitting: INTERNAL MEDICINE
Payer: MEDICAID

## 2024-09-16 DIAGNOSIS — K70.30 ALCOHOLIC CIRRHOSIS OF LIVER WITHOUT ASCITES: Primary | ICD-10-CM

## 2024-09-16 PROCEDURE — 37000009 HC ANESTHESIA EA ADD 15 MINS: Performed by: INTERNAL MEDICINE

## 2024-09-16 PROCEDURE — D9220A PRA ANESTHESIA: Mod: ,,, | Performed by: NURSE ANESTHETIST, CERTIFIED REGISTERED

## 2024-09-16 PROCEDURE — 25000003 PHARM REV CODE 250: Performed by: NURSE ANESTHETIST, CERTIFIED REGISTERED

## 2024-09-16 PROCEDURE — 43235 EGD DIAGNOSTIC BRUSH WASH: CPT | Performed by: INTERNAL MEDICINE

## 2024-09-16 PROCEDURE — 63600175 PHARM REV CODE 636 W HCPCS: Performed by: ANESTHESIOLOGY

## 2024-09-16 PROCEDURE — 63600175 PHARM REV CODE 636 W HCPCS: Performed by: NURSE ANESTHETIST, CERTIFIED REGISTERED

## 2024-09-16 PROCEDURE — 37000008 HC ANESTHESIA 1ST 15 MINUTES: Performed by: INTERNAL MEDICINE

## 2024-09-16 RX ORDER — OMEPRAZOLE 40 MG/1
40 CAPSULE, DELAYED RELEASE ORAL EVERY MORNING
Qty: 90 CAPSULE | Refills: 3 | Status: SHIPPED | OUTPATIENT
Start: 2024-09-16 | End: 2025-09-16

## 2024-09-16 RX ORDER — PROPOFOL 10 MG/ML
VIAL (ML) INTRAVENOUS
Status: DISCONTINUED | OUTPATIENT
Start: 2024-09-16 | End: 2024-09-16

## 2024-09-16 RX ORDER — LIDOCAINE HYDROCHLORIDE 20 MG/ML
INJECTION INTRAVENOUS
Status: DISCONTINUED | OUTPATIENT
Start: 2024-09-16 | End: 2024-09-16

## 2024-09-16 RX ORDER — SODIUM CHLORIDE, SODIUM LACTATE, POTASSIUM CHLORIDE, CALCIUM CHLORIDE 600; 310; 30; 20 MG/100ML; MG/100ML; MG/100ML; MG/100ML
INJECTION, SOLUTION INTRAVENOUS CONTINUOUS
Status: DISCONTINUED | OUTPATIENT
Start: 2024-09-16 | End: 2024-09-16 | Stop reason: HOSPADM

## 2024-09-16 RX ADMIN — PROPOFOL 50 MG: 10 INJECTION, EMULSION INTRAVENOUS at 10:09

## 2024-09-16 RX ADMIN — SODIUM CHLORIDE, POTASSIUM CHLORIDE, SODIUM LACTATE AND CALCIUM CHLORIDE: 600; 310; 30; 20 INJECTION, SOLUTION INTRAVENOUS at 09:09

## 2024-09-16 RX ADMIN — LIDOCAINE HYDROCHLORIDE 50 MG: 20 INJECTION INTRAVENOUS at 10:09

## 2024-09-16 RX ADMIN — SODIUM CHLORIDE, POTASSIUM CHLORIDE, SODIUM LACTATE AND CALCIUM CHLORIDE: 600; 310; 30; 20 INJECTION, SOLUTION INTRAVENOUS at 10:09

## 2024-09-16 NOTE — H&P
History and Physical    Patient Name: Lauren Ewing  MRN: [id number]  : 1960  Date of Procedure:  2024  Referring Physician: Lorna Taylor  Primary Physician: Candi Suero FNP  Procedure Physician: Deb Arteaga MD    Procedure - EGD  ASA - per anesthesia  Mallampati - per anesthesia  History of Anesthesia problems - no  Family history Anesthesia problems -  no   Plan of anesthesia - General    Diagnosis: Screening for esophageal varices  Chief Complaint: Same as above    HPI: Patient is an 64 y.o. female is here for the above. Patient diagnosed with cirrhosis and is here for screening for varices. Patient denies any recent nausea, vomiting, hematemesis, abdominal pain, abnormal bowel movements, or unintentional weight loss.      Last colonoscopy: 30+ years ago    ROS:  Constitutional: No fevers, chills, No weight loss  CV: No chest pain  Pulm: No cough, No shortness of breath  GI: see HPI    Medical History:   Past Medical History:   Diagnosis Date    DIANA (acute kidney injury)     Hyponatremia     Major depressive disorder, single episode, unspecified     Unspecified cirrhosis of liver      Surgical History:   Past Surgical History:   Procedure Laterality Date    BRAIN SURGERY       SECTION       Family History:   Family History   Problem Relation Name Age of Onset    Hypertension Mother      Thyroid disease Mother      Liver disease Father      Liver disease Sister      Alcohol abuse Brother      No Known Problems Daughter      No Known Problems Son     .  Social History:   Social History     Socioeconomic History    Marital status:     Number of children: 3   Tobacco Use    Smoking status: Never     Passive exposure: Never    Smokeless tobacco: Never   Substance and Sexual Activity    Alcohol use: Not Currently     Comment: last drink 3 -4 months    Drug use: Never    Sexual activity: Not Currently     Partners: Male     Social Determinants of Health     Financial  Resource Strain: Low Risk  (12/5/2023)    Overall Financial Resource Strain (CARDIA)     Difficulty of Paying Living Expenses: Not hard at all   Food Insecurity: No Food Insecurity (12/5/2023)    Hunger Vital Sign     Worried About Running Out of Food in the Last Year: Never true     Ran Out of Food in the Last Year: Never true   Transportation Needs: No Transportation Needs (12/5/2023)    PRAPARE - Transportation     Lack of Transportation (Medical): No     Lack of Transportation (Non-Medical): No   Physical Activity: Insufficiently Active (12/5/2023)    Exercise Vital Sign     Days of Exercise per Week: 1 day     Minutes of Exercise per Session: 90 min   Stress: No Stress Concern Present (12/5/2023)    [employer] - Occupational Stress Questionnaire     Feeling of Stress : Only a little   Housing Stability: Unknown (12/5/2023)    Housing Stability Vital Sign     Unable to Pay for Housing in the Last Year: No     Unstable Housing in the Last Year: No       Review of patient's allergies indicates:   Allergen Reactions    Hydrocodone      Nausea..       Medications:   Medications Prior to Admission   Medication Sig Dispense Refill Last Dose    famotidine (PEPCID) 20 MG tablet Take 1 tablet (20 mg total) by mouth 2 (two) times daily. 60 tablet 11 Past Week    folic acid (FOLVITE) 1 MG tablet Take 1 tablet (1 mg total) by mouth once daily. 90 tablet 3 9/15/2024    furosemide (LASIX) 20 MG tablet Take 1 tablet (20 mg total) by mouth once daily. 30 tablet 11 9/15/2024    lactulose (CHRONULAC) 10 gram/15 mL solution Take 15 mLs (10 g total) by mouth 2 (two) times daily. Take an extra dose if needed to achieve 2-3 bowel movements per day to prevent confusion 900 mL 11 Past Week    levothyroxine (SYNTHROID) 25 MCG tablet Take 1 tablet (25 mcg total) by mouth before breakfast. 90 tablet 2 9/16/2024    magnesium oxide (MAG-OX) 400 mg (241.3 mg magnesium) tablet Take 1 tablet (400 mg total) by  "mouth 2 (two) times daily. 180 tablet 2 9/15/2024    multivitamin Tab Take 1 tablet by mouth once daily.   9/15/2024    rOPINIRole (REQUIP) 0.25 MG tablet Take 1 tablet (0.25 mg total) by mouth every evening. 30 tablet 6 9/15/2024    sodium bicarbonate 650 MG tablet Take 1 tablet (650 mg total) by mouth 2 (two) times daily. 180 tablet 3 9/15/2024    spironolactone (ALDACTONE) 50 MG tablet Take 1 tablet (50 mg total) by mouth once daily. 30 tablet 11 9/15/2024    thiamine 100 MG tablet Take 1 tablet (100 mg total) by mouth once daily. 90 tablet 1 9/15/2024    acamprosate (CAMPRAL) 333 mg tablet Take 2 tablets (666 mg total) by mouth 3 (three) times daily. (Patient not taking: Reported on 9/13/2024) 90 tablet 6 Not Taking    ondansetron (ZOFRAN) 4 MG tablet Take 8 mg by mouth every 4 (four) hours as needed for Nausea. (Patient not taking: Reported on 4/29/2024)   Not Taking     Physical Exam:  Vital Signs:   Vitals:    09/16/24 0925   BP: 137/86   Pulse: 109   Resp: 16   Temp: 98.3 °F (36.8 °C)     /86 (BP Location: Left arm, Patient Position: Lying)   Pulse 109   Temp 98.3 °F (36.8 °C) (Oral)   Resp 16   Ht 5' 4" (1.626 m)   Wt 63 kg (139 lb)   SpO2 96%   Breastfeeding No   BMI 23.86 kg/m²     General: NAD  Lungs: NWOB on RA. Symmetric chest rise and fall  Heart: RR  Abdomen: Soft, NT, ND    Labs:  Lab Results   Component Value Date    WBC 6.63 09/10/2024    HGB 11.7 (L) 09/10/2024    HCT 35.5 (L) 09/10/2024    MCV 82.0 09/10/2024     09/10/2024     Lab Results   Component Value Date    INR 1.3 (L) 07/01/2024    APTT 56.4 (H) 10/26/2023     Lab Results   Component Value Date     09/10/2024    K 3.8 09/10/2024    CO2 25 09/10/2024    BUN 9.5 (L) 09/10/2024    CREATININE 0.83 09/10/2024    LABPROT 7.0 09/10/2024    ALBUMIN 3.0 (L) 09/10/2024    BILITOT 1.8 (H) 09/10/2024    ALKPHOS 105 09/10/2024    ALT 16 09/10/2024    AST 34 09/10/2024       Assessment and Plan:   Patient is a 63 yo female " with PMH of HTN, cirrhosis who presents today for EGD.     - After discussing risks, benefits, and alternatives, patient elects to proceed with EGD, and any other indicated procedures. All questions and concerns addressed. Written and verbal consent obtained.     Jyoti Brown MD  [employer] General Surgery PGY-2

## 2024-09-16 NOTE — ANESTHESIA POSTPROCEDURE EVALUATION
Anesthesia Post Evaluation    Patient: Lauren Ewing    Procedure(s) Performed: Procedure(s) (LRB):  EGD (ESOPHAGOGASTRODUODENOSCOPY) (N/A)    Final Anesthesia Type: general      Patient location during evaluation: GI PACU  Patient participation: Yes- Able to Participate  Level of consciousness: awake and alert  Pain management: adequate  Airway patency: patent      Anesthetic complications: no      Cardiovascular status: hemodynamically stable  Respiratory status: unassisted, room air and spontaneous ventilation  Hydration status: euvolemic  Follow-up not needed.                No case tracking events are documented in the log.      Pain/Meagan Score: No data recorded

## 2024-09-16 NOTE — [employer]ATION PATIENT INSTRUCTIONS
Discharge Summary/Instructions after an Endoscopic Procedure  Patient Name: Lauren Ewing  Patient MRN: [id number]  Patient YOB: 1960 Monday, September 16, 2024  Deb Arteaga MD  Dear patient,  As a result of recent federal legislation (The Federal Cures Act), you may   receive lab or pathology results from your procedure in your [facility]   account before your physician is able to contact you. Your physician or   their representative will relay the results to you with their   recommendations at their soonest availability.  Thank you,  RESTRICTIONS:  During your procedure today, you received medications for sedation.  These   medications may affect your judgment, balance and coordination.  Therefore,   for 24 hours, you have the following restrictions:   - DO NOT drive a car, operate machinery, make legal/financial decisions,   sign important papers or drink alcohol.    ACTIVITY:  Today: no heavy lifting, straining or running due to procedural   sedation/anesthesia.  The following day: return to full activity including work.  DIET:  Eat and drink normally unless instructed otherwise.     TREATMENT FOR COMMON SIDE EFFECTS:  - Mild abdominal pain, nausea, belching, bloating or excessive gas:  rest,   eat lightly and use a heating pad.  - Sore Throat: treat with throat lozenges and/or gargle with warm salt   water.  - Because air was used during the procedure, expelling large amounts of air   from your rectum or belching is normal.  - If a bowel prep was taken, you may not have a bowel movement for 1-3 days.    This is normal.  SYMPTOMS TO WATCH FOR AND REPORT TO YOUR PHYSICIAN:  1. Abdominal pain or bloating, other than gas cramps.  2. Chest pain.  3. Back pain.  4. Signs of infection such as: chills or fever occurring within 24 hours   after the procedure.  5. Rectal bleeding, which would show as bright red, maroon, or black stools.   (A tablespoon of blood from the rectum is not serious,  especially if   hemorrhoids are present.)  6. Vomiting.  7. Weakness or dizziness.  GO DIRECTLY TO THE NEAREST EMERGENCY ROOM IF YOU HAVE ANY OF THE FOLLOWING:      Difficulty breathing              Chills and/or fever over 101 F   Persistent vomiting and/or vomiting blood   Severe abdominal pain   Severe chest pain   Black, tarry stools   Bleeding- more than one tablespoon   Any other symptom or condition that you feel may need urgent attention  Your doctor recommends these additional instructions:  If any biopsies were taken, your doctors clinic will contact you in 1 to 2   weeks with any results.  - Patient has a contact number available for emergencies.  The signs and   symptoms of potential delayed complications were discussed with the   patient.  Return to normal activities tomorrow.  Written discharge   instructions were provided to the patient.   - Discharge patient to home.   - Low sodium diet.   - Continue present medications.   - Use Prilosec (omeprazole) 40 mg PO daily.   - Repeat upper endoscopy to check healing.   - Follow an antireflux regimen.  For questions, problems or results please call your physician - Deb Arteaga MD at Work:  [phone], Work:  [phone].  [facility] , EMERGENCY ROOM PHONE NUMBER: [phone]  IF A COMPLICATION OR EMERGENCY SITUATION ARISES AND YOU ARE UNABLE TO REACH   YOUR PHYSICIAN - GO DIRECTLY TO THE EMERGENCY ROOM.  Deb Arteaga MD  9/16/2024 10:50:40 AM  This report has been verified and signed electronically.  Dear patient,  As a result of recent federal legislation (The Federal Cures Act), you may   receive lab or pathology results from your procedure in your [facility]   account before your physician is able to contact you. Your physician or   their representative will relay the results to you with their   recommendations at their soonest availability.  Thank you,  [employer]

## 2024-09-16 NOTE — ANESTHESIA PREPROCEDURE EVALUATION
"                                                                                                             09/15/2024  Lauren Ewing is a 64 y.o., female with PMHx of ETOH abuse/cirrhosis, hypothyroidism, depression presents for screening EGD.    NO BETA BLOCKER USE    Active Ambulatory Problems     Diagnosis Date Noted    Hyponatremia 10/13/2023    Liver failure 10/26/2023    Major depressive disorder 10/26/2023    Alcohol abuse 10/26/2023    Palliative care encounter 10/26/2023    Hypothyroidism 10/26/2023    Volume overload 11/13/2023    Alcoholic liver disease 12/05/2023    Alcoholic cirrhosis of liver without ascites 05/10/2024    Metabolic acidosis 05/10/2024     Resolved Ambulatory Problems     Diagnosis Date Noted    Hepatorenal syndrome 10/26/2023    DIANA (acute kidney injury) 10/26/2023     Past Medical History:   Diagnosis Date    Major depressive disorder, single episode, unspecified        Pre-op Assessment    I have reviewed the NPO Status.      Review of Systems  Anesthesia Hx:  No problems with previous Anesthesia                Social:  Alcohol Use       Cardiovascular:  Cardiovascular Normal                                            Pulmonary:  Pulmonary Normal                       Renal/:  Renal/ Normal                 Hepatic/GI:      Liver Disease,            Neurological:  Neurology Normal                                      Endocrine:   Hypothyroidism          Psych:    depression              Vitals:    09/03/24 1108 09/16/24 0925   BP:  137/86   BP Location:  Left arm   Patient Position:  Lying   Pulse:  109   Resp:  16   Temp:  36.8 °C (98.3 °F)   TempSrc:  Oral   SpO2:  96%   Weight: 61.2 kg (135 lb) 63 kg (139 lb)   Height: 5' 4" (1.626 m) 5' 4" (1.626 m)         Physical Exam  General: Alert, Cooperative and Well nourished    Airway:  Mallampati: II   Mouth Opening: Normal  TM Distance: Normal  Tongue: Normal  Neck ROM: Normal ROM    Dental:  Intact    Chest/Lungs:  Clear to " auscultation, Normal Respiratory Rate    Heart:  Rate: Normal  Rhythm: Regular Rhythm  Sounds: Normal      Lab Results   Component Value Date    WBC 6.63 09/10/2024    HGB 11.7 (L) 09/10/2024    HCT 35.5 (L) 09/10/2024    MCV 82.0 09/10/2024     09/10/2024       CMP  Sodium   Date Value Ref Range Status   09/10/2024 137 136 - 145 mmol/L Final   10/28/2023 137 136 - 145 mmol/L Final     Potassium   Date Value Ref Range Status   09/10/2024 3.8 3.5 - 5.1 mmol/L Final   10/28/2023 4.2 3.5 - 5.1 mmol/L Final     Chloride   Date Value Ref Range Status   09/10/2024 106 98 - 107 mmol/L Final   10/28/2023 104 95 - 110 mmol/L Final     CO2   Date Value Ref Range Status   09/10/2024 25 23 - 31 mmol/L Final   10/28/2023 23 23 - 29 mmol/L Final     Glucose   Date Value Ref Range Status   10/28/2023 84 70 - 110 mg/dL Final     BUN   Date Value Ref Range Status   10/28/2023 12 8 - 23 mg/dL Final     Blood Urea Nitrogen   Date Value Ref Range Status   09/10/2024 9.5 (L) 9.8 - 20.1 mg/dL Final     Creatinine   Date Value Ref Range Status   09/10/2024 0.83 0.55 - 1.02 mg/dL Final   10/28/2023 1.0 0.5 - 1.4 mg/dL Final     Calcium   Date Value Ref Range Status   09/10/2024 9.2 8.4 - 10.2 mg/dL Final   10/28/2023 8.2 (L) 8.7 - 10.5 mg/dL Final     Total Protein   Date Value Ref Range Status   10/28/2023 5.0 (L) 6.0 - 8.4 g/dL Final     Albumin   Date Value Ref Range Status   09/10/2024 3.0 (L) 3.4 - 4.8 g/dL Final   10/28/2023 2.9 (L) 3.5 - 5.2 g/dL Final     Total Bilirubin   Date Value Ref Range Status   10/28/2023 7.7 (H) 0.1 - 1.0 mg/dL Final     Comment:     For infants and newborns, interpretation of results should be based  on gestational age, weight and in agreement with clinical  observations.    Premature Infant recommended reference ranges:  Up to 24 hours.............<8.0 mg/dL  Up to 48 hours............<12.0 mg/dL  3-5 days..................<15.0 mg/dL  6-29 days.................<15.0 mg/dL       Bilirubin Total    Date Value Ref Range Status   09/10/2024 1.8 (H) <=1.5 mg/dL Final     Alkaline Phosphatase   Date Value Ref Range Status   10/28/2023 101 55 - 135 U/L Final     ALP   Date Value Ref Range Status   09/10/2024 105 40 - 150 unit/L Final     AST   Date Value Ref Range Status   09/10/2024 34 5 - 34 unit/L Final   10/28/2023 75 (H) 10 - 40 U/L Final     ALT   Date Value Ref Range Status   09/10/2024 16 0 - 55 unit/L Final   10/28/2023 32 10 - 44 U/L Final     Anion Gap   Date Value Ref Range Status   10/28/2023 10 8 - 16 mmol/L Final     eGFR   Date Value Ref Range Status   09/10/2024 >60 mL/min/1.73/m2 Final   10/28/2023 >60.0 >60 mL/min/1.73 m^2 Final     Lab Results   Component Value Date    INR 1.3 (L) 07/01/2024    INR 2.1 (H) 12/07/2023    INR 2.2 (H) 10/28/2023           Anesthesia Plan  Type of Anesthesia, risks & benefits discussed:    Anesthesia Type: Gen Natural Airway  Intra-op Monitoring Plan: Standard ASA Monitors  Post Op Pain Control Plan: IV/PO Opioids PRN  Induction:  IV  Informed Consent: Informed consent signed with the Patient and all parties understand the risks and agree with anesthesia plan.  All questions answered.   ASA Score: 3  Day of Surgery Review of History & Physical: H&P Update referred to the surgeon/provider.    Ready For Surgery From Anesthesia Perspective.     .

## 2024-09-16 NOTE — TRANSFER OF CARE
Anesthesia Transfer of Care Note    Patient: Lauren Ewing    Procedure(s) Performed: Procedure(s) (LRB):  EGD (ESOPHAGOGASTRODUODENOSCOPY) (N/A)    Patient location: GI    Anesthesia Type: general    Post pain: adequate analgesia    Post assessment: no apparent anesthetic complications    Post vital signs: stable    Level of consciousness: awake    Nausea/Vomiting: no nausea/vomiting    Complications: none    Transfer of care protocol was followed      Last vitals

## 2024-09-17 VITALS
WEIGHT: 139 LBS | HEIGHT: 64 IN | SYSTOLIC BLOOD PRESSURE: 156 MMHG | BODY MASS INDEX: 23.73 KG/M2 | DIASTOLIC BLOOD PRESSURE: 94 MMHG | TEMPERATURE: 98 F | HEART RATE: 90 BPM | RESPIRATION RATE: 20 BRPM | OXYGEN SATURATION: 95 %

## 2024-09-30 ENCOUNTER — PATIENT MESSAGE (OUTPATIENT)
Dept: INTERNAL MEDICINE | Facility: CLINIC | Age: 64
End: 2024-09-30
Payer: MEDICAID

## 2024-09-30 ENCOUNTER — HOSPITAL ENCOUNTER (OUTPATIENT)
Dept: RADIOLOGY | Facility: HOSPITAL | Age: 64
Discharge: HOME | End: 2024-09-30
Attending: STUDENT IN AN ORGANIZED HEALTH CARE EDUCATION/TRAINING PROGRAM
Payer: MEDICAID

## 2024-09-30 DIAGNOSIS — K70.31 ALCOHOLIC CIRRHOSIS OF LIVER WITH ASCITES: ICD-10-CM

## 2024-09-30 PROCEDURE — 76705 ECHO EXAM OF ABDOMEN: CPT | Mod: TC

## 2024-10-07 ENCOUNTER — OFFICE VISIT (OUTPATIENT)
Dept: HEPATOLOGY | Facility: CLINIC | Age: 64
End: 2024-10-07
Payer: MEDICAID

## 2024-10-07 VITALS
RESPIRATION RATE: 20 BRPM | BODY MASS INDEX: 23.68 KG/M2 | OXYGEN SATURATION: 99 % | SYSTOLIC BLOOD PRESSURE: 139 MMHG | WEIGHT: 138.69 LBS | DIASTOLIC BLOOD PRESSURE: 85 MMHG | HEART RATE: 108 BPM | HEIGHT: 64 IN

## 2024-10-07 DIAGNOSIS — K70.31 ALCOHOLIC CIRRHOSIS OF LIVER WITH ASCITES: Primary | ICD-10-CM

## 2024-10-07 DIAGNOSIS — K76.82 HEPATIC ENCEPHALOPATHY: ICD-10-CM

## 2024-10-07 PROCEDURE — 3066F NEPHROPATHY DOC TX: CPT | Mod: CPTII,S$GLB,, | Performed by: STUDENT IN AN ORGANIZED HEALTH CARE EDUCATION/TRAINING PROGRAM

## 2024-10-07 PROCEDURE — 3079F DIAST BP 80-89 MM HG: CPT | Mod: CPTII,S$GLB,, | Performed by: STUDENT IN AN ORGANIZED HEALTH CARE EDUCATION/TRAINING PROGRAM

## 2024-10-07 PROCEDURE — 1160F RVW MEDS BY RX/DR IN RCRD: CPT | Mod: CPTII,S$GLB,, | Performed by: STUDENT IN AN ORGANIZED HEALTH CARE EDUCATION/TRAINING PROGRAM

## 2024-10-07 PROCEDURE — 3075F SYST BP GE 130 - 139MM HG: CPT | Mod: CPTII,S$GLB,, | Performed by: STUDENT IN AN ORGANIZED HEALTH CARE EDUCATION/TRAINING PROGRAM

## 2024-10-07 PROCEDURE — 99214 OFFICE O/P EST MOD 30 MIN: CPT | Mod: S$GLB,,, | Performed by: STUDENT IN AN ORGANIZED HEALTH CARE EDUCATION/TRAINING PROGRAM

## 2024-10-07 PROCEDURE — 1159F MED LIST DOCD IN RCRD: CPT | Mod: CPTII,S$GLB,, | Performed by: STUDENT IN AN ORGANIZED HEALTH CARE EDUCATION/TRAINING PROGRAM

## 2024-10-07 PROCEDURE — 3008F BODY MASS INDEX DOCD: CPT | Mod: CPTII,S$GLB,, | Performed by: STUDENT IN AN ORGANIZED HEALTH CARE EDUCATION/TRAINING PROGRAM

## 2024-10-07 NOTE — PROGRESS NOTES
Hepatology Follow Up Note    Referring provider: No ref. provider found  PCP: No, Primary Doctor    Chief complaint: follow up etoh cirrhosis    HPI:  Lauren Ewing is a 64 y.o. female with history of alcohol related cirrhosis who presents for follow up.    She is without complaints today.  No recent hospitalizations or ED visits.  Remains abstinent from alcohol.  Compliant with diuretics without recent abdominal distention and with lactulose without recent encephalopathy.  She denies other signs of decompensated cirrhosis including no recent jaundice or GI bleeding.    Background - Dr. Houser  HPI  I saw this 63 y.o. lady in the liver clinic for follow up.     First seen at [facility] in Oct 2023 and subsequently in liver clinic in Dec 2023.  - decompensation with jaundice, ascites/pleural effusions + HE  - hypontremia  - MELD 30     AFP on 11/1/23= 2.1     Seen by psychiatry at [facility]- modifiably high risk for liver transplant     No SBP  Claims that this was the first time she heard about liver disease.     admission to [facility] a few days after discharge from [facility] with edema/ascites- Nov 13 2023  - because of fever and edema  -mdischarged to rehab- physical therapy  - home yesterday  - can walk unaided      Abdo US: 11/3/2023  - Liver: 15.7 cm  - CBD diameter: 3 mm  - Right kidney: 10.6 cm in length  Impression:  Limited assessment.  Small volume ascites with hepatofugal flow in the main portal vein.     Managed to lose all the fluid  Much better now  No jaundice  No HE        No alcohol since hospital admission in Nov 2023.  - doesn't feel that she needs help  - has previously done rehab  - on acamprosate     Only issue right now is occasional ankle edema  No ascites  No HE    Past Medical History:   Diagnosis Date    DIANA (acute kidney injury)     Hyponatremia     Major depressive disorder, single episode, unspecified     Unspecified cirrhosis of liver        Past Surgical History:   Procedure  Laterality Date    BRAIN SURGERY       SECTION      ESOPHAGOGASTRODUODENOSCOPY N/A 2024    Procedure: EGD (ESOPHAGOGASTRODUODENOSCOPY);  Surgeon: Deb Arteaga MD;  Location: [facility] ENDOSCOPY;  Service: Gastroenterology;  Laterality: N/A;       Family History   Problem Relation Name Age of Onset    Hypertension Mother      Thyroid disease Mother      Liver disease Father      Liver disease Sister      Alcohol abuse Brother      No Known Problems Daughter      No Known Problems Son         Social History     Tobacco Use    Smoking status: Never     Passive exposure: Never    Smokeless tobacco: Never   Substance Use Topics    Alcohol use: Not Currently     Comment: last drink 3 -4 months    Drug use: Never       Current Outpatient Medications   Medication Sig Dispense Refill    acamprosate (CAMPRAL) 333 mg tablet Take 2 tablets (666 mg total) by mouth 3 (three) times daily. (Patient not taking: Reported on 2024) 90 tablet 6    folic acid (FOLVITE) 1 MG tablet Take 1 tablet (1 mg total) by mouth once daily. 90 tablet 3    furosemide (LASIX) 20 MG tablet Take 1 tablet (20 mg total) by mouth once daily. 30 tablet 11    lactulose (CHRONULAC) 10 gram/15 mL solution Take 15 mLs (10 g total) by mouth 2 (two) times daily. Take an extra dose if needed to achieve 2-3 bowel movements per day to prevent confusion 900 mL 11    levothyroxine (SYNTHROID) 25 MCG tablet Take 1 tablet (25 mcg total) by mouth before breakfast. 90 tablet 2    magnesium oxide (MAG-OX) 400 mg (241.3 mg magnesium) tablet Take 1 tablet (400 mg total) by mouth 2 (two) times daily. 180 tablet 2    multivitamin Tab Take 1 tablet by mouth once daily.      omeprazole (PRILOSEC) 40 MG capsule Take 1 capsule (40 mg total) by mouth every morning. 90 capsule 3    ondansetron (ZOFRAN) 4 MG tablet Take 8 mg by mouth every 4 (four) hours as needed for Nausea. (Patient not taking: Reported on 2024)      rOPINIRole (REQUIP) 0.25 MG tablet Take  "1 tablet (0.25 mg total) by mouth every evening. 30 tablet 6    sodium bicarbonate 650 MG tablet Take 1 tablet (650 mg total) by mouth 2 (two) times daily. 180 tablet 3    spironolactone (ALDACTONE) 50 MG tablet Take 1 tablet (50 mg total) by mouth once daily. 30 tablet 11    thiamine 100 MG tablet Take 1 tablet (100 mg total) by mouth once daily. 90 tablet 1     No current facility-administered medications for this visit.       Review of patient's allergies indicates:   Allergen Reactions    Hydrocodone      Nausea..       Review of Systems   Constitutional:  Negative for fever and weight loss.   Cardiovascular:  Negative for leg swelling.   Gastrointestinal:  Negative for abdominal pain, blood in stool, constipation, diarrhea, heartburn, melena, nausea and vomiting.       Vitals:    10/07/24 1121   BP: 139/85   Pulse: 108   Resp: 20   SpO2: 99%   Weight: 62.9 kg (138 lb 11.2 oz)   Height: 5' 4" (1.626 m)         Physical Exam  Constitutional:       General: She is not in acute distress.  Eyes:      General: No scleral icterus.  Cardiovascular:      Rate and Rhythm: Regular rhythm. Tachycardia present.   Pulmonary:      Effort: Pulmonary effort is normal. No respiratory distress.   Abdominal:      General: Bowel sounds are normal. There is no distension.      Palpations: Abdomen is soft.      Tenderness: There is no abdominal tenderness. There is no guarding or rebound.   Musculoskeletal:      Right lower leg: No edema.      Left lower leg: No edema.   Skin:     Coloration: Skin is not jaundiced.         LABS: I personally reviewed pertinent laboratory findings.    Lab Results   Component Value Date    WBC 5.09 09/30/2024    HGB 13.7 09/30/2024    HCT 41.1 09/30/2024    MCV 78.9 (L) 09/30/2024     09/30/2024       Lab Results   Component Value Date     09/30/2024    K 4.2 09/30/2024     09/30/2024    CO2 25 09/30/2024    BUN 6.6 (L) 09/30/2024    CREATININE 1.14 (H) 09/30/2024    CALCIUM 10.1 " 09/30/2024    ANIONGAP 10 10/28/2023    EGFRNONAA >60 02/08/2022       Lab Results   Component Value Date    ALT 25 09/30/2024    AST 52 (H) 09/30/2024    ALKPHOS 115 09/30/2024    BILITOT 2.1 (H) 09/30/2024       Lab Results   Component Value Date    HEPAIGM Nonreactive 10/12/2023    HEPBIGM Nonreactive 10/12/2023    HEPBCAB Nonreactive 07/01/2024    HEPCAB Nonreactive 10/12/2023       Lab Results   Component Value Date    DARIANA Negative 07/01/2024        MELD 3.0: 16 at 7/1/2024 10:37 AM  MELD-Na: 14 at 7/1/2024 10:37 AM  Calculated from:  Serum Creatinine: 1 mg/dL at 7/1/2024 10:37 AM  Serum Sodium: 139 mmol/L (Using max of 137 mmol/L) at 7/1/2024 10:37 AM  Total Bilirubin: 3.1 mg/dL at 7/1/2024 10:37 AM  Serum Albumin: 3.1 g/dL at 7/1/2024 10:37 AM  INR(ratio): 1.3 at 7/1/2024 10:37 AM  Age at listing (hypothetical): 64 years  Sex: Female at 7/1/2024 10:37 AM       IMAGING: I personally reviewed imaging studies.      Assessment:  64 y.o. female with alcohol related cirrhosis. She is decompensated with ascites, HE, jaundice but appears to have re-compensated following alcohol cessation.    1. Alcoholic cirrhosis of liver with ascites    2. Hepatic encephalopathy        Recommendations:  Cirrhosis due to alcohol: Congratulated on alcohol cessation and counseled on continued abstinence.    Ascites/Edema: 2 gm Na diet. Continue Lasix 20mg daily and spironolactone 50mg daily.    Encephalopathy: Continue lactulose. Titrate to maintain 3-4 bowel movements per day.    Transplant candidacy:  Transplant evaluation not warranted given relatively low MELD score and medically controlled decompensations.    Cirrhosis HM  - HCC screening: US in 09/2024 without HCC. AFP 4.1. Repeat abdominal US and AFP every 6 months.    - Variceal screening: EGD 09/2024 without varices. Repeat EGD in 09/2024.    - Immunizations: Recommend HAV and HBV vaccinations if not immune.    Return to clinic in 6 months with labs and US.    I spent a total  of 30 minutes on the day of the visit. This includes face to face time and non-face to face time preparing to see the patient (eg, review of tests), obtaining and/or reviewing separately obtained history, documenting clinical information in the electronic or other health record, independently interpreting results, and communicating results to the patient/family/caregiver, or care coordination.    This note includes dictation done using [employer] speech recognition program. Word recognition mistakes are occasionally missed on review.    Long Appiah MD  Staff Physician  Hepatology and Liver Transplant  [facility]  [facility] [place]

## 2024-11-10 ENCOUNTER — HOSPITAL ENCOUNTER (INPATIENT)
Facility: HOSPITAL | Age: 64
LOS: 15 days | Discharge: NURSING, REHAB OR LONG-TERM CARE | DRG: 432 | End: 2024-11-26
Attending: STUDENT IN AN ORGANIZED HEALTH CARE EDUCATION/TRAINING PROGRAM | Admitting: INTERNAL MEDICINE
Payer: MEDICAID

## 2024-11-10 DIAGNOSIS — K76.82 HEPATIC ENCEPHALOPATHY: ICD-10-CM

## 2024-11-10 DIAGNOSIS — R53.1 GENERALIZED WEAKNESS: ICD-10-CM

## 2024-11-10 DIAGNOSIS — K72.90 DECOMPENSATED CIRRHOSIS: Primary | ICD-10-CM

## 2024-11-10 DIAGNOSIS — K74.60 DECOMPENSATED CIRRHOSIS: Primary | ICD-10-CM

## 2024-11-10 LAB
ALBUMIN SERPL-MCNC: 3.5 G/DL (ref 3.4–4.8)
ALBUMIN/GLOB SERPL: 0.9 RATIO (ref 1.1–2)
ALP SERPL-CCNC: 101 UNIT/L (ref 40–150)
ALT SERPL-CCNC: 18 UNIT/L (ref 0–55)
AMMONIA PLAS-MSCNC: 160 UMOL/L (ref 18–72)
ANION GAP SERPL CALC-SCNC: 9 MEQ/L
AST SERPL-CCNC: 33 UNIT/L (ref 5–34)
BACTERIA #/AREA URNS AUTO: ABNORMAL /HPF
BASOPHILS # BLD AUTO: 0.08 X10(3)/MCL
BASOPHILS NFR BLD AUTO: 1.3 %
BILIRUB SERPL-MCNC: 1.4 MG/DL
BILIRUB UR QL STRIP.AUTO: NEGATIVE
BUN SERPL-MCNC: 11.8 MG/DL (ref 9.8–20.1)
CALCIUM SERPL-MCNC: 9.1 MG/DL (ref 8.4–10.2)
CHLORIDE SERPL-SCNC: 112 MMOL/L (ref 98–107)
CLARITY UR: CLEAR
CO2 SERPL-SCNC: 18 MMOL/L (ref 23–31)
COLOR UR AUTO: ABNORMAL
CREAT SERPL-MCNC: 0.76 MG/DL (ref 0.55–1.02)
CREAT/UREA NIT SERPL: 16
EOSINOPHIL # BLD AUTO: 0.08 X10(3)/MCL (ref 0–0.9)
EOSINOPHIL NFR BLD AUTO: 1.3 %
ERYTHROCYTE [DISTWIDTH] IN BLOOD BY AUTOMATED COUNT: 17.8 % (ref 11.5–17)
GFR SERPLBLD CREATININE-BSD FMLA CKD-EPI: >60 ML/MIN/1.73/M2
GLOBULIN SER-MCNC: 4.1 GM/DL (ref 2.4–3.5)
GLUCOSE SERPL-MCNC: 118 MG/DL (ref 82–115)
GLUCOSE UR QL STRIP: NORMAL
HCT VFR BLD AUTO: 37.6 % (ref 37–47)
HGB BLD-MCNC: 12.5 G/DL (ref 12–16)
HGB UR QL STRIP: NEGATIVE
IMM GRANULOCYTES # BLD AUTO: 0.03 X10(3)/MCL (ref 0–0.04)
IMM GRANULOCYTES NFR BLD AUTO: 0.5 %
KETONES UR QL STRIP: NEGATIVE
LEUKOCYTE ESTERASE UR QL STRIP: NEGATIVE
LYMPHOCYTES # BLD AUTO: 1.38 X10(3)/MCL (ref 0.6–4.6)
LYMPHOCYTES NFR BLD AUTO: 22.3 %
MAGNESIUM SERPL-MCNC: 2.1 MG/DL (ref 1.6–2.6)
MCH RBC QN AUTO: 26.8 PG (ref 27–31)
MCHC RBC AUTO-ENTMCNC: 33.2 G/DL (ref 33–36)
MCV RBC AUTO: 80.7 FL (ref 80–94)
MONOCYTES # BLD AUTO: 1 X10(3)/MCL (ref 0.1–1.3)
MONOCYTES NFR BLD AUTO: 16.2 %
MUCOUS THREADS URNS QL MICRO: ABNORMAL /LPF
NEUTROPHILS # BLD AUTO: 3.61 X10(3)/MCL (ref 2.1–9.2)
NEUTROPHILS NFR BLD AUTO: 58.4 %
NITRITE UR QL STRIP: ABNORMAL
NRBC BLD AUTO-RTO: 0 %
OHS QRS DURATION: 82 MS
OHS QTC CALCULATION: 518 MS
PH UR STRIP: 7 [PH]
PLATELET # BLD AUTO: 201 X10(3)/MCL (ref 130–400)
PMV BLD AUTO: 8.8 FL (ref 7.4–10.4)
POTASSIUM SERPL-SCNC: 4 MMOL/L (ref 3.5–5.1)
PROT SERPL-MCNC: 7.6 GM/DL (ref 5.8–7.6)
PROT UR QL STRIP: NEGATIVE
RBC # BLD AUTO: 4.66 X10(6)/MCL (ref 4.2–5.4)
RBC #/AREA URNS AUTO: ABNORMAL /HPF
SODIUM SERPL-SCNC: 139 MMOL/L (ref 136–145)
SP GR UR STRIP.AUTO: 1.01 (ref 1–1.03)
SQUAMOUS #/AREA URNS LPF: ABNORMAL /HPF
TROPONIN I SERPL-MCNC: 0.02 NG/ML (ref 0–0.04)
UROBILINOGEN UR STRIP-ACNC: NORMAL
WBC # BLD AUTO: 6.18 X10(3)/MCL (ref 4.5–11.5)
WBC #/AREA URNS AUTO: ABNORMAL /HPF

## 2024-11-10 PROCEDURE — 25000003 PHARM REV CODE 250: Performed by: PHYSICIAN ASSISTANT

## 2024-11-10 PROCEDURE — 11000001 HC ACUTE MED/SURG PRIVATE ROOM

## 2024-11-10 PROCEDURE — 99285 EMERGENCY DEPT VISIT HI MDM: CPT | Mod: 25

## 2024-11-10 PROCEDURE — 85025 COMPLETE CBC W/AUTO DIFF WBC: CPT

## 2024-11-10 PROCEDURE — 63600175 PHARM REV CODE 636 W HCPCS: Performed by: PHYSICIAN ASSISTANT

## 2024-11-10 PROCEDURE — 82140 ASSAY OF AMMONIA: CPT

## 2024-11-10 PROCEDURE — 83735 ASSAY OF MAGNESIUM: CPT

## 2024-11-10 PROCEDURE — 93010 ELECTROCARDIOGRAM REPORT: CPT | Mod: ,,, | Performed by: INTERNAL MEDICINE

## 2024-11-10 PROCEDURE — 81001 URINALYSIS AUTO W/SCOPE: CPT

## 2024-11-10 PROCEDURE — 25000003 PHARM REV CODE 250: Performed by: INTERNAL MEDICINE

## 2024-11-10 PROCEDURE — 80053 COMPREHEN METABOLIC PANEL: CPT

## 2024-11-10 PROCEDURE — 84484 ASSAY OF TROPONIN QUANT: CPT

## 2024-11-10 PROCEDURE — 93005 ELECTROCARDIOGRAM TRACING: CPT

## 2024-11-10 PROCEDURE — 96374 THER/PROPH/DIAG INJ IV PUSH: CPT

## 2024-11-10 RX ORDER — PANTOPRAZOLE SODIUM 40 MG/1
40 TABLET, DELAYED RELEASE ORAL DAILY
Status: DISCONTINUED | OUTPATIENT
Start: 2024-11-11 | End: 2024-11-26 | Stop reason: HOSPADM

## 2024-11-10 RX ORDER — LACTULOSE 10 G/15ML
30 SOLUTION ORAL 3 TIMES DAILY
Status: DISCONTINUED | OUTPATIENT
Start: 2024-11-10 | End: 2024-11-18

## 2024-11-10 RX ORDER — SPIRONOLACTONE 25 MG/1
50 TABLET ORAL DAILY
Status: DISCONTINUED | OUTPATIENT
Start: 2024-11-11 | End: 2024-11-26 | Stop reason: HOSPADM

## 2024-11-10 RX ORDER — HYDRALAZINE HYDROCHLORIDE 20 MG/ML
10 INJECTION INTRAMUSCULAR; INTRAVENOUS EVERY 4 HOURS PRN
Status: DISCONTINUED | OUTPATIENT
Start: 2024-11-10 | End: 2024-11-26 | Stop reason: HOSPADM

## 2024-11-10 RX ORDER — FUROSEMIDE 20 MG/1
20 TABLET ORAL DAILY
Status: DISCONTINUED | OUTPATIENT
Start: 2024-11-11 | End: 2024-11-26 | Stop reason: HOSPADM

## 2024-11-10 RX ORDER — LEVOTHYROXINE SODIUM 25 UG/1
25 TABLET ORAL
Status: DISCONTINUED | OUTPATIENT
Start: 2024-11-11 | End: 2024-11-26 | Stop reason: HOSPADM

## 2024-11-10 RX ORDER — SODIUM BICARBONATE 650 MG/1
650 TABLET ORAL 2 TIMES DAILY
Status: DISCONTINUED | OUTPATIENT
Start: 2024-11-10 | End: 2024-11-26 | Stop reason: HOSPADM

## 2024-11-10 RX ORDER — ROPINIROLE 0.25 MG/1
0.25 TABLET, FILM COATED ORAL NIGHTLY
Status: DISCONTINUED | OUTPATIENT
Start: 2024-11-10 | End: 2024-11-26 | Stop reason: HOSPADM

## 2024-11-10 RX ORDER — THIAMINE HCL 100 MG
100 TABLET ORAL DAILY
Status: DISCONTINUED | OUTPATIENT
Start: 2024-11-11 | End: 2024-11-26 | Stop reason: HOSPADM

## 2024-11-10 RX ORDER — LANOLIN ALCOHOL/MO/W.PET/CERES
400 CREAM (GRAM) TOPICAL 2 TIMES DAILY
Status: DISCONTINUED | OUTPATIENT
Start: 2024-11-10 | End: 2024-11-26 | Stop reason: HOSPADM

## 2024-11-10 RX ORDER — FOLIC ACID 1 MG/1
1 TABLET ORAL DAILY
Status: DISCONTINUED | OUTPATIENT
Start: 2024-11-11 | End: 2024-11-26 | Stop reason: HOSPADM

## 2024-11-10 RX ORDER — ONDANSETRON HYDROCHLORIDE 2 MG/ML
4 INJECTION, SOLUTION INTRAVENOUS EVERY 6 HOURS PRN
Status: DISCONTINUED | OUTPATIENT
Start: 2024-11-10 | End: 2024-11-26 | Stop reason: HOSPADM

## 2024-11-10 RX ADMIN — LACTULOSE 30 G: 10 SOLUTION ORAL at 02:11

## 2024-11-10 RX ADMIN — ROPINIROLE HYDROCHLORIDE 0.25 MG: 0.25 TABLET, FILM COATED ORAL at 08:11

## 2024-11-10 RX ADMIN — SODIUM BICARBONATE 650 MG TABLET 650 MG: at 08:11

## 2024-11-10 RX ADMIN — LACTULOSE 30 G: 10 SOLUTION ORAL at 08:11

## 2024-11-10 RX ADMIN — HYDRALAZINE HYDROCHLORIDE 10 MG: 20 INJECTION INTRAMUSCULAR; INTRAVENOUS at 02:11

## 2024-11-10 RX ADMIN — Medication 400 MG: at 08:11

## 2024-11-10 RX ADMIN — RIFAXIMIN 550 MG: 550 TABLET ORAL at 08:11

## 2024-11-10 NOTE — H&P
[facility]  Hospital Medicine History & Physical Examination       Patient Name: Lauren Ewing  MRN: [id number]  Patient Class: IP- Inpatient   Admission Date: 11/10/2024   Admitting Physician: Charlie Jenkins MD   Length of Stay: 0  Attending Physician: Charlie Jenkins MD   Primary Care Provider: Phuong Yañez FNP  Face-to-Face encounter date: 11/10/2024  Code Status: Full Code   Chief Complaint: Altered Mental Status (Pt arrives POV c/o weakness, difficulty walking, confusion since 3 days ago. Pt GCS 14 in triage, slurring words, per son has been going on for 3 days. Hx cirrhosis, 2 brain surgeries in 2018 and 2020.)        Patient information was obtained from patient, patient's family, past medical records and ER records.     HISTORY OF PRESENT ILLNESS:   Lauren Ewing is a 64 y.o. White female with a past medical history of alcoholic cirrhosis and hypothyroidism. The patient presented to [facility] on 11/10/2024 with a primary complaint of confusion and generalized weakness which began on 11/07/2024.  She denies complaints of headache, chest pain, shortness of breath, abdominal pain, vomiting and diarrhea.  Patient and son who is at bedside reports patient is no longer drinking alcohol.  She reports she is prescribed lactulose once daily which she was compliant with.    Upon presentation to the ED, temperature 98.2° F, heart rate 117, blood pressure 174/100, respiratory rate 20 and SpO2 98% on 2 L nasal cannula.  Labs with CO2 18, ammonia 160, troponin 0.017.  EKG sinus tachycardia with ventricular rate of 113.  CTA of the head stable chronic findings but no acute process.  Chest x-ray no acute abnormality.  Patient is admitted to hospital medicine services for further medical management.    PAST MEDICAL HISTORY:     Past Medical History:   Diagnosis Date    DIANA (acute kidney injury)     Hyponatremia     Major depressive disorder, single episode, unspecified     Unspecified  cirrhosis of liver        PAST SURGICAL HISTORY:     Past Surgical History:   Procedure Laterality Date    BRAIN SURGERY       SECTION      ESOPHAGOGASTRODUODENOSCOPY N/A 2024    Procedure: EGD (ESOPHAGOGASTRODUODENOSCOPY);  Surgeon: Deb Arteaga MD;  Location: [facility] ENDOSCOPY;  Service: Gastroenterology;  Laterality: N/A;       ALLERGIES:   Hydrocodone    FAMILY HISTORY:   Father: Cancer, type unknown    SOCIAL HISTORY:     Social History     Tobacco Use    Smoking status: Never     Passive exposure: Never    Smokeless tobacco: Never   Substance Use Topics    Alcohol use: Not Currently     Comment: last drink 3 -4 months     Denies tobacco and illicit drug use   Former alcohol user who quit 3 months ago     Screening for Social Drivers for health:  Patient screened for food insecurity, housing instability, transportation needs, utility difficulties, and interpersonal safety (select all that apply as identified as concern)  []Housing or Food  []Transportation Needs  []Utility Difficulties  []Interpersonal safety  [x]None    HOME MEDICATIONS:     Prior to Admission medications    Medication Sig Start Date End Date Taking? Authorizing Provider   folic acid (FOLVITE) 1 MG tablet Take 1 tablet (1 mg total) by mouth once daily. 10/28/23 11/10/24 Yes Ceci Martinez MD   furosemide (LASIX) 20 MG tablet Take 1 tablet (20 mg total) by mouth once daily. 24 Yes Long Appiah MD   levothyroxine (SYNTHROID) 25 MCG tablet Take 1 tablet (25 mcg total) by mouth before breakfast. 24  Yes Candi Suero FNP   magnesium oxide (MAG-OX) 400 mg (241.3 mg magnesium) tablet Take 1 tablet (400 mg total) by mouth 2 (two) times daily. 24  Yes Candi Suero FNP   multivitamin Tab Take 1 tablet by mouth once daily. 10/26/23  Yes Emre Kerr MD   omeprazole (PRILOSEC) 40 MG capsule Take 1 capsule (40 mg total) by mouth every morning. 24 Yes Deb Arteaga MD    rOPINIRole (REQUIP) 0.25 MG tablet Take 1 tablet (0.25 mg total) by mouth every evening. 4/29/24  Yes Candi Suero FNP   sodium bicarbonate 650 MG tablet Take 1 tablet (650 mg total) by mouth 2 (two) times daily. 5/10/24 5/10/25 Yes Joel Carrillo FNP   spironolactone (ALDACTONE) 50 MG tablet Take 1 tablet (50 mg total) by mouth once daily. 7/1/24 7/1/25 Yes Long Appiah MD   acamprosate (CAMPRAL) 333 mg tablet Take 2 tablets (666 mg total) by mouth 3 (three) times daily.  Patient not taking: Reported on 9/13/2024 4/29/24   Candi Suero FNP   ondansetron (ZOFRAN) 4 MG tablet Take 8 mg by mouth every 4 (four) hours as needed for Nausea.    Provider, Historical   thiamine 100 MG tablet Take 1 tablet (100 mg total) by mouth once daily. 1/12/24   Candi Suero FNP       REVIEW OF SYSTEMS:   Except as documented, all other systems reviewed and negative     PHYSICAL EXAM:     VITAL SIGNS: 24 HRS MIN & MAX LAST   Temp  Min: 98.2 °F (36.8 °C)  Max: 98.2 °F (36.8 °C) 98.2 °F (36.8 °C)   BP  Min: 155/81  Max: 181/95 (!) 155/81   Pulse  Min: 103  Max: 117  (!) 114   Resp  Min: 15  Max: 24 19   SpO2  Min: 95 %  Max: 98 % 95 %       General appearance: Well-developed, well-nourished female in no apparent distress.  Son at bedside.  HEENT: Atraumatic head. Moist mucous membranes of oral cavity.  Lungs: Clear to auscultation bilaterally.   Heart: Regular rate and rhythm.   Abdomen: Soft, non-distended, non-tender. Bowel sounds are normal.   Extremities: No cyanosis, clubbing. No deformities.  Skin: No Rash. Warm and dry.  Neuro: Awake, alert and oriented place, city, month, not oriented to year. Motor and sensory exams grossly intact.  Psych/mental status: Appropriate mood and affect. Cooperative. Responds appropriately to questions.       LABS AND IMAGING:     Recent Labs   Lab 11/10/24  1235   WBC 6.18   RBC 4.66   HGB 12.5   HCT 37.6   MCV 80.7   MCH 26.8*   MCHC 33.2   RDW 17.8*      MPV 8.8        Recent Labs   Lab 11/10/24  1235      K 4.0   *   CO2 18*   BUN 11.8   CREATININE 0.76   CALCIUM 9.1   MG 2.10   ALBUMIN 3.5   ALKPHOS 101   ALT 18   AST 33   BILITOT 1.4       Microbiology Results (last 7 days)       ** No results found for the last 168 hours. **             X-Ray Chest 1 View  Narrative: EXAMINATION:  XR CHEST 1 VIEW    CLINICAL HISTORY:  generalized weakness, AMS;    TECHNIQUE:  Single frontal view of the chest was performed.    COMPARISON:  11/01/2023    FINDINGS:  The lungs are clear.  The heart is normal appearance.  The pulmonary vascularity is unremarkable.  Aorta appears grossly unremarkable.  No pleural effusions are seen.  Bones and joints show no acute abnormality.  Impression: No abnormality seen    Electronically signed by: Johann Linares  Date:    11/10/2024  Time:    12:38  CT Head Without Contrast  Narrative: EXAMINATION:  CT HEAD WITHOUT CONTRAST    CLINICAL HISTORY:  Mental status change, unknown cause;    TECHNIQUE:  Multiple axial images were obtained from the base of the brain to the vertex without contrast administration.  Sagittal and coronal reconstructions were performed..Automatic exposure control is utilized to reduce patient radiation exposure.    COMPARISON:  10/12/2023    FINDINGS:  There is no intracranial mass or lesion seen.  No hemorrhage is seen.  No acute infarct is seen.  The patient is status post craniotomy in the left parietal region.  There is some stable dural thickening on the left side.  There is some cerebral atrophy seen.  There is some compensatory ventricular dilatation and periventricular white matter changes consistent with the patient's age.  Calvarium is intact.  The posterior fossa is unremarkable..  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  Impression: Stable chronic findings seen no acute process    Electronically  signed by: Johann Linares  Date:    11/10/2024  Time:    12:35        ASSESSMENT & PLAN:   Assessment:  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  Metabolic acidosis   Hypothyroidism    Plan:  - Lactulose 3 times a day  - Physical therapy wants patient was back at baseline  - Resume appropriate home medications when deemed necessary   - Labs in AM      VTE Prophylaxis: will be placed on SCD for DVT prophylaxis and will be advised to be as mobile as possible and sit in a chair as tolerated      __________________________________________________________________________  INPATIENT LIST OF MEDICATIONS     Current Facility-Administered Medications:     hydrALAZINE injection 10 mg, 10 mg, Intravenous, Q4H PRN, Vivien Knox PA-CALVIN, 10 mg at 11/10/24 1443    lactulose 10 gram/15 ml solution 30 g, 30 g, Oral, TID, Vivien Knox PA-C, 30 g at 11/10/24 1443    ondansetron injection 4 mg, 4 mg, Intravenous, Q6H PRN, Vivien Knox PA-C    Current Outpatient Medications:     folic acid (FOLVITE) 1 MG tablet, Take 1 tablet (1 mg total) by mouth once daily., Disp: 90 tablet, Rfl: 3    furosemide (LASIX) 20 MG tablet, Take 1 tablet (20 mg total) by mouth once daily., Disp: 30 tablet, Rfl: 11    levothyroxine (SYNTHROID) 25 MCG tablet, Take 1 tablet (25 mcg total) by mouth before breakfast., Disp: 90 tablet, Rfl: 2    magnesium oxide (MAG-OX) 400 mg (241.3 mg magnesium) tablet, Take 1 tablet (400 mg total) by mouth 2 (two) times daily., Disp: 180 tablet, Rfl: 2    multivitamin Tab, Take 1 tablet by mouth once daily., Disp: , Rfl:     omeprazole (PRILOSEC) 40 MG capsule, Take 1 capsule (40 mg total) by mouth every morning., Disp: 90 capsule, Rfl: 3    rOPINIRole (REQUIP) 0.25 MG tablet, Take 1 tablet (0.25 mg total) by mouth every evening., Disp: 30 tablet, Rfl: 6    sodium bicarbonate 650 MG tablet, Take 1 tablet (650 mg total) by mouth 2 (two) times daily., Disp: 180 tablet, Rfl: 3    spironolactone  (ALDACTONE) 50 MG tablet, Take 1 tablet (50 mg total) by mouth once daily., Disp: 30 tablet, Rfl: 11    acamprosate (CAMPRAL) 333 mg tablet, Take 2 tablets (666 mg total) by mouth 3 (three) times daily. (Patient not taking: Reported on 9/13/2024), Disp: 90 tablet, Rfl: 6    ondansetron (ZOFRAN) 4 MG tablet, Take 8 mg by mouth every 4 (four) hours as needed for Nausea., Disp: , Rfl:     thiamine 100 MG tablet, Take 1 tablet (100 mg total) by mouth once daily., Disp: 90 tablet, Rfl: 1      Scheduled Meds:   lactulose 10 gram/15 ml  30 g Oral TID     Continuous Infusions:  PRN Meds:.  Current Facility-Administered Medications:     hydrALAZINE, 10 mg, Intravenous, Q4H PRN    ondansetron, 4 mg, Intravenous, Q6H PRN      Discharge Planning and Disposition: Anticipated discharge to be determined.    IVivien PA, have reviewed and discussed the case with Dr. Charlie Jenkins MD    Please see the following addendum for further assessment and plan from there attending MD.      Portion of this note is dictated using EMR integrated voice recognition software and may be subjected to voice recognition errors not corrected with proofreading. Please  for clarification if needed.       Vivien Knox PA-C  11/10/2024

## 2024-11-10 NOTE — FIRST PROVIDER EVALUATION
"Medical screening examination initiated.  I have conducted a focused provider triage encounter, findings are as follows:    Brief history of present illness:  64 year old female presents to ER with c/o generalized weakness and confusion x 3 days. Son reports difficulty ambulating. Son reports history of cirrhosis and brain surgery x 2.     Vitals:    11/10/24 1153   BP: (!) 174/100   Pulse: (!) 117   Resp: 20   Temp: 98.2 °F (36.8 °C)   TempSrc: Oral   SpO2: 98%   Weight: 63.5 kg (140 lb)   Height: 5' 4" (1.626 m)       Pertinent physical exam:  awake and alert, NAD    Brief workup plan:  Labs, EKG, imaging     Preliminary workup initiated; this workup will be continued and followed by the physician or advanced practice provider that is assigned to the patient when roomed.  "

## 2024-11-10 NOTE — Clinical Note
Diagnosis: Generalized weakness [794661]   Admit to which facility:: [facility] [[id number]]   Reason for IP Medical Treatment  (Clinical interventions that can only be accomplished in the IP setting? ) :: hepatic enceph   Plans for Post-Acute care--if anticipated (pick the single best option):: I. Nursing Home (assisted)

## 2024-11-10 NOTE — ED PROVIDER NOTES
Encounter Date: 11/10/2024       History     Chief Complaint   Patient presents with    Altered Mental Status     Pt arrives POV c/o weakness, difficulty walking, confusion since 3 days ago. Pt GCS 14 in triage, slurring words, per son has been going on for 3 days. Hx cirrhosis, 2 brain surgeries in  and .     The history is provided by the patient and a relative.       63 yo F with hx of alcohol cirrhosis presents with complaints of weakness onset days ago.  Patient increasing amount of confusion difficulty walking.  Her son with her and states that she has she has been increasingly weak.  He also notes that the patient has had abnormal facial movements and dizziness  There are no recent sicknesses.  No recent fever, chills, nausea, vomiting, diarrhea.  Patient states that she has had no trouble her bowels lately denies any dysuria urinary frequency    Review of patient's allergies indicates:   Allergen Reactions    Hydrocodone      Nausea..     Past Medical History:   Diagnosis Date    DIANA (acute kidney injury)     Hyponatremia     Major depressive disorder, single episode, unspecified     Unspecified cirrhosis of liver      Past Surgical History:   Procedure Laterality Date    BRAIN SURGERY       SECTION      ESOPHAGOGASTRODUODENOSCOPY N/A 2024    Procedure: EGD (ESOPHAGOGASTRODUODENOSCOPY);  Surgeon: Deb Arteaga MD;  Location: [facility] ENDOSCOPY;  Service: Gastroenterology;  Laterality: N/A;     Family History   Problem Relation Name Age of Onset    Hypertension Mother      Thyroid disease Mother      Liver disease Father      Liver disease Sister      Alcohol abuse Brother      No Known Problems Daughter      No Known Problems Son       Social History     Tobacco Use    Smoking status: Never     Passive exposure: Never    Smokeless tobacco: Never   Substance Use Topics    Alcohol use: Not Currently     Comment: last drink 3 -4 months    Drug use: Never     Review of Systems    Constitutional:  Negative for fever.   HENT:  Negative for sore throat.    Respiratory:  Negative for shortness of breath.    Cardiovascular:  Negative for chest pain.   Gastrointestinal:  Negative for abdominal distention, abdominal pain, constipation, diarrhea and nausea.        Hepatomegaly   Genitourinary:  Negative for dysuria and hematuria.   Musculoskeletal:  Negative for back pain.   Skin:  Negative for rash.   Neurological:  Positive for dizziness and weakness.   Hematological:  Does not bruise/bleed easily.   Psychiatric/Behavioral:  Positive for confusion. Negative for agitation.        Physical Exam     Initial Vitals [11/10/24 1153]   BP Pulse Resp Temp SpO2   (!) 174/100 (!) 117 20 98.2 °F (36.8 °C) 98 %      MAP       --         Physical Exam    Vitals reviewed.  Constitutional: She appears well-developed.   Cardiovascular:  Normal rate and regular rhythm.           Pulmonary/Chest: No respiratory distress.   Abdominal: She exhibits no distension. There is no abdominal tenderness.   hepatomegaly There is no rebound.     Neurological: She is alert.   Oriented to person place time and situation.  Cranial nerves intact.  Mild asterixis. Unable to perform finger to nose . Positive dysdiadochokinesia   Skin: Skin is warm and dry. No rash noted.       ED Course   Procedures  Labs Reviewed   COMPREHENSIVE METABOLIC PANEL - Abnormal       Result Value    Sodium 139      Potassium 4.0      Chloride 112 (*)     CO2 18 (*)     Glucose 118 (*)     Blood Urea Nitrogen 11.8      Creatinine 0.76      Calcium 9.1      Protein Total 7.6      Albumin 3.5      Globulin 4.1 (*)     Albumin/Globulin Ratio 0.9 (*)     Bilirubin Total 1.4            ALT 18      AST 33      eGFR >60      Anion Gap 9.0      BUN/Creatinine Ratio 16     CBC WITH DIFFERENTIAL - Abnormal    WBC 6.18      RBC 4.66      Hgb 12.5      Hct 37.6      MCV 80.7      MCH 26.8 (*)     MCHC 33.2      RDW 17.8 (*)     Platelet 201      MPV 8.8       Neut % 58.4      Lymph % 22.3      Mono % 16.2      Eos % 1.3      Basophil % 1.3      Lymph # 1.38      Neut # 3.61      Mono # 1.00      Eos # 0.08      Baso # 0.08      IG# 0.03      IG% 0.5      NRBC% 0.0     AMMONIA - Abnormal    Ammonia Level 160.0 (*)    MAGNESIUM - Normal    Magnesium Level 2.10     TROPONIN I - Normal    Troponin-I 0.017     CBC W/ AUTO DIFFERENTIAL    Narrative:     The following orders were created for panel order CBC auto differential.  Procedure                               Abnormality         Status                     ---------                               -----------         ------                     CBC with Differential[[id number]]       Abnormal            Final result                 Please view results for these tests on the individual orders.   URINALYSIS, REFLEX TO URINE CULTURE          Imaging Results              X-Ray Chest 1 View (Final result)  Result time 11/10/24 12:38:36      Final result by Dariana Linares MD (11/10/24 12:38:36)                   Impression:      No abnormality seen      Electronically signed by: Johann Linares  Date:    11/10/2024  Time:    12:38               Narrative:    EXAMINATION:  XR CHEST 1 VIEW    CLINICAL HISTORY:  generalized weakness, AMS;    TECHNIQUE:  Single frontal view of the chest was performed.    COMPARISON:  11/01/2023    FINDINGS:  The lungs are clear.  The heart is normal appearance.  The pulmonary vascularity is unremarkable.  Aorta appears grossly unremarkable.  No pleural effusions are seen.  Bones and joints show no acute abnormality.                                       CT Head Without Contrast (Final result)  Result time 11/10/24 12:35:45      Final result by Dariana Linares MD (11/10/24 12:35:45)                   Impression:      Stable chronic findings seen no acute process      Electronically signed by: Johann Linares  Date:    11/10/2024  Time:    12:35               Narrative:    EXAMINATION:  CT  HEAD WITHOUT CONTRAST    CLINICAL HISTORY:  Mental status change, unknown cause;    TECHNIQUE:  Multiple axial images were obtained from the base of the brain to the vertex without contrast administration.  Sagittal and coronal reconstructions were performed..Automatic exposure control is utilized to reduce patient radiation exposure.    COMPARISON:  10/12/2023    FINDINGS:  There is no intracranial mass or lesion seen.  No hemorrhage is seen.  No acute infarct is seen.  The patient is status post craniotomy in the left parietal region.  There is some stable dural thickening on the left side.  There is some cerebral atrophy seen.  There is some compensatory ventricular dilatation and periventricular white matter changes consistent with the patient's age.  Calvarium is intact.  The posterior fossa is unremarkable..  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                                       Medications   lactulose 10 gram/15 ml solution 30 g (30 g Oral Given 11/10/24 1443)   hydrALAZINE injection 10 mg (10 mg Intravenous Given 11/10/24 1443)   ondansetron injection 4 mg (has no administration in time range)     Medical Decision Making  Amount and/or Complexity of Data Reviewed  External Data Reviewed: labs.  Labs: ordered. Decision-making details documented in ED Course.  Radiology: ordered.    Risk  Decision regarding hospitalization.               ED Course as of 11/10/24 1445   Sun Nov 10, 2024   1341 Ammonia(!): 160.0 []   1342 Ammonia(!): 160.0 [AH]   1410 Labs concerning for hepatic encephalopathy / decompensated cirrhosis  []   1435 Discussed case with Hospitalist. Pt will be admitted to Dr. Jenkins. Will give pt lactulose and hydralazine while in the ED. Case management consult placed at the requests of Hospitalist  []      ED Course User Index  [] Joel Montes MD                              Clinical Impression:  Final diagnoses:  [R53.1] Generalized weakness  [K72.90, K74.60] Decompensated cirrhosis (Primary)  [K76.82] Hepatic encephalopathy          ED Disposition Condition    Admit Stable                oJel Montes MD  Resident  11/10/24 8720

## 2024-11-11 LAB
ALBUMIN SERPL-MCNC: 3.2 G/DL (ref 3.4–4.8)
ALBUMIN/GLOB SERPL: 1 RATIO (ref 1.1–2)
ALP SERPL-CCNC: 88 UNIT/L (ref 40–150)
ALT SERPL-CCNC: 14 UNIT/L (ref 0–55)
ANION GAP SERPL CALC-SCNC: 8 MEQ/L
AST SERPL-CCNC: 34 UNIT/L (ref 5–34)
BASOPHILS # BLD AUTO: 0.08 X10(3)/MCL
BASOPHILS NFR BLD AUTO: 1.1 %
BILIRUB SERPL-MCNC: 1.5 MG/DL
BUN SERPL-MCNC: 9.4 MG/DL (ref 9.8–20.1)
CALCIUM SERPL-MCNC: 8.5 MG/DL (ref 8.4–10.2)
CHLORIDE SERPL-SCNC: 117 MMOL/L (ref 98–107)
CO2 SERPL-SCNC: 17 MMOL/L (ref 23–31)
CREAT SERPL-MCNC: 0.76 MG/DL (ref 0.55–1.02)
CREAT/UREA NIT SERPL: 12
EOSINOPHIL # BLD AUTO: 0.19 X10(3)/MCL (ref 0–0.9)
EOSINOPHIL NFR BLD AUTO: 2.7 %
ERYTHROCYTE [DISTWIDTH] IN BLOOD BY AUTOMATED COUNT: 18.1 % (ref 11.5–17)
GFR SERPLBLD CREATININE-BSD FMLA CKD-EPI: >60 ML/MIN/1.73/M2
GLOBULIN SER-MCNC: 3.2 GM/DL (ref 2.4–3.5)
GLUCOSE SERPL-MCNC: 75 MG/DL (ref 82–115)
HCT VFR BLD AUTO: 34.1 % (ref 37–47)
HGB BLD-MCNC: 11.5 G/DL (ref 12–16)
IMM GRANULOCYTES # BLD AUTO: 0.01 X10(3)/MCL (ref 0–0.04)
IMM GRANULOCYTES NFR BLD AUTO: 0.1 %
LYMPHOCYTES # BLD AUTO: 2.45 X10(3)/MCL (ref 0.6–4.6)
LYMPHOCYTES NFR BLD AUTO: 34.5 %
MCH RBC QN AUTO: 27.3 PG (ref 27–31)
MCHC RBC AUTO-ENTMCNC: 33.7 G/DL (ref 33–36)
MCV RBC AUTO: 80.8 FL (ref 80–94)
MONOCYTES # BLD AUTO: 1.65 X10(3)/MCL (ref 0.1–1.3)
MONOCYTES NFR BLD AUTO: 23.2 %
NEUTROPHILS # BLD AUTO: 2.73 X10(3)/MCL (ref 2.1–9.2)
NEUTROPHILS NFR BLD AUTO: 38.4 %
NRBC BLD AUTO-RTO: 0 %
PLATELET # BLD AUTO: 193 X10(3)/MCL (ref 130–400)
PMV BLD AUTO: 9.5 FL (ref 7.4–10.4)
POTASSIUM SERPL-SCNC: 3.9 MMOL/L (ref 3.5–5.1)
PROT SERPL-MCNC: 6.4 GM/DL (ref 5.8–7.6)
RBC # BLD AUTO: 4.22 X10(6)/MCL (ref 4.2–5.4)
SODIUM SERPL-SCNC: 142 MMOL/L (ref 136–145)
WBC # BLD AUTO: 7.11 X10(3)/MCL (ref 4.5–11.5)

## 2024-11-11 PROCEDURE — 80053 COMPREHEN METABOLIC PANEL: CPT | Performed by: PHYSICIAN ASSISTANT

## 2024-11-11 PROCEDURE — 25000003 PHARM REV CODE 250: Performed by: INTERNAL MEDICINE

## 2024-11-11 PROCEDURE — 25000003 PHARM REV CODE 250: Performed by: PHYSICIAN ASSISTANT

## 2024-11-11 PROCEDURE — 85025 COMPLETE CBC W/AUTO DIFF WBC: CPT | Performed by: PHYSICIAN ASSISTANT

## 2024-11-11 PROCEDURE — 11000001 HC ACUTE MED/SURG PRIVATE ROOM

## 2024-11-11 PROCEDURE — 36415 COLL VENOUS BLD VENIPUNCTURE: CPT | Performed by: PHYSICIAN ASSISTANT

## 2024-11-11 RX ADMIN — SPIRONOLACTONE 50 MG: 25 TABLET ORAL at 09:11

## 2024-11-11 RX ADMIN — ROPINIROLE HYDROCHLORIDE 0.25 MG: 0.25 TABLET, FILM COATED ORAL at 08:11

## 2024-11-11 RX ADMIN — THIAMINE HCL TAB 100 MG 100 MG: 100 TAB at 09:11

## 2024-11-11 RX ADMIN — LACTULOSE 30 G: 10 SOLUTION ORAL at 02:11

## 2024-11-11 RX ADMIN — PANTOPRAZOLE SODIUM 40 MG: 40 TABLET, DELAYED RELEASE ORAL at 09:11

## 2024-11-11 RX ADMIN — THERA TABS 1 TABLET: TAB at 09:11

## 2024-11-11 RX ADMIN — FOLIC ACID 1 MG: 1 TABLET ORAL at 09:11

## 2024-11-11 RX ADMIN — RIFAXIMIN 550 MG: 550 TABLET ORAL at 08:11

## 2024-11-11 RX ADMIN — RIFAXIMIN 550 MG: 550 TABLET ORAL at 09:11

## 2024-11-11 RX ADMIN — LEVOTHYROXINE SODIUM 25 MCG: 25 TABLET ORAL at 05:11

## 2024-11-11 RX ADMIN — Medication 400 MG: at 08:11

## 2024-11-11 RX ADMIN — SODIUM BICARBONATE 650 MG TABLET 650 MG: at 08:11

## 2024-11-11 RX ADMIN — LACTULOSE 30 G: 10 SOLUTION ORAL at 09:11

## 2024-11-11 RX ADMIN — Medication 400 MG: at 09:11

## 2024-11-11 RX ADMIN — FUROSEMIDE 20 MG: 20 TABLET ORAL at 09:11

## 2024-11-11 RX ADMIN — SODIUM BICARBONATE 650 MG TABLET 650 MG: at 09:11

## 2024-11-11 RX ADMIN — LACTULOSE 30 G: 10 SOLUTION ORAL at 08:11

## 2024-11-11 NOTE — PLAN OF CARE
Problem: Adult Inpatient Plan of Care  Goal: Plan of Care Review  11/11/2024 1608 by Deb Golden RN  Outcome: Progressing  11/11/2024 0759 by Deb Golden RN  Outcome: Progressing  Goal: Patient-Specific Goal (Individualized)  11/11/2024 1608 by Deb Golden RN  Outcome: Progressing  11/11/2024 0759 by Deb Golden RN  Outcome: Progressing  Goal: Absence of Hospital-Acquired Illness or Injury  11/11/2024 1608 by Deb Golden RN  Outcome: Progressing  11/11/2024 0759 by Deb Golden RN  Outcome: Progressing  Goal: Optimal Comfort and Wellbeing  11/11/2024 1608 by Deb Golden RN  Outcome: Progressing  11/11/2024 0759 by Deb Golden RN  Outcome: Progressing  Goal: Readiness for Transition of Care  11/11/2024 1608 by Deb Golden RN  Outcome: Progressing  11/11/2024 0759 by Deb Golden RN  Outcome: Progressing     Problem: Wound  Goal: Optimal Coping  11/11/2024 1608 by Deb Golden RN  Outcome: Progressing  11/11/2024 0759 by Deb Golden RN  Outcome: Progressing  Goal: Optimal Functional Ability  11/11/2024 1608 by Deb Golden RN  Outcome: Progressing  11/11/2024 0759 by Deb Golden RN  Outcome: Progressing  Goal: Absence of Infection Signs and Symptoms  11/11/2024 1608 by Deb Golden RN  Outcome: Progressing  11/11/2024 0759 by Deb Golden RN  Outcome: Progressing  Goal: Improved Oral Intake  11/11/2024 1608 by Deb Golden RN  Outcome: Progressing  11/11/2024 0759 by Deb Golden RN  Outcome: Progressing  Goal: Optimal Pain Control and Function  11/11/2024 1608 by Deb Golden RN  Outcome: Progressing  11/11/2024 0759 by Deb Golden RN  Outcome: Progressing  Goal: Skin Health and Integrity  11/11/2024 1608 by Deb Golden RN  Outcome: Progressing  11/11/2024 0759 by Deb Golden RN  Outcome: Progressing  Goal: Optimal Wound Healing  11/11/2024 1608 by Deb Golden, RN  Outcome: Progressing  11/11/2024 7829 by  Deb Golden, RN  Outcome: Progressing

## 2024-11-11 NOTE — PROGRESS NOTES
[facility] Hospital Medicine Progress Note        Chief Complaint: Inpatient Follow-up for     HPI:   Lauren Ewing is a 64 y.o. White female with a past medical history of alcoholic cirrhosis and hypothyroidism. The patient presented to [facility] on 11/10/2024 with a primary complaint of confusion and generalized weakness which began on 11/07/2024.  She denies complaints of headache, chest pain, shortness of breath, abdominal pain, vomiting and diarrhea.  Patient and son who is at bedside reports patient is no longer drinking alcohol.  She reports she is prescribed lactulose once daily which she was compliant with.     Upon presentation to the ED, temperature 98.2° F, heart rate 117, blood pressure 174/100, respiratory rate 20 and SpO2 98% on 2 L nasal cannula.  Labs with CO2 18, ammonia 160, troponin 0.017.  EKG sinus tachycardia with ventricular rate of 113.  CTA of the head stable chronic findings but no acute process.  Chest x-ray no acute abnormality.  Patient is admitted to hospital medicine services for further medical management.    Interval Hx:   Patient was seen and examined.  Mentation seems to be improving with no more altered mentation at the time of my visit.  Case was discussed with patient's nurse and  on the floor.    Objective/physical exam:  General: In no acute distress, afebrile  Chest: Clear to auscultation bilaterally  Heart: RRR, +S1, S2, no appreciable murmur  Abdomen: Soft, nontender, BS +  MSK: Warm, no lower extremity edema, no clubbing or cyanosis  Neurologic: Alert and oriented x4, nonfocal     VITAL SIGNS: 24 HRS MIN & MAX LAST   Temp  Min: 97.8 °F (36.6 °C)  Max: 98.6 °F (37 °C) 98 °F (36.7 °C)   BP  Min: 143/79  Max: 181/95 (!) 156/80   Pulse  Min: 86  Max: 117  86   Resp  Min: 15  Max: 24 18   SpO2  Min: 95 %  Max: 100 % 97 %     I have reviewed the following labs:  Recent Labs   Lab 11/10/24  1235 11/11/24  0300   WBC 6.18 7.11   RBC  4.66 4.22   HGB 12.5 11.5*   HCT 37.6 34.1*   MCV 80.7 80.8   MCH 26.8* 27.3   MCHC 33.2 33.7   RDW 17.8* 18.1*    193   MPV 8.8 9.5     Recent Labs   Lab 11/10/24  1235 11/11/24  0300    142   K 4.0 3.9   * 117*   CO2 18* 17*   BUN 11.8 9.4*   CREATININE 0.76 0.76   CALCIUM 9.1 8.5   MG 2.10  --    ALBUMIN 3.5 3.2*   ALKPHOS 101 88   ALT 18 14   AST 33 34   BILITOT 1.4 1.5     Microbiology Results (last 7 days)       ** No results found for the last 168 hours. **             See below for Radiology    Assessment/Plan:    Assessment:  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  Metabolic acidosis   Hypothyroidism     Plan:  - Lactulose 3 times a day  - Physical therapy , hopefully discharge home in a day or 2   - Labs in AM      Patient condition:  Stable  Anticipated discharge and Disposition:   Home, possibly in a.m.      All diagnosis and differential diagnosis have been reviewed; assessment and plan has been documented; I have personally reviewed the labs and test results that are presently available; I have reviewed the patients medication list; I have reviewed the consulting providers response and recommendations. I have reviewed or attempted to review medical records based upon their availability    All of the patient's questions have been  addressed and answered. Patient's is agreeable to the above stated plan. I will continue to monitor closely and make adjustments to medical management as needed.    Portions of this note dictated using EMR integrated voice recognition software, and may be subject to voice recognition errors not corrected at proofreading. Please contact writer for clarification if needed.   _____________________________________________________________________    Malnutrition Status:    Scheduled Med:   folic acid  1 mg Oral Daily    furosemide  20 mg Oral Daily    lactulose 10 gram/15 ml  30 g Oral TID    levothyroxine  25 mcg Oral Before breakfast    magnesium  oxide  400 mg Oral BID    multivitamin  1 tablet Oral Daily    pantoprazole  40 mg Oral Daily    rifAXIMin  550 mg Oral BID    rOPINIRole  0.25 mg Oral QHS    sodium bicarbonate  650 mg Oral BID    spironolactone  50 mg Oral Daily    thiamine  100 mg Oral Daily      Continuous Infusions:     PRN Meds:    Current Facility-Administered Medications:     hydrALAZINE, 10 mg, Intravenous, Q4H PRN    ondansetron, 4 mg, Intravenous, Q6H PRN     Radiology:  I have personally reviewed the following imaging and agree with the radiologist.     X-Ray Chest 1 View  Narrative: EXAMINATION:  XR CHEST 1 VIEW    CLINICAL HISTORY:  generalized weakness, AMS;    TECHNIQUE:  Single frontal view of the chest was performed.    COMPARISON:  11/01/2023    FINDINGS:  The lungs are clear.  The heart is normal appearance.  The pulmonary vascularity is unremarkable.  Aorta appears grossly unremarkable.  No pleural effusions are seen.  Bones and joints show no acute abnormality.  Impression: No abnormality seen    Electronically signed by: Johann Linares  Date:    11/10/2024  Time:    12:38  CT Head Without Contrast  Narrative: EXAMINATION:  CT HEAD WITHOUT CONTRAST    CLINICAL HISTORY:  Mental status change, unknown cause;    TECHNIQUE:  Multiple axial images were obtained from the base of the brain to the vertex without contrast administration.  Sagittal and coronal reconstructions were performed..Automatic exposure control is utilized to reduce patient radiation exposure.    COMPARISON:  10/12/2023    FINDINGS:  There is no intracranial mass or lesion seen.  No hemorrhage is seen.  No acute infarct is seen.  The patient is status post craniotomy in the left parietal region.  There is some stable dural thickening on the left side.  There is some cerebral atrophy seen.  There is some compensatory ventricular dilatation and periventricular white matter changes consistent with the patient's age.  Calvarium is intact.  The posterior fossa is  unremarkable..  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  Impression: Stable chronic findings seen no acute process    Electronically signed by: Johann Linares  Date:    11/10/2024  Time:    12:35      Adrianne Alfredo MD  Department of Hospital Medicine   [facility]   11/11/2024

## 2024-11-12 PROBLEM — F05 ACUTE CONFUSIONAL STATE: Status: ACTIVE | Noted: 2024-11-12

## 2024-11-12 LAB
ALBUMIN SERPL-MCNC: 3.1 G/DL (ref 3.4–4.8)
ALBUMIN/GLOB SERPL: 1 RATIO (ref 1.1–2)
ALP SERPL-CCNC: 91 UNIT/L (ref 40–150)
ALT SERPL-CCNC: 15 UNIT/L (ref 0–55)
ANION GAP SERPL CALC-SCNC: 7 MEQ/L
AST SERPL-CCNC: 33 UNIT/L (ref 5–34)
BASOPHILS # BLD AUTO: 0.06 X10(3)/MCL
BASOPHILS NFR BLD AUTO: 0.9 %
BILIRUB SERPL-MCNC: 1.6 MG/DL
BUN SERPL-MCNC: 8.4 MG/DL (ref 9.8–20.1)
CALCIUM SERPL-MCNC: 8.6 MG/DL (ref 8.4–10.2)
CHLORIDE SERPL-SCNC: 109 MMOL/L (ref 98–107)
CO2 SERPL-SCNC: 22 MMOL/L (ref 23–31)
CREAT SERPL-MCNC: 0.89 MG/DL (ref 0.55–1.02)
CREAT/UREA NIT SERPL: 9
EOSINOPHIL # BLD AUTO: 0.33 X10(3)/MCL (ref 0–0.9)
EOSINOPHIL NFR BLD AUTO: 4.9 %
ERYTHROCYTE [DISTWIDTH] IN BLOOD BY AUTOMATED COUNT: 18.3 % (ref 11.5–17)
GFR SERPLBLD CREATININE-BSD FMLA CKD-EPI: >60 ML/MIN/1.73/M2
GLOBULIN SER-MCNC: 3.1 GM/DL (ref 2.4–3.5)
GLUCOSE SERPL-MCNC: 84 MG/DL (ref 82–115)
HCT VFR BLD AUTO: 35.4 % (ref 37–47)
HGB BLD-MCNC: 11.8 G/DL (ref 12–16)
IMM GRANULOCYTES # BLD AUTO: 0.02 X10(3)/MCL (ref 0–0.04)
IMM GRANULOCYTES NFR BLD AUTO: 0.3 %
LYMPHOCYTES # BLD AUTO: 2.2 X10(3)/MCL (ref 0.6–4.6)
LYMPHOCYTES NFR BLD AUTO: 32.5 %
MCH RBC QN AUTO: 27.2 PG (ref 27–31)
MCHC RBC AUTO-ENTMCNC: 33.3 G/DL (ref 33–36)
MCV RBC AUTO: 81.6 FL (ref 80–94)
MONOCYTES # BLD AUTO: 1.51 X10(3)/MCL (ref 0.1–1.3)
MONOCYTES NFR BLD AUTO: 22.3 %
NEUTROPHILS # BLD AUTO: 2.65 X10(3)/MCL (ref 2.1–9.2)
NEUTROPHILS NFR BLD AUTO: 39.1 %
NRBC BLD AUTO-RTO: 0 %
PLATELET # BLD AUTO: 193 X10(3)/MCL (ref 130–400)
PMV BLD AUTO: 8.8 FL (ref 7.4–10.4)
POTASSIUM SERPL-SCNC: 3.6 MMOL/L (ref 3.5–5.1)
PROT SERPL-MCNC: 6.2 GM/DL (ref 5.8–7.6)
RBC # BLD AUTO: 4.34 X10(6)/MCL (ref 4.2–5.4)
SODIUM SERPL-SCNC: 138 MMOL/L (ref 136–145)
WBC # BLD AUTO: 6.77 X10(3)/MCL (ref 4.5–11.5)

## 2024-11-12 PROCEDURE — 25000003 PHARM REV CODE 250: Performed by: INTERNAL MEDICINE

## 2024-11-12 PROCEDURE — 80053 COMPREHEN METABOLIC PANEL: CPT | Performed by: INTERNAL MEDICINE

## 2024-11-12 PROCEDURE — 85025 COMPLETE CBC W/AUTO DIFF WBC: CPT | Performed by: INTERNAL MEDICINE

## 2024-11-12 PROCEDURE — 25000003 PHARM REV CODE 250: Performed by: PHYSICIAN ASSISTANT

## 2024-11-12 PROCEDURE — 36415 COLL VENOUS BLD VENIPUNCTURE: CPT | Performed by: INTERNAL MEDICINE

## 2024-11-12 PROCEDURE — 97161 PT EVAL LOW COMPLEX 20 MIN: CPT

## 2024-11-12 PROCEDURE — 11000001 HC ACUTE MED/SURG PRIVATE ROOM

## 2024-11-12 RX ORDER — LACTULOSE 10 G/15ML
30 SOLUTION ORAL 3 TIMES DAILY
Qty: 100 ML | Refills: 0 | Status: SHIPPED | OUTPATIENT
Start: 2024-11-12 | End: 2024-11-26

## 2024-11-12 RX ADMIN — THERA TABS 1 TABLET: TAB at 08:11

## 2024-11-12 RX ADMIN — THIAMINE HCL TAB 100 MG 100 MG: 100 TAB at 08:11

## 2024-11-12 RX ADMIN — LACTULOSE 30 G: 10 SOLUTION ORAL at 07:11

## 2024-11-12 RX ADMIN — SPIRONOLACTONE 50 MG: 25 TABLET ORAL at 09:11

## 2024-11-12 RX ADMIN — FOLIC ACID 1 MG: 1 TABLET ORAL at 08:11

## 2024-11-12 RX ADMIN — Medication 400 MG: at 07:11

## 2024-11-12 RX ADMIN — SODIUM BICARBONATE 650 MG TABLET 650 MG: at 08:11

## 2024-11-12 RX ADMIN — RIFAXIMIN 550 MG: 550 TABLET ORAL at 07:11

## 2024-11-12 RX ADMIN — Medication 400 MG: at 08:11

## 2024-11-12 RX ADMIN — LEVOTHYROXINE SODIUM 25 MCG: 25 TABLET ORAL at 05:11

## 2024-11-12 RX ADMIN — PANTOPRAZOLE SODIUM 40 MG: 40 TABLET, DELAYED RELEASE ORAL at 09:11

## 2024-11-12 RX ADMIN — LACTULOSE 30 G: 10 SOLUTION ORAL at 08:11

## 2024-11-12 RX ADMIN — SODIUM BICARBONATE 650 MG TABLET 650 MG: at 07:11

## 2024-11-12 RX ADMIN — ROPINIROLE HYDROCHLORIDE 0.25 MG: 0.25 TABLET, FILM COATED ORAL at 07:11

## 2024-11-12 RX ADMIN — FUROSEMIDE 20 MG: 20 TABLET ORAL at 09:11

## 2024-11-12 RX ADMIN — RIFAXIMIN 550 MG: 550 TABLET ORAL at 08:11

## 2024-11-12 RX ADMIN — LACTULOSE 30 G: 10 SOLUTION ORAL at 03:11

## 2024-11-12 NOTE — PLAN OF CARE
Problem: Adult Inpatient Plan of Care  Goal: Plan of Care Review  11/12/2024 1406 by Sophy Mcfadden RN  Outcome: Met  11/12/2024 1019 by Sophy Mcfadden RN  Outcome: Progressing  Goal: Patient-Specific Goal (Individualized)  11/12/2024 1406 by Sophy Mcfadden RN  Outcome: Met  11/12/2024 1019 by Sophy Mcfadden RN  Outcome: Progressing  Goal: Absence of Hospital-Acquired Illness or Injury  11/12/2024 1406 by Sophy Mcfadden RN  Outcome: Met  11/12/2024 1019 by Sophy Mcfadden RN  Outcome: Progressing  Goal: Optimal Comfort and Wellbeing  11/12/2024 1406 by Sophy Mcfadden RN  Outcome: Met  11/12/2024 1019 by Sophy Mcfadden RN  Outcome: Progressing  Goal: Readiness for Transition of Care  11/12/2024 1406 by Sophy Mcfadden RN  Outcome: Met  11/12/2024 1019 by Sophy Mcfadden RN  Outcome: Progressing     Problem: Wound  Goal: Optimal Coping  11/12/2024 1406 by Sophy Mcfadden RN  Outcome: Met  11/12/2024 1019 by Sophy Mcfadden RN  Outcome: Progressing  Goal: Optimal Functional Ability  11/12/2024 1406 by Sophy Mcfadden RN  Outcome: Met  11/12/2024 1019 by Sophy Mcfadden RN  Outcome: Progressing  Goal: Absence of Infection Signs and Symptoms  11/12/2024 1406 by Sophy Mcfadden RN  Outcome: Met  11/12/2024 1019 by Sophy Mcfadden RN  Outcome: Progressing  Goal: Improved Oral Intake  11/12/2024 1406 by Sophy Mcfadden RN  Outcome: Met  11/12/2024 1019 by Sophy Mcfadden RN  Outcome: Progressing  Goal: Optimal Pain Control and Function  11/12/2024 1406 by Sophy Mcfadden RN  Outcome: Met  11/12/2024 1019 by Sophy Mcfadden RN  Outcome: Progressing  Goal: Skin Health and Integrity  11/12/2024 1406 by Sophy Mcfadden RN  Outcome: Met  11/12/2024 1019 by Sophy Mcfadden RN  Outcome: Progressing  Goal: Optimal Wound Healing  11/12/2024 1406 by Sophy Mcfadden, RN  Outcome: Met  11/12/2024 1019 by Sophy Mcfadden, RN  Outcome: Progressing

## 2024-11-12 NOTE — PLAN OF CARE
11/12/24 1526   Discharge Reassessment   Assessment Type Discharge Planning Reassessment   Discharge Plan discussed with: Patient;Adult children   Discharge Plan A New Nursing Home placement - shelter care facility   Discharge Plan B New Nursing Home placement - shelter care facility   DME Needed Upon Discharge  none   Transition of Care Barriers None   Post-Acute Status   Post-Acute Authorization Placement   Post-Acute Placement Status Referrals Sent   Patient choice form signed by patient/caregiver List with quality metrics by geographic area provided   Discharge Delays None known at this time     The patient had orders to discharge home. However, when the nurse called the son to  the patient he stated that he can't take care of her at home.  I called and spoke to the daughter that lives in [place].  She stated that the patient was living alone but lost her apartment.  They are concerned that the patient may not take her meds properly or be able to take care of herself.  Her son lives in town and would visit the patient but he works out of town often.  The daughter stated that they would like long-term nursing home placement for the patient.  The daughter would like [facility]. I spoke with the patient and explained the situation and she is ok with sending the referral to [facility].  Referral sent via [employer]. Spoke with Dr. Tabares and she agrees with NH placement and will cancel today's discharge.

## 2024-11-12 NOTE — PROGRESS NOTES
[facility]  Hospital Medicine Progress Note      Chief Complaint: weakness and confusion        HPI: (personally reviewed by me and is documented from initial H&P)     Lauren Ewing is a 64 y.o. female who  has a past medical history of DIANA (acute kidney injury), Hyponatremia, Major depressive disorder, single episode, unspecified, and Unspecified cirrhosis of liver..      The patient presented to [facility] on 11/10/2024 with a primary complaint of confusion and generalized weakness which began on 11/07/2024.      She denies complaints of headache, chest pain, shortness of breath, abdominal pain, vomiting and diarrhea.  Patient and son who is at bedside reports patient is no longer drinking alcohol.  She reports she is prescribed lactulose once daily which she was compliant with.     Upon presentation to the ED, temperature 98.2° F, heart rate 117, blood pressure 174/100, respiratory rate 20 and SpO2 98% on 2 L nasal cannula.  Labs with CO2 18, ammonia 160, troponin 0.017.  EKG sinus tachycardia with ventricular rate of 113.  CTA of the head stable chronic findings but no acute process.  Chest x-ray no acute abnormality.  Patient is admitted to hospital medicine services for further medical management    Interval History:        11/12/2024 patient lives at home with her son and he can no longer care for her.    She is now agreeable to nursing home place    Objective Assessment:  Physical Exam      Vital signs have been personally reviewed by me   General: Appears comfortable, no acute distress.  Neuro:  Awake alert oriented    Integumentary: Warm, dry, intact.  Musculoskeletal: Purposeful movement noted.     Respiratory: No accessory muscle use. Breath sounds are equal.  Cardiovascular: Regular rate.       VITAL SIGNS: 24 HRS MIN & MAX LAST   Temp  Min: 97.4 °F (36.3 °C)  Max: 98.6 °F (37 °C) 98.6 °F (37 °C)   BP  Min: 120/75  Max: 153/81 120/75   Pulse  Min: 87  Max: 102  87   Resp   Min: 14  Max: 20 14   SpO2  Min: 96 %  Max: 99 % 99 %     X-Ray Chest 1 View  Narrative: EXAMINATION:  XR CHEST 1 VIEW    CLINICAL HISTORY:  generalized weakness, AMS;    TECHNIQUE:  Single frontal view of the chest was performed.    COMPARISON:  11/01/2023    FINDINGS:  The lungs are clear.  The heart is normal appearance.  The pulmonary vascularity is unremarkable.  Aorta appears grossly unremarkable.  No pleural effusions are seen.  Bones and joints show no acute abnormality.  Impression: No abnormality seen    Electronically signed by: Johann Linares  Date:    11/10/2024  Time:    12:38  CT Head Without Contrast  Narrative: EXAMINATION:  CT HEAD WITHOUT CONTRAST    CLINICAL HISTORY:  Mental status change, unknown cause;    TECHNIQUE:  Multiple axial images were obtained from the base of the brain to the vertex without contrast administration.  Sagittal and coronal reconstructions were performed..Automatic exposure control is utilized to reduce patient radiation exposure.    COMPARISON:  10/12/2023    FINDINGS:  There is no intracranial mass or lesion seen.  No hemorrhage is seen.  No acute infarct is seen.  The patient is status post craniotomy in the left parietal region.  There is some stable dural thickening on the left side.  There is some cerebral atrophy seen.  There is some compensatory ventricular dilatation and periventricular white matter changes consistent with the patient's age.  Calvarium is intact.  The posterior fossa is unremarkable..  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  Impression: Stable chronic findings seen no acute process    Electronically signed by: Johann Linares  Date:    11/10/2024  Time:    12:35    Recent Labs   Lab 11/10/24  1235 11/11/24  0300 11/12/24  0257   WBC 6.18 7.11 6.77   RBC 4.66 4.22 4.34   HGB 12.5 11.5* 11.8*   HCT 37.6 34.1* 35.4*   MCV 80.7 80.8 81.6    MCH 26.8* 27.3 27.2   MCHC 33.2 33.7 33.3   RDW 17.8* 18.1* 18.3*    193 193   MPV 8.8 9.5 8.8       Recent Labs   Lab 11/10/24  1235 11/11/24  0300 11/12/24  0257    142 138   K 4.0 3.9 3.6   * 117* 109*   CO2 18* 17* 22*   BUN 11.8 9.4* 8.4*   CREATININE 0.76 0.76 0.89   CALCIUM 9.1 8.5 8.6   MG 2.10  --   --    ALBUMIN 3.5 3.2* 3.1*   ALKPHOS 101 88 91   ALT 18 14 15   AST 33 34 33   BILITOT 1.4 1.5 1.6*     Microbiology Results (last 7 days)       ** No results found for the last 168 hours. **               Medications for Hospital Course     Scheduled Med:   folic acid  1 mg Oral Daily    furosemide  20 mg Oral Daily    lactulose 10 gram/15 ml  30 g Oral TID    levothyroxine  25 mcg Oral Before breakfast    magnesium oxide  400 mg Oral BID    multivitamin  1 tablet Oral Daily    pantoprazole  40 mg Oral Daily    rifAXIMin  550 mg Oral BID    rOPINIRole  0.25 mg Oral QHS    sodium bicarbonate  650 mg Oral BID    spironolactone  50 mg Oral Daily    thiamine  100 mg Oral Daily      PRN Meds:    Current Facility-Administered Medications:     hydrALAZINE, 10 mg, Intravenous, Q4H PRN    ondansetron, 4 mg, Intravenous, Q6H PRN     Assessment and Plan          Chronic medical issues:  has a past medical history of DIANA (acute kidney injury), Hyponatremia, Major depressive disorder, single episode, unspecified, and Unspecified cirrhosis of liver..      __________________________________________________________________________________________________________________________________    Presented for acute confusion.  Has history of alcohol misuse and cirrhosis of the liver.    Poor compliance with medications, lactulose has been restarted and symptoms resolved.    Patient is now homeless, children refusing to take her to live with them, nursing home planned    Continue supportive care  Continue checking vital signs q4hrs.  Reviewed and restarted appropriate home medications.     DVT prophylaxis  initiated   Nurse notified to page me if any changes occur       Anticipated discharge and Disposition when medically stable:  Nursing home place    Therapy: PT/OT/Speech-no indication  Nutrition:  Regular diet as tolerated     _______________________________________________________________________________________________________________________________      I have spent 40 minutes on the day of the visit; time spent includes face to face time and non-face to face time preparing to see the patient (eg, review of tests), independently reviewing and interpreting medical records, both past and current; documenting clinical information in the electronic or other health record, and communicating results to the patient/family/caregiver and care coordinator and nursing team.      All diagnosis and differential diagnosis have been reviewed,  interpreted and communicated appropriately to care team. assessment and plan has been documented; I have personally reviewed the labs and test results that are presently available and pertinent to this hospital course; I have reviewed medical records based upon their availability.    All of the patient's questions have been  addressed and answered. Patient's is agreeable to the above stated plan.   I will continue to monitor closely and make adjustments to medical management as needed.    Gaby Tabares,      11/12/2024     This note was created with the assistance of Dragon voice recognition software. There may be transcription errors as a result of using this technology however minimal. Effort has been made to assure accuracy of transcription but any obvious errors or omissions should be clarified with the author of the document.

## 2024-11-12 NOTE — PT/OT/SLP EVAL
1036Physical Therapy Evaluation and Discharge Note    Patient Name:  Lauren Ewing   MRN:  [id number]    Recommendations:     Discharge therapy intensity: No Therapy Indicated   Discharge Equipment Recommendations: none   Barriers to discharge: None    Assessment:     Lauren Ewing is a 64 y.o. female admitted with a medical diagnosis of hepatic encephalopathy.  At this time, patient is functioning at their prior level of function and does not require further acute PT services.     Recent Surgery: * No surgery found *      Plan:     During this hospitalization, patient does not require further acute PT services.  Please re-consult if situation changes.      Subjective     Chief Complaint: none  Patient/Family Comments/goals: none  Pain/Comfort:  Pain Rating 1: 0/10    Patients cultural, spiritual, Evangelical conflicts given the current situation: no    Living Environment:  Lives with son in a [facility].   Prior to admission, patients level of function was independent.  Equipment used at home: none (owns cane & RW).  DME owned (not currently used): rolling walker and single point cane.  Upon discharge, patient will have assistance from son.    Objective:     Communicated with nurse prior to session.  Patient found supine with telemetry upon PT entry to room.    General Precautions: Standard,      Orthopedic Precautions:N/A   Braces: N/A  Respiratory Status: Room air    Exams:  Cognitive Exam:  Patient is oriented to Person, Place, Time, and Situation  Sensation: -       Intact  RLE ROM: WFL  RLE Strength: WFL  LLE ROM: WFL  LLE Strength: WFL    Functional Mobility:  Bed Mobility:  Supine to Sit: independence  Sit to Supine: independence  Transfers:  Sit to Stand:  independence with no AD  Gait: 110 ft independently. Slight unsteadiness, which is her baseline. Educated on use of RW   Balance: fair    AM-PAC 6 CLICK MOBILITY  Total Score:24       Education Provided:  Role and goals of PT, transfer training, bed  mobility, gait training, balance training, safety awareness, assistive device, strengthening exercises, and importance of participating in PT to return to PLOF.      Patient left supine with all lines intact and call button in reach.    GOALS:   Multidisciplinary Problems       Physical Therapy Goals       Not on file                    History:     Past Medical History:   Diagnosis Date    DIANA (acute kidney injury)     Hyponatremia     Major depressive disorder, single episode, unspecified     Unspecified cirrhosis of liver        Past Surgical History:   Procedure Laterality Date    BRAIN SURGERY       SECTION      ESOPHAGOGASTRODUODENOSCOPY N/A 2024    Procedure: EGD (ESOPHAGOGASTRODUODENOSCOPY);  Surgeon: Deb Arteaga MD;  Location: [facility] ENDOSCOPY;  Service: Gastroenterology;  Laterality: N/A;       Time Tracking:     PT Received On: 24  PT Start Time: 1036     PT Stop Time: 1046  PT Total Time (min): 10 min     Billable Minutes: Evaluation 10 minutes      2024

## 2024-11-12 NOTE — PROGRESS NOTES
Inpatient Nutrition Evaluation    Admit Date: 11/10/2024   Total duration of encounter: 2 days    Nutrition Recommendation/Prescription     Diet Heart Healthy ordered  Add Boost Plus daily (provides 360 kcal and 14 g protein per container)  Encouraged adequate PO intake  Monitor appetite/PO intake, weight, and labs    Nutrition Assessment     Chart Review    Reason Seen: continuous nutrition monitoring Cirrhosis     Malnutrition Screening Tool Results   Have you recently lost weight without trying?: No  Have you been eating poorly because of a decreased appetite?: No   MST Score: 0     Diagnosis:  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  Metabolic acidosis   Hypothyroidism    Relevant Medical History:    DIANA (acute kidney injury)      Hyponatremia      Major depressive disorder, single episode, unspecified      Unspecified cirrhosis of liver        Nutrition-Related Medications:   Scheduled Meds:   folic acid  1 mg Oral Daily    furosemide  20 mg Oral Daily    lactulose 10 gram/15 ml  30 g Oral TID    levothyroxine  25 mcg Oral Before breakfast    magnesium oxide  400 mg Oral BID    multivitamin  1 tablet Oral Daily    pantoprazole  40 mg Oral Daily    rifAXIMin  550 mg Oral BID    rOPINIRole  0.25 mg Oral QHS    sodium bicarbonate  650 mg Oral BID    spironolactone  50 mg Oral Daily    thiamine  100 mg Oral Daily     Continuous Infusions:  PRN Meds:.  Current Facility-Administered Medications:     hydrALAZINE, 10 mg, Intravenous, Q4H PRN    ondansetron, 4 mg, Intravenous, Q6H PRN      Nutrition-Related Labs:  Recent Labs   Lab 11/10/24  1235 11/11/24  0300 11/12/24  0257    142 138   K 4.0 3.9 3.6   CALCIUM 9.1 8.5 8.6   MG 2.10  --   --    * 117* 109*   CO2 18* 17* 22*   BUN 11.8 9.4* 8.4*   CREATININE 0.76 0.76 0.89   EGFRNORACEVR >60 >60 >60   GLUCOSE 118* 75* 84   BILITOT 1.4 1.5 1.6*   ALKPHOS 101 88 91   ALT 18 14 15   AST 33 34 33   ALBUMIN 3.5 3.2* 3.1*   AMMONIA 160.0*  --   --   "  WBC 6.18 7.11 6.77   HGB 12.5 11.5* 11.8*   HCT 37.6 34.1* 35.4*         Diet Order: Diet Heart Healthy  Oral Supplement Order: none  Appetite/Oral Intake: good/% of meals  Factors Affecting Nutritional Intake: none identified  Food/Adventist/Cultural Preferences: none reported  Food Allergies: none reported       Wound(s):   none noted    Comments    2024: No significant fat/muscle wasting per NFPE. Pt reports a good appetite/PO intake prior to admit and currently. Pt denies N/V/D/C and chewing/swallowing difficulties. Pt denies unplanned wt loss. Per EMR, pt weighed 68.7 kg on 11/10/2023 (~9.7% wt loss in 1 year, insignificant). Pt agreeable to ONS as preventative MNT. Encouraged adequate PO intake. Last BM documented as 2024. Will monitor.    Anthropometrics    Height: 5' 4" (162.6 cm) Height Method: Stated  Last Weight: 62 kg (136 lb 11 oz) (11/10/24 1631) Weight Method: Bed Scale  BMI (Calculated): 23.5  BMI Classification: normal (BMI 18.5-24.9)     Ideal Body Weight (IBW), Female: 120 lb     % Ideal Body Weight, Female (lb): 113.91 %                    Usual Body Weight (UBW), k.7 kg  % Usual Body Weight: 90.44     Usual Weight Provided By: EMR weight history    Wt Readings from Last 5 Encounters:   11/10/24 62 kg (136 lb 11 oz)   10/07/24 62.9 kg (138 lb 11.2 oz)   24 63 kg (139 lb)   24 64.1 kg (141 lb 6.4 oz)   24 61.2 kg (135 lb)     Weight Change(s) Since Admission:  Admit Weight: 63.5 kg (140 lb) (11/10/24 1153)  11/10/2024: 62 kg    Patient Education    Not applicable.    Monitoring & Evaluation     Dietitian will monitor food and beverage intake, weight, electrolyte/renal panel, glucose/endocrine profile, and gastrointestinal profile.  Nutrition Risk/Follow-Up: low (follow-up in 5-7 days)  Patients assigned 'low nutrition risk' status do not qualify for a full nutritional assessment but will be monitored and re-evaluated in a 5-7 day time period. Please " consult if re-evaluation needed sooner.

## 2024-11-13 PROCEDURE — 25000003 PHARM REV CODE 250: Performed by: PHYSICIAN ASSISTANT

## 2024-11-13 PROCEDURE — G0378 HOSPITAL OBSERVATION PER HR: HCPCS

## 2024-11-13 PROCEDURE — 25000003 PHARM REV CODE 250: Performed by: INTERNAL MEDICINE

## 2024-11-13 RX ADMIN — RIFAXIMIN 550 MG: 550 TABLET ORAL at 08:11

## 2024-11-13 RX ADMIN — FOLIC ACID 1 MG: 1 TABLET ORAL at 09:11

## 2024-11-13 RX ADMIN — PANTOPRAZOLE SODIUM 40 MG: 40 TABLET, DELAYED RELEASE ORAL at 09:11

## 2024-11-13 RX ADMIN — THIAMINE HCL TAB 100 MG 100 MG: 100 TAB at 09:11

## 2024-11-13 RX ADMIN — RIFAXIMIN 550 MG: 550 TABLET ORAL at 09:11

## 2024-11-13 RX ADMIN — FUROSEMIDE 20 MG: 20 TABLET ORAL at 09:11

## 2024-11-13 RX ADMIN — SODIUM BICARBONATE 650 MG TABLET 650 MG: at 08:11

## 2024-11-13 RX ADMIN — SPIRONOLACTONE 50 MG: 25 TABLET ORAL at 09:11

## 2024-11-13 RX ADMIN — Medication 400 MG: at 08:11

## 2024-11-13 RX ADMIN — LACTULOSE 30 G: 10 SOLUTION ORAL at 02:11

## 2024-11-13 RX ADMIN — ROPINIROLE HYDROCHLORIDE 0.25 MG: 0.25 TABLET, FILM COATED ORAL at 08:11

## 2024-11-13 RX ADMIN — Medication 400 MG: at 09:11

## 2024-11-13 RX ADMIN — THERA TABS 1 TABLET: TAB at 09:11

## 2024-11-13 RX ADMIN — LEVOTHYROXINE SODIUM 25 MCG: 25 TABLET ORAL at 06:11

## 2024-11-13 RX ADMIN — LACTULOSE 30 G: 10 SOLUTION ORAL at 08:11

## 2024-11-13 RX ADMIN — SODIUM BICARBONATE 650 MG TABLET 650 MG: at 09:11

## 2024-11-13 RX ADMIN — LACTULOSE 30 G: 10 SOLUTION ORAL at 09:11

## 2024-11-13 NOTE — PLAN OF CARE
[facility] called in locet and fax forms   Spoke to Enma at [facility] who will come to visit the pt on today

## 2024-11-13 NOTE — PROGRESS NOTES
[facility]  Hospital Medicine Progress Note      Chief Complaint: weakness and confusion        HPI: (personally reviewed by me and is documented from initial H&P)     Lauren Ewing is a 64 y.o. female who  has a past medical history of Acute confusional state (11/12/2024), DIANA (acute kidney injury), Hyponatremia, Major depressive disorder, single episode, unspecified, and Unspecified cirrhosis of liver..      The patient presented to [facility] on 11/10/2024 with a primary complaint of confusion and generalized weakness which began on 11/07/2024.      She denies complaints of headache, chest pain, shortness of breath, abdominal pain, vomiting and diarrhea.  Patient and son who is at bedside reports patient is no longer drinking alcohol.  She reports she is prescribed lactulose once daily which she was compliant with.     Upon presentation to the ED, temperature 98.2° F, heart rate 117, blood pressure 174/100, respiratory rate 20 and SpO2 98% on 2 L nasal cannula.  Labs with CO2 18, ammonia 160, troponin 0.017.  EKG sinus tachycardia with ventricular rate of 113.  CTA of the head stable chronic findings but no acute process.  Chest x-ray no acute abnormality.  Patient is admitted to hospital medicine services for further medical management    Interval History:        Patient lives at home with her son and he can no longer care for her.    She is now agreeable to nursing home placement     Objective Assessment:  Physical Exam      Vital signs have been personally reviewed by me   General: Appears comfortable, no acute distress.  Neuro:  Awake alert oriented, answers questions appropriately    Integumentary: Warm, dry, intact.  Musculoskeletal: Purposeful movement noted.     Respiratory: No accessory muscle use. Breath sounds are equal.  Cardiovascular: Regular rate.       VITAL SIGNS: 24 HRS MIN & MAX LAST   Temp  Min: 97.9 °F (36.6 °C)  Max: 98.8 °F (37.1 °C) 98.7 °F (37.1 °C)   BP   Min: 119/74  Max: 139/67 128/78   Pulse  Min: 84  Max: 101  90   Resp  Min: 18  Max: 18 18   SpO2  Min: 97 %  Max: 100 % 97 %     X-Ray Chest 1 View  Narrative: EXAMINATION:  XR CHEST 1 VIEW    CLINICAL HISTORY:  generalized weakness, AMS;    TECHNIQUE:  Single frontal view of the chest was performed.    COMPARISON:  11/01/2023    FINDINGS:  The lungs are clear.  The heart is normal appearance.  The pulmonary vascularity is unremarkable.  Aorta appears grossly unremarkable.  No pleural effusions are seen.  Bones and joints show no acute abnormality.  Impression: No abnormality seen    Electronically signed by: Johann Linares  Date:    11/10/2024  Time:    12:38  CT Head Without Contrast  Narrative: EXAMINATION:  CT HEAD WITHOUT CONTRAST    CLINICAL HISTORY:  Mental status change, unknown cause;    TECHNIQUE:  Multiple axial images were obtained from the base of the brain to the vertex without contrast administration.  Sagittal and coronal reconstructions were performed..Automatic exposure control is utilized to reduce patient radiation exposure.    COMPARISON:  10/12/2023    FINDINGS:  There is no intracranial mass or lesion seen.  No hemorrhage is seen.  No acute infarct is seen.  The patient is status post craniotomy in the left parietal region.  There is some stable dural thickening on the left side.  There is some cerebral atrophy seen.  There is some compensatory ventricular dilatation and periventricular white matter changes consistent with the patient's age.  Calvarium is intact.  The posterior fossa is unremarkable..  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  Impression: Stable chronic findings seen no acute process    Electronically signed by: Johann Linares  Date:    11/10/2024  Time:    12:35    Recent Labs   Lab 11/10/24  1235 11/11/24  0300 11/12/24  0257   WBC 6.18 7.11 6.77   RBC 4.66 4.22  4.34   HGB 12.5 11.5* 11.8*   HCT 37.6 34.1* 35.4*   MCV 80.7 80.8 81.6   MCH 26.8* 27.3 27.2   MCHC 33.2 33.7 33.3   RDW 17.8* 18.1* 18.3*    193 193   MPV 8.8 9.5 8.8       Recent Labs   Lab 11/10/24  1235 11/11/24  0300 11/12/24  0257    142 138   K 4.0 3.9 3.6   * 117* 109*   CO2 18* 17* 22*   BUN 11.8 9.4* 8.4*   CREATININE 0.76 0.76 0.89   CALCIUM 9.1 8.5 8.6   MG 2.10  --   --    ALBUMIN 3.5 3.2* 3.1*   ALKPHOS 101 88 91   ALT 18 14 15   AST 33 34 33   BILITOT 1.4 1.5 1.6*     Microbiology Results (last 7 days)       ** No results found for the last 168 hours. **               Medications for Hospital Course     Scheduled Med:   folic acid  1 mg Oral Daily    furosemide  20 mg Oral Daily    lactulose 10 gram/15 ml  30 g Oral TID    levothyroxine  25 mcg Oral Before breakfast    magnesium oxide  400 mg Oral BID    multivitamin  1 tablet Oral Daily    pantoprazole  40 mg Oral Daily    rifAXIMin  550 mg Oral BID    rOPINIRole  0.25 mg Oral QHS    sodium bicarbonate  650 mg Oral BID    spironolactone  50 mg Oral Daily    thiamine  100 mg Oral Daily      PRN Meds:    Current Facility-Administered Medications:     hydrALAZINE, 10 mg, Intravenous, Q4H PRN    ondansetron, 4 mg, Intravenous, Q6H PRN     Assessment and Plan          Chronic medical issues:  has a past medical history of Acute confusional state (11/12/2024), DIANA (acute kidney injury), Hyponatremia, Major depressive disorder, single episode, unspecified, and Unspecified cirrhosis of liver..    __________________________________________________________________________________________________________________________________    Presented for acute confusion. Patient history of alcohol misuse and cirrhosis of the liver.    Poor compliance with medications, lactulose has been restarted and symptoms resolved.    Patient is now homeless, children refusing to take her to live with them, nursing home planned.    Continue supportive  care  Continue checking vital signs q4hrs.  Reviewed and restarted appropriate home medications.     DVT prophylaxis initiated   Nurse notified to page me if any changes occur       Anticipated discharge and Disposition when medically stable:  Nursing home place    Therapy: PT/OT/Speech-no indication  Nutrition:  Regular diet as tolerated     _______________________________________________________________________________________________________________________________      I have spent 40 minutes on the day of the visit; time spent includes face to face time and non-face to face time preparing to see the patient (eg, review of tests), independently reviewing and interpreting medical records, both past and current; documenting clinical information in the electronic or other health record, and communicating results to the patient/family/caregiver and care coordinator and nursing team.      All diagnosis and differential diagnosis have been reviewed,  interpreted and communicated appropriately to care team. assessment and plan has been documented; I have personally reviewed the labs and test results that are presently available and pertinent to this hospital course; I have reviewed medical records based upon their availability.    All of the patient's questions have been  addressed and answered. Patient's is agreeable to the above stated plan.   I will continue to monitor closely and make adjustments to medical management as needed.    Gaby Tabares,      11/13/2024     This note was created with the assistance of Dragon voice recognition software. There may be transcription errors as a result of using this technology however minimal. Effort has been made to assure accuracy of transcription but any obvious errors or omissions should be clarified with the author of the document.

## 2024-11-14 LAB
ALBUMIN SERPL-MCNC: 3.1 G/DL (ref 3.4–4.8)
ALBUMIN/GLOB SERPL: 0.9 RATIO (ref 1.1–2)
ALP SERPL-CCNC: 88 UNIT/L (ref 40–150)
ALT SERPL-CCNC: 16 UNIT/L (ref 0–55)
AMMONIA PLAS-MSCNC: 96.2 UMOL/L (ref 18–72)
ANION GAP SERPL CALC-SCNC: 7 MEQ/L
AST SERPL-CCNC: 33 UNIT/L (ref 5–34)
BASOPHILS # BLD AUTO: 0.05 X10(3)/MCL
BASOPHILS NFR BLD AUTO: 0.8 %
BILIRUB SERPL-MCNC: 1.6 MG/DL
BUN SERPL-MCNC: 9.2 MG/DL (ref 9.8–20.1)
CALCIUM SERPL-MCNC: 8.7 MG/DL (ref 8.4–10.2)
CHLORIDE SERPL-SCNC: 106 MMOL/L (ref 98–107)
CO2 SERPL-SCNC: 22 MMOL/L (ref 23–31)
CREAT SERPL-MCNC: 0.85 MG/DL (ref 0.55–1.02)
CREAT/UREA NIT SERPL: 11
EOSINOPHIL # BLD AUTO: 0.32 X10(3)/MCL (ref 0–0.9)
EOSINOPHIL NFR BLD AUTO: 5.3 %
ERYTHROCYTE [DISTWIDTH] IN BLOOD BY AUTOMATED COUNT: 18 % (ref 11.5–17)
GFR SERPLBLD CREATININE-BSD FMLA CKD-EPI: >60 ML/MIN/1.73/M2
GLOBULIN SER-MCNC: 3.3 GM/DL (ref 2.4–3.5)
GLUCOSE SERPL-MCNC: 149 MG/DL (ref 82–115)
HCT VFR BLD AUTO: 35.9 % (ref 37–47)
HGB BLD-MCNC: 11.8 G/DL (ref 12–16)
IMM GRANULOCYTES # BLD AUTO: 0.01 X10(3)/MCL (ref 0–0.04)
IMM GRANULOCYTES NFR BLD AUTO: 0.2 %
LYMPHOCYTES # BLD AUTO: 1.83 X10(3)/MCL (ref 0.6–4.6)
LYMPHOCYTES NFR BLD AUTO: 30.1 %
MCH RBC QN AUTO: 27.3 PG (ref 27–31)
MCHC RBC AUTO-ENTMCNC: 32.9 G/DL (ref 33–36)
MCV RBC AUTO: 82.9 FL (ref 80–94)
MONOCYTES # BLD AUTO: 0.99 X10(3)/MCL (ref 0.1–1.3)
MONOCYTES NFR BLD AUTO: 16.3 %
NEUTROPHILS # BLD AUTO: 2.88 X10(3)/MCL (ref 2.1–9.2)
NEUTROPHILS NFR BLD AUTO: 47.3 %
NRBC BLD AUTO-RTO: 0 %
PLATELET # BLD AUTO: 197 X10(3)/MCL (ref 130–400)
PMV BLD AUTO: 9.1 FL (ref 7.4–10.4)
POTASSIUM SERPL-SCNC: 3.8 MMOL/L (ref 3.5–5.1)
PROT SERPL-MCNC: 6.4 GM/DL (ref 5.8–7.6)
RBC # BLD AUTO: 4.33 X10(6)/MCL (ref 4.2–5.4)
SODIUM SERPL-SCNC: 135 MMOL/L (ref 136–145)
WBC # BLD AUTO: 6.08 X10(3)/MCL (ref 4.5–11.5)

## 2024-11-14 PROCEDURE — 11000001 HC ACUTE MED/SURG PRIVATE ROOM

## 2024-11-14 PROCEDURE — 36415 COLL VENOUS BLD VENIPUNCTURE: CPT | Performed by: INTERNAL MEDICINE

## 2024-11-14 PROCEDURE — 85025 COMPLETE CBC W/AUTO DIFF WBC: CPT | Performed by: INTERNAL MEDICINE

## 2024-11-14 PROCEDURE — 25000003 PHARM REV CODE 250: Performed by: PHYSICIAN ASSISTANT

## 2024-11-14 PROCEDURE — 80053 COMPREHEN METABOLIC PANEL: CPT | Performed by: INTERNAL MEDICINE

## 2024-11-14 PROCEDURE — 82140 ASSAY OF AMMONIA: CPT | Performed by: INTERNAL MEDICINE

## 2024-11-14 PROCEDURE — 25000003 PHARM REV CODE 250: Performed by: INTERNAL MEDICINE

## 2024-11-14 RX ORDER — ENOXAPARIN SODIUM 100 MG/ML
40 INJECTION SUBCUTANEOUS EVERY 24 HOURS
Status: DISCONTINUED | OUTPATIENT
Start: 2024-11-15 | End: 2024-11-26 | Stop reason: HOSPADM

## 2024-11-14 RX ADMIN — THERA TABS 1 TABLET: TAB at 08:11

## 2024-11-14 RX ADMIN — LACTULOSE 30 G: 10 SOLUTION ORAL at 03:11

## 2024-11-14 RX ADMIN — SODIUM BICARBONATE 650 MG TABLET 650 MG: at 08:11

## 2024-11-14 RX ADMIN — LACTULOSE 30 G: 10 SOLUTION ORAL at 08:11

## 2024-11-14 RX ADMIN — FOLIC ACID 1 MG: 1 TABLET ORAL at 08:11

## 2024-11-14 RX ADMIN — PANTOPRAZOLE SODIUM 40 MG: 40 TABLET, DELAYED RELEASE ORAL at 08:11

## 2024-11-14 RX ADMIN — ROPINIROLE HYDROCHLORIDE 0.25 MG: 0.25 TABLET, FILM COATED ORAL at 08:11

## 2024-11-14 RX ADMIN — Medication 400 MG: at 08:11

## 2024-11-14 RX ADMIN — THIAMINE HCL TAB 100 MG 100 MG: 100 TAB at 08:11

## 2024-11-14 RX ADMIN — SPIRONOLACTONE 50 MG: 25 TABLET ORAL at 08:11

## 2024-11-14 RX ADMIN — RIFAXIMIN 550 MG: 550 TABLET ORAL at 08:11

## 2024-11-14 RX ADMIN — FUROSEMIDE 20 MG: 20 TABLET ORAL at 08:11

## 2024-11-14 RX ADMIN — LEVOTHYROXINE SODIUM 25 MCG: 25 TABLET ORAL at 05:11

## 2024-11-14 NOTE — PROGRESS NOTES
[facility]  Hospital Medicine Progress Note        Chief Complaint: Inpatient Follow-up    HPI:     64-year-old female with significant history of alcoholic cirrhosis, major depression, hypothyroidism presented to the ED with complaints of generalized weakness and worsening confusion.  Patient no longer drinks alcohol and is well compliant with her lactulose.  Tachycardic in the ED, lab significant for elevated ammonia, metabolic acidosis.  Patient was tachycardic with EKG revealing sinus tachycardia.  CT head negative, chest x-ray negative.  Patient admitted to hospital medicine services, initiated lactulose, therapy services consulted for physical deconditioning.  Per therapy services patient has no indication for therapy, however patient unable to take care of herself at home. Kids mostly working out of town and therefore they requested long-term nursing home placement.  Case management currently working to arrange this, patient's mentation improved on lactulose      Interval Hx:   Patient seen at bedside, hemodynamics stable, she is awake, alert and fully oriented, answering all questions appropriately, no acute events overnight    Objective/physical exam:  General: In no acute distress, afebrile  Chest: Clear to auscultation bilaterally  Heart: S1, S2, no appreciable murmur  Abdomen: Soft, nontender, BS +  MSK: Warm, no lower extremity edema, no clubbing or cyanosis  Neurologic: Alert and oriented x4, moving all extremities with good strength     VITAL SIGNS: 24 HRS MIN & MAX LAST   Temp  Min: 97.6 °F (36.4 °C)  Max: 98.7 °F (37.1 °C) 97.6 °F (36.4 °C)   BP  Min: 116/76  Max: 139/67 116/76   Pulse  Min: 90  Max: 100  91   Resp  Min: 18  Max: 20 18   SpO2  Min: 96 %  Max: 99 % 99 %       Recent Labs   Lab 11/12/24  0257   WBC 6.77   RBC 4.34   HGB 11.8*   HCT 35.4*   MCV 81.6   MCH 27.2   MCHC 33.3   RDW 18.3*      MPV 8.8         Recent Labs   Lab 11/10/24  1235  11/11/24  0300 11/12/24  0257      < > 138   K 4.0   < > 3.6   *   < > 109*   CO2 18*   < > 22*   BUN 11.8   < > 8.4*   CREATININE 0.76   < > 0.89   CALCIUM 9.1   < > 8.6   MG 2.10  --   --    ALBUMIN 3.5   < > 3.1*   ALKPHOS 101   < > 91   ALT 18   < > 15   AST 33   < > 33   BILITOT 1.4   < > 1.6*    < > = values in this interval not displayed.          Microbiology Results (last 7 days)       ** No results found for the last 168 hours. **             Scheduled Med:   folic acid  1 mg Oral Daily    furosemide  20 mg Oral Daily    lactulose 10 gram/15 ml  30 g Oral TID    levothyroxine  25 mcg Oral Before breakfast    magnesium oxide  400 mg Oral BID    multivitamin  1 tablet Oral Daily    pantoprazole  40 mg Oral Daily    rifAXIMin  550 mg Oral BID    rOPINIRole  0.25 mg Oral QHS    sodium bicarbonate  650 mg Oral BID    spironolactone  50 mg Oral Daily    thiamine  100 mg Oral Daily          Assessment/Plan:    Decompensated alcoholic cirrhosis with hepatic encephalopathy-improved   History of major depression   Inability to take care of self   History of hypothyroidism   History of alcohol abuse  Prophylaxis    Mentation stable, fully oriented   Ammonia is still more than 90, will keep the current dose of lactulose 30 g t.i.d.   Continue rifaximin  Continue Lasix, Aldactone to prevent volume overload   Continue folic acid, magnesium oxide, multivitamin, thiamine  No more uses alcohol  Continue other home meds-levothyroxine, ppi, ropinirole  P.o. bicarb for metabolic acidosis-improving  DVT prophylaxis-subQ Lovenox, platelet count normal    Case management is working on long-term nursing home placement      Amanda Trejo MD   11/14/2024

## 2024-11-14 NOTE — PLAN OF CARE
Problem: Fall Injury Risk  Goal: Absence of Fall and Fall-Related Injury  Outcome: Progressing     Problem: Fatigue  Goal: Improved Activity Tolerance  Intervention: Promote Improved Energy  Flowsheets (Taken 11/14/2024 1539)  Fatigue Management: activity assistance provided  Activity Management: Ambulated in room - L4  Environmental Support: calm environment promoted

## 2024-11-14 NOTE — PLAN OF CARE
Problem: Fall Injury Risk  Goal: Absence of Fall and Fall-Related Injury  11/13/2024 1908 by Teena Alicia, RN  Outcome: Progressing  11/13/2024 1908 by Teena Alicia, RN  Outcome: Progressing  Intervention: Identify and Manage Contributors  Flowsheets (Taken 11/13/2024 1908)  Self-Care Promotion:   independence encouraged   BADL personal objects within reach   BADL personal routines maintained  Medication Review/Management: medications reviewed  Intervention: Promote Injury-Free Environment  Flowsheets (Taken 11/13/2024 1908)  Safety Promotion/Fall Prevention:   assistive device/personal item within reach   commode/urinal/bedpan at bedside   Fall Risk reviewed with patient/family   Fall Risk signage in place   medications reviewed

## 2024-11-14 NOTE — PROGRESS NOTES
[facility]  Hospital Medicine Progress Note        Chief Complaint: Inpatient Follow-up     HPI:    64 y.o. female who  has a past medical history of Acute confusional state (11/12/2024), DIANA (acute kidney injury), Hyponatremia, Major depressive disorder, single episode, unspecified, and Unspecified cirrhosis of liver..      The patient presented to [facility] on 11/10/2024 with a primary complaint of confusion and generalized weakness which began on 11/07/2024.       She denies complaints of headache, chest pain, shortness of breath, abdominal pain, vomiting and diarrhea.  Patient and son who is at bedside reports patient is no longer drinking alcohol.  She reports she is prescribed lactulose once daily which she was compliant with.     Upon presentation to the ED, temperature 98.2° F, heart rate 117, blood pressure 174/100, respiratory rate 20 and SpO2 98% on 2 L nasal cannula.  Labs with CO2 18, ammonia 160, troponin 0.017.  EKG sinus tachycardia with ventricular rate of 113.  CTA of the head stable chronic findings but no acute process.  Chest x-ray no acute abnormality.  Patient is admitted to hospital medicine services for further medical management    Interval Hx:  No significant events overnight. Resting comfortably in bed.  No new complaints.     Objective/physical exam:  General: In no acute distress, afebrile  Chest: Clear to auscultation bilaterally  Heart: RRR, +S1, S2, no appreciable murmur  Abdomen: Soft, nontender, BS +  MSK: Warm, no lower extremity edema, no clubbing or cyanosis  Neurologic: Alert and oriented x4, Cranial nerve II-XII intact, Strength 5/5 in all 4 extremities    VITAL SIGNS: 24 HRS MIN & MAX LAST   Temp  Min: 97.9 °F (36.6 °C)  Max: 98.8 °F (37.1 °C) 98.3 °F (36.8 °C)   BP  Min: 119/74  Max: 139/67 137/76   Pulse  Min: 84  Max: 101  100   Resp  Min: 18  Max: 18 18   SpO2  Min: 97 %  Max: 100 % 98 %       Recent Labs   Lab 11/11/24  0300 11/12/24  7443  11/14/24  0836   WBC 7.11 6.77 6.08   RBC 4.22 4.34 4.33   HGB 11.5* 11.8* 11.8*   HCT 34.1* 35.4* 35.9*   MCV 80.8 81.6 82.9   MCH 27.3 27.2 27.3   MCHC 33.7 33.3 32.9*   RDW 18.1* 18.3* 18.0*    193 197   MPV 9.5 8.8 9.1       Recent Labs   Lab 11/10/24  1235 11/11/24  0300 11/12/24  0257 11/14/24  0836    142 138 135*   K 4.0 3.9 3.6 3.8   * 117* 109* 106   CO2 18* 17* 22* 22*   BUN 11.8 9.4* 8.4* 9.2*   CREATININE 0.76 0.76 0.89 0.85   CALCIUM 9.1 8.5 8.6 8.7   MG 2.10  --   --   --    ALBUMIN 3.5 3.2* 3.1* 3.1*   ALKPHOS 101 88 91 88   ALT 18 14 15 16   AST 33 34 33 33   BILITOT 1.4 1.5 1.6* 1.6*          Microbiology Results (last 7 days)       ** No results found for the last 168 hours. **             Radiology:  X-Ray Chest 1 View  Narrative: EXAMINATION:  XR CHEST 1 VIEW    CLINICAL HISTORY:  generalized weakness, AMS;    TECHNIQUE:  Single frontal view of the chest was performed.    COMPARISON:  11/01/2023    FINDINGS:  The lungs are clear.  The heart is normal appearance.  The pulmonary vascularity is unremarkable.  Aorta appears grossly unremarkable.  No pleural effusions are seen.  Bones and joints show no acute abnormality.  Impression: No abnormality seen    Electronically signed by: Johann Linares  Date:    11/10/2024  Time:    12:38  CT Head Without Contrast  Narrative: EXAMINATION:  CT HEAD WITHOUT CONTRAST    CLINICAL HISTORY:  Mental status change, unknown cause;    TECHNIQUE:  Multiple axial images were obtained from the base of the brain to the vertex without contrast administration.  Sagittal and coronal reconstructions were performed..Automatic exposure control is utilized to reduce patient radiation exposure.    COMPARISON:  10/12/2023    FINDINGS:  There is no intracranial mass or lesion seen.  No hemorrhage is seen.  No acute infarct is seen.  The patient is status post craniotomy in the left parietal region.  There is some stable dural thickening on the left side.  There  is some cerebral atrophy seen.  There is some compensatory ventricular dilatation and periventricular white matter changes consistent with the patient's age.  Calvarium is intact.  The posterior fossa is unremarkable..  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  Impression: Stable chronic findings seen no acute process    Electronically signed by: Johann Linares  Date:    11/10/2024  Time:    12:35        Medications:  Scheduled Meds:   enoxparin  40 mg Subcutaneous Q24H (prophylaxis, 1700)    folic acid  1 mg Oral Daily    furosemide  20 mg Oral Daily    lactulose 10 gram/15 ml  30 g Oral TID    levothyroxine  25 mcg Oral Before breakfast    magnesium oxide  400 mg Oral BID    multivitamin  1 tablet Oral Daily    pantoprazole  40 mg Oral Daily    rifAXIMin  550 mg Oral BID    rOPINIRole  0.25 mg Oral QHS    sodium bicarbonate  650 mg Oral BID    spironolactone  50 mg Oral Daily    thiamine  100 mg Oral Daily     Continuous Infusions:  PRN Meds:.  Current Facility-Administered Medications:     hydrALAZINE, 10 mg, Intravenous, Q4H PRN    ondansetron, 4 mg, Intravenous, Q6H PRN    Nutrition:  Nutrition consulted. Most recent weight and BMI monitored-     Measurements:  Wt Readings from Last 1 Encounters:   11/10/24 62 kg (136 lb 11 oz)   Body mass index is 23.46 kg/m².    Patient has been screened and assessed by RD.    Malnutrition Type:  Context:    Level:      Malnutrition Characteristic Summary:       Interventions/Recommendations (treatment strategy):           Assessment/Plan:   Combined toxic/metabolic encephalopathy  Alcohol cirrhosis  Alcohol dependence  Homelessness  Chronic hyponatremia  Major depressive disorder    Continue lactulose/rifaxamin. Titrate to 2 - 3 soft Bms daily  Vitamin supplementation with folate, thiamine, and MVI  Continue lasix/spironolactone for volume mgmt  CM working on  [facility] NH placement ([facility])  Medically stable  Labs yesterday stable, can repeat prn      Taj Ramirez MD   11/15/2024      All diagnosis and differential diagnosis have been reviewed; assessment and plan has been documented; I have personally reviewed the labs and test results that are presently available; I have reviewed the patients medication list; I have reviewed the consulting providers response and recommendations. I have reviewed or attempted to review medical records based upon their availability    All of the patient's questions have been  addressed and answered. Patient's is agreeable to the above stated plan. I will continue to monitor closely and make adjustments to medical management as needed.  _____________________________________________________________________

## 2024-11-15 PROCEDURE — 63600175 PHARM REV CODE 636 W HCPCS: Performed by: INTERNAL MEDICINE

## 2024-11-15 PROCEDURE — 11000001 HC ACUTE MED/SURG PRIVATE ROOM

## 2024-11-15 PROCEDURE — 25000003 PHARM REV CODE 250: Performed by: INTERNAL MEDICINE

## 2024-11-15 PROCEDURE — 25000003 PHARM REV CODE 250: Performed by: PHYSICIAN ASSISTANT

## 2024-11-15 RX ADMIN — RIFAXIMIN 550 MG: 550 TABLET ORAL at 09:11

## 2024-11-15 RX ADMIN — Medication 400 MG: at 09:11

## 2024-11-15 RX ADMIN — FOLIC ACID 1 MG: 1 TABLET ORAL at 09:11

## 2024-11-15 RX ADMIN — ROPINIROLE HYDROCHLORIDE 0.25 MG: 0.25 TABLET, FILM COATED ORAL at 09:11

## 2024-11-15 RX ADMIN — LACTULOSE 30 G: 10 SOLUTION ORAL at 09:11

## 2024-11-15 RX ADMIN — SPIRONOLACTONE 50 MG: 25 TABLET ORAL at 09:11

## 2024-11-15 RX ADMIN — LEVOTHYROXINE SODIUM 25 MCG: 25 TABLET ORAL at 06:11

## 2024-11-15 RX ADMIN — SODIUM BICARBONATE 650 MG TABLET 650 MG: at 09:11

## 2024-11-15 RX ADMIN — FUROSEMIDE 20 MG: 20 TABLET ORAL at 09:11

## 2024-11-15 RX ADMIN — PANTOPRAZOLE SODIUM 40 MG: 40 TABLET, DELAYED RELEASE ORAL at 09:11

## 2024-11-15 RX ADMIN — ENOXAPARIN SODIUM 40 MG: 40 INJECTION SUBCUTANEOUS at 04:11

## 2024-11-15 RX ADMIN — THERA TABS 1 TABLET: TAB at 09:11

## 2024-11-15 RX ADMIN — LACTULOSE 30 G: 10 SOLUTION ORAL at 04:11

## 2024-11-15 RX ADMIN — THIAMINE HCL TAB 100 MG 100 MG: 100 TAB at 09:11

## 2024-11-15 NOTE — PLAN OF CARE
Problem: Fall Injury Risk  Goal: Absence of Fall and Fall-Related Injury  Outcome: Progressing  Intervention: Identify and Manage Contributors  Flowsheets (Taken 11/14/2024 1927)  Self-Care Promotion:   independence encouraged   BADL personal objects within reach   BADL personal routines maintained  Medication Review/Management: medications reviewed  Intervention: Promote Injury-Free Environment  Flowsheets (Taken 11/14/2024 1927)  Safety Promotion/Fall Prevention:   assistive device/personal item within reach   Fall Risk reviewed with patient/family   Fall Risk signage in place   bedside commode chair   medications reviewed   nonskid shoes/socks when out of bed     Problem: Fatigue  Goal: Improved Activity Tolerance  Outcome: Progressing  Intervention: Promote Improved Energy  Flowsheets (Taken 11/14/2024 1927)  Sleep/Rest Enhancement: regular sleep/rest pattern promoted  Activity Management: Rolling - L1  Environmental Support: calm environment promoted

## 2024-11-15 NOTE — PLAN OF CARE
[facility] fax requested forms to [facility]  [phone] and Nicole @ [facility] [phone] ( f) - except the pysch notes due to the visit has not been conducted yet      Nicole Nguyen confirmed Nell will come to visit/ evaluate the pt @ 2:30pm on 11/16/24

## 2024-11-15 NOTE — PLAN OF CARE
Problem: Fall Injury Risk  Goal: Absence of Fall and Fall-Related Injury  Outcome: Progressing     Problem: Fatigue  Goal: Improved Activity Tolerance  Outcome: Progressing

## 2024-11-16 PROCEDURE — 25000003 PHARM REV CODE 250: Performed by: INTERNAL MEDICINE

## 2024-11-16 PROCEDURE — 11000001 HC ACUTE MED/SURG PRIVATE ROOM

## 2024-11-16 PROCEDURE — 63600175 PHARM REV CODE 636 W HCPCS: Performed by: INTERNAL MEDICINE

## 2024-11-16 PROCEDURE — 25000003 PHARM REV CODE 250: Performed by: PHYSICIAN ASSISTANT

## 2024-11-16 RX ORDER — HYDROXYZINE PAMOATE 50 MG/1
50 CAPSULE ORAL EVERY 8 HOURS PRN
Status: DISCONTINUED | OUTPATIENT
Start: 2024-11-17 | End: 2024-11-26 | Stop reason: HOSPADM

## 2024-11-16 RX ADMIN — LEVOTHYROXINE SODIUM 25 MCG: 25 TABLET ORAL at 05:11

## 2024-11-16 RX ADMIN — RIFAXIMIN 550 MG: 550 TABLET ORAL at 07:11

## 2024-11-16 RX ADMIN — ENOXAPARIN SODIUM 40 MG: 40 INJECTION SUBCUTANEOUS at 04:11

## 2024-11-16 RX ADMIN — Medication 400 MG: at 08:11

## 2024-11-16 RX ADMIN — SPIRONOLACTONE 50 MG: 25 TABLET ORAL at 08:11

## 2024-11-16 RX ADMIN — LACTULOSE 30 G: 10 SOLUTION ORAL at 07:11

## 2024-11-16 RX ADMIN — FOLIC ACID 1 MG: 1 TABLET ORAL at 08:11

## 2024-11-16 RX ADMIN — THIAMINE HCL TAB 100 MG 100 MG: 100 TAB at 08:11

## 2024-11-16 RX ADMIN — SODIUM BICARBONATE 650 MG TABLET 650 MG: at 07:11

## 2024-11-16 RX ADMIN — LACTULOSE 30 G: 10 SOLUTION ORAL at 08:11

## 2024-11-16 RX ADMIN — ROPINIROLE HYDROCHLORIDE 0.25 MG: 0.25 TABLET, FILM COATED ORAL at 07:11

## 2024-11-16 RX ADMIN — SODIUM BICARBONATE 650 MG TABLET 650 MG: at 08:11

## 2024-11-16 RX ADMIN — FUROSEMIDE 20 MG: 20 TABLET ORAL at 08:11

## 2024-11-16 RX ADMIN — RIFAXIMIN 550 MG: 550 TABLET ORAL at 08:11

## 2024-11-16 RX ADMIN — PANTOPRAZOLE SODIUM 40 MG: 40 TABLET, DELAYED RELEASE ORAL at 08:11

## 2024-11-16 RX ADMIN — Medication 400 MG: at 07:11

## 2024-11-16 RX ADMIN — THERA TABS 1 TABLET: TAB at 08:11

## 2024-11-16 RX ADMIN — LACTULOSE 30 G: 10 SOLUTION ORAL at 02:11

## 2024-11-16 NOTE — PROGRESS NOTES
[facility]  Hospital Medicine Progress Note        Chief Complaint: Inpatient Follow-up     HPI:    64 y.o. female who  has a past medical history of Acute confusional state (11/12/2024), DIANA (acute kidney injury), Hyponatremia, Major depressive disorder, single episode, unspecified, and Unspecified cirrhosis of liver..      The patient presented to [facility] on 11/10/2024 with a primary complaint of confusion and generalized weakness which began on 11/07/2024.       She denies complaints of headache, chest pain, shortness of breath, abdominal pain, vomiting and diarrhea.  Patient and son who is at bedside reports patient is no longer drinking alcohol.  She reports she is prescribed lactulose once daily which she was compliant with.     Upon presentation to the ED, temperature 98.2° F, heart rate 117, blood pressure 174/100, respiratory rate 20 and SpO2 98% on 2 L nasal cannula.  Labs with CO2 18, ammonia 160, troponin 0.017.  EKG sinus tachycardia with ventricular rate of 113.  CTA of the head stable chronic findings but no acute process.  Chest x-ray no acute abnormality.  Patient is admitted to hospital medicine services for further medical management    Interval Hx:  No longer having confusion currently.  Understands she needs 2-3 bm per day.      Objective/physical exam:  General: In no acute distress, afebrile  Chest: Clear to auscultation bilaterally  Heart: RRR, +S1, S2, no appreciable murmur  Abdomen: Soft, nontender, BS +  MSK: Warm, no lower extremity edema, no clubbing or cyanosis  Neurologic: Alert and oriented x4, Cranial nerve II-XII intact, Strength 5/5 in all 4 extremities    VITAL SIGNS: 24 HRS MIN & MAX LAST   Temp  Min: 98 °F (36.7 °C)  Max: 98.9 °F (37.2 °C) 98.3 °F (36.8 °C)   BP  Min: 119/57  Max: 160/81 122/65   Pulse  Min: 95  Max: 106  95   Resp  Min: 17  Max: 20 17   SpO2  Min: 97 %  Max: 99 % 97 %       Recent Labs   Lab 11/11/24  0300 11/12/24  6044  11/14/24  0836   WBC 7.11 6.77 6.08   RBC 4.22 4.34 4.33   HGB 11.5* 11.8* 11.8*   HCT 34.1* 35.4* 35.9*   MCV 80.8 81.6 82.9   MCH 27.3 27.2 27.3   MCHC 33.7 33.3 32.9*   RDW 18.1* 18.3* 18.0*    193 197   MPV 9.5 8.8 9.1       Recent Labs   Lab 11/10/24  1235 11/11/24  0300 11/12/24  0257 11/14/24  0836    142 138 135*   K 4.0 3.9 3.6 3.8   * 117* 109* 106   CO2 18* 17* 22* 22*   BUN 11.8 9.4* 8.4* 9.2*   CREATININE 0.76 0.76 0.89 0.85   CALCIUM 9.1 8.5 8.6 8.7   MG 2.10  --   --   --    ALBUMIN 3.5 3.2* 3.1* 3.1*   ALKPHOS 101 88 91 88   ALT 18 14 15 16   AST 33 34 33 33   BILITOT 1.4 1.5 1.6* 1.6*          Microbiology Results (last 7 days)       ** No results found for the last 168 hours. **             Radiology:  X-Ray Chest 1 View  Narrative: EXAMINATION:  XR CHEST 1 VIEW    CLINICAL HISTORY:  generalized weakness, AMS;    TECHNIQUE:  Single frontal view of the chest was performed.    COMPARISON:  11/01/2023    FINDINGS:  The lungs are clear.  The heart is normal appearance.  The pulmonary vascularity is unremarkable.  Aorta appears grossly unremarkable.  No pleural effusions are seen.  Bones and joints show no acute abnormality.  Impression: No abnormality seen    Electronically signed by: Johann Linares  Date:    11/10/2024  Time:    12:38  CT Head Without Contrast  Narrative: EXAMINATION:  CT HEAD WITHOUT CONTRAST    CLINICAL HISTORY:  Mental status change, unknown cause;    TECHNIQUE:  Multiple axial images were obtained from the base of the brain to the vertex without contrast administration.  Sagittal and coronal reconstructions were performed..Automatic exposure control is utilized to reduce patient radiation exposure.    COMPARISON:  10/12/2023    FINDINGS:  There is no intracranial mass or lesion seen.  No hemorrhage is seen.  No acute infarct is seen.  The patient is status post craniotomy in the left parietal region.  There is some stable dural thickening on the left side.  There  is some cerebral atrophy seen.  There is some compensatory ventricular dilatation and periventricular white matter changes consistent with the patient's age.  Calvarium is intact.  The posterior fossa is unremarkable..  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  Impression: Stable chronic findings seen no acute process    Electronically signed by: Johann Linares  Date:    11/10/2024  Time:    12:35        Medications:  Scheduled Meds:   enoxparin  40 mg Subcutaneous Q24H (prophylaxis, 1700)    folic acid  1 mg Oral Daily    furosemide  20 mg Oral Daily    lactulose 10 gram/15 ml  30 g Oral TID    levothyroxine  25 mcg Oral Before breakfast    magnesium oxide  400 mg Oral BID    multivitamin  1 tablet Oral Daily    pantoprazole  40 mg Oral Daily    rifAXIMin  550 mg Oral BID    rOPINIRole  0.25 mg Oral QHS    sodium bicarbonate  650 mg Oral BID    spironolactone  50 mg Oral Daily    thiamine  100 mg Oral Daily     Continuous Infusions:  PRN Meds:.  Current Facility-Administered Medications:     hydrALAZINE, 10 mg, Intravenous, Q4H PRN    ondansetron, 4 mg, Intravenous, Q6H PRN    Nutrition:  Nutrition consulted. Most recent weight and BMI monitored-     Measurements:  Wt Readings from Last 1 Encounters:   11/10/24 62 kg (136 lb 11 oz)   Body mass index is 23.46 kg/m².    Patient has been screened and assessed by RD.    Malnutrition Type:  Context:    Level:      Malnutrition Characteristic Summary:       Interventions/Recommendations (treatment strategy):           Assessment/Plan:   Combined toxic/metabolic encephalopathy-resolved  Alcohol cirrhosis  Alcohol dependence  Homelessness  Chronic hyponatremia  Major depressive disorder    Continue lactulose/rifaxamin. Titrate to 2 - 3 soft Bms daily  Vitamin supplementation with folate, thiamine, and MVI  Continue lasix/spironolactone for volume mgmt  CM  working on assisted [facility] ([facility])  Medically stable      Fernando Guillen MD   11/16/2024      All diagnosis and differential diagnosis have been reviewed; assessment and plan has been documented; I have personally reviewed the labs and test results that are presently available; I have reviewed the patients medication list; I have reviewed the consulting providers response and recommendations. I have reviewed or attempted to review medical records based upon their availability    All of the patient's questions have been  addressed and answered. Patient's is agreeable to the above stated plan. I will continue to monitor closely and make adjustments to medical management as needed.  _____________________________________________________________________

## 2024-11-16 NOTE — PLAN OF CARE
Problem: Fall Injury Risk  Goal: Absence of Fall and Fall-Related Injury  Outcome: Progressing     Problem: Fatigue  Goal: Improved Activity Tolerance  Outcome: Progressing     Problem: Adult Inpatient Plan of Care  Goal: Plan of Care Review  Outcome: Progressing  Goal: Patient-Specific Goal (Individualized)  Outcome: Progressing  Goal: Absence of Hospital-Acquired Illness or Injury  Outcome: Progressing  Goal: Optimal Comfort and Wellbeing  Outcome: Progressing  Goal: Readiness for Transition of Care  Outcome: Progressing

## 2024-11-16 NOTE — PLAN OF CARE
Problem: Fall Injury Risk  Goal: Absence of Fall and Fall-Related Injury  Outcome: Progressing  Intervention: Identify and Manage Contributors  Flowsheets (Taken 11/16/2024 0110)  Self-Care Promotion: independence encouraged  Medication Review/Management: medications reviewed  Intervention: Promote Injury-Free Environment  Flowsheets (Taken 11/16/2024 0110)  Safety Promotion/Fall Prevention:   assistive device/personal item within reach   lighting adjusted   medications reviewed   muscle strengthening facilitated   nonskid shoes/socks when out of bed   side rails raised x 3     Problem: Fatigue  Goal: Improved Activity Tolerance  Outcome: Progressing  Intervention: Promote Improved Energy  Flowsheets (Taken 11/16/2024 0110)  Activity Management: Rolling - L1  Environmental Support: calm environment promoted     Problem: Adult Inpatient Plan of Care  Goal: Plan of Care Review  Outcome: Progressing  Flowsheets (Taken 11/16/2024 0110)  Plan of Care Reviewed With: patient  Goal: Patient-Specific Goal (Individualized)  Outcome: Progressing  Goal: Absence of Hospital-Acquired Illness or Injury  Outcome: Progressing  Intervention: Identify and Manage Fall Risk  Flowsheets (Taken 11/16/2024 0110)  Safety Promotion/Fall Prevention:   assistive device/personal item within reach   lighting adjusted   medications reviewed   muscle strengthening facilitated   nonskid shoes/socks when out of bed   side rails raised x 3  Intervention: Prevent Skin Injury  Flowsheets (Taken 11/16/2024 0110)  Body Position:   position changed independently   weight shifting  Skin Protection: incontinence pads utilized  Intervention: Prevent and Manage VTE (Venous Thromboembolism) Risk  Flowsheets (Taken 11/16/2024 0110)  VTE Prevention/Management:   ambulation promoted   bleeding precautions maintained   bleeding risk assessed   dorsiflexion/plantar flexion performed  Intervention: Prevent Infection  Flowsheets (Taken 11/16/2024 0110)  Infection  Prevention:   rest/sleep promoted   single patient room provided   equipment surfaces disinfected   hand hygiene promoted  Goal: Optimal Comfort and Wellbeing  Outcome: Progressing  Intervention: Monitor Pain and Promote Comfort  Flowsheets (Taken 11/16/2024 0110)  Pain Management Interventions:   care clustered   relaxation techniques promoted   quiet environment facilitated  Intervention: Provide Person-Centered Care  Flowsheets (Taken 11/16/2024 0110)  Trust Relationship/Rapport:   care explained   emotional support provided   empathic listening provided   questions answered   thoughts/feelings acknowledged   reassurance provided   questions encouraged  Goal: Readiness for Transition of Care  Outcome: Progressing

## 2024-11-17 PROCEDURE — 25000003 PHARM REV CODE 250: Performed by: PHYSICIAN ASSISTANT

## 2024-11-17 PROCEDURE — 25000003 PHARM REV CODE 250: Performed by: INTERNAL MEDICINE

## 2024-11-17 PROCEDURE — 11000001 HC ACUTE MED/SURG PRIVATE ROOM

## 2024-11-17 PROCEDURE — 25000003 PHARM REV CODE 250: Performed by: NURSE PRACTITIONER

## 2024-11-17 PROCEDURE — 63600175 PHARM REV CODE 636 W HCPCS: Performed by: INTERNAL MEDICINE

## 2024-11-17 RX ADMIN — SODIUM BICARBONATE 650 MG TABLET 650 MG: at 08:11

## 2024-11-17 RX ADMIN — LACTULOSE 30 G: 10 SOLUTION ORAL at 04:11

## 2024-11-17 RX ADMIN — FOLIC ACID 1 MG: 1 TABLET ORAL at 08:11

## 2024-11-17 RX ADMIN — ENOXAPARIN SODIUM 40 MG: 40 INJECTION SUBCUTANEOUS at 04:11

## 2024-11-17 RX ADMIN — FUROSEMIDE 20 MG: 20 TABLET ORAL at 08:11

## 2024-11-17 RX ADMIN — ROPINIROLE HYDROCHLORIDE 0.25 MG: 0.25 TABLET, FILM COATED ORAL at 07:11

## 2024-11-17 RX ADMIN — THIAMINE HCL TAB 100 MG 100 MG: 100 TAB at 08:11

## 2024-11-17 RX ADMIN — HYDROXYZINE PAMOATE 50 MG: 50 CAPSULE ORAL at 07:11

## 2024-11-17 RX ADMIN — LACTULOSE 30 G: 10 SOLUTION ORAL at 08:11

## 2024-11-17 RX ADMIN — LACTULOSE 30 G: 10 SOLUTION ORAL at 07:11

## 2024-11-17 RX ADMIN — PANTOPRAZOLE SODIUM 40 MG: 40 TABLET, DELAYED RELEASE ORAL at 08:11

## 2024-11-17 RX ADMIN — THERA TABS 1 TABLET: TAB at 08:11

## 2024-11-17 RX ADMIN — Medication 400 MG: at 07:11

## 2024-11-17 RX ADMIN — SPIRONOLACTONE 50 MG: 25 TABLET ORAL at 08:11

## 2024-11-17 RX ADMIN — RIFAXIMIN 550 MG: 550 TABLET ORAL at 07:11

## 2024-11-17 RX ADMIN — SODIUM BICARBONATE 650 MG TABLET 650 MG: at 07:11

## 2024-11-17 RX ADMIN — Medication 400 MG: at 08:11

## 2024-11-17 RX ADMIN — LEVOTHYROXINE SODIUM 25 MCG: 25 TABLET ORAL at 07:11

## 2024-11-17 RX ADMIN — RIFAXIMIN 550 MG: 550 TABLET ORAL at 08:11

## 2024-11-17 NOTE — PSYCH EVALUATION
"Subjective: "I'm ready to go to the nursing home."       Patient is a 64 y.o. female seen for evaluation for level 2.Pt currently in room alone, reports that she has been "waiting to go" and gets more anxious/worried by the day. Denies significant psychiatric history but this is questionable noting hx depression. denies history of being on psychotropic medications but reports "I always been anxious and had anxiety". Denies significant sleep and appetite disturbances at present. 2 adult children, has been  before but her long time partner  4 years ago. Limited family contact. 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 Reports long hx ETOH abuse but has been sober since . HS education, currently homeless with no noted familial support. Denies using dreams/cravings. Pt limited historian. Recent UDS and ETOH unavailable.Limited historian.    *History of Acute confusional state (2024), DIANA (acute kidney injury), Hyponatremia, Major depressive disorder, single episode, unspecified, and Unspecified cirrhosis of liver..        Patient Active Problem List    Diagnosis Date Noted    Acute confusional state 2024    Alcoholic cirrhosis of liver without ascites 05/10/2024    Metabolic acidosis 05/10/2024    Alcoholic liver disease 2023    Volume overload 2023    Liver failure 10/26/2023    Major depressive disorder 10/26/2023    Alcohol abuse 10/26/2023    Palliative care encounter 10/26/2023    Hypothyroidism 10/26/2023    Hyponatremia 10/13/2023     Past Medical History:   Diagnosis Date    Acute confusional state 2024    DIANA (acute kidney injury)     Hyponatremia     Major depressive disorder, single episode, unspecified     Unspecified cirrhosis of liver       Past Surgical History:   Procedure Laterality Date    BRAIN SURGERY       SECTION      ESOPHAGOGASTRODUODENOSCOPY N/A 2024    Procedure: EGD (ESOPHAGOGASTRODUODENOSCOPY);  Surgeon: Deb Arteaga MD;  Location: " [facility] ENDOSCOPY;  Service: Gastroenterology;  Laterality: N/A;      Medications Prior to Admission   Medication Sig Dispense Refill Last Dose/Taking    folic acid (FOLVITE) 1 MG tablet Take 1 tablet (1 mg total) by mouth once daily. 90 tablet 3 11/9/2024    furosemide (LASIX) 20 MG tablet Take 1 tablet (20 mg total) by mouth once daily. 30 tablet 11 11/9/2024    levothyroxine (SYNTHROID) 25 MCG tablet Take 1 tablet (25 mcg total) by mouth before breakfast. 90 tablet 2 11/9/2024    magnesium oxide (MAG-OX) 400 mg (241.3 mg magnesium) tablet Take 1 tablet (400 mg total) by mouth 2 (two) times daily. 180 tablet 2 11/9/2024    multivitamin Tab Take 1 tablet by mouth once daily.   11/9/2024    omeprazole (PRILOSEC) 40 MG capsule Take 1 capsule (40 mg total) by mouth every morning. 90 capsule 3 11/9/2024    rOPINIRole (REQUIP) 0.25 MG tablet Take 1 tablet (0.25 mg total) by mouth every evening. 30 tablet 6 11/9/2024    sodium bicarbonate 650 MG tablet Take 1 tablet (650 mg total) by mouth 2 (two) times daily. 180 tablet 3 11/9/2024    spironolactone (ALDACTONE) 50 MG tablet Take 1 tablet (50 mg total) by mouth once daily. 30 tablet 11 11/9/2024    ondansetron (ZOFRAN) 4 MG tablet Take 8 mg by mouth every 4 (four) hours as needed for Nausea.   Unknown    thiamine 100 MG tablet Take 1 tablet (100 mg total) by mouth once daily. 90 tablet 1 Unknown    [DISCONTINUED] acamprosate (CAMPRAL) 333 mg tablet Take 2 tablets (666 mg total) by mouth 3 (three) times daily. (Patient not taking: Reported on 9/13/2024) 90 tablet 6      Review of patient's allergies indicates:   Allergen Reactions    Hydrocodone      Nausea..      Social History     Tobacco Use    Smoking status: Never     Passive exposure: Never    Smokeless tobacco: Never   Substance Use Topics    Alcohol use: Not Currently     Comment: last drink 3 -4 months      Family History   Problem Relation Name Age of Onset    Hypertension Mother      Thyroid disease Mother       Liver disease Father      Liver disease Sister      Alcohol abuse Brother      No Known Problems Daughter      No Known Problems Son        Psychiatric Review Of Systems:  sleep: no  appetite changes: no  weight changes: no  energy/anergy: no  interest/pleasure/anhedonia: no  somatic symptoms: no  libido: no  anxiety/panic: no  guilty/hopeless: yes  S.I.B.s/risky behavior: no  any drugs: no  alcohol: no, hx abuse       Objective:  Vital signs:  Temp:  [98.1 °F (36.7 °C)-98.5 °F (36.9 °C)] 98.5 °F (36.9 °C)  Pulse:  [] 100  Resp:  [17-20] 18  SpO2:  [97 %-99 %] 98 %  BP: (122-144)/(61-81) 132/61    Mental Status Evaluation:  Appearance:  lying in bed, personal clothes   Behavior:  cooperative, restless and fidgety , eye contact normal   Speech:  soft, minimal, minimally forthcoming   Mood:  anxious   Affect:  blunted   Thought Process:  blocked   Thought Content:  Limited, questionable intermittent confusion and disorganized thought content   Sensorium:  grossly intact   Cognition:  memory > able to remember recent events- Yes, remote memory limited   Insight:  limited   Judgment:  limited     Assessment/Plan:  Axis I: 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

## 2024-11-17 NOTE — PROGRESS NOTES
[facility]  Hospital Medicine Progress Note            Chief Complaint: Inpatient Follow-up      HPI:    64 y.o. female who  has a past medical history of Acute confusional state (11/12/2024), DIANA (acute kidney injury), Hyponatremia, Major depressive disorder, single episode, unspecified, and Unspecified cirrhosis of liver. The patient presented to [facility] on 11/10/2024 with a primary complaint of confusion and generalized weakness which began on 11/07/2024.  Upon presentation to the ED, temperature 98.2° F, heart rate 117, blood pressure 174/100, respiratory rate 20 and SpO2 98% on 2 L nasal cannula.  Labs with CO2 18, ammonia 160, troponin 0.017.  EKG sinus tachycardia with ventricular rate of 113.  CTA of the head stable chronic findings but no acute process.  Chest x-ray no acute abnormality.  Patient is admitted to hospital medicine services for further medical management    Today: Current vital signs stable, afebrile, not hypoxic. Denies nausea, vomiting, fever, chills    Case was discussed with patient's nurse and  on the floor. Denies nausea/vomiting/fever/chills    Objective/physical exam:  General: In no acute distress, afebrile  Chest: Clear to auscultation bilaterally  Heart: RRR, +S1, S2, no appreciable murmur  Abdomen: Soft, nontender, BS +  MSK: Warm, no lower extremity edema, no clubbing or cyanosis  Neurologic: Alert and oriented x 3    VITAL SIGNS: 24 HRS MIN & MAX LAST   Temp  Min: 97.7 °F (36.5 °C)  Max: 98.5 °F (36.9 °C) 97.9 °F (36.6 °C)   BP  Min: 118/64  Max: 141/75 130/80   Pulse  Min: 90  Max: 102  90   Resp  Min: 18  Max: 18 18   SpO2  Min: 97 %  Max: 99 % 98 %     I have reviewed the following labs:  Recent Labs   Lab 11/11/24  0300 11/12/24  0257 11/14/24  0836   WBC 7.11 6.77 6.08   RBC 4.22 4.34 4.33   HGB 11.5* 11.8* 11.8*   HCT 34.1* 35.4* 35.9*   MCV 80.8 81.6 82.9   MCH 27.3 27.2 27.3   MCHC 33.7 33.3 32.9*   RDW 18.1* 18.3* 18.0*     193 197   MPV 9.5 8.8 9.1     Recent Labs   Lab 11/10/24  1235 11/11/24  0300 11/12/24  0257 11/14/24  0836    142 138 135*   K 4.0 3.9 3.6 3.8   * 117* 109* 106   CO2 18* 17* 22* 22*   BUN 11.8 9.4* 8.4* 9.2*   CREATININE 0.76 0.76 0.89 0.85   CALCIUM 9.1 8.5 8.6 8.7   MG 2.10  --   --   --    ALBUMIN 3.5 3.2* 3.1* 3.1*   ALKPHOS 101 88 91 88   ALT 18 14 15 16   AST 33 34 33 33   BILITOT 1.4 1.5 1.6* 1.6*     Microbiology Results (last 7 days)       ** No results found for the last 168 hours. **             See below for Radiology    Scheduled Med:   enoxparin  40 mg Subcutaneous Q24H (prophylaxis, 1700)    folic acid  1 mg Oral Daily    furosemide  20 mg Oral Daily    lactulose 10 gram/15 ml  30 g Oral TID    levothyroxine  25 mcg Oral Before breakfast    magnesium oxide  400 mg Oral BID    multivitamin  1 tablet Oral Daily    pantoprazole  40 mg Oral Daily    rifAXIMin  550 mg Oral BID    rOPINIRole  0.25 mg Oral QHS    sodium bicarbonate  650 mg Oral BID    spironolactone  50 mg Oral Daily    thiamine  100 mg Oral Daily      Continuous Infusions:     PRN Meds:    Current Facility-Administered Medications:     hydrALAZINE, 10 mg, Intravenous, Q4H PRN    hydrOXYzine pamoate, 50 mg, Oral, Q8H PRN    ondansetron, 4 mg, Intravenous, Q6H PRN     Assessment/Plan:  Hepatic encephalopathy resolved  Alcohol cirrhosis  Alcohol dependence  Homelessness  Chronic hyponatremia  Major depressive disorder     Continue with lactulose/rifaxamin. Titrate to 2 - 3 soft Bms daily  Vitamin supplementation with folate, thiamine, and MVI  Continue lasix/spironolactone for volume mgmt, electrolyte/kidney function monitoring.  CM working on [facility] ([facility])  PPI ppx:    GI Stress ulcer prophylaxis    1. PPI recommended    a. coagulopathy (platelets < 50k, INR > 1.5, or PTT > 2x control value)  b. Mechanical ventilation > 48 hours  c. hx of GI ulcer with or without bleeding  d. TBI, burn  e. Current dual  anti-platelet therapy  f. Anti-platelet and anti-coagulation therapy concurrently   g. Anti-platelet therapy and hx of GI ulcer  H. anti-coagulation/anti-platelet and corticosteroid use    2. PPI suggested (continuation of outpatient treatment for)  a. Erosive esophagitis  b. Symptomatic GERD  c. H. pylori infection  d. NSAID induced GI ulcer  e. Zollinger Elison and GI hypersecretory syndrome      VTE prophylaxis: lovenox    Patient condition:  fair    Anticipated discharge and Disposition:   correction/NH      All diagnosis and differential diagnosis have been reviewed; assessment and plan has been documented; I have personally reviewed the labs and test results that are presently available; I have reviewed the patients medication list; I have reviewed the consulting providers response and recommendations. I have reviewed or attempted to review medical records based upon their availability    All of the patient's questions have been  addressed and answered. Patient's is agreeable to the above stated plan. I will continue to monitor closely and make adjustments to medical management as needed.  _____________________________________________________________________    Nutrition Status:    Radiology:  I have personally reviewed the following imaging and agree with the radiologist.     X-Ray Chest 1 View  Narrative: EXAMINATION:  XR CHEST 1 VIEW    CLINICAL HISTORY:  generalized weakness, AMS;    TECHNIQUE:  Single frontal view of the chest was performed.    COMPARISON:  11/01/2023    FINDINGS:  The lungs are clear.  The heart is normal appearance.  The pulmonary vascularity is unremarkable.  Aorta appears grossly unremarkable.  No pleural effusions are seen.  Bones and joints show no acute abnormality.  Impression: No abnormality seen    Electronically signed by: Johann Linares  Date:    11/10/2024  Time:    12:38  CT Head Without Contrast  Narrative: EXAMINATION:  CT HEAD WITHOUT CONTRAST    CLINICAL HISTORY:  Mental  status change, unknown cause;    TECHNIQUE:  Multiple axial images were obtained from the base of the brain to the vertex without contrast administration.  Sagittal and coronal reconstructions were performed..Automatic exposure control is utilized to reduce patient radiation exposure.    COMPARISON:  10/12/2023    FINDINGS:  There is no intracranial mass or lesion seen.  No hemorrhage is seen.  No acute infarct is seen.  The patient is status post craniotomy in the left parietal region.  There is some stable dural thickening on the left side.  There is some cerebral atrophy seen.  There is some compensatory ventricular dilatation and periventricular white matter changes consistent with the patient's age.  Calvarium is intact.  The posterior fossa is unremarkable..  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  Impression: Stable chronic findings seen no acute process    Electronically signed by: Johann Linares  Date:    11/10/2024  Time:    12:35      Austin Bauer MD   11/17/2024

## 2024-11-17 NOTE — PLAN OF CARE
Problem: Fall Injury Risk  Goal: Absence of Fall and Fall-Related Injury  Outcome: Progressing  Intervention: Identify and Manage Contributors  Flowsheets (Taken 11/16/2024 2126)  Self-Care Promotion: independence encouraged  Medication Review/Management: medications reviewed  Intervention: Promote Injury-Free Environment  Flowsheets (Taken 11/16/2024 2126)  Safety Promotion/Fall Prevention:   assistive device/personal item within reach   bedside commode chair   commode/urinal/bedpan at bedside   Fall Risk reviewed with patient/family   Fall Risk signage in place   family expresses understanding of fall risk and prevention   instructed to call staff for mobility   lighting adjusted   medications reviewed   muscle strengthening facilitated   nonskid shoes/socks when out of bed   side rails raised x 2     Problem: Fatigue  Goal: Improved Activity Tolerance  Outcome: Progressing  Intervention: Promote Improved Energy  Flowsheets (Taken 11/16/2024 2126)  Environmental Support:   calm environment promoted   rest periods encouraged     Problem: Adult Inpatient Plan of Care  Goal: Plan of Care Review  Outcome: Progressing  Flowsheets (Taken 11/16/2024 2126)  Plan of Care Reviewed With: patient  Goal: Patient-Specific Goal (Individualized)  Outcome: Progressing  Goal: Absence of Hospital-Acquired Illness or Injury  Outcome: Progressing  Intervention: Identify and Manage Fall Risk  Flowsheets (Taken 11/16/2024 2126)  Safety Promotion/Fall Prevention:   assistive device/personal item within reach   bedside commode chair   commode/urinal/bedpan at bedside   Fall Risk reviewed with patient/family   Fall Risk signage in place   family expresses understanding of fall risk and prevention   instructed to call staff for mobility   lighting adjusted   medications reviewed   muscle strengthening facilitated   nonskid shoes/socks when out of bed   side rails raised x 2  Intervention: Prevent Skin Injury  Flowsheets (Taken 11/16/2024  2126)  Body Position:   position changed independently   weight shifting  Skin Protection: incontinence pads utilized  Device Skin Pressure Protection: absorbent pad utilized/changed  Intervention: Prevent and Manage VTE (Venous Thromboembolism) Risk  Flowsheets (Taken 11/16/2024 2126)  VTE Prevention/Management:   ambulation promoted   bleeding precautions maintained   bleeding risk assessed   dorsiflexion/plantar flexion performed  Intervention: Prevent Infection  Flowsheets (Taken 11/16/2024 2126)  Infection Prevention:   hand hygiene promoted   visitors restricted/screened   single patient room provided   equipment surfaces disinfected   rest/sleep promoted   personal protective equipment utilized  Goal: Optimal Comfort and Wellbeing  Outcome: Progressing  Intervention: Monitor Pain and Promote Comfort  Flowsheets (Taken 11/16/2024 2126)  Pain Management Interventions:   care clustered   quiet environment facilitated   relaxation techniques promoted  Intervention: Provide Person-Centered Care  Flowsheets (Taken 11/16/2024 2126)  Trust Relationship/Rapport:   care explained   choices provided   emotional support provided   empathic listening provided   questions answered   questions encouraged   reassurance provided   thoughts/feelings acknowledged  Goal: Readiness for Transition of Care  Outcome: Progressing

## 2024-11-18 PROCEDURE — 25000003 PHARM REV CODE 250: Performed by: NURSE PRACTITIONER

## 2024-11-18 PROCEDURE — 25000003 PHARM REV CODE 250: Performed by: HOSPITALIST

## 2024-11-18 PROCEDURE — 11000001 HC ACUTE MED/SURG PRIVATE ROOM

## 2024-11-18 PROCEDURE — 25000003 PHARM REV CODE 250: Performed by: INTERNAL MEDICINE

## 2024-11-18 PROCEDURE — 63600175 PHARM REV CODE 636 W HCPCS: Performed by: INTERNAL MEDICINE

## 2024-11-18 RX ORDER — LACTULOSE 10 G/15ML
30 SOLUTION ORAL 2 TIMES DAILY
Status: DISCONTINUED | OUTPATIENT
Start: 2024-11-18 | End: 2024-11-26 | Stop reason: HOSPADM

## 2024-11-18 RX ADMIN — PANTOPRAZOLE SODIUM 40 MG: 40 TABLET, DELAYED RELEASE ORAL at 10:11

## 2024-11-18 RX ADMIN — HYDROXYZINE PAMOATE 50 MG: 50 CAPSULE ORAL at 03:11

## 2024-11-18 RX ADMIN — LACTULOSE 30 G: 10 SOLUTION ORAL at 10:11

## 2024-11-18 RX ADMIN — Medication 400 MG: at 10:11

## 2024-11-18 RX ADMIN — THERA TABS 1 TABLET: TAB at 10:11

## 2024-11-18 RX ADMIN — SODIUM BICARBONATE 650 MG TABLET 650 MG: at 10:11

## 2024-11-18 RX ADMIN — FOLIC ACID 1 MG: 1 TABLET ORAL at 10:11

## 2024-11-18 RX ADMIN — THIAMINE HCL TAB 100 MG 100 MG: 100 TAB at 10:11

## 2024-11-18 RX ADMIN — ENOXAPARIN SODIUM 40 MG: 40 INJECTION SUBCUTANEOUS at 04:11

## 2024-11-18 RX ADMIN — RIFAXIMIN 550 MG: 550 TABLET ORAL at 10:11

## 2024-11-18 RX ADMIN — LEVOTHYROXINE SODIUM 25 MCG: 25 TABLET ORAL at 05:11

## 2024-11-18 RX ADMIN — ROPINIROLE HYDROCHLORIDE 0.25 MG: 0.25 TABLET, FILM COATED ORAL at 10:11

## 2024-11-18 RX ADMIN — SPIRONOLACTONE 50 MG: 25 TABLET ORAL at 10:11

## 2024-11-18 RX ADMIN — FUROSEMIDE 20 MG: 20 TABLET ORAL at 10:11

## 2024-11-18 NOTE — PROGRESS NOTES
[facility]  Hospital Medicine Progress Note        Chief Complaint: Inpatient Follow-up     HPI:   64 y.o. female who  has a past medical history of Acute confusional state (11/12/2024), DIANA (acute kidney injury), Hyponatremia, Major depressive disorder, single episode, unspecified, and Unspecified cirrhosis of liver. The patient presented to [facility] on 11/10/2024 with a primary complaint of confusion and generalized weakness which began on 11/07/2024.  Upon presentation to the ED, temperature 98.2° F, heart rate 117, blood pressure 174/100, respiratory rate 20 and SpO2 98% on 2 L nasal cannula.  Labs with CO2 18, ammonia 160, troponin 0.017.  EKG sinus tachycardia with ventricular rate of 113.  CTA of the head stable chronic findings but no acute process.  Chest x-ray no acute abnormality.  Patient is admitted to hospital medicine services for further medical management     Interval History:  No acute events overnight. No new complaints.  Awaiting placement. No Bms overnight per nursing.      Objective/physical exam:  General: In no acute distress, afebrile  Chest: Clear to auscultation bilaterally  Heart: RRR, +S1, S2, no appreciable murmur  Abdomen: Soft, nontender, BS +  MSK: Warm, no lower extremity edema, no clubbing or cyanosis  Neurologic: Alert and oriented x 3    VITAL SIGNS: 24 HRS MIN & MAX LAST   Temp  Min: 97.7 °F (36.5 °C)  Max: 98.8 °F (37.1 °C) 97.8 °F (36.6 °C)   BP  Min: 115/67  Max: 150/74 (!) 145/86   Pulse  Min: 92  Max: 115  98   Resp  Min: 18  Max: 18 18   SpO2  Min: 97 %  Max: 100 % 100 %       Recent Labs   Lab 11/12/24 0257 11/14/24  0836   WBC 6.77 6.08   RBC 4.34 4.33   HGB 11.8* 11.8*   HCT 35.4* 35.9*   MCV 81.6 82.9   MCH 27.2 27.3   MCHC 33.3 32.9*   RDW 18.3* 18.0*    197   MPV 8.8 9.1       Recent Labs   Lab 11/12/24 0257 11/14/24  0836    135*   K 3.6 3.8   * 106   CO2 22* 22*   BUN 8.4* 9.2*   CREATININE 0.89 0.85   CALCIUM 8.6 8.7    ALBUMIN 3.1* 3.1*   ALKPHOS 91 88   ALT 15 16   AST 33 33   BILITOT 1.6* 1.6*          Microbiology Results (last 7 days)       ** No results found for the last 168 hours. **             Radiology:  X-Ray Chest 1 View  Narrative: EXAMINATION:  XR CHEST 1 VIEW    CLINICAL HISTORY:  generalized weakness, AMS;    TECHNIQUE:  Single frontal view of the chest was performed.    COMPARISON:  11/01/2023    FINDINGS:  The lungs are clear.  The heart is normal appearance.  The pulmonary vascularity is unremarkable.  Aorta appears grossly unremarkable.  No pleural effusions are seen.  Bones and joints show no acute abnormality.  Impression: No abnormality seen    Electronically signed by: Johann Linares  Date:    11/10/2024  Time:    12:38  CT Head Without Contrast  Narrative: EXAMINATION:  CT HEAD WITHOUT CONTRAST    CLINICAL HISTORY:  Mental status change, unknown cause;    TECHNIQUE:  Multiple axial images were obtained from the base of the brain to the vertex without contrast administration.  Sagittal and coronal reconstructions were performed..Automatic exposure control is utilized to reduce patient radiation exposure.    COMPARISON:  10/12/2023    FINDINGS:  There is no intracranial mass or lesion seen.  No hemorrhage is seen.  No acute infarct is seen.  The patient is status post craniotomy in the left parietal region.  There is some stable dural thickening on the left side.  There is some cerebral atrophy seen.  There is some compensatory ventricular dilatation and periventricular white matter changes consistent with the patient's age.  Calvarium is intact.  The posterior fossa is unremarkable..  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  Impression: Stable chronic findings seen no acute process    Electronically signed by: Johann Linares  Date:    11/10/2024  Time:    12:35        Medications:  Scheduled  Meds:   enoxparin  40 mg Subcutaneous Q24H (prophylaxis, 1700)    folic acid  1 mg Oral Daily    furosemide  20 mg Oral Daily    lactulose 10 gram/15 ml  30 g Oral BID    levothyroxine  25 mcg Oral Before breakfast    magnesium oxide  400 mg Oral BID    multivitamin  1 tablet Oral Daily    pantoprazole  40 mg Oral Daily    rifAXIMin  550 mg Oral BID    rOPINIRole  0.25 mg Oral QHS    sodium bicarbonate  650 mg Oral BID    spironolactone  50 mg Oral Daily    thiamine  100 mg Oral Daily     Continuous Infusions:  PRN Meds:.  Current Facility-Administered Medications:     hydrALAZINE, 10 mg, Intravenous, Q4H PRN    hydrOXYzine pamoate, 50 mg, Oral, Q8H PRN    ondansetron, 4 mg, Intravenous, Q6H PRN    Nutrition:  Nutrition consulted. Most recent weight and BMI monitored-     Measurements:  Wt Readings from Last 1 Encounters:   11/10/24 62 kg (136 lb 11 oz)   Body mass index is 23.46 kg/m².    Patient has been screened and assessed by RD.    Malnutrition Type:  Context:    Level:      Malnutrition Characteristic Summary:       Interventions/Recommendations (treatment strategy):           Assessment/Plan:   Hepatic encephalopathy resolved  Alcohol cirrhosis  Alcohol dependence  Homelessness  Chronic hyponatremia  Major depressive disorder     Decreased lactulose to b.i.d. yesterday.  Monitor BM frequency.  Goal to have 2-3 soft bowel movements daily.  May need to go back up if no BM by lunch time  Continue vitamin supplementation with folate, thiamine, and MVI   Diuretics for volume management.  Will repeat labs tomorrow.    Case management working on retirement nursing home placement at [facility].  Medically stable once accepted         Taj Ramirez MD   11/19/2024    All diagnosis and differential diagnosis have been reviewed; assessment and plan has been documented; I have personally reviewed the labs and test results that are presently available; I have reviewed the patients medication list; I have reviewed the  consulting providers response and recommendations. I have reviewed or attempted to review medical records based upon their availability    All of the patient's questions have been  addressed and answered. Patient's is agreeable to the above stated plan. I will continue to monitor closely and make adjustments to medical management as needed.  _____________________________________________________________________

## 2024-11-18 NOTE — PLAN OF CARE
Problem: Fall Injury Risk  Goal: Absence of Fall and Fall-Related Injury  Outcome: Progressing     Problem: Fatigue  Goal: Improved Activity Tolerance  Outcome: Progressing     Problem: Adult Inpatient Plan of Care  Goal: Plan of Care Review  Outcome: Progressing  Flowsheets (Taken 11/17/2024 2054)  Plan of Care Reviewed With: patient  Goal: Patient-Specific Goal (Individualized)  Outcome: Progressing  Goal: Absence of Hospital-Acquired Illness or Injury  Outcome: Progressing  Intervention: Identify and Manage Fall Risk  Flowsheets (Taken 11/17/2024 2054)  Safety Promotion/Fall Prevention:   assistive device/personal item within reach   Fall Risk reviewed with patient/family   Fall Risk signage in place   lighting adjusted   medications reviewed   muscle strengthening facilitated   nonskid shoes/socks when out of bed  Intervention: Prevent Skin Injury  Flowsheets (Taken 11/17/2024 2054)  Body Position:   position changed independently   weight shifting  Skin Protection: incontinence pads utilized  Intervention: Prevent and Manage VTE (Venous Thromboembolism) Risk  Flowsheets (Taken 11/17/2024 2054)  VTE Prevention/Management:   ambulation promoted   bleeding precautions maintained   bleeding risk assessed   dorsiflexion/plantar flexion performed  Intervention: Prevent Infection  Flowsheets (Taken 11/17/2024 2054)  Infection Prevention:   rest/sleep promoted   equipment surfaces disinfected   single patient room provided   hand hygiene promoted   visitors restricted/screened  Goal: Optimal Comfort and Wellbeing  Outcome: Progressing  Intervention: Monitor Pain and Promote Comfort  Flowsheets (Taken 11/17/2024 2054)  Pain Management Interventions:   care clustered   quiet environment facilitated   relaxation techniques promoted  Intervention: Provide Person-Centered Care  Flowsheets (Taken 11/17/2024 2054)  Trust Relationship/Rapport:   care explained   choices provided   reassurance provided   emotional support  provided   thoughts/feelings acknowledged   empathic listening provided   questions answered   questions encouraged  Goal: Readiness for Transition of Care  Outcome: Progressing

## 2024-11-18 NOTE — PLAN OF CARE
Per request by Autumn at [facility] sent [facility] & [facility]

## 2024-11-18 NOTE — PROGRESS NOTES
[facility]  Hospital Medicine Progress Note        Chief Complaint: Inpatient Follow-up     HPI:   64 y.o. female who  has a past medical history of Acute confusional state (11/12/2024), DIANA (acute kidney injury), Hyponatremia, Major depressive disorder, single episode, unspecified, and Unspecified cirrhosis of liver. The patient presented to [facility] on 11/10/2024 with a primary complaint of confusion and generalized weakness which began on 11/07/2024.  Upon presentation to the ED, temperature 98.2° F, heart rate 117, blood pressure 174/100, respiratory rate 20 and SpO2 98% on 2 L nasal cannula.  Labs with CO2 18, ammonia 160, troponin 0.017.  EKG sinus tachycardia with ventricular rate of 113.  CTA of the head stable chronic findings but no acute process.  Chest x-ray no acute abnormality.  Patient is admitted to hospital medicine services for further medical management     Interval History:  Doing okay this morning.  Nursing at the bedside.  Reports multiple loose bowel movements overnight.  We discussed decreasing her lactulose.  Otherwise stable.     Objective/physical exam:  General: In no acute distress, afebrile  Chest: Clear to auscultation bilaterally  Heart: RRR, +S1, S2, no appreciable murmur  Abdomen: Soft, nontender, BS +  MSK: Warm, no lower extremity edema, no clubbing or cyanosis  Neurologic: Alert and oriented x 3    VITAL SIGNS: 24 HRS MIN & MAX LAST   Temp  Min: 97.7 °F (36.5 °C)  Max: 98.5 °F (36.9 °C) 98 °F (36.7 °C)   BP  Min: 129/74  Max: 150/74 138/76   Pulse  Min: 90  Max: 115  (!) 115   Resp  Min: 18  Max: 18 18   SpO2  Min: 97 %  Max: 99 % 98 %       Recent Labs   Lab 11/12/24  0257 11/14/24  0836   WBC 6.77 6.08   RBC 4.34 4.33   HGB 11.8* 11.8*   HCT 35.4* 35.9*   MCV 81.6 82.9   MCH 27.2 27.3   MCHC 33.3 32.9*   RDW 18.3* 18.0*    197   MPV 8.8 9.1       Recent Labs   Lab 11/12/24 0257 11/14/24  0836    135*   K 3.6 3.8   * 106   CO2 22*  22*   BUN 8.4* 9.2*   CREATININE 0.89 0.85   CALCIUM 8.6 8.7   ALBUMIN 3.1* 3.1*   ALKPHOS 91 88   ALT 15 16   AST 33 33   BILITOT 1.6* 1.6*          Microbiology Results (last 7 days)       ** No results found for the last 168 hours. **             Radiology:  X-Ray Chest 1 View  Narrative: EXAMINATION:  XR CHEST 1 VIEW    CLINICAL HISTORY:  generalized weakness, AMS;    TECHNIQUE:  Single frontal view of the chest was performed.    COMPARISON:  11/01/2023    FINDINGS:  The lungs are clear.  The heart is normal appearance.  The pulmonary vascularity is unremarkable.  Aorta appears grossly unremarkable.  No pleural effusions are seen.  Bones and joints show no acute abnormality.  Impression: No abnormality seen    Electronically signed by: Johann Linares  Date:    11/10/2024  Time:    12:38  CT Head Without Contrast  Narrative: EXAMINATION:  CT HEAD WITHOUT CONTRAST    CLINICAL HISTORY:  Mental status change, unknown cause;    TECHNIQUE:  Multiple axial images were obtained from the base of the brain to the vertex without contrast administration.  Sagittal and coronal reconstructions were performed..Automatic exposure control is utilized to reduce patient radiation exposure.    COMPARISON:  10/12/2023    FINDINGS:  There is no intracranial mass or lesion seen.  No hemorrhage is seen.  No acute infarct is seen.  The patient is status post craniotomy in the left parietal region.  There is some stable dural thickening on the left side.  There is some cerebral atrophy seen.  There is some compensatory ventricular dilatation and periventricular white matter changes consistent with the patient's age.  Calvarium is intact.  The posterior fossa is unremarkable..  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  Impression: Stable chronic findings seen no acute process    Electronically signed by: Johann  Sachasinh  Date:    11/10/2024  Time:    12:35        Medications:  Scheduled Meds:   enoxparin  40 mg Subcutaneous Q24H (prophylaxis, 1700)    folic acid  1 mg Oral Daily    furosemide  20 mg Oral Daily    lactulose 10 gram/15 ml  30 g Oral BID    levothyroxine  25 mcg Oral Before breakfast    magnesium oxide  400 mg Oral BID    multivitamin  1 tablet Oral Daily    pantoprazole  40 mg Oral Daily    rifAXIMin  550 mg Oral BID    rOPINIRole  0.25 mg Oral QHS    sodium bicarbonate  650 mg Oral BID    spironolactone  50 mg Oral Daily    thiamine  100 mg Oral Daily     Continuous Infusions:  PRN Meds:.  Current Facility-Administered Medications:     hydrALAZINE, 10 mg, Intravenous, Q4H PRN    hydrOXYzine pamoate, 50 mg, Oral, Q8H PRN    ondansetron, 4 mg, Intravenous, Q6H PRN    Nutrition:  Nutrition consulted. Most recent weight and BMI monitored-     Measurements:  Wt Readings from Last 1 Encounters:   11/10/24 62 kg (136 lb 11 oz)   Body mass index is 23.46 kg/m².    Patient has been screened and assessed by RD.    Malnutrition Type:  Context:    Level:      Malnutrition Characteristic Summary:       Interventions/Recommendations (treatment strategy):           Assessment/Plan:   Hepatic encephalopathy resolved  Alcohol cirrhosis  Alcohol dependence  Homelessness  Chronic hyponatremia  Major depressive disorder    Decrease lactulose to b.i.d..  Goal to have 2-3 soft bowel movements daily.    Continue vitamin supplementation with folate, thiamine, and MVI   Diuretics for volume management.  Will repeat labs tomorrow.    Case management working on prison nursing home placement at [facility].  Medically stable once accepted      Taj Ramirez MD   11/18/2024     All diagnosis and differential diagnosis have been reviewed; assessment and plan has been documented; I have personally reviewed the labs and test results that are presently available; I have reviewed the patients medication list; I have reviewed the  consulting providers response and recommendations. I have reviewed or attempted to review medical records based upon their availability    All of the patient's questions have been  addressed and answered. Patient's is agreeable to the above stated plan. I will continue to monitor closely and make adjustments to medical management as needed.  _____________________________________________________________________

## 2024-11-18 NOTE — PLAN OF CARE
[facility] faxed updates via [facility] to [facility]    [facility] fax psych notes to [facility] &  emailed Nicole at [facility]

## 2024-11-19 LAB
ALBUMIN SERPL-MCNC: 2.6 G/DL (ref 3.4–4.8)
ALBUMIN/GLOB SERPL: 0.9 RATIO (ref 1.1–2)
ALP SERPL-CCNC: 86 UNIT/L (ref 40–150)
ALT SERPL-CCNC: 15 UNIT/L (ref 0–55)
ANION GAP SERPL CALC-SCNC: 5 MEQ/L
AST SERPL-CCNC: 28 UNIT/L (ref 5–34)
BASOPHILS # BLD AUTO: 0.07 X10(3)/MCL
BASOPHILS NFR BLD AUTO: 0.9 %
BILIRUB SERPL-MCNC: 1.5 MG/DL
BUN SERPL-MCNC: 8.4 MG/DL (ref 9.8–20.1)
CALCIUM SERPL-MCNC: 8.3 MG/DL (ref 8.4–10.2)
CHLORIDE SERPL-SCNC: 109 MMOL/L (ref 98–107)
CO2 SERPL-SCNC: 22 MMOL/L (ref 23–31)
CREAT SERPL-MCNC: 0.8 MG/DL (ref 0.55–1.02)
CREAT/UREA NIT SERPL: 11
EOSINOPHIL # BLD AUTO: 0.32 X10(3)/MCL (ref 0–0.9)
EOSINOPHIL NFR BLD AUTO: 4 %
ERYTHROCYTE [DISTWIDTH] IN BLOOD BY AUTOMATED COUNT: 17.6 % (ref 11.5–17)
GFR SERPLBLD CREATININE-BSD FMLA CKD-EPI: >60 ML/MIN/1.73/M2
GLOBULIN SER-MCNC: 3 GM/DL (ref 2.4–3.5)
GLUCOSE SERPL-MCNC: 146 MG/DL (ref 82–115)
HCT VFR BLD AUTO: 31.2 % (ref 37–47)
HGB BLD-MCNC: 10.4 G/DL (ref 12–16)
IMM GRANULOCYTES # BLD AUTO: 0.03 X10(3)/MCL (ref 0–0.04)
IMM GRANULOCYTES NFR BLD AUTO: 0.4 %
LYMPHOCYTES # BLD AUTO: 2.7 X10(3)/MCL (ref 0.6–4.6)
LYMPHOCYTES NFR BLD AUTO: 33.5 %
MCH RBC QN AUTO: 27.4 PG (ref 27–31)
MCHC RBC AUTO-ENTMCNC: 33.3 G/DL (ref 33–36)
MCV RBC AUTO: 82.1 FL (ref 80–94)
MONOCYTES # BLD AUTO: 1.71 X10(3)/MCL (ref 0.1–1.3)
MONOCYTES NFR BLD AUTO: 21.2 %
NEUTROPHILS # BLD AUTO: 3.24 X10(3)/MCL (ref 2.1–9.2)
NEUTROPHILS NFR BLD AUTO: 40 %
NRBC BLD AUTO-RTO: 0 %
PLATELET # BLD AUTO: 177 X10(3)/MCL (ref 130–400)
PMV BLD AUTO: 8.9 FL (ref 7.4–10.4)
POTASSIUM SERPL-SCNC: 3.6 MMOL/L (ref 3.5–5.1)
PROT SERPL-MCNC: 5.6 GM/DL (ref 5.8–7.6)
RBC # BLD AUTO: 3.8 X10(6)/MCL (ref 4.2–5.4)
SODIUM SERPL-SCNC: 136 MMOL/L (ref 136–145)
WBC # BLD AUTO: 8.07 X10(3)/MCL (ref 4.5–11.5)

## 2024-11-19 PROCEDURE — 85025 COMPLETE CBC W/AUTO DIFF WBC: CPT | Performed by: HOSPITALIST

## 2024-11-19 PROCEDURE — 80053 COMPREHEN METABOLIC PANEL: CPT | Performed by: HOSPITALIST

## 2024-11-19 PROCEDURE — 63600175 PHARM REV CODE 636 W HCPCS: Performed by: INTERNAL MEDICINE

## 2024-11-19 PROCEDURE — 11000001 HC ACUTE MED/SURG PRIVATE ROOM

## 2024-11-19 PROCEDURE — 25000003 PHARM REV CODE 250: Performed by: INTERNAL MEDICINE

## 2024-11-19 PROCEDURE — 25000003 PHARM REV CODE 250: Performed by: NURSE PRACTITIONER

## 2024-11-19 PROCEDURE — 25000003 PHARM REV CODE 250: Performed by: HOSPITALIST

## 2024-11-19 PROCEDURE — 36415 COLL VENOUS BLD VENIPUNCTURE: CPT | Performed by: HOSPITALIST

## 2024-11-19 RX ADMIN — SODIUM BICARBONATE 650 MG TABLET 650 MG: at 08:11

## 2024-11-19 RX ADMIN — RIFAXIMIN 550 MG: 550 TABLET ORAL at 08:11

## 2024-11-19 RX ADMIN — FOLIC ACID 1 MG: 1 TABLET ORAL at 08:11

## 2024-11-19 RX ADMIN — PANTOPRAZOLE SODIUM 40 MG: 40 TABLET, DELAYED RELEASE ORAL at 08:11

## 2024-11-19 RX ADMIN — SPIRONOLACTONE 50 MG: 25 TABLET ORAL at 08:11

## 2024-11-19 RX ADMIN — FUROSEMIDE 20 MG: 20 TABLET ORAL at 08:11

## 2024-11-19 RX ADMIN — ENOXAPARIN SODIUM 40 MG: 40 INJECTION SUBCUTANEOUS at 04:11

## 2024-11-19 RX ADMIN — Medication 400 MG: at 08:11

## 2024-11-19 RX ADMIN — LACTULOSE 30 G: 10 SOLUTION ORAL at 08:11

## 2024-11-19 RX ADMIN — THIAMINE HCL TAB 100 MG 100 MG: 100 TAB at 08:11

## 2024-11-19 RX ADMIN — HYDROXYZINE PAMOATE 50 MG: 50 CAPSULE ORAL at 08:11

## 2024-11-19 RX ADMIN — LEVOTHYROXINE SODIUM 25 MCG: 25 TABLET ORAL at 05:11

## 2024-11-19 RX ADMIN — ROPINIROLE HYDROCHLORIDE 0.25 MG: 0.25 TABLET, FILM COATED ORAL at 08:11

## 2024-11-19 RX ADMIN — THERA TABS 1 TABLET: TAB at 08:11

## 2024-11-19 NOTE — PLAN OF CARE
Problem: Adult Inpatient Plan of Care  Goal: Absence of Hospital-Acquired Illness or Injury  Intervention: Identify and Manage Fall Risk  Flowsheets (Taken 11/19/2024 0038)  Safety Promotion/Fall Prevention:   assistive device/personal item within reach   Fall Risk reviewed with patient/family   Fall Risk signage in place   medications reviewed   nonskid shoes/socks when out of bed     Problem: Fall Injury Risk  Goal: Absence of Fall and Fall-Related Injury  Intervention: Identify and Manage Contributors  Flowsheets (Taken 11/19/2024 0038)  Self-Care Promotion:   independence encouraged   BADL personal objects within reach   BADL personal routines maintained  Medication Review/Management: medications reviewed

## 2024-11-19 NOTE — PLAN OF CARE
Problem: Adult Inpatient Plan of Care  Goal: Plan of Care Review  Outcome: Progressing  Flowsheets (Taken 11/19/2024 0033)  Plan of Care Reviewed With: patient     Problem: Adult Inpatient Plan of Care  Goal: Absence of Hospital-Acquired Illness or Injury  Intervention: Identify and Manage Fall Risk  Flowsheets (Taken 11/19/2024 0038)  Safety Promotion/Fall Prevention:   assistive device/personal item within reach   Fall Risk reviewed with patient/family   Fall Risk signage in place   medications reviewed   nonskid shoes/socks when out of bed     Problem: Adult Inpatient Plan of Care  Goal: Absence of Hospital-Acquired Illness or Injury  Intervention: Prevent Skin Injury  Flowsheets (Taken 11/19/2024 0038)  Body Position: position changed independently  Skin Protection: incontinence pads utilized  Device Skin Pressure Protection: absorbent pad utilized/changed     Problem: Adult Inpatient Plan of Care  Goal: Absence of Hospital-Acquired Illness or Injury  Intervention: Prevent and Manage VTE (Venous Thromboembolism) Risk  Flowsheets (Taken 11/19/2024 0038)  VTE Prevention/Management:   bleeding risk assessed   ambulation promoted   dorsiflexion/plantar flexion performed     Problem: Adult Inpatient Plan of Care  Goal: Optimal Comfort and Wellbeing  Outcome: Progressing     Problem: Adult Inpatient Plan of Care  Goal: Absence of Hospital-Acquired Illness or Injury  Intervention: Prevent Infection  Flowsheets (Taken 11/19/2024 0038)  Infection Prevention:   rest/sleep promoted   single patient room provided   hand hygiene promoted     Problem: Adult Inpatient Plan of Care  Goal: Optimal Comfort and Wellbeing  Intervention: Monitor Pain and Promote Comfort  Flowsheets (Taken 11/19/2024 0038)  Pain Management Interventions: care clustered     Problem: Adult Inpatient Plan of Care  Goal: Optimal Comfort and Wellbeing  Intervention: Provide Person-Centered Care  Flowsheets (Taken 11/19/2024 0038)  Trust  Relationship/Rapport:   questions encouraged   care explained   choices provided   reassurance provided   emotional support provided   thoughts/feelings acknowledged   empathic listening provided   questions answered     Problem: Fall Injury Risk  Goal: Absence of Fall and Fall-Related Injury  Intervention: Identify and Manage Contributors  Flowsheets (Taken 11/19/2024 0038)  Self-Care Promotion:   independence encouraged   BADL personal objects within reach   BADL personal routines maintained  Medication Review/Management: medications reviewed     Problem: Fall Injury Risk  Goal: Absence of Fall and Fall-Related Injury  Intervention: Promote Injury-Free Environment  Flowsheets (Taken 11/19/2024 0038)  Safety Promotion/Fall Prevention:   assistive device/personal item within reach   Fall Risk reviewed with patient/family   Fall Risk signage in place   medications reviewed   nonskid shoes/socks when out of bed     Problem: Fatigue  Goal: Improved Activity Tolerance  Intervention: Promote Improved Energy  Flowsheets (Taken 11/19/2024 0038)  Sleep/Rest Enhancement: regular sleep/rest pattern promoted  Activity Management: Ambulated in room - L4  Environmental Support: calm environment promoted

## 2024-11-19 NOTE — PROGRESS NOTES
Inpatient Nutrition Evaluation    Admit Date: 11/10/2024   Total duration of encounter: 9 days    Nutrition Recommendation/Prescription     Diet Heart Healthy ordered  Continue Boost Plus daily (provides 360 kcal and 14 g protein per container)  Encouraged adequate PO intake  Monitor appetite/PO intake, weight, and labs    Nutrition Assessment     Chart Review    Reason Seen: continuous nutrition monitoring and follow-up Cirrhosis     Malnutrition Screening Tool Results   Have you recently lost weight without trying?: No  Have you been eating poorly because of a decreased appetite?: No   MST Score: 0     Diagnosis:  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  Metabolic acidosis   Hypothyroidism    Relevant Medical History:    DIANA (acute kidney injury)      Hyponatremia      Major depressive disorder, single episode, unspecified      Unspecified cirrhosis of liver        Nutrition-Related Medications:   Scheduled Meds:   enoxparin  40 mg Subcutaneous Q24H (prophylaxis, 1700)    folic acid  1 mg Oral Daily    furosemide  20 mg Oral Daily    lactulose 10 gram/15 ml  30 g Oral BID    levothyroxine  25 mcg Oral Before breakfast    magnesium oxide  400 mg Oral BID    multivitamin  1 tablet Oral Daily    pantoprazole  40 mg Oral Daily    rifAXIMin  550 mg Oral BID    rOPINIRole  0.25 mg Oral QHS    sodium bicarbonate  650 mg Oral BID    spironolactone  50 mg Oral Daily    thiamine  100 mg Oral Daily     Continuous Infusions:  PRN Meds:.  Current Facility-Administered Medications:     hydrALAZINE, 10 mg, Intravenous, Q4H PRN    hydrOXYzine pamoate, 50 mg, Oral, Q8H PRN    ondansetron, 4 mg, Intravenous, Q6H PRN      Nutrition-Related Labs:  Recent Labs   Lab 11/14/24  0836 11/19/24  0407   * 136   K 3.8 3.6   CALCIUM 8.7 8.3*    109*   CO2 22* 22*   BUN 9.2* 8.4*   CREATININE 0.85 0.80   EGFRNORACEVR >60 >60   GLUCOSE 149* 146*   BILITOT 1.6* 1.5   ALKPHOS 88 86   ALT 16 15   AST 33 28   ALBUMIN 3.1*  Problem List  Date Reviewed: 8/18/2017          Codes Class Noted    Other normal pregnancy, not first ICD-10-CM: Z34.80  ICD-9-CM: V22.1  6/28/2017    Overview Addendum 7/12/2017  1:56 PM by Celena Vargas     H/O PTL at 28 weeks--baby 6 #, in hospital 2 days   Had LEEP prior to preg by 4 years (cx 4.64 cm today)   Declines progesterone for PTL,  Smoker  h/o drug abuse in the past  first child hypospadias and hydrocele  sister with bladder extrophy  unknown dates  vanished twin--very small empty second sac. NT scheduled for 7/14/17 @ 2:45-# has been disconnected. Patient left before getting Prenatal labs drawn. "2.6*   AMMONIA 96.2*  --    WBC 6.08 8.07   HGB 11.8* 10.4*   HCT 35.9* 31.2*         Diet Order: Diet Heart Healthy  Oral Supplement Order: Boost Plus  Appetite/Oral Intake: good/% of meals  Factors Affecting Nutritional Intake: none identified  Food/Jewish/Cultural Preferences: none reported  Food Allergies: none reported       Wound(s):   none noted    Comments    2024: No significant fat/muscle wasting per NFPE. Pt reports a good appetite/PO intake prior to admit and currently. Pt denies N/V/D/C and chewing/swallowing difficulties. Pt denies unplanned wt loss. Per EMR, pt weighed 68.7 kg on 11/10/2023 (~9.7% wt loss in 1 year, insignificant). Pt agreeable to ONS as preventative MNT. Encouraged adequate PO intake. Last BM documented as 2024. Will monitor.    2024: Pt reports a good appetite/PO intake and denies N/V/D/C. Pt reports drinking ONS. Encouraged adequate PO intake. Last BM documented as 2024. Will monitor.    Anthropometrics    Height: 5' 4" (162.6 cm) Height Method: Stated  Last Weight: 62 kg (136 lb 11 oz) (11/10/24 1631) Weight Method: Bed Scale  BMI (Calculated): 23.5  BMI Classification: normal (BMI 18.5-24.9)     Ideal Body Weight (IBW), Female: 120 lb     % Ideal Body Weight, Female (lb): 113.91 %                    Usual Body Weight (UBW), k.7 kg  % Usual Body Weight: 90.44     Usual Weight Provided By: EMR weight history    Wt Readings from Last 5 Encounters:   11/10/24 62 kg (136 lb 11 oz)   10/07/24 62.9 kg (138 lb 11.2 oz)   24 63 kg (139 lb)   24 64.1 kg (141 lb 6.4 oz)   24 61.2 kg (135 lb)     Weight Change(s) Since Admission:  Admit Weight: 63.5 kg (140 lb) (11/10/24 1153)  11/10/2024: 62 kg  2024: no new wts    Patient Education    Not applicable.    Monitoring & Evaluation     Dietitian will monitor food and beverage intake, weight, electrolyte/renal panel, glucose/endocrine profile, and gastrointestinal profile.  Nutrition " Risk/Follow-Up: low (follow-up in 5-7 days)  Patients assigned 'low nutrition risk' status do not qualify for a full nutritional assessment but will be monitored and re-evaluated in a 5-7 day time period. Please consult if re-evaluation needed sooner.

## 2024-11-19 NOTE — PROGRESS NOTES
[facility]  Hospital Medicine Progress Note        Chief Complaint: Inpatient Follow-up     HPI:   64 y.o. female who  has a past medical history of Acute confusional state (11/12/2024), DIANA (acute kidney injury), Hyponatremia, Major depressive disorder, single episode, unspecified, and Unspecified cirrhosis of liver. The patient presented to [facility] on 11/10/2024 with a primary complaint of confusion and generalized weakness which began on 11/07/2024.  Upon presentation to the ED, temperature 98.2° F, heart rate 117, blood pressure 174/100, respiratory rate 20 and SpO2 98% on 2 L nasal cannula.  Labs with CO2 18, ammonia 160, troponin 0.017.  EKG sinus tachycardia with ventricular rate of 113.  CTA of the head stable chronic findings but no acute process.  Chest x-ray no acute abnormality.  Patient is admitted to hospital medicine services for further medical management     Interval History:  Mental status stable. +soft Bms.  No new complaints. .      Objective/physical exam:  General: In no acute distress, afebrile  Chest: Clear to auscultation bilaterally  Heart: RRR, +S1, S2, no appreciable murmur  Abdomen: Soft, nontender, BS +  MSK: Warm, no lower extremity edema, no clubbing or cyanosis  Neurologic: Alert and oriented x 3    VITAL SIGNS: 24 HRS MIN & MAX LAST   Temp  Min: 97.8 °F (36.6 °C)  Max: 98.9 °F (37.2 °C) 98.6 °F (37 °C)   BP  Min: 121/70  Max: 151/87 122/67   Pulse  Min: 93  Max: 116  93   Resp  Min: 17  Max: 20 17   SpO2  Min: 96 %  Max: 100 % 96 %       Recent Labs   Lab 11/14/24  0836 11/19/24  0407   WBC 6.08 8.07   RBC 4.33 3.80*   HGB 11.8* 10.4*   HCT 35.9* 31.2*   MCV 82.9 82.1   MCH 27.3 27.4   MCHC 32.9* 33.3   RDW 18.0* 17.6*    177   MPV 9.1 8.9       Recent Labs   Lab 11/14/24  0836 11/19/24  0407   * 136   K 3.8 3.6    109*   CO2 22* 22*   BUN 9.2* 8.4*   CREATININE 0.85 0.80   CALCIUM 8.7 8.3*   ALBUMIN 3.1* 2.6*   ALKPHOS 88 86   ALT 16  15   AST 33 28   BILITOT 1.6* 1.5          Microbiology Results (last 7 days)       ** No results found for the last 168 hours. **             Radiology:  X-Ray Chest 1 View  Narrative: EXAMINATION:  XR CHEST 1 VIEW    CLINICAL HISTORY:  generalized weakness, AMS;    TECHNIQUE:  Single frontal view of the chest was performed.    COMPARISON:  11/01/2023    FINDINGS:  The lungs are clear.  The heart is normal appearance.  The pulmonary vascularity is unremarkable.  Aorta appears grossly unremarkable.  No pleural effusions are seen.  Bones and joints show no acute abnormality.  Impression: No abnormality seen    Electronically signed by: Johann Linares  Date:    11/10/2024  Time:    12:38  CT Head Without Contrast  Narrative: EXAMINATION:  CT HEAD WITHOUT CONTRAST    CLINICAL HISTORY:  Mental status change, unknown cause;    TECHNIQUE:  Multiple axial images were obtained from the base of the brain to the vertex without contrast administration.  Sagittal and coronal reconstructions were performed..Automatic exposure control is utilized to reduce patient radiation exposure.    COMPARISON:  10/12/2023    FINDINGS:  There is no intracranial mass or lesion seen.  No hemorrhage is seen.  No acute infarct is seen.  The patient is status post craniotomy in the left parietal region.  There is some stable dural thickening on the left side.  There is some cerebral atrophy seen.  There is some compensatory ventricular dilatation and periventricular white matter changes consistent with the patient's age.  Calvarium is intact.  The posterior fossa is unremarkable..  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  Impression: Stable chronic findings seen no acute process    Electronically signed by: Johann Linares  Date:    11/10/2024  Time:    12:35        Medications:  Scheduled Meds:   enoxparin  40 mg Subcutaneous Q24H  (prophylaxis, 1700)    folic acid  1 mg Oral Daily    furosemide  20 mg Oral Daily    lactulose 10 gram/15 ml  30 g Oral BID    levothyroxine  25 mcg Oral Before breakfast    magnesium oxide  400 mg Oral BID    multivitamin  1 tablet Oral Daily    pantoprazole  40 mg Oral Daily    rifAXIMin  550 mg Oral BID    rOPINIRole  0.25 mg Oral QHS    sodium bicarbonate  650 mg Oral BID    spironolactone  50 mg Oral Daily    thiamine  100 mg Oral Daily     Continuous Infusions:  PRN Meds:.  Current Facility-Administered Medications:     hydrALAZINE, 10 mg, Intravenous, Q4H PRN    hydrOXYzine pamoate, 50 mg, Oral, Q8H PRN    ondansetron, 4 mg, Intravenous, Q6H PRN    Nutrition:  Nutrition consulted. Most recent weight and BMI monitored-     Measurements:  Wt Readings from Last 1 Encounters:   11/10/24 62 kg (136 lb 11 oz)   Body mass index is 23.46 kg/m².    Patient has been screened and assessed by RD.    Malnutrition Type:  Context:    Level:      Malnutrition Characteristic Summary:       Interventions/Recommendations (treatment strategy):           Assessment/Plan:   Hepatic encephalopathy resolved  Alcohol cirrhosis  Alcohol dependence  Homelessness  Chronic hyponatremia  Major depressive disorder     Continue lactulose b.i.d.  Goal to have 2-3 soft bowel movements daily. Titrate as needed.  Continue vitamin supplementation with folate, thiamine, and MVI   Diuretics for volume management.  Will repeat labs tomorrow.    Case management working on detention nursing home placement at [facility].  Medically stable once accepted      Taj Ramirez MD   11/20/2024    All diagnosis and differential diagnosis have been reviewed; assessment and plan has been documented; I have personally reviewed the labs and test results that are presently available; I have reviewed the patients medication list; I have reviewed the consulting providers response and recommendations. I have reviewed or attempted to review medical records based  upon their availability    All of the patient's questions have been  addressed and answered. Patient's is agreeable to the above stated plan. I will continue to monitor closely and make adjustments to medical management as needed.  _____________________________________________________________________

## 2024-11-20 PROCEDURE — 25000003 PHARM REV CODE 250: Performed by: HOSPITALIST

## 2024-11-20 PROCEDURE — 25000003 PHARM REV CODE 250: Performed by: NURSE PRACTITIONER

## 2024-11-20 PROCEDURE — 63600175 PHARM REV CODE 636 W HCPCS: Performed by: INTERNAL MEDICINE

## 2024-11-20 PROCEDURE — 11000001 HC ACUTE MED/SURG PRIVATE ROOM

## 2024-11-20 PROCEDURE — 25000003 PHARM REV CODE 250: Performed by: INTERNAL MEDICINE

## 2024-11-20 RX ADMIN — FOLIC ACID 1 MG: 1 TABLET ORAL at 08:11

## 2024-11-20 RX ADMIN — RIFAXIMIN 550 MG: 550 TABLET ORAL at 08:11

## 2024-11-20 RX ADMIN — PANTOPRAZOLE SODIUM 40 MG: 40 TABLET, DELAYED RELEASE ORAL at 08:11

## 2024-11-20 RX ADMIN — THERA TABS 1 TABLET: TAB at 08:11

## 2024-11-20 RX ADMIN — Medication 400 MG: at 08:11

## 2024-11-20 RX ADMIN — LACTULOSE 30 G: 10 SOLUTION ORAL at 08:11

## 2024-11-20 RX ADMIN — SODIUM BICARBONATE 650 MG TABLET 650 MG: at 08:11

## 2024-11-20 RX ADMIN — FUROSEMIDE 20 MG: 20 TABLET ORAL at 08:11

## 2024-11-20 RX ADMIN — LEVOTHYROXINE SODIUM 25 MCG: 25 TABLET ORAL at 06:11

## 2024-11-20 RX ADMIN — ROPINIROLE HYDROCHLORIDE 0.25 MG: 0.25 TABLET, FILM COATED ORAL at 08:11

## 2024-11-20 RX ADMIN — HYDROXYZINE PAMOATE 50 MG: 50 CAPSULE ORAL at 08:11

## 2024-11-20 RX ADMIN — ENOXAPARIN SODIUM 40 MG: 40 INJECTION SUBCUTANEOUS at 05:11

## 2024-11-20 RX ADMIN — THIAMINE HCL TAB 100 MG 100 MG: 100 TAB at 08:11

## 2024-11-20 RX ADMIN — SPIRONOLACTONE 50 MG: 25 TABLET ORAL at 08:11

## 2024-11-20 NOTE — PROGRESS NOTES
[facility]  Hospital Medicine Progress Note        Chief Complaint: Inpatient Follow-up     HPI:   64 y.o. female who  has a past medical history of Acute confusional state (11/12/2024), DIANA (acute kidney injury), Hyponatremia, Major depressive disorder, single episode, unspecified, and Unspecified cirrhosis of liver. The patient presented to [facility] on 11/10/2024 with a primary complaint of confusion and generalized weakness which began on 11/07/2024.  Upon presentation to the ED, temperature 98.2° F, heart rate 117, blood pressure 174/100, respiratory rate 20 and SpO2 98% on 2 L nasal cannula.  Labs with CO2 18, ammonia 160, troponin 0.017.  EKG sinus tachycardia with ventricular rate of 113.  CTA of the head stable chronic findings but no acute process.  Chest x-ray no acute abnormality.  Patient is admitted to hospital medicine services for further medical management     Interval History:  Doing well without complaints. Waiting on placement. Requested regular diet yesterday.  Told her this is fine but will need to monitor her salt intake and any edema.      Objective/physical exam:  General: In no acute distress, afebrile  Chest: Clear to auscultation bilaterally  Heart: RRR, +S1, S2, no appreciable murmur  Abdomen: Soft, nontender, BS +  MSK: Warm, no lower extremity edema, no clubbing or cyanosis  Neurologic: Alert and oriented x 3    VITAL SIGNS: 24 HRS MIN & MAX LAST   Temp  Min: 98.2 °F (36.8 °C)  Max: 98.7 °F (37.1 °C) 98.2 °F (36.8 °C)   BP  Min: 119/73  Max: 148/78 (!) 148/78   Pulse  Min: 93  Max: 104  94   Resp  Min: 18  Max: 20 18   SpO2  Min: 94 %  Max: 97 % 97 %       Recent Labs   Lab 11/14/24  0836 11/19/24  0407   WBC 6.08 8.07   RBC 4.33 3.80*   HGB 11.8* 10.4*   HCT 35.9* 31.2*   MCV 82.9 82.1   MCH 27.3 27.4   MCHC 32.9* 33.3   RDW 18.0* 17.6*    177   MPV 9.1 8.9       Recent Labs   Lab 11/14/24  0836 11/19/24  0407   * 136   K 3.8 3.6    109*    CO2 22* 22*   BUN 9.2* 8.4*   CREATININE 0.85 0.80   CALCIUM 8.7 8.3*   ALBUMIN 3.1* 2.6*   ALKPHOS 88 86   ALT 16 15   AST 33 28   BILITOT 1.6* 1.5          Microbiology Results (last 7 days)       ** No results found for the last 168 hours. **             Radiology:  X-Ray Chest 1 View  Narrative: EXAMINATION:  XR CHEST 1 VIEW    CLINICAL HISTORY:  generalized weakness, AMS;    TECHNIQUE:  Single frontal view of the chest was performed.    COMPARISON:  11/01/2023    FINDINGS:  The lungs are clear.  The heart is normal appearance.  The pulmonary vascularity is unremarkable.  Aorta appears grossly unremarkable.  No pleural effusions are seen.  Bones and joints show no acute abnormality.  Impression: No abnormality seen    Electronically signed by: Johann Linares  Date:    11/10/2024  Time:    12:38  CT Head Without Contrast  Narrative: EXAMINATION:  CT HEAD WITHOUT CONTRAST    CLINICAL HISTORY:  Mental status change, unknown cause;    TECHNIQUE:  Multiple axial images were obtained from the base of the brain to the vertex without contrast administration.  Sagittal and coronal reconstructions were performed..Automatic exposure control is utilized to reduce patient radiation exposure.    COMPARISON:  10/12/2023    FINDINGS:  There is no intracranial mass or lesion seen.  No hemorrhage is seen.  No acute infarct is seen.  The patient is status post craniotomy in the left parietal region.  There is some stable dural thickening on the left side.  There is some cerebral atrophy seen.  There is some compensatory ventricular dilatation and periventricular white matter changes consistent with the patient's age.  Calvarium is intact.  The posterior fossa is unremarkable..  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  Impression: Stable chronic findings seen no acute process    Electronically signed by: Johann  Sachasinh  Date:    11/10/2024  Time:    12:35        Medications:  Scheduled Meds:   enoxparin  40 mg Subcutaneous Q24H (prophylaxis, 1700)    folic acid  1 mg Oral Daily    furosemide  20 mg Oral Daily    lactulose 10 gram/15 ml  30 g Oral BID    levothyroxine  25 mcg Oral Before breakfast    magnesium oxide  400 mg Oral BID    multivitamin  1 tablet Oral Daily    pantoprazole  40 mg Oral Daily    rifAXIMin  550 mg Oral BID    rOPINIRole  0.25 mg Oral QHS    sodium bicarbonate  650 mg Oral BID    spironolactone  50 mg Oral Daily    thiamine  100 mg Oral Daily     Continuous Infusions:  PRN Meds:.  Current Facility-Administered Medications:     hydrALAZINE, 10 mg, Intravenous, Q4H PRN    hydrOXYzine pamoate, 50 mg, Oral, Q8H PRN    ondansetron, 4 mg, Intravenous, Q6H PRN    Nutrition:  Nutrition consulted. Most recent weight and BMI monitored-     Measurements:  Wt Readings from Last 1 Encounters:   11/10/24 62 kg (136 lb 11 oz)   Body mass index is 23.46 kg/m².    Patient has been screened and assessed by RD.    Malnutrition Type:  Context:    Level:      Malnutrition Characteristic Summary:       Interventions/Recommendations (treatment strategy):           Assessment/Plan:   Hepatic encephalopathy resolved  Alcohol cirrhosis  Alcohol dependence  Homelessness  Chronic hyponatremia  Major depressive disorder     Continue lactulose b.i.d.  Goal to have 2-3 soft bowel movements daily. Titrate as needed.  Continue vitamin supplementation with folate, thiamine, and MVI   Diuretics for volume management.  Case management working on [facility] nursing home placement at [facility].  Medically stable once accepted. Waiting on 142 from the [facility]      Taj Ramirez MD   11/21/2024    All diagnosis and differential diagnosis have been reviewed; assessment and plan has been documented; I have personally reviewed the labs and test results that are presently available; I have reviewed the patients medication list; I have  reviewed the consulting providers response and recommendations. I have reviewed or attempted to review medical records based upon their availability    All of the patient's questions have been  addressed and answered. Patient's is agreeable to the above stated plan. I will continue to monitor closely and make adjustments to medical management as needed.  _____________________________________________________________________

## 2024-11-20 NOTE — PLAN OF CARE
Spoke to Bonny with [facility] following up on the status of the 142- she informed me it was still in review process but should be issued this week      SSC Notified Autumn at [facility] the 142 should be issued this week and the pt will dc this week to [facility]    Autumn confirmed the pt will sign her admissions paper work once she is at facility and their are no discrepancies that will hold up the discharge    SSC sent updates & immunizations hx via epic to [facility]

## 2024-11-21 LAB
ALBUMIN SERPL-MCNC: 2.6 G/DL (ref 3.4–4.8)
ALBUMIN/GLOB SERPL: 0.8 RATIO (ref 1.1–2)
ALP SERPL-CCNC: 83 UNIT/L (ref 40–150)
ALT SERPL-CCNC: 15 UNIT/L (ref 0–55)
ANION GAP SERPL CALC-SCNC: 4 MEQ/L
AST SERPL-CCNC: 25 UNIT/L (ref 5–34)
BASOPHILS # BLD AUTO: 0.08 X10(3)/MCL
BASOPHILS NFR BLD AUTO: 1.1 %
BILIRUB SERPL-MCNC: 1.1 MG/DL
BUN SERPL-MCNC: 6.9 MG/DL (ref 9.8–20.1)
CALCIUM SERPL-MCNC: 8.7 MG/DL (ref 8.4–10.2)
CHLORIDE SERPL-SCNC: 115 MMOL/L (ref 98–107)
CO2 SERPL-SCNC: 22 MMOL/L (ref 23–31)
CREAT SERPL-MCNC: 0.8 MG/DL (ref 0.55–1.02)
CREAT/UREA NIT SERPL: 9
EOSINOPHIL # BLD AUTO: 0.41 X10(3)/MCL (ref 0–0.9)
EOSINOPHIL NFR BLD AUTO: 5.9 %
ERYTHROCYTE [DISTWIDTH] IN BLOOD BY AUTOMATED COUNT: 17.9 % (ref 11.5–17)
GFR SERPLBLD CREATININE-BSD FMLA CKD-EPI: >60 ML/MIN/1.73/M2
GLOBULIN SER-MCNC: 3.1 GM/DL (ref 2.4–3.5)
GLUCOSE SERPL-MCNC: 89 MG/DL (ref 82–115)
HCT VFR BLD AUTO: 32.8 % (ref 37–47)
HGB BLD-MCNC: 10.6 G/DL (ref 12–16)
IMM GRANULOCYTES # BLD AUTO: 0.02 X10(3)/MCL (ref 0–0.04)
IMM GRANULOCYTES NFR BLD AUTO: 0.3 %
INR PPP: 1.3
LYMPHOCYTES # BLD AUTO: 2.45 X10(3)/MCL (ref 0.6–4.6)
LYMPHOCYTES NFR BLD AUTO: 35.2 %
MCH RBC QN AUTO: 26.8 PG (ref 27–31)
MCHC RBC AUTO-ENTMCNC: 32.3 G/DL (ref 33–36)
MCV RBC AUTO: 82.8 FL (ref 80–94)
MONOCYTES # BLD AUTO: 1.67 X10(3)/MCL (ref 0.1–1.3)
MONOCYTES NFR BLD AUTO: 24 %
NEUTROPHILS # BLD AUTO: 2.34 X10(3)/MCL (ref 2.1–9.2)
NEUTROPHILS NFR BLD AUTO: 33.5 %
NRBC BLD AUTO-RTO: 0 %
PLATELET # BLD AUTO: 200 X10(3)/MCL (ref 130–400)
PMV BLD AUTO: 8.8 FL (ref 7.4–10.4)
POTASSIUM SERPL-SCNC: 4.2 MMOL/L (ref 3.5–5.1)
PROT SERPL-MCNC: 5.7 GM/DL (ref 5.8–7.6)
PROTHROMBIN TIME: 15.8 SECONDS (ref 12.5–14.5)
RBC # BLD AUTO: 3.96 X10(6)/MCL (ref 4.2–5.4)
SODIUM SERPL-SCNC: 141 MMOL/L (ref 136–145)
WBC # BLD AUTO: 6.97 X10(3)/MCL (ref 4.5–11.5)

## 2024-11-21 PROCEDURE — 85025 COMPLETE CBC W/AUTO DIFF WBC: CPT | Performed by: HOSPITALIST

## 2024-11-21 PROCEDURE — 85610 PROTHROMBIN TIME: CPT | Performed by: HOSPITALIST

## 2024-11-21 PROCEDURE — 11000001 HC ACUTE MED/SURG PRIVATE ROOM

## 2024-11-21 PROCEDURE — 36415 COLL VENOUS BLD VENIPUNCTURE: CPT | Performed by: HOSPITALIST

## 2024-11-21 PROCEDURE — 25000003 PHARM REV CODE 250: Performed by: INTERNAL MEDICINE

## 2024-11-21 PROCEDURE — 80053 COMPREHEN METABOLIC PANEL: CPT | Performed by: HOSPITALIST

## 2024-11-21 PROCEDURE — 63600175 PHARM REV CODE 636 W HCPCS: Performed by: INTERNAL MEDICINE

## 2024-11-21 PROCEDURE — 25000003 PHARM REV CODE 250: Performed by: NURSE PRACTITIONER

## 2024-11-21 PROCEDURE — 25000003 PHARM REV CODE 250: Performed by: HOSPITALIST

## 2024-11-21 RX ORDER — CLONIDINE HYDROCHLORIDE 0.1 MG/1
0.1 TABLET ORAL EVERY 6 HOURS PRN
Status: DISCONTINUED | OUTPATIENT
Start: 2024-11-21 | End: 2024-11-26 | Stop reason: HOSPADM

## 2024-11-21 RX ADMIN — ENOXAPARIN SODIUM 40 MG: 40 INJECTION SUBCUTANEOUS at 04:11

## 2024-11-21 RX ADMIN — Medication 400 MG: at 08:11

## 2024-11-21 RX ADMIN — THIAMINE HCL TAB 100 MG 100 MG: 100 TAB at 08:11

## 2024-11-21 RX ADMIN — FUROSEMIDE 20 MG: 20 TABLET ORAL at 08:11

## 2024-11-21 RX ADMIN — ROPINIROLE HYDROCHLORIDE 0.25 MG: 0.25 TABLET, FILM COATED ORAL at 09:11

## 2024-11-21 RX ADMIN — RIFAXIMIN 550 MG: 550 TABLET ORAL at 09:11

## 2024-11-21 RX ADMIN — SODIUM BICARBONATE 650 MG TABLET 650 MG: at 08:11

## 2024-11-21 RX ADMIN — LEVOTHYROXINE SODIUM 25 MCG: 25 TABLET ORAL at 06:11

## 2024-11-21 RX ADMIN — RIFAXIMIN 550 MG: 550 TABLET ORAL at 08:11

## 2024-11-21 RX ADMIN — HYDROXYZINE PAMOATE 50 MG: 50 CAPSULE ORAL at 04:11

## 2024-11-21 RX ADMIN — LACTULOSE 30 G: 10 SOLUTION ORAL at 08:11

## 2024-11-21 RX ADMIN — PANTOPRAZOLE SODIUM 40 MG: 40 TABLET, DELAYED RELEASE ORAL at 08:11

## 2024-11-21 RX ADMIN — Medication 400 MG: at 09:11

## 2024-11-21 RX ADMIN — SPIRONOLACTONE 50 MG: 25 TABLET ORAL at 08:11

## 2024-11-21 RX ADMIN — THERA TABS 1 TABLET: TAB at 08:11

## 2024-11-21 RX ADMIN — FOLIC ACID 1 MG: 1 TABLET ORAL at 08:11

## 2024-11-21 RX ADMIN — CLONIDINE HYDROCHLORIDE 0.1 MG: 0.1 TABLET ORAL at 05:11

## 2024-11-21 RX ADMIN — SODIUM BICARBONATE 650 MG TABLET 650 MG: at 09:11

## 2024-11-21 NOTE — PLAN OF CARE
Problem: Adult Inpatient Plan of Care  Goal: Absence of Hospital-Acquired Illness or Injury  Intervention: Identify and Manage Fall Risk  Flowsheets (Taken 11/20/2024 1948)  Safety Promotion/Fall Prevention:   assistive device/personal item within reach   Fall Risk signage in place   Fall Risk reviewed with patient/family   nonskid shoes/socks when out of bed   medications reviewed     Problem: Adult Inpatient Plan of Care  Goal: Absence of Hospital-Acquired Illness or Injury  Intervention: Prevent Skin Injury  Flowsheets (Taken 11/20/2024 1948)  Body Position:   position changed independently   sitting up in bed  Skin Protection: incontinence pads utilized     Problem: Adult Inpatient Plan of Care  Goal: Absence of Hospital-Acquired Illness or Injury  Intervention: Prevent and Manage VTE (Venous Thromboembolism) Risk  Flowsheets (Taken 11/20/2024 1948)  VTE Prevention/Management:   bleeding risk assessed   ROM (active) performed   ROM (passive) performed   dorsiflexion/plantar flexion performed     Problem: Adult Inpatient Plan of Care  Goal: Optimal Comfort and Wellbeing  Intervention: Monitor Pain and Promote Comfort  Flowsheets (Taken 11/20/2024 1948)  Pain Management Interventions: care clustered     Problem: Adult Inpatient Plan of Care  Goal: Readiness for Transition of Care  11/21/2024 0502 by Teena Alicia, RN  Outcome: Progressing  11/20/2024 1948 by Teena Alicia, RN  Outcome: Progressing     Problem: Adult Inpatient Plan of Care  Goal: Optimal Comfort and Wellbeing  Intervention: Provide Person-Centered Care  Flowsheets (Taken 11/20/2024 1948)  Trust Relationship/Rapport:   questions encouraged   care explained   choices provided   reassurance provided   emotional support provided   thoughts/feelings acknowledged   empathic listening provided   questions answered     Problem: Fall Injury Risk  Goal: Absence of Fall and Fall-Related Injury  Intervention: Identify and Manage  Contributors  Flowsheets (Taken 11/20/2024 1948)  Self-Care Promotion:   independence encouraged   BADL personal objects within reach   BADL personal routines maintained  Medication Review/Management: medications reviewed     Problem: Fatigue  Goal: Improved Activity Tolerance  Intervention: Promote Improved Energy  Flowsheets (Taken 11/20/2024 1948)  Sleep/Rest Enhancement: regular sleep/rest pattern promoted  Environmental Support: calm environment promoted

## 2024-11-21 NOTE — PLAN OF CARE
Problem: Adult Inpatient Plan of Care  Goal: Plan of Care Review  Outcome: Progressing  Flowsheets (Taken 11/20/2024 1948)  Plan of Care Reviewed With: patient     Problem: Adult Inpatient Plan of Care  Goal: Absence of Hospital-Acquired Illness or Injury  Intervention: Identify and Manage Fall Risk  Flowsheets (Taken 11/20/2024 1948)  Safety Promotion/Fall Prevention:   assistive device/personal item within reach   Fall Risk signage in place   Fall Risk reviewed with patient/family   nonskid shoes/socks when out of bed   medications reviewed     Problem: Adult Inpatient Plan of Care  Goal: Absence of Hospital-Acquired Illness or Injury  Intervention: Prevent Skin Injury  Flowsheets (Taken 11/20/2024 1948)  Body Position:   position changed independently   sitting up in bed  Skin Protection: incontinence pads utilized     Problem: Adult Inpatient Plan of Care  Goal: Absence of Hospital-Acquired Illness or Injury  Intervention: Prevent Infection  Flowsheets (Taken 11/20/2024 1948)  Infection Prevention:   rest/sleep promoted   single patient room provided   hand hygiene promoted     Problem: Adult Inpatient Plan of Care  Goal: Optimal Comfort and Wellbeing  Intervention: Monitor Pain and Promote Comfort  Flowsheets (Taken 11/20/2024 1948)  Pain Management Interventions: care clustered     Problem: Adult Inpatient Plan of Care  Goal: Optimal Comfort and Wellbeing  Intervention: Provide Person-Centered Care  Flowsheets (Taken 11/20/2024 1948)  Trust Relationship/Rapport:   questions encouraged   care explained   choices provided   reassurance provided   emotional support provided   thoughts/feelings acknowledged   empathic listening provided   questions answered     Problem: Fall Injury Risk  Goal: Absence of Fall and Fall-Related Injury  Intervention: Identify and Manage Contributors  Flowsheets (Taken 11/20/2024 1948)  Self-Care Promotion:   independence encouraged   BADL personal objects within reach   BADL  personal routines maintained  Medication Review/Management: medications reviewed     Problem: Fall Injury Risk  Goal: Absence of Fall and Fall-Related Injury  Intervention: Promote Injury-Free Environment  Flowsheets (Taken 11/20/2024 1948)  Safety Promotion/Fall Prevention:   assistive device/personal item within reach   Fall Risk signage in place   Fall Risk reviewed with patient/family   nonskid shoes/socks when out of bed   medications reviewed     Problem: Fatigue  Goal: Improved Activity Tolerance  Intervention: Promote Improved Energy  Flowsheets (Taken 11/20/2024 1948)  Sleep/Rest Enhancement: regular sleep/rest pattern promoted  Environmental Support: calm environment promoted

## 2024-11-21 NOTE — PROGRESS NOTES
[facility]  Hospital Medicine Progress Note        Chief Complaint: Inpatient Follow-up     HPI:   64 y.o. female who  has a past medical history of Acute confusional state (11/12/2024), DIANA (acute kidney injury), Hyponatremia, Major depressive disorder, single episode, unspecified, and Unspecified cirrhosis of liver. The patient presented to [facility] on 11/10/2024 with a primary complaint of confusion and generalized weakness which began on 11/07/2024.  Upon presentation to the ED, temperature 98.2° F, heart rate 117, blood pressure 174/100, respiratory rate 20 and SpO2 98% on 2 L nasal cannula.  Labs with CO2 18, ammonia 160, troponin 0.017.  EKG sinus tachycardia with ventricular rate of 113.  CTA of the head stable chronic findings but no acute process.  Chest x-ray no acute abnormality.  Patient is admitted to hospital medicine services for further medical management     Interval History:  No new events or issues overnight.  Asymptomatic.  Tolerating diet     Objective/physical exam:  General: In no acute distress, afebrile  Chest: Clear to auscultation bilaterally  Heart: RRR, +S1, S2, no appreciable murmur  Abdomen: Soft, nontender, BS +  MSK: Warm, no lower extremity edema, no clubbing or cyanosis  Neurologic: Alert and oriented x 3    VITAL SIGNS: 24 HRS MIN & MAX LAST   Temp  Min: 97.8 °F (36.6 °C)  Max: 98.7 °F (37.1 °C) 98.4 °F (36.9 °C)   BP  Min: 131/69  Max: 159/80 (!) 143/82   Pulse  Min: 93  Max: 103  94   Resp  Min: 18  Max: 20 18   SpO2  Min: 96 %  Max: 98 % 98 %       Recent Labs   Lab 11/19/24 0407 11/21/24 0413   WBC 8.07 6.97   RBC 3.80* 3.96*   HGB 10.4* 10.6*   HCT 31.2* 32.8*   MCV 82.1 82.8   MCH 27.4 26.8*   MCHC 33.3 32.3*   RDW 17.6* 17.9*    200   MPV 8.9 8.8       Recent Labs   Lab 11/19/24 0407 11/21/24 0414    141   K 3.6 4.2   * 115*   CO2 22* 22*   BUN 8.4* 6.9*   CREATININE 0.80 0.80   CALCIUM 8.3* 8.7   ALBUMIN 2.6* 2.6*    ALKPHOS 86 83   ALT 15 15   AST 28 25   BILITOT 1.5 1.1          Microbiology Results (last 7 days)       ** No results found for the last 168 hours. **             Radiology:  X-Ray Chest 1 View  Narrative: EXAMINATION:  XR CHEST 1 VIEW    CLINICAL HISTORY:  generalized weakness, AMS;    TECHNIQUE:  Single frontal view of the chest was performed.    COMPARISON:  11/01/2023    FINDINGS:  The lungs are clear.  The heart is normal appearance.  The pulmonary vascularity is unremarkable.  Aorta appears grossly unremarkable.  No pleural effusions are seen.  Bones and joints show no acute abnormality.  Impression: No abnormality seen    Electronically signed by: Johann Linares  Date:    11/10/2024  Time:    12:38  CT Head Without Contrast  Narrative: EXAMINATION:  CT HEAD WITHOUT CONTRAST    CLINICAL HISTORY:  Mental status change, unknown cause;    TECHNIQUE:  Multiple axial images were obtained from the base of the brain to the vertex without contrast administration.  Sagittal and coronal reconstructions were performed..Automatic exposure control is utilized to reduce patient radiation exposure.    COMPARISON:  10/12/2023    FINDINGS:  There is no intracranial mass or lesion seen.  No hemorrhage is seen.  No acute infarct is seen.  The patient is status post craniotomy in the left parietal region.  There is some stable dural thickening on the left side.  There is some cerebral atrophy seen.  There is some compensatory ventricular dilatation and periventricular white matter changes consistent with the patient's age.  Calvarium is intact.  The posterior fossa is unremarkable..  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  Impression: Stable chronic findings seen no acute process    Electronically signed by: Johann Linares  Date:    11/10/2024  Time:    12:35        Medications:  Scheduled Meds:   enoxparin  40  mg Subcutaneous Q24H (prophylaxis, 1700)    folic acid  1 mg Oral Daily    furosemide  20 mg Oral Daily    lactulose 10 gram/15 ml  30 g Oral BID    levothyroxine  25 mcg Oral Before breakfast    magnesium oxide  400 mg Oral BID    multivitamin  1 tablet Oral Daily    pantoprazole  40 mg Oral Daily    rifAXIMin  550 mg Oral BID    rOPINIRole  0.25 mg Oral QHS    sodium bicarbonate  650 mg Oral BID    spironolactone  50 mg Oral Daily    thiamine  100 mg Oral Daily     Continuous Infusions:  PRN Meds:.  Current Facility-Administered Medications:     hydrALAZINE, 10 mg, Intravenous, Q4H PRN    hydrOXYzine pamoate, 50 mg, Oral, Q8H PRN    ondansetron, 4 mg, Intravenous, Q6H PRN    Nutrition:  Nutrition consulted. Most recent weight and BMI monitored-     Measurements:  Wt Readings from Last 1 Encounters:   11/10/24 62 kg (136 lb 11 oz)   Body mass index is 23.46 kg/m².    Patient has been screened and assessed by RD.    Malnutrition Type:  Context:    Level:      Malnutrition Characteristic Summary:       Interventions/Recommendations (treatment strategy):           Assessment/Plan:   Hepatic encephalopathy resolved  Alcohol cirrhosis  Alcohol dependence  Homelessness  Chronic hyponatremia  Major depressive disorder     Continue lactulose b.i.d.  Goal to have 2-3 soft bowel movements daily. Titrate as needed.  Continue vitamin supplementation with folate, thiamine, and MVI   Diuretics for volume management.  Case management working on senior living nursing home placement at [facility].  Medically stable once accepted. Waiting on 142 from the [facility]      Taj Ramirez MD   11/22/2024    All diagnosis and differential diagnosis have been reviewed; assessment and plan has been documented; I have personally reviewed the labs and test results that are presently available; I have reviewed the patients medication list; I have reviewed the consulting providers response and recommendations. I have reviewed or attempted to review  medical records based upon their availability    All of the patient's questions have been  addressed and answered. Patient's is agreeable to the above stated plan. I will continue to monitor closely and make adjustments to medical management as needed.  _____________________________________________________________________

## 2024-11-22 PROCEDURE — 11000001 HC ACUTE MED/SURG PRIVATE ROOM

## 2024-11-22 PROCEDURE — 25000003 PHARM REV CODE 250: Performed by: INTERNAL MEDICINE

## 2024-11-22 PROCEDURE — 63600175 PHARM REV CODE 636 W HCPCS: Performed by: INTERNAL MEDICINE

## 2024-11-22 PROCEDURE — 25000003 PHARM REV CODE 250: Performed by: HOSPITALIST

## 2024-11-22 RX ADMIN — RIFAXIMIN 550 MG: 550 TABLET ORAL at 08:11

## 2024-11-22 RX ADMIN — FOLIC ACID 1 MG: 1 TABLET ORAL at 09:11

## 2024-11-22 RX ADMIN — SODIUM BICARBONATE 650 MG TABLET 650 MG: at 08:11

## 2024-11-22 RX ADMIN — LEVOTHYROXINE SODIUM 25 MCG: 25 TABLET ORAL at 05:11

## 2024-11-22 RX ADMIN — LACTULOSE 30 G: 10 SOLUTION ORAL at 09:11

## 2024-11-22 RX ADMIN — SPIRONOLACTONE 50 MG: 25 TABLET ORAL at 09:11

## 2024-11-22 RX ADMIN — Medication 400 MG: at 09:11

## 2024-11-22 RX ADMIN — RIFAXIMIN 550 MG: 550 TABLET ORAL at 09:11

## 2024-11-22 RX ADMIN — ROPINIROLE HYDROCHLORIDE 0.25 MG: 0.25 TABLET, FILM COATED ORAL at 08:11

## 2024-11-22 RX ADMIN — ENOXAPARIN SODIUM 40 MG: 40 INJECTION SUBCUTANEOUS at 04:11

## 2024-11-22 RX ADMIN — THIAMINE HCL TAB 100 MG 100 MG: 100 TAB at 09:11

## 2024-11-22 RX ADMIN — THERA TABS 1 TABLET: TAB at 09:11

## 2024-11-22 RX ADMIN — PANTOPRAZOLE SODIUM 40 MG: 40 TABLET, DELAYED RELEASE ORAL at 09:11

## 2024-11-22 RX ADMIN — Medication 400 MG: at 08:11

## 2024-11-22 RX ADMIN — SODIUM BICARBONATE 650 MG TABLET 650 MG: at 09:11

## 2024-11-22 RX ADMIN — FUROSEMIDE 20 MG: 20 TABLET ORAL at 09:11

## 2024-11-22 NOTE — PROGRESS NOTES
[facility]  Hospital Medicine Progress Note        Chief Complaint: Inpatient Follow-up     HPI:   64 y.o. female who  has a past medical history of Acute confusional state (11/12/2024), DIANA (acute kidney injury), Hyponatremia, Major depressive disorder, single episode, unspecified, and Unspecified cirrhosis of liver. The patient presented to [facility] on 11/10/2024 with a primary complaint of confusion and generalized weakness which began on 11/07/2024.  Upon presentation to the ED, temperature 98.2° F, heart rate 117, blood pressure 174/100, respiratory rate 20 and SpO2 98% on 2 L nasal cannula.  Labs with CO2 18, ammonia 160, troponin 0.017.  EKG sinus tachycardia with ventricular rate of 113.  CTA of the head stable chronic findings but no acute process.  Chest x-ray no acute abnormality.  Patient is admitted to hospital medicine services for further medical management     Interval History:  Sleepy but arousable. +Bms. No new complaints.      Objective/physical exam:  General: In no acute distress, afebrile  Chest: Clear to auscultation bilaterally  Heart: RRR, +S1, S2, no appreciable murmur  Abdomen: Soft, nontender, BS +  MSK: Warm, no lower extremity edema, no clubbing or cyanosis  Neurologic: Alert and oriented x 3    VITAL SIGNS: 24 HRS MIN & MAX LAST   Temp  Min: 97.3 °F (36.3 °C)  Max: 98.4 °F (36.9 °C) 97.8 °F (36.6 °C)   BP  Min: 99/63  Max: 170/79 116/71   Pulse  Min: 87  Max: 98  87   Resp  Min: 18  Max: 20 20   SpO2  Min: 97 %  Max: 100 % 98 %       Recent Labs   Lab 11/19/24 0407 11/21/24 0413   WBC 8.07 6.97   RBC 3.80* 3.96*   HGB 10.4* 10.6*   HCT 31.2* 32.8*   MCV 82.1 82.8   MCH 27.4 26.8*   MCHC 33.3 32.3*   RDW 17.6* 17.9*    200   MPV 8.9 8.8       Recent Labs   Lab 11/19/24 0407 11/21/24 0414    141   K 3.6 4.2   * 115*   CO2 22* 22*   BUN 8.4* 6.9*   CREATININE 0.80 0.80   CALCIUM 8.3* 8.7   ALBUMIN 2.6* 2.6*   ALKPHOS 86 83   ALT 15 15    AST 28 25   BILITOT 1.5 1.1          Microbiology Results (last 7 days)       ** No results found for the last 168 hours. **             Radiology:  X-Ray Chest 1 View  Narrative: EXAMINATION:  XR CHEST 1 VIEW    CLINICAL HISTORY:  generalized weakness, AMS;    TECHNIQUE:  Single frontal view of the chest was performed.    COMPARISON:  11/01/2023    FINDINGS:  The lungs are clear.  The heart is normal appearance.  The pulmonary vascularity is unremarkable.  Aorta appears grossly unremarkable.  No pleural effusions are seen.  Bones and joints show no acute abnormality.  Impression: No abnormality seen    Electronically signed by: Johann Linares  Date:    11/10/2024  Time:    12:38  CT Head Without Contrast  Narrative: EXAMINATION:  CT HEAD WITHOUT CONTRAST    CLINICAL HISTORY:  Mental status change, unknown cause;    TECHNIQUE:  Multiple axial images were obtained from the base of the brain to the vertex without contrast administration.  Sagittal and coronal reconstructions were performed..Automatic exposure control is utilized to reduce patient radiation exposure.    COMPARISON:  10/12/2023    FINDINGS:  There is no intracranial mass or lesion seen.  No hemorrhage is seen.  No acute infarct is seen.  The patient is status post craniotomy in the left parietal region.  There is some stable dural thickening on the left side.  There is some cerebral atrophy seen.  There is some compensatory ventricular dilatation and periventricular white matter changes consistent with the patient's age.  Calvarium is intact.  The posterior fossa is unremarkable..  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  Impression: Stable chronic findings seen no acute process    Electronically signed by: Johann Linares  Date:    11/10/2024  Time:    12:35        Medications:  Scheduled Meds:   enoxparin  40 mg Subcutaneous Q24H  (prophylaxis, 1700)    folic acid  1 mg Oral Daily    furosemide  20 mg Oral Daily    lactulose 10 gram/15 ml  30 g Oral BID    levothyroxine  25 mcg Oral Before breakfast    magnesium oxide  400 mg Oral BID    multivitamin  1 tablet Oral Daily    pantoprazole  40 mg Oral Daily    rifAXIMin  550 mg Oral BID    rOPINIRole  0.25 mg Oral QHS    sodium bicarbonate  650 mg Oral BID    spironolactone  50 mg Oral Daily    thiamine  100 mg Oral Daily     Continuous Infusions:  PRN Meds:.  Current Facility-Administered Medications:     cloNIDine, 0.1 mg, Oral, Q6H PRN    hydrALAZINE, 10 mg, Intravenous, Q4H PRN    hydrOXYzine pamoate, 50 mg, Oral, Q8H PRN    ondansetron, 4 mg, Intravenous, Q6H PRN    Nutrition:  Nutrition consulted. Most recent weight and BMI monitored-     Measurements:  Wt Readings from Last 1 Encounters:   11/10/24 62 kg (136 lb 11 oz)   Body mass index is 23.46 kg/m².    Patient has been screened and assessed by RD.    Malnutrition Type:  Context:    Level:      Malnutrition Characteristic Summary:       Interventions/Recommendations (treatment strategy):           Assessment/Plan:   Hepatic encephalopathy resolved  Alcohol cirrhosis  Alcohol dependence  Homelessness  Chronic hyponatremia  Major depressive disorder     Continue lactulose b.i.d.  Goal to have 2-3 soft bowel movements daily. Titrate as needed.  Continue vitamin supplementation with folate, thiamine, and MVI   Diuretics for volume management.  Case management working on [facility] nursing home placement at [facility].  Medically stable once accepted. Waiting on 142 from the [facility]         Taj Ramirez MD   11/24/2024    All diagnosis and differential diagnosis have been reviewed; assessment and plan has been documented; I have personally reviewed the labs and test results that are presently available; I have reviewed the patients medication list; I have reviewed the consulting providers response and recommendations. I have reviewed or  attempted to review medical records based upon their availability    All of the patient's questions have been  addressed and answered. Patient's is agreeable to the above stated plan. I will continue to monitor closely and make adjustments to medical management as needed.  _____________________________________________________________________

## 2024-11-22 NOTE — PLAN OF CARE
Problem: Fall Injury Risk  Goal: Absence of Fall and Fall-Related Injury  Outcome: Progressing     Problem: Fatigue  Goal: Improved Activity Tolerance  Outcome: Progressing     Problem: Adult Inpatient Plan of Care  Goal: Plan of Care Review  Outcome: Progressing  Flowsheets (Taken 11/22/2024 0336)  Plan of Care Reviewed With: patient  Goal: Patient-Specific Goal (Individualized)  Outcome: Progressing  Goal: Absence of Hospital-Acquired Illness or Injury  Outcome: Progressing  Intervention: Identify and Manage Fall Risk  Flowsheets (Taken 11/22/2024 0336)  Safety Promotion/Fall Prevention:   assistive device/personal item within reach   side rails raised x 2   nonskid shoes/socks when out of bed  Intervention: Prevent Skin Injury  Flowsheets (Taken 11/22/2024 0336)  Body Position: position changed independently  Skin Protection: protective footwear used  Device Skin Pressure Protection: tubing/devices free from skin contact  Intervention: Prevent and Manage VTE (Venous Thromboembolism) Risk  Flowsheets (Taken 11/22/2024 0336)  VTE Prevention/Management: ambulation promoted  Intervention: Prevent Infection  Flowsheets (Taken 11/22/2024 0336)  Infection Prevention: single patient room provided  Goal: Optimal Comfort and Wellbeing  Outcome: Progressing  Intervention: Monitor Pain and Promote Comfort  Flowsheets (Taken 11/22/2024 0336)  Pain Management Interventions:   care clustered   quiet environment facilitated  Intervention: Provide Person-Centered Care  Flowsheets (Taken 11/22/2024 0336)  Trust Relationship/Rapport:   questions encouraged   questions answered  Goal: Readiness for Transition of Care  Outcome: Progressing

## 2024-11-23 PROCEDURE — 25000003 PHARM REV CODE 250: Performed by: INTERNAL MEDICINE

## 2024-11-23 PROCEDURE — 11000001 HC ACUTE MED/SURG PRIVATE ROOM

## 2024-11-23 PROCEDURE — 25000003 PHARM REV CODE 250: Performed by: HOSPITALIST

## 2024-11-23 PROCEDURE — 63600175 PHARM REV CODE 636 W HCPCS: Performed by: INTERNAL MEDICINE

## 2024-11-23 RX ADMIN — SODIUM BICARBONATE 650 MG TABLET 650 MG: at 07:11

## 2024-11-23 RX ADMIN — SODIUM BICARBONATE 650 MG TABLET 650 MG: at 08:11

## 2024-11-23 RX ADMIN — Medication 400 MG: at 07:11

## 2024-11-23 RX ADMIN — ROPINIROLE HYDROCHLORIDE 0.25 MG: 0.25 TABLET, FILM COATED ORAL at 07:11

## 2024-11-23 RX ADMIN — LACTULOSE 30 G: 10 SOLUTION ORAL at 08:11

## 2024-11-23 RX ADMIN — ENOXAPARIN SODIUM 40 MG: 40 INJECTION SUBCUTANEOUS at 05:11

## 2024-11-23 RX ADMIN — FUROSEMIDE 20 MG: 20 TABLET ORAL at 08:11

## 2024-11-23 RX ADMIN — RIFAXIMIN 550 MG: 550 TABLET ORAL at 08:11

## 2024-11-23 RX ADMIN — THIAMINE HCL TAB 100 MG 100 MG: 100 TAB at 08:11

## 2024-11-23 RX ADMIN — PANTOPRAZOLE SODIUM 40 MG: 40 TABLET, DELAYED RELEASE ORAL at 08:11

## 2024-11-23 RX ADMIN — Medication 400 MG: at 08:11

## 2024-11-23 RX ADMIN — SPIRONOLACTONE 50 MG: 25 TABLET ORAL at 08:11

## 2024-11-23 RX ADMIN — THERA TABS 1 TABLET: TAB at 08:11

## 2024-11-23 RX ADMIN — FOLIC ACID 1 MG: 1 TABLET ORAL at 08:11

## 2024-11-23 RX ADMIN — RIFAXIMIN 550 MG: 550 TABLET ORAL at 07:11

## 2024-11-23 RX ADMIN — LEVOTHYROXINE SODIUM 25 MCG: 25 TABLET ORAL at 06:11

## 2024-11-23 NOTE — PLAN OF CARE
Problem: Fall Injury Risk  Goal: Absence of Fall and Fall-Related Injury  Outcome: Progressing     Problem: Fatigue  Goal: Improved Activity Tolerance  Outcome: Progressing     Problem: Adult Inpatient Plan of Care  Goal: Plan of Care Review  Outcome: Progressing  Flowsheets (Taken 11/23/2024 0124)  Plan of Care Reviewed With: patient  Goal: Patient-Specific Goal (Individualized)  Outcome: Progressing  Goal: Absence of Hospital-Acquired Illness or Injury  Outcome: Progressing  Intervention: Identify and Manage Fall Risk  Flowsheets (Taken 11/23/2024 0124)  Safety Promotion/Fall Prevention:   assistive device/personal item within reach   side rails raised x 2   nonskid shoes/socks when out of bed  Intervention: Prevent Skin Injury  Flowsheets (Taken 11/23/2024 0124)  Body Position: position changed independently  Skin Protection: protective footwear used  Device Skin Pressure Protection: tubing/devices free from skin contact  Goal: Optimal Comfort and Wellbeing  Outcome: Progressing  Intervention: Monitor Pain and Promote Comfort  Flowsheets (Taken 11/23/2024 0124)  Pain Management Interventions:   care clustered   quiet environment facilitated  Intervention: Provide Person-Centered Care  Flowsheets (Taken 11/23/2024 0124)  Trust Relationship/Rapport:   questions encouraged   questions answered  Goal: Readiness for Transition of Care  Outcome: Progressing

## 2024-11-23 NOTE — PROGRESS NOTES
[facility]  Hospital Medicine Progress Note        Chief Complaint: Inpatient Follow-up     HPI:   64 y.o. female who  has a past medical history of Acute confusional state (11/12/2024), DIANA (acute kidney injury), Hyponatremia, Major depressive disorder, single episode, unspecified, and Unspecified cirrhosis of liver. The patient presented to [facility] on 11/10/2024 with a primary complaint of confusion and generalized weakness which began on 11/07/2024.  Upon presentation to the ED, temperature 98.2° F, heart rate 117, blood pressure 174/100, respiratory rate 20 and SpO2 98% on 2 L nasal cannula.  Labs with CO2 18, ammonia 160, troponin 0.017.  EKG sinus tachycardia with ventricular rate of 113.  CTA of the head stable chronic findings but no acute process.  Chest x-ray no acute abnormality.  Patient is admitted to hospital medicine services for further medical management     Interval History:  Resting comfortably in bed. No new complaints.  Vital signs stable.      Objective/physical exam:  General: In no acute distress, afebrile  Chest: Clear to auscultation bilaterally  Heart: RRR, +S1, S2, no appreciable murmur  Abdomen: Soft, nontender, BS +  MSK: Warm, no lower extremity edema, no clubbing or cyanosis  Neurologic: Alert and oriented x 3    VITAL SIGNS: 24 HRS MIN & MAX LAST   Temp  Min: 97.6 °F (36.4 °C)  Max: 98.5 °F (36.9 °C) 98.1 °F (36.7 °C)   BP  Min: 102/62  Max: 130/79 102/62   Pulse  Min: 86  Max: 93  88   Resp  Min: 18  Max: 20 18   SpO2  Min: 93 %  Max: 98 % 96 %       Recent Labs   Lab 11/19/24 0407 11/21/24 0413   WBC 8.07 6.97   RBC 3.80* 3.96*   HGB 10.4* 10.6*   HCT 31.2* 32.8*   MCV 82.1 82.8   MCH 27.4 26.8*   MCHC 33.3 32.3*   RDW 17.6* 17.9*    200   MPV 8.9 8.8       Recent Labs   Lab 11/19/24 0407 11/21/24 0414    141   K 3.6 4.2   * 115*   CO2 22* 22*   BUN 8.4* 6.9*   CREATININE 0.80 0.80   CALCIUM 8.3* 8.7   ALBUMIN 2.6* 2.6*   ALKPHOS  86 83   ALT 15 15   AST 28 25   BILITOT 1.5 1.1          Microbiology Results (last 7 days)       ** No results found for the last 168 hours. **             Radiology:  X-Ray Chest 1 View  Narrative: EXAMINATION:  XR CHEST 1 VIEW    CLINICAL HISTORY:  generalized weakness, AMS;    TECHNIQUE:  Single frontal view of the chest was performed.    COMPARISON:  11/01/2023    FINDINGS:  The lungs are clear.  The heart is normal appearance.  The pulmonary vascularity is unremarkable.  Aorta appears grossly unremarkable.  No pleural effusions are seen.  Bones and joints show no acute abnormality.  Impression: No abnormality seen    Electronically signed by: Johann Linares  Date:    11/10/2024  Time:    12:38  CT Head Without Contrast  Narrative: EXAMINATION:  CT HEAD WITHOUT CONTRAST    CLINICAL HISTORY:  Mental status change, unknown cause;    TECHNIQUE:  Multiple axial images were obtained from the base of the brain to the vertex without contrast administration.  Sagittal and coronal reconstructions were performed..Automatic exposure control is utilized to reduce patient radiation exposure.    COMPARISON:  10/12/2023    FINDINGS:  There is no intracranial mass or lesion seen.  No hemorrhage is seen.  No acute infarct is seen.  The patient is status post craniotomy in the left parietal region.  There is some stable dural thickening on the left side.  There is some cerebral atrophy seen.  There is some compensatory ventricular dilatation and periventricular white matter changes consistent with the patient's age.  Calvarium is intact.  The posterior fossa is unremarkable..  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  Impression: Stable chronic findings seen no acute process    Electronically signed by: Johann Linares  Date:    11/10/2024  Time:    12:35        Medications:  Scheduled Meds:   enoxparin  40 mg  Subcutaneous Q24H (prophylaxis, 1700)    folic acid  1 mg Oral Daily    furosemide  20 mg Oral Daily    lactulose 10 gram/15 ml  30 g Oral BID    levothyroxine  25 mcg Oral Before breakfast    magnesium oxide  400 mg Oral BID    multivitamin  1 tablet Oral Daily    pantoprazole  40 mg Oral Daily    rifAXIMin  550 mg Oral BID    rOPINIRole  0.25 mg Oral QHS    sodium bicarbonate  650 mg Oral BID    spironolactone  50 mg Oral Daily    thiamine  100 mg Oral Daily     Continuous Infusions:  PRN Meds:.  Current Facility-Administered Medications:     cloNIDine, 0.1 mg, Oral, Q6H PRN    hydrALAZINE, 10 mg, Intravenous, Q4H PRN    hydrOXYzine pamoate, 50 mg, Oral, Q8H PRN    ondansetron, 4 mg, Intravenous, Q6H PRN    Nutrition:  Nutrition consulted. Most recent weight and BMI monitored-     Measurements:  Wt Readings from Last 1 Encounters:   11/10/24 62 kg (136 lb 11 oz)   Body mass index is 23.46 kg/m².    Patient has been screened and assessed by RD.    Malnutrition Type:  Context:    Level:      Malnutrition Characteristic Summary:       Interventions/Recommendations (treatment strategy):           Assessment/Plan:   Hepatic encephalopathy resolved  Alcohol cirrhosis  Alcohol dependence  Homelessness  Chronic hyponatremia  Major depressive disorder     Continue lactulose b.i.d.  Goal to have 2-3 soft bowel movements daily. Titrate as needed.  Continue vitamin supplementation with folate, thiamine, and MVI   Diuretics for volume management.  Case management working on group home nursing home placement at [facility].  Medically stable once accepted. Waiting on 142 from the [facility]         Taj Ramirez MD   11/23/2024     All diagnosis and differential diagnosis have been reviewed; assessment and plan has been documented; I have personally reviewed the labs and test results that are presently available; I have reviewed the patients medication list; I have reviewed the consulting providers response and recommendations. I  have reviewed or attempted to review medical records based upon their availability    All of the patient's questions have been  addressed and answered. Patient's is agreeable to the above stated plan. I will continue to monitor closely and make adjustments to medical management as needed.  _____________________________________________________________________

## 2024-11-24 PROCEDURE — 25000003 PHARM REV CODE 250: Performed by: HOSPITALIST

## 2024-11-24 PROCEDURE — 25000003 PHARM REV CODE 250: Performed by: INTERNAL MEDICINE

## 2024-11-24 PROCEDURE — 11000001 HC ACUTE MED/SURG PRIVATE ROOM

## 2024-11-24 PROCEDURE — 63600175 PHARM REV CODE 636 W HCPCS: Performed by: INTERNAL MEDICINE

## 2024-11-24 PROCEDURE — 25000003 PHARM REV CODE 250: Performed by: NURSE PRACTITIONER

## 2024-11-24 RX ADMIN — THERA TABS 1 TABLET: TAB at 09:11

## 2024-11-24 RX ADMIN — RIFAXIMIN 550 MG: 550 TABLET ORAL at 09:11

## 2024-11-24 RX ADMIN — HYDROXYZINE PAMOATE 50 MG: 50 CAPSULE ORAL at 08:11

## 2024-11-24 RX ADMIN — Medication 400 MG: at 08:11

## 2024-11-24 RX ADMIN — LACTULOSE 30 G: 10 SOLUTION ORAL at 09:11

## 2024-11-24 RX ADMIN — ROPINIROLE HYDROCHLORIDE 0.25 MG: 0.25 TABLET, FILM COATED ORAL at 08:11

## 2024-11-24 RX ADMIN — Medication 400 MG: at 09:11

## 2024-11-24 RX ADMIN — LEVOTHYROXINE SODIUM 25 MCG: 25 TABLET ORAL at 05:11

## 2024-11-24 RX ADMIN — PANTOPRAZOLE SODIUM 40 MG: 40 TABLET, DELAYED RELEASE ORAL at 09:11

## 2024-11-24 RX ADMIN — SODIUM BICARBONATE 650 MG TABLET 650 MG: at 08:11

## 2024-11-24 RX ADMIN — SPIRONOLACTONE 50 MG: 25 TABLET ORAL at 09:11

## 2024-11-24 RX ADMIN — SODIUM BICARBONATE 650 MG TABLET 650 MG: at 09:11

## 2024-11-24 RX ADMIN — ENOXAPARIN SODIUM 40 MG: 40 INJECTION SUBCUTANEOUS at 04:11

## 2024-11-24 RX ADMIN — THIAMINE HCL TAB 100 MG 100 MG: 100 TAB at 09:11

## 2024-11-24 RX ADMIN — FOLIC ACID 1 MG: 1 TABLET ORAL at 09:11

## 2024-11-24 RX ADMIN — RIFAXIMIN 550 MG: 550 TABLET ORAL at 08:11

## 2024-11-24 RX ADMIN — FUROSEMIDE 20 MG: 20 TABLET ORAL at 09:11

## 2024-11-24 NOTE — PROGRESS NOTES
[facility]  Hospital Medicine Progress Note        Chief Complaint: Inpatient Follow-up     HPI:   64 y.o. female who  has a past medical history of Acute confusional state (11/12/2024), DIANA (acute kidney injury), Hyponatremia, Major depressive disorder, single episode, unspecified, and Unspecified cirrhosis of liver. The patient presented to [facility] on 11/10/2024 with a primary complaint of confusion and generalized weakness which began on 11/07/2024.  Upon presentation to the ED, temperature 98.2° F, heart rate 117, blood pressure 174/100, respiratory rate 20 and SpO2 98% on 2 L nasal cannula.  Labs with CO2 18, ammonia 160, troponin 0.017.  EKG sinus tachycardia with ventricular rate of 113.  CTA of the head stable chronic findings but no acute process.  Chest x-ray no acute abnormality.  Patient is admitted to hospital medicine services for further medical management     Interval History:  Awake alert and comfortable.  No new complaints. Steady Bms.       Objective/physical exam:  General: In no acute distress, afebrile  Chest: Clear to auscultation bilaterally  Heart: RRR, +S1, S2, no appreciable murmur  Abdomen: Soft, nontender, BS +  MSK: Warm, no lower extremity edema, no clubbing or cyanosis  Neurologic: Alert and oriented x 3    VITAL SIGNS: 24 HRS MIN & MAX LAST   Temp  Min: 97.4 °F (36.3 °C)  Max: 98.6 °F (37 °C) 98.4 °F (36.9 °C)   BP  Min: 113/68  Max: 147/85 113/68   Pulse  Min: 81  Max: 99  97   Resp  Min: 18  Max: 19 18   SpO2  Min: 96 %  Max: 100 % 96 %       Recent Labs   Lab 11/19/24  0407 11/21/24 0413   WBC 8.07 6.97   RBC 3.80* 3.96*   HGB 10.4* 10.6*   HCT 31.2* 32.8*   MCV 82.1 82.8   MCH 27.4 26.8*   MCHC 33.3 32.3*   RDW 17.6* 17.9*    200   MPV 8.9 8.8       Recent Labs   Lab 11/19/24 0407 11/21/24 0414    141   K 3.6 4.2   * 115*   CO2 22* 22*   BUN 8.4* 6.9*   CREATININE 0.80 0.80   CALCIUM 8.3* 8.7   ALBUMIN 2.6* 2.6*   ALKPHOS 86 83    ALT 15 15   AST 28 25   BILITOT 1.5 1.1          Microbiology Results (last 7 days)       ** No results found for the last 168 hours. **             Radiology:  X-Ray Chest 1 View  Narrative: EXAMINATION:  XR CHEST 1 VIEW    CLINICAL HISTORY:  generalized weakness, AMS;    TECHNIQUE:  Single frontal view of the chest was performed.    COMPARISON:  11/01/2023    FINDINGS:  The lungs are clear.  The heart is normal appearance.  The pulmonary vascularity is unremarkable.  Aorta appears grossly unremarkable.  No pleural effusions are seen.  Bones and joints show no acute abnormality.  Impression: No abnormality seen    Electronically signed by: Johann Linares  Date:    11/10/2024  Time:    12:38  CT Head Without Contrast  Narrative: EXAMINATION:  CT HEAD WITHOUT CONTRAST    CLINICAL HISTORY:  Mental status change, unknown cause;    TECHNIQUE:  Multiple axial images were obtained from the base of the brain to the vertex without contrast administration.  Sagittal and coronal reconstructions were performed..Automatic exposure control is utilized to reduce patient radiation exposure.    COMPARISON:  10/12/2023    FINDINGS:  There is no intracranial mass or lesion seen.  No hemorrhage is seen.  No acute infarct is seen.  The patient is status post craniotomy in the left parietal region.  There is some stable dural thickening on the left side.  There is some cerebral atrophy seen.  There is some compensatory ventricular dilatation and periventricular white matter changes consistent with the patient's age.  Calvarium is intact.  The posterior fossa is unremarkable..  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  Impression: Stable chronic findings seen no acute process    Electronically signed by: Johann Linares  Date:    11/10/2024  Time:    12:35        Medications:  Scheduled Meds:   enoxparin  40 mg Subcutaneous  Q24H (prophylaxis, 1700)    folic acid  1 mg Oral Daily    furosemide  20 mg Oral Daily    lactulose 10 gram/15 ml  30 g Oral BID    levothyroxine  25 mcg Oral Before breakfast    magnesium oxide  400 mg Oral BID    multivitamin  1 tablet Oral Daily    pantoprazole  40 mg Oral Daily    rifAXIMin  550 mg Oral BID    rOPINIRole  0.25 mg Oral QHS    sodium bicarbonate  650 mg Oral BID    spironolactone  50 mg Oral Daily    thiamine  100 mg Oral Daily     Continuous Infusions:  PRN Meds:.  Current Facility-Administered Medications:     cloNIDine, 0.1 mg, Oral, Q6H PRN    hydrALAZINE, 10 mg, Intravenous, Q4H PRN    hydrOXYzine pamoate, 50 mg, Oral, Q8H PRN    ondansetron, 4 mg, Intravenous, Q6H PRN    Nutrition:  Nutrition consulted. Most recent weight and BMI monitored-     Measurements:  Wt Readings from Last 1 Encounters:   11/10/24 62 kg (136 lb 11 oz)   Body mass index is 23.46 kg/m².    Patient has been screened and assessed by RD.    Malnutrition Type:  Context:    Level:      Malnutrition Characteristic Summary:       Interventions/Recommendations (treatment strategy):           Assessment/Plan:   Hepatic encephalopathy resolved  Alcohol cirrhosis  Alcohol dependence  Homelessness  Chronic hyponatremia  Major depressive disorder     Continue lactulose b.i.d.  Goal to have 2-3 soft bowel movements daily. Titrate as needed.  Continue vitamin supplementation with folate, thiamine, and MVI   Diuretics for volume management.  Case management working on group home nursing home placement at [facility].  Medically stable once accepted. Waiting on 142 from the [facility]         Taj Ramirez MD   11/25/2024    All diagnosis and differential diagnosis have been reviewed; assessment and plan has been documented; I have personally reviewed the labs and test results that are presently available; I have reviewed the patients medication list; I have reviewed the consulting providers response and recommendations. I have reviewed  or attempted to review medical records based upon their availability    All of the patient's questions have been  addressed and answered. Patient's is agreeable to the above stated plan. I will continue to monitor closely and make adjustments to medical management as needed.  _____________________________________________________________________

## 2024-11-24 NOTE — PLAN OF CARE
Problem: Adult Inpatient Plan of Care  Goal: Plan of Care Review  Outcome: Progressing  Goal: Patient-Specific Goal (Individualized)  Outcome: Progressing  Goal: Absence of Hospital-Acquired Illness or Injury  Outcome: Progressing  Intervention: Prevent Skin Injury  Flowsheets (Taken 11/24/2024 6302)  Body Position:   position changed independently   weight shifting  Skin Protection: incontinence pads utilized  Device Skin Pressure Protection:   adhesive use limited   tubing/devices free from skin contact  Goal: Optimal Comfort and Wellbeing  Outcome: Progressing  Goal: Readiness for Transition of Care  Outcome: Progressing     Problem: Fall Injury Risk  Goal: Absence of Fall and Fall-Related Injury  Outcome: Progressing  Intervention: Identify and Manage Contributors  Flowsheets (Taken 11/24/2024 8666)  Medication Review/Management:   medications reviewed   high-risk medications identified     Problem: Fatigue  Goal: Improved Activity Tolerance  Outcome: Progressing

## 2024-11-25 PROCEDURE — 25000003 PHARM REV CODE 250: Performed by: INTERNAL MEDICINE

## 2024-11-25 PROCEDURE — 25000003 PHARM REV CODE 250: Performed by: NURSE PRACTITIONER

## 2024-11-25 PROCEDURE — 63600175 PHARM REV CODE 636 W HCPCS: Performed by: INTERNAL MEDICINE

## 2024-11-25 PROCEDURE — 11000001 HC ACUTE MED/SURG PRIVATE ROOM

## 2024-11-25 PROCEDURE — 25000003 PHARM REV CODE 250: Performed by: HOSPITALIST

## 2024-11-25 RX ADMIN — FOLIC ACID 1 MG: 1 TABLET ORAL at 08:11

## 2024-11-25 RX ADMIN — LEVOTHYROXINE SODIUM 25 MCG: 25 TABLET ORAL at 06:11

## 2024-11-25 RX ADMIN — ENOXAPARIN SODIUM 40 MG: 40 INJECTION SUBCUTANEOUS at 05:11

## 2024-11-25 RX ADMIN — SODIUM BICARBONATE 650 MG TABLET 650 MG: at 09:11

## 2024-11-25 RX ADMIN — FUROSEMIDE 20 MG: 20 TABLET ORAL at 08:11

## 2024-11-25 RX ADMIN — Medication 400 MG: at 09:11

## 2024-11-25 RX ADMIN — HYDROXYZINE PAMOATE 50 MG: 50 CAPSULE ORAL at 09:11

## 2024-11-25 RX ADMIN — PANTOPRAZOLE SODIUM 40 MG: 40 TABLET, DELAYED RELEASE ORAL at 08:11

## 2024-11-25 RX ADMIN — Medication 400 MG: at 08:11

## 2024-11-25 RX ADMIN — LACTULOSE 30 G: 10 SOLUTION ORAL at 08:11

## 2024-11-25 RX ADMIN — ROPINIROLE HYDROCHLORIDE 0.25 MG: 0.25 TABLET, FILM COATED ORAL at 09:11

## 2024-11-25 RX ADMIN — LACTULOSE 30 G: 10 SOLUTION ORAL at 09:11

## 2024-11-25 RX ADMIN — THERA TABS 1 TABLET: TAB at 08:11

## 2024-11-25 RX ADMIN — RIFAXIMIN 550 MG: 550 TABLET ORAL at 08:11

## 2024-11-25 RX ADMIN — THIAMINE HCL TAB 100 MG 100 MG: 100 TAB at 08:11

## 2024-11-25 RX ADMIN — SPIRONOLACTONE 50 MG: 25 TABLET ORAL at 08:11

## 2024-11-25 RX ADMIN — SODIUM BICARBONATE 650 MG TABLET 650 MG: at 08:11

## 2024-11-25 RX ADMIN — RIFAXIMIN 550 MG: 550 TABLET ORAL at 09:11

## 2024-11-25 NOTE — PROGRESS NOTES
Inpatient Nutrition Evaluation    Admit Date: 11/10/2024   Total duration of encounter: 15 days    Nutrition Recommendation/Prescription     Diet Heart Healthy ordered  Continue Boost Plus daily (provides 360 kcal and 14 g protein per container)  Encouraged adequate PO intake  Monitor appetite/PO intake, weight, and labs    Nutrition Assessment     Chart Review    Reason Seen: continuous nutrition monitoring and follow-up Cirrhosis     Malnutrition Screening Tool Results   Have you recently lost weight without trying?: No  Have you been eating poorly because of a decreased appetite?: No   MST Score: 0     Diagnosis:  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  Metabolic acidosis   Hypothyroidism    Relevant Medical History:    DIANA (acute kidney injury)      Hyponatremia      Major depressive disorder, single episode, unspecified      Unspecified cirrhosis of liver        Nutrition-Related Medications:   Scheduled Meds:   enoxparin  40 mg Subcutaneous Q24H (prophylaxis, 1700)    folic acid  1 mg Oral Daily    furosemide  20 mg Oral Daily    lactulose 10 gram/15 ml  30 g Oral BID    levothyroxine  25 mcg Oral Before breakfast    magnesium oxide  400 mg Oral BID    multivitamin  1 tablet Oral Daily    pantoprazole  40 mg Oral Daily    rifAXIMin  550 mg Oral BID    rOPINIRole  0.25 mg Oral QHS    sodium bicarbonate  650 mg Oral BID    spironolactone  50 mg Oral Daily    thiamine  100 mg Oral Daily     Continuous Infusions:  PRN Meds:.  Current Facility-Administered Medications:     cloNIDine, 0.1 mg, Oral, Q6H PRN    hydrALAZINE, 10 mg, Intravenous, Q4H PRN    hydrOXYzine pamoate, 50 mg, Oral, Q8H PRN    ondansetron, 4 mg, Intravenous, Q6H PRN      Nutrition-Related Labs:  Recent Labs   Lab 11/19/24  0407 11/21/24  0413 11/21/24  0414     --  141   K 3.6  --  4.2   CALCIUM 8.3*  --  8.7   *  --  115*   CO2 22*  --  22*   BUN 8.4*  --  6.9*   CREATININE 0.80  --  0.80   EGFRNORACEVR >60  --  >60  "  GLUCOSE 146*  --  89   BILITOT 1.5  --  1.1   ALKPHOS 86  --  83   ALT 15  --  15   AST 28  --  25   ALBUMIN 2.6*  --  2.6*   WBC 8.07 6.97  --    HGB 10.4* 10.6*  --    HCT 31.2* 32.8*  --          Diet Order: Diet Adult Regular  Oral Supplement Order: Boost Plus  Appetite/Oral Intake: good/% of meals  Factors Affecting Nutritional Intake: none identified  Food/Latter day/Cultural Preferences: none reported  Food Allergies: none reported       Wound(s):   none noted    Comments    2024: No significant fat/muscle wasting per NFPE. Pt reports a good appetite/PO intake prior to admit and currently. Pt denies N/V/D/C and chewing/swallowing difficulties. Pt denies unplanned wt loss. Per EMR, pt weighed 68.7 kg on 11/10/2023 (~9.7% wt loss in 1 year, insignificant). Pt agreeable to ONS as preventative MNT. Encouraged adequate PO intake. Last BM documented as 2024. Will monitor.    2024: Pt reports a good appetite/PO intake and denies N/V/D/C. Pt reports drinking ONS. Encouraged adequate PO intake. Last BM documented as 2024. Will monitor.    2024: Pt reports a good appetite/PO intake and denies N/V/D/C. ONS ordered. Encouraged adequate PO intake. Last BM documented as 2024. Will monitor.    Anthropometrics    Height: 5' 4" (162.6 cm) Height Method: Stated  Last Weight: 62 kg (136 lb 11 oz) (11/10/24 1631) Weight Method: Bed Scale  BMI (Calculated): 23.5  BMI Classification: normal (BMI 18.5-24.9)     Ideal Body Weight (IBW), Female: 120 lb     % Ideal Body Weight, Female (lb): 113.91 %                    Usual Body Weight (UBW), k.7 kg  % Usual Body Weight: 90.44     Usual Weight Provided By: EMR weight history    Wt Readings from Last 5 Encounters:   11/10/24 62 kg (136 lb 11 oz)   10/07/24 62.9 kg (138 lb 11.2 oz)   24 63 kg (139 lb)   24 64.1 kg (141 lb 6.4 oz)   24 61.2 kg (135 lb)     Weight Change(s) Since Admission:  Admit Weight: 63.5 kg (140 lb) " (11/10/24 1153)  11/10/2024: 62 kg  11/19/2024: no new wts  11/25/2024: no new wts    Patient Education    Not applicable.    Monitoring & Evaluation     Dietitian will monitor food and beverage intake, weight, electrolyte/renal panel, glucose/endocrine profile, and gastrointestinal profile.  Nutrition Risk/Follow-Up: low (follow-up in 5-7 days)  Patients assigned 'low nutrition risk' status do not qualify for a full nutritional assessment but will be monitored and re-evaluated in a 5-7 day time period. Please consult if re-evaluation needed sooner.

## 2024-11-25 NOTE — PROGRESS NOTES
[facility]  Hospital Medicine Progress Note        Chief Complaint: Inpatient Follow-up     HPI:   64 y.o. female who  has a past medical history of Acute confusional state (11/12/2024), DIANA (acute kidney injury), Hyponatremia, Major depressive disorder, single episode, unspecified, and Unspecified cirrhosis of liver. The patient presented to [facility] on 11/10/2024 with a primary complaint of confusion and generalized weakness which began on 11/07/2024.  Upon presentation to the ED, temperature 98.2° F, heart rate 117, blood pressure 174/100, respiratory rate 20 and SpO2 98% on 2 L nasal cannula.  Labs with CO2 18, ammonia 160, troponin 0.017.  EKG sinus tachycardia with ventricular rate of 113.  CTA of the head stable chronic findings but no acute process.  Chest x-ray no acute abnormality.  Patient is admitted to hospital medicine services for further medical management     Interval History:  No events or issues overnight. Tolerating diet. No new complaints. +Bms (3 - 4)     Objective/physical exam:  General: In no acute distress, afebrile  Chest: Clear to auscultation bilaterally  Heart: RRR, +S1, S2, no appreciable murmur  Abdomen: Soft, nontender, BS +  MSK: Warm, no lower extremity edema, no clubbing or cyanosis  Neurologic: Alert and oriented x 3    VITAL SIGNS: 24 HRS MIN & MAX LAST   Temp  Min: 97.6 °F (36.4 °C)  Max: 98.2 °F (36.8 °C) 97.6 °F (36.4 °C)   BP  Min: 113/77  Max: 147/83 133/72   Pulse  Min: 87  Max: 106  93   Resp  Min: 18  Max: 20 20   SpO2  Min: 96 %  Max: 97 % 97 %       Recent Labs   Lab 11/19/24 0407 11/21/24 0413   WBC 8.07 6.97   RBC 3.80* 3.96*   HGB 10.4* 10.6*   HCT 31.2* 32.8*   MCV 82.1 82.8   MCH 27.4 26.8*   MCHC 33.3 32.3*   RDW 17.6* 17.9*    200   MPV 8.9 8.8       Recent Labs   Lab 11/19/24 0407 11/21/24 0414    141   K 3.6 4.2   * 115*   CO2 22* 22*   BUN 8.4* 6.9*   CREATININE 0.80 0.80   CALCIUM 8.3* 8.7   ALBUMIN 2.6* 2.6*    ALKPHOS 86 83   ALT 15 15   AST 28 25   BILITOT 1.5 1.1          Microbiology Results (last 7 days)       ** No results found for the last 168 hours. **             Radiology:  X-Ray Chest 1 View  Narrative: EXAMINATION:  XR CHEST 1 VIEW    CLINICAL HISTORY:  generalized weakness, AMS;    TECHNIQUE:  Single frontal view of the chest was performed.    COMPARISON:  11/01/2023    FINDINGS:  The lungs are clear.  The heart is normal appearance.  The pulmonary vascularity is unremarkable.  Aorta appears grossly unremarkable.  No pleural effusions are seen.  Bones and joints show no acute abnormality.  Impression: No abnormality seen    Electronically signed by: Johann Linares  Date:    11/10/2024  Time:    12:38  CT Head Without Contrast  Narrative: EXAMINATION:  CT HEAD WITHOUT CONTRAST    CLINICAL HISTORY:  Mental status change, unknown cause;    TECHNIQUE:  Multiple axial images were obtained from the base of the brain to the vertex without contrast administration.  Sagittal and coronal reconstructions were performed..Automatic exposure control is utilized to reduce patient radiation exposure.    COMPARISON:  10/12/2023    FINDINGS:  There is no intracranial mass or lesion seen.  No hemorrhage is seen.  No acute infarct is seen.  The patient is status post craniotomy in the left parietal region.  There is some stable dural thickening on the left side.  There is some cerebral atrophy seen.  There is some compensatory ventricular dilatation and periventricular white matter changes consistent with the patient's age.  Calvarium is intact.  The posterior fossa is unremarkable..  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  Impression: Stable chronic findings seen no acute process    Electronically signed by: Johann Linares  Date:    11/10/2024  Time:    12:35        Medications:  Scheduled Meds:   enoxparin  40  mg Subcutaneous Q24H (prophylaxis, 1700)    folic acid  1 mg Oral Daily    furosemide  20 mg Oral Daily    lactulose 10 gram/15 ml  30 g Oral BID    levothyroxine  25 mcg Oral Before breakfast    magnesium oxide  400 mg Oral BID    multivitamin  1 tablet Oral Daily    pantoprazole  40 mg Oral Daily    rifAXIMin  550 mg Oral BID    rOPINIRole  0.25 mg Oral QHS    sodium bicarbonate  650 mg Oral BID    spironolactone  50 mg Oral Daily    thiamine  100 mg Oral Daily     Continuous Infusions:  PRN Meds:.  Current Facility-Administered Medications:     cloNIDine, 0.1 mg, Oral, Q6H PRN    hydrALAZINE, 10 mg, Intravenous, Q4H PRN    hydrOXYzine pamoate, 50 mg, Oral, Q8H PRN    ondansetron, 4 mg, Intravenous, Q6H PRN    Nutrition:  Nutrition consulted. Most recent weight and BMI monitored-     Measurements:  Wt Readings from Last 1 Encounters:   11/10/24 62 kg (136 lb 11 oz)   Body mass index is 23.46 kg/m².    Patient has been screened and assessed by RD.    Malnutrition Type:  Context:    Level:      Malnutrition Characteristic Summary:       Interventions/Recommendations (treatment strategy):           Assessment/Plan:   Hepatic encephalopathy resolved  Alcohol cirrhosis  Alcohol dependence  Homelessness  Chronic hyponatremia  Major depressive disorder     Continue lactulose b.i.d.  Goal to have 2-3 soft bowel movements daily. Titrate as needed.  Continue vitamin supplementation with folate, thiamine, and MVI   Diuretics for volume management.  Case management working on [facility] nursing home placement at [facility].  Medically stable once accepted. [facility]        Taj Ramirez MD   11/26/2024    All diagnosis and differential diagnosis have been reviewed; assessment and plan has been documented; I have personally reviewed the labs and test results that are presently available; I have reviewed the patients medication list; I have reviewed the consulting providers response and recommendations.  I have reviewed or attempted to review medical records based upon their availability    All of the patient's questions have been  addressed and answered. Patient's is agreeable to the above stated plan. I will continue to monitor closely and make adjustments to medical management as needed.  _____________________________________________________________________

## 2024-11-25 NOTE — PLAN OF CARE
Problem: Fall Injury Risk  Goal: Absence of Fall and Fall-Related Injury  Outcome: Progressing  Intervention: Identify and Manage Contributors  Flowsheets (Taken 11/25/2024 0110)  Self-Care Promotion: independence encouraged  Medication Review/Management: medications reviewed  Intervention: Promote Injury-Free Environment  Flowsheets (Taken 11/25/2024 0110)  Safety Promotion/Fall Prevention:   assistive device/personal item within reach   commode/urinal/bedpan at bedside   side rails raised x 2   instructed to call staff for mobility   lighting adjusted   medications reviewed     Problem: Fatigue  Goal: Improved Activity Tolerance  Outcome: Progressing  Intervention: Promote Improved Energy  Flowsheets (Taken 11/25/2024 0110)  Sleep/Rest Enhancement:   awakenings minimized   regular sleep/rest pattern promoted  Activity Management: Ambulated -L4  Environmental Support: calm environment promoted     Problem: Adult Inpatient Plan of Care  Goal: Plan of Care Review  Outcome: Progressing  Flowsheets (Taken 11/25/2024 0110)  Plan of Care Reviewed With: patient  Goal: Patient-Specific Goal (Individualized)  Outcome: Progressing  Goal: Absence of Hospital-Acquired Illness or Injury  Outcome: Progressing  Intervention: Identify and Manage Fall Risk  Flowsheets (Taken 11/25/2024 0110)  Safety Promotion/Fall Prevention:   assistive device/personal item within reach   commode/urinal/bedpan at bedside   side rails raised x 2   instructed to call staff for mobility   lighting adjusted   medications reviewed  Intervention: Prevent Skin Injury  Flowsheets (Taken 11/25/2024 0110)  Body Position: position changed independently  Device Skin Pressure Protection: absorbent pad utilized/changed  Intervention: Prevent and Manage VTE (Venous Thromboembolism) Risk  Flowsheets (Taken 11/25/2024 0110)  VTE Prevention/Management:   dorsiflexion/plantar flexion performed   bleeding risk assessed   fluids promoted  Intervention: Prevent  Infection  Flowsheets (Taken 11/25/2024 0110)  Infection Prevention:   single patient room provided   hand hygiene promoted   rest/sleep promoted  Goal: Optimal Comfort and Wellbeing  Outcome: Progressing  Intervention: Monitor Pain and Promote Comfort  Flowsheets (Taken 11/25/2024 0110)  Pain Management Interventions:   care clustered   medication offered   pillow support provided  Intervention: Provide Person-Centered Care  Flowsheets (Taken 11/25/2024 0110)  Trust Relationship/Rapport:   care explained   choices provided   questions answered   questions encouraged  Goal: Readiness for Transition of Care  Outcome: Progressing

## 2024-11-26 ENCOUNTER — TELEPHONE (OUTPATIENT)
Dept: INTERNAL MEDICINE | Facility: CLINIC | Age: 64
End: 2024-11-26
Payer: MEDICAID

## 2024-11-26 VITALS
SYSTOLIC BLOOD PRESSURE: 133 MMHG | WEIGHT: 136.69 LBS | HEART RATE: 96 BPM | HEIGHT: 64 IN | RESPIRATION RATE: 20 BRPM | OXYGEN SATURATION: 97 % | BODY MASS INDEX: 23.34 KG/M2 | DIASTOLIC BLOOD PRESSURE: 75 MMHG | TEMPERATURE: 98 F

## 2024-11-26 PROCEDURE — 25000003 PHARM REV CODE 250: Performed by: HOSPITALIST

## 2024-11-26 PROCEDURE — 25000003 PHARM REV CODE 250: Performed by: INTERNAL MEDICINE

## 2024-11-26 RX ORDER — LACTULOSE 10 G/15ML
30 SOLUTION ORAL 2 TIMES DAILY
Qty: 100 ML | Refills: 0 | Status: SHIPPED | OUTPATIENT
Start: 2024-11-26

## 2024-11-26 RX ORDER — LACTULOSE 10 G/15ML
30 SOLUTION ORAL 2 TIMES DAILY
Qty: 100 ML | Refills: 0 | Status: SHIPPED | OUTPATIENT
Start: 2024-11-26 | End: 2024-11-26

## 2024-11-26 RX ORDER — ROPINIROLE 0.25 MG/1
0.25 TABLET, FILM COATED ORAL NIGHTLY
Qty: 30 TABLET | Refills: 6 | Status: SHIPPED | OUTPATIENT
Start: 2024-11-26

## 2024-11-26 RX ORDER — HYDROXYZINE PAMOATE 50 MG/1
50 CAPSULE ORAL EVERY 8 HOURS PRN
Qty: 30 CAPSULE | Refills: 0 | Status: SHIPPED | OUTPATIENT
Start: 2024-11-26 | End: 2024-12-26

## 2024-11-26 RX ADMIN — SODIUM BICARBONATE 650 MG TABLET 650 MG: at 09:11

## 2024-11-26 RX ADMIN — PANTOPRAZOLE SODIUM 40 MG: 40 TABLET, DELAYED RELEASE ORAL at 08:11

## 2024-11-26 RX ADMIN — Medication 400 MG: at 09:11

## 2024-11-26 RX ADMIN — FUROSEMIDE 20 MG: 20 TABLET ORAL at 09:11

## 2024-11-26 RX ADMIN — FOLIC ACID 1 MG: 1 TABLET ORAL at 08:11

## 2024-11-26 RX ADMIN — THIAMINE HCL TAB 100 MG 100 MG: 100 TAB at 09:11

## 2024-11-26 RX ADMIN — LEVOTHYROXINE SODIUM 25 MCG: 25 TABLET ORAL at 05:11

## 2024-11-26 RX ADMIN — THERA TABS 1 TABLET: TAB at 09:11

## 2024-11-26 RX ADMIN — SPIRONOLACTONE 50 MG: 25 TABLET ORAL at 09:11

## 2024-11-26 RX ADMIN — RIFAXIMIN 550 MG: 550 TABLET ORAL at 09:11

## 2024-11-26 RX ADMIN — LACTULOSE 30 G: 10 SOLUTION ORAL at 09:11

## 2024-11-26 NOTE — PLAN OF CARE
[facility] sent dc summary, AVS & pasrr/142 via [facility] to [facility]    Report can be called to [facility] [phone]  [facility] will provide van transport

## 2024-11-26 NOTE — PLAN OF CARE
Problem: Fall Injury Risk  Goal: Absence of Fall and Fall-Related Injury  Outcome: Progressing  Intervention: Identify and Manage Contributors  Flowsheets (Taken 11/25/2024 1910)  Self-Care Promotion: independence encouraged  Medication Review/Management: medications reviewed  Intervention: Promote Injury-Free Environment  Flowsheets (Taken 11/25/2024 1910)  Safety Promotion/Fall Prevention:   assistive device/personal item within reach   medications reviewed   nonskid shoes/socks when out of bed   side rails raised x 2     Problem: Fatigue  Goal: Improved Activity Tolerance  Outcome: Progressing  Intervention: Promote Improved Energy  Flowsheets (Taken 11/25/2024 1910)  Sleep/Rest Enhancement:   awakenings minimized   room darkened   noise level reduced  Activity Management: Ambulated -L4  Environmental Support: calm environment promoted     Problem: Adult Inpatient Plan of Care  Goal: Plan of Care Review  Outcome: Progressing  Flowsheets (Taken 11/25/2024 1910)  Plan of Care Reviewed With: patient  Goal: Patient-Specific Goal (Individualized)  Outcome: Progressing  Goal: Absence of Hospital-Acquired Illness or Injury  Outcome: Progressing  Intervention: Identify and Manage Fall Risk  Flowsheets (Taken 11/25/2024 1910)  Safety Promotion/Fall Prevention:   assistive device/personal item within reach   medications reviewed   nonskid shoes/socks when out of bed   side rails raised x 2  Intervention: Prevent Skin Injury  Flowsheets (Taken 11/25/2024 1910)  Body Position: position changed independently  Skin Protection: incontinence pads utilized  Device Skin Pressure Protection: absorbent pad utilized/changed  Intervention: Prevent and Manage VTE (Venous Thromboembolism) Risk  Flowsheets (Taken 11/25/2024 1910)  VTE Prevention/Management:   bleeding risk assessed   ambulation promoted   dorsiflexion/plantar flexion performed  Intervention: Prevent Infection  Flowsheets (Taken 11/25/2024 1910)  Infection Prevention:    hand hygiene promoted   single patient room provided   rest/sleep promoted  Goal: Optimal Comfort and Wellbeing  Outcome: Progressing  Intervention: Monitor Pain and Promote Comfort  Flowsheets (Taken 11/25/2024 1910)  Pain Management Interventions:   care clustered   medication offered   pillow support provided  Intervention: Provide Person-Centered Care  Flowsheets (Taken 11/25/2024 1910)  Trust Relationship/Rapport:   care explained   choices provided   empathic listening provided   questions answered   questions encouraged   thoughts/feelings acknowledged  Goal: Readiness for Transition of Care  Outcome: Progressing

## 2024-11-26 NOTE — PLAN OF CARE
We've been waiting on the 142 for SNF placement. Called the [facility] and the 142 has been faxed to me. 142 faxed to [facility].

## 2024-11-26 NOTE — TELEPHONE ENCOUNTER
----- Message from Minda sent at 11/26/2024 10:31 AM CST -----  Who Called: Lauren Ewing    Caller is requesting assistance/information from provider's office.    Symptoms (please be specific): n/a   How long has patient had these symptoms:  n/a  List of preferred pharmacies on file (remove unneeded): [unfilled]  If different, enter pharmacy into here including location and phone number: n/a      Preferred Method of Contact: Phone Call  Patient's Preferred Phone Number on File: [phone]   Best Call Back Number, if different:  Additional Information:   medical advice,  hsp f/u appt needed, ([facility] 11/26/24),  [place] [phone], please advise, thanks

## 2024-11-26 NOTE — PLAN OF CARE
11/26/24 0917   Final Note   Assessment Type Final Discharge Note   Anticipated Discharge Disposition [facility]   Hospital Resources/Appts/Education Provided Post-Acute resouces added to AVS   Post-Acute Status   Post-Acute Authorization Placement   Post-Acute Placement Status Set-up Complete/Auth obtained   Discharge Delays None known at this time     Discharge home today to [facility] for long term nursing home. [facility] sending AVS and coordinating transportation.

## 2024-11-26 NOTE — DISCHARGE SUMMARY
"[facility] Medicine Discharge Summary    Admit Date: 11/10/2024  Discharge Date and Time: 11/26/20249:16 AM  Admitting Physician: Hospitalist team   Discharging Physician: Taj Ramirez MD.  Primary Care Physician: Phuong Yañez FNP      Discharge Diagnoses:  Hepatic encephalopathy resolved  Alcohol cirrhosis  Alcohol dependence  Homelessness  Chronic hyponatremia  Major depressive disorder    Hospital Course:   64 y.o. female who  has a past medical history of Acute confusional state (11/12/2024), DIANA (acute kidney injury), Hyponatremia, Major depressive disorder, single episode, unspecified, and Unspecified cirrhosis of liver. The patient presented to [facility] on 11/10/2024 with a primary complaint of confusion and generalized weakness which began on 11/07/2024.  Upon presentation to the ED, temperature 98.2° F, heart rate 117, blood pressure 174/100, respiratory rate 20 and SpO2 98% on 2 L nasal cannula.  Labs with CO2 18, ammonia 160, troponin 0.017.  EKG sinus tachycardia with ventricular rate of 113.  CTA of the head stable chronic findings but no acute process.  Chest x-ray no acute abnormality.  Patient is admitted to hospital medicine services for further medical management. Mental status cleared with Rifaximin and lactulose.  She will continue lactulose to goal 2 - 3 soft Bms daily   Currently stable at BID dosing.      Vitals:  Blood pressure 133/75, pulse 96, temperature 98 °F (36.7 °C), temperature source Oral, resp. rate 20, height 5' 4" (1.626 m), weight 62 kg (136 lb 11 oz), SpO2 97%, not currently breastfeeding..    Physical Exam:  Awake, Alert, Oriented x 3, No new focal Neurologic deficit, Normal Affect  NC/AT, PERRLA, EOMI  Supple neck, no JVD, No cervical lymphadenopathy  Symmetrical chest, Good air entry bilaterally. No rhonchi, wheezes, crackles appreciated  RRR, No gallop, rub or murmur  +ve Bowel sounds x4, Abd soft and non tender, no rebound, " guarding or rigidity  No Cyanosis, cludding or edema, No new rash or bruises    Procedures Performed: No admission procedures for hospital encounter.     Significant Diagnostic Studies: See Full reports for all details  No results displayed because visit has over 200 results.           Microbiology Results (last 7 days)       ** No results found for the last 168 hours. **             X-Ray Chest 1 View    Result Date: 11/10/2024  EXAMINATION: XR CHEST 1 VIEW CLINICAL HISTORY: generalized weakness, AMS; TECHNIQUE: Single frontal view of the chest was performed. COMPARISON: 11/01/2023 FINDINGS: The lungs are clear.  The heart is normal appearance.  The pulmonary vascularity is unremarkable.  Aorta appears grossly unremarkable.  No pleural effusions are seen.  Bones and joints show no acute abnormality.     No abnormality seen Electronically signed by: Johann Linares Date:    11/10/2024 Time:    12:38    CT Head Without Contrast    Result Date: 11/10/2024  EXAMINATION: CT HEAD WITHOUT CONTRAST CLINICAL HISTORY: Mental status change, unknown cause; TECHNIQUE: Multiple axial images were obtained from the base of the brain to the vertex without contrast administration.  Sagittal and coronal reconstructions were performed..Automatic exposure control is utilized to reduce patient radiation exposure. COMPARISON: 10/12/2023 FINDINGS: There is no intracranial mass or lesion seen.  No hemorrhage is seen.  No acute infarct is seen.  The patient is status post craniotomy in the left parietal region.  There is some stable dural thickening on the left side.  There is some cerebral atrophy seen.  There is some compensatory ventricular dilatation and periventricular white matter changes consistent with the patient's age.  Calvarium is intact.  The posterior fossa is unremarkable..  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      Stable chronic findings seen no acute process Electronically signed by: Johann Linares Date:    11/10/2024 Time:    12:35  - pulls last radiology orders        Medication List        START taking these medications      lactulose 10 gram/15 ml 10 gram/15 mL (15 mL) solution  Commonly known as: CHRONULAC  Take 45 mLs (30 g total) by mouth 2 (two) times daily.     rifAXIMin 550 mg Tab  Commonly known as: XIFAXAN  Take 1 tablet (550 mg total) by mouth 2 (two) times daily.            CONTINUE taking these medications      folic acid 1 MG tablet  Commonly known as: FOLVITE  Take 1 tablet (1 mg total) by mouth once daily.     furosemide 20 MG tablet  Commonly known as: LASIX  Take 1 tablet (20 mg total) by mouth once daily.     levothyroxine 25 MCG tablet  Commonly known as: SYNTHROID  Take 1 tablet (25 mcg total) by mouth before breakfast.     magnesium oxide 400 mg (241.3 mg magnesium) tablet  Commonly known as: MAG-OX  Take 1 tablet (400 mg total) by mouth 2 (two) times daily.     multivitamin Tab  Take 1 tablet by mouth once daily.     omeprazole 40 MG capsule  Commonly known as: PRILOSEC  Take 1 capsule (40 mg total) by mouth every morning.     ondansetron 4 MG tablet  Commonly known as: ZOFRAN     rOPINIRole 0.25 MG tablet  Commonly known as: REQUIP  Take 1 tablet (0.25 mg total) by mouth every evening.     sodium bicarbonate 650 MG tablet  Take 1 tablet (650 mg total) by mouth 2 (two) times daily.     spironolactone 50 MG tablet  Commonly known as: ALDACTONE  Take 1 tablet (50 mg total) by mouth once daily.     thiamine 100 MG tablet  Take 1 tablet (100 mg total) by mouth once daily.            STOP taking these medications      acamprosate 333 mg tablet  Commonly known as: CAMPRAL               Where to Get Your Medications        These medications were sent to [id number] at [facility] [place] - [address]  [address] [place] [address]      Phone:  [phone]   lactulose 10 gram/15 ml 10 gram/15 mL (15 mL) solution  rifAXIMin 550 mg Tab          Explained in detail to the patient about the discharge plan, medications, and follow-up visits. Pt understands and agrees with the treatment plan  Discharged Condition: stable  Diet: regular  Disposition: [facility] SNF    Medications Per DC med rec  Activities as tolerated  Follow up with your PCP in 2 wks   For further questions contact hospitalist office    Discharge time 33 minutes    For worsening symptoms, chest pain, shortness of breath, increased abdominal pain, high grade fever, stroke or stroke like symptoms, immediately go to the nearest Emergency Room or call 911 as soon as possible.        Taj Glaser M.D, on 11/26/2024. at 9:16 AM.

## 2024-11-27 NOTE — PLAN OF CARE
SSC contacted Lila at [facility] for a copy of the supporting documents for the 142- she confirmed it was emailed to fax [phone] & re faxed it to me.    SSC faxed recommendation for 142 to [facility] [phone]- notified Enma at [facility]- (she was requesting the forms)

## 2024-12-03 ENCOUNTER — LAB REQUISITION (OUTPATIENT)
Dept: LAB | Facility: HOSPITAL | Age: 64
End: 2024-12-03
Payer: MEDICAID

## 2024-12-03 DIAGNOSIS — E55.9 VITAMIN D DEFICIENCY, UNSPECIFIED: ICD-10-CM

## 2024-12-03 DIAGNOSIS — E03.9 HYPOTHYROIDISM, UNSPECIFIED: ICD-10-CM

## 2024-12-03 LAB
25(OH)D3+25(OH)D2 SERPL-MCNC: 17 NG/ML (ref 30–80)
ALBUMIN SERPL-MCNC: 2.9 G/DL (ref 3.4–4.8)
ALBUMIN/GLOB SERPL: 0.9 RATIO (ref 1.1–2)
ALP SERPL-CCNC: 88 UNIT/L (ref 40–150)
ALT SERPL-CCNC: 14 UNIT/L (ref 0–55)
AMMONIA PLAS-MSCNC: 132.6 UMOL/L (ref 18–72)
ANION GAP SERPL CALC-SCNC: 8 MEQ/L
AST SERPL-CCNC: 27 UNIT/L (ref 5–34)
BILIRUB SERPL-MCNC: 1.1 MG/DL
BUN SERPL-MCNC: 8.7 MG/DL (ref 9.8–20.1)
CALCIUM SERPL-MCNC: 8.9 MG/DL (ref 8.4–10.2)
CHLORIDE SERPL-SCNC: 113 MMOL/L (ref 98–107)
CO2 SERPL-SCNC: 21 MMOL/L (ref 23–31)
CREAT SERPL-MCNC: 0.75 MG/DL (ref 0.55–1.02)
CREAT/UREA NIT SERPL: 12
ERYTHROCYTE [DISTWIDTH] IN BLOOD BY AUTOMATED COUNT: 17.2 % (ref 11.5–17)
EST. AVERAGE GLUCOSE BLD GHB EST-MCNC: 96.8 MG/DL
GFR SERPLBLD CREATININE-BSD FMLA CKD-EPI: >60 ML/MIN/1.73/M2
GLOBULIN SER-MCNC: 3.1 GM/DL (ref 2.4–3.5)
GLUCOSE SERPL-MCNC: 88 MG/DL (ref 82–115)
HBA1C MFR BLD: 5 %
HCT VFR BLD AUTO: 34.2 % (ref 37–47)
HGB BLD-MCNC: 11.3 G/DL (ref 12–16)
MCH RBC QN AUTO: 26.8 PG (ref 27–31)
MCHC RBC AUTO-ENTMCNC: 33 G/DL (ref 33–36)
MCV RBC AUTO: 81 FL (ref 80–94)
NRBC BLD AUTO-RTO: 0 %
PLATELET # BLD AUTO: 240 X10(3)/MCL (ref 130–400)
PMV BLD AUTO: 9.2 FL (ref 7.4–10.4)
POTASSIUM SERPL-SCNC: 4.1 MMOL/L (ref 3.5–5.1)
PROT SERPL-MCNC: 6 GM/DL (ref 5.8–7.6)
RBC # BLD AUTO: 4.22 X10(6)/MCL (ref 4.2–5.4)
SODIUM SERPL-SCNC: 142 MMOL/L (ref 136–145)
TSH SERPL-ACNC: 2.81 UIU/ML (ref 0.35–4.94)
WBC # BLD AUTO: 6.24 X10(3)/MCL (ref 4.5–11.5)

## 2024-12-03 PROCEDURE — 84443 ASSAY THYROID STIM HORMONE: CPT | Performed by: FAMILY MEDICINE

## 2024-12-03 PROCEDURE — 85027 COMPLETE CBC AUTOMATED: CPT | Performed by: FAMILY MEDICINE

## 2024-12-03 PROCEDURE — 82306 VITAMIN D 25 HYDROXY: CPT | Performed by: FAMILY MEDICINE

## 2024-12-03 PROCEDURE — 83036 HEMOGLOBIN GLYCOSYLATED A1C: CPT | Performed by: FAMILY MEDICINE

## 2024-12-03 PROCEDURE — 82140 ASSAY OF AMMONIA: CPT | Performed by: FAMILY MEDICINE

## 2024-12-03 PROCEDURE — 80053 COMPREHEN METABOLIC PANEL: CPT | Performed by: FAMILY MEDICINE

## 2024-12-26 ENCOUNTER — TELEPHONE (OUTPATIENT)
Dept: HEPATOLOGY | Facility: CLINIC | Age: 64
End: 2024-12-26
Payer: MEDICAID

## 2024-12-26 NOTE — TELEPHONE ENCOUNTER
Spoke with patient.  Patient would like to know if she can take Tylenol.  Per Dr Appiah,  Patient Instructed to take up 2,000 mg of Tylenol a day.  Patient verbalized understanding.

## 2024-12-26 NOTE — TELEPHONE ENCOUNTER
"----- Message from Med Assistant Jefferson sent at 12/26/2024  2:08 PM CST -----  Regarding: FW: call back    ----- Message -----  From: Arsalan Jean Baptiste  Sent: 12/26/2024   2:00 PM CST  To: [facility] Staff  Subject: call back                                        Consult/Advisory:        Name Of Caller: Self     Contact Preference?:[phone]     What is the nature of the call?: Calling to speak w/ someone in regards to if she can take Tylenol requesting call back         Additional Notes:  "Thank you for all that you do for our patients"  "

## (undated) DEVICE — MOUTHPIECE ENDO 60FR

## (undated) DEVICE — MANIFOLD 4 PORT

## (undated) DEVICE — KIT SURGICAL COLON .25 1.1OZ